# Patient Record
Sex: FEMALE | Race: WHITE | NOT HISPANIC OR LATINO | Employment: OTHER | URBAN - METROPOLITAN AREA
[De-identification: names, ages, dates, MRNs, and addresses within clinical notes are randomized per-mention and may not be internally consistent; named-entity substitution may affect disease eponyms.]

---

## 2017-06-20 ENCOUNTER — GENERIC CONVERSION - ENCOUNTER (OUTPATIENT)
Dept: OTHER | Facility: OTHER | Age: 77
End: 2017-06-20

## 2017-06-23 RX ORDER — OMEPRAZOLE 20 MG/1
20 CAPSULE, DELAYED RELEASE ORAL DAILY
COMMUNITY
End: 2020-01-13

## 2017-06-23 RX ORDER — ASPIRIN 81 MG/1
81 TABLET ORAL DAILY
COMMUNITY
End: 2020-01-11

## 2017-06-23 RX ORDER — LEVOTHYROXINE SODIUM 0.1 MG/1
137 TABLET ORAL DAILY
COMMUNITY
End: 2021-03-02 | Stop reason: HOSPADM

## 2017-06-23 RX ORDER — METOPROLOL TARTRATE 100 MG/1
100 TABLET ORAL DAILY
COMMUNITY
End: 2021-03-02 | Stop reason: HOSPADM

## 2017-06-25 ENCOUNTER — ANESTHESIA EVENT (OUTPATIENT)
Dept: PERIOP | Facility: HOSPITAL | Age: 77
End: 2017-06-25
Payer: MEDICARE

## 2017-06-26 ENCOUNTER — HOSPITAL ENCOUNTER (OUTPATIENT)
Facility: HOSPITAL | Age: 77
Setting detail: OUTPATIENT SURGERY
Discharge: HOME | End: 2017-06-26
Attending: INTERNAL MEDICINE | Admitting: INTERNAL MEDICINE
Payer: MEDICARE

## 2017-06-26 ENCOUNTER — ANESTHESIA (OUTPATIENT)
Dept: PERIOP | Facility: HOSPITAL | Age: 77
End: 2017-06-26
Payer: MEDICARE

## 2017-06-26 ENCOUNTER — GENERIC CONVERSION - ENCOUNTER (OUTPATIENT)
Dept: OTHER | Facility: OTHER | Age: 77
End: 2017-06-26

## 2017-06-26 VITALS
TEMPERATURE: 98 F | HEART RATE: 62 BPM | SYSTOLIC BLOOD PRESSURE: 149 MMHG | RESPIRATION RATE: 21 BRPM | DIASTOLIC BLOOD PRESSURE: 71 MMHG | HEIGHT: 66 IN | BODY MASS INDEX: 34.69 KG/M2 | WEIGHT: 215.83 LBS | OXYGEN SATURATION: 95 %

## 2017-06-26 DIAGNOSIS — Z12.11 ENCOUNTER FOR SCREENING FOR MALIGNANT NEOPLASM OF COLON: ICD-10-CM

## 2017-06-26 PROCEDURE — 88305 TISSUE EXAM BY PATHOLOGIST: CPT | Performed by: INTERNAL MEDICINE

## 2017-06-26 RX ORDER — SODIUM CHLORIDE, SODIUM LACTATE, POTASSIUM CHLORIDE, CALCIUM CHLORIDE 600; 310; 30; 20 MG/100ML; MG/100ML; MG/100ML; MG/100ML
125 INJECTION, SOLUTION INTRAVENOUS CONTINUOUS
Status: DISCONTINUED | OUTPATIENT
Start: 2017-06-26 | End: 2017-06-26 | Stop reason: HOSPADM

## 2017-06-26 RX ORDER — PROPOFOL 10 MG/ML
INJECTION, EMULSION INTRAVENOUS AS NEEDED
Status: DISCONTINUED | OUTPATIENT
Start: 2017-06-26 | End: 2017-06-26 | Stop reason: SURG

## 2017-06-26 RX ADMIN — PROPOFOL 120 MG: 10 INJECTION, EMULSION INTRAVENOUS at 10:33

## 2017-06-26 RX ADMIN — PROPOFOL 50 MG: 10 INJECTION, EMULSION INTRAVENOUS at 10:42

## 2017-06-26 RX ADMIN — SODIUM CHLORIDE, SODIUM LACTATE, POTASSIUM CHLORIDE, AND CALCIUM CHLORIDE 125 ML/HR: .6; .31; .03; .02 INJECTION, SOLUTION INTRAVENOUS at 10:05

## 2017-06-26 RX ADMIN — PROPOFOL 50 MG: 10 INJECTION, EMULSION INTRAVENOUS at 10:38

## 2017-06-26 RX ADMIN — PROPOFOL 30 MG: 10 INJECTION, EMULSION INTRAVENOUS at 10:35

## 2017-07-05 ENCOUNTER — GENERIC CONVERSION - ENCOUNTER (OUTPATIENT)
Dept: OTHER | Facility: OTHER | Age: 77
End: 2017-07-05

## 2018-01-14 NOTE — RESULT NOTES
Verified Results  (1) TISSUE EXAM [id number] 10:43AM Zita Mcclelland     Test Name Result Flag Reference   LAB AP CASE REPORT (Report)     Surgical Pathology Report             Case: [id number]                   Authorizing Provider: Christal Rodríguez MD  Collected:      06/26/2017 1043        Ordering Location:   [facility] Received:      06/26/2017 1153                    Operating Room                                 Pathologist:      Dimple Tesfaye MD                                 Specimen:  Polyp, Colorectal, Splenic flexure polypectomy   LAB AP FINAL DIAGNOSIS      A  Splenic flexure polyp, colon (cold snare polypectomy):    - Portions of polypoid colonic mucosa with minimal surface hyperplasia  - No high-grade dysplasia or malignancy identified  Electronically signed by Dimple Tesfaye MD on 6/28/2017 at 12:29 PM   LAB AP NOTE      Interpretation performed at [facility], [address] [place] [address], [address] [place] [address]  LAB AP SURGICAL ADDITIONAL INFORMATION (Report)     These tests were developed and their performance characteristics   determined by [facility] or One [facility]  They may not be cleared or approved by the [place]  Food and   Drug Administration  The FDA has determined that such clearance or   approval is not necessary  These tests are used for clinical purposes  They should not be regarded as investigational or for research  This   laboratory has been approved by [facility], designated as a high-complexity   laboratory and is qualified to perform these tests  LAB AP GROSS DESCRIPTION (Report)     A  The specimen is received in formalin, labeled with the patient's name   and hospital number, and is designated splenic flexure polypectomy  The   specimen consists of an unoriented tan soft tissue fragment measuring 1 1   x 0 2 x 0 2 cm  Entirely submitted  One cassette      Note: The estimated total formalin fixation time based upon information provided by the submitting clinician and the standard processing schedule   is 9 75 hours      MAC   LAB AP CLINICAL INFORMATION      Encounter for screening for malignant neoplasm of colon   Cold snare

## 2018-04-24 ENCOUNTER — TRANSCRIBE ORDERS (OUTPATIENT)
Dept: ADMINISTRATIVE | Facility: HOSPITAL | Age: 78
End: 2018-04-24

## 2020-01-11 ENCOUNTER — HOSPITAL ENCOUNTER (EMERGENCY)
Facility: HOSPITAL | Age: 80
Discharge: HOME | DRG: 194 | End: 2020-01-11
Attending: EMERGENCY MEDICINE | Admitting: EMERGENCY MEDICINE
Payer: MEDICARE

## 2020-01-11 ENCOUNTER — APPOINTMENT (EMERGENCY)
Dept: RADIOLOGY | Facility: HOSPITAL | Age: 80
DRG: 194 | End: 2020-01-11
Payer: MEDICARE

## 2020-01-11 VITALS
SYSTOLIC BLOOD PRESSURE: 136 MMHG | BODY MASS INDEX: 36.55 KG/M2 | OXYGEN SATURATION: 96 % | TEMPERATURE: 97.8 F | DIASTOLIC BLOOD PRESSURE: 89 MMHG | HEIGHT: 65 IN | WEIGHT: 219.36 LBS | RESPIRATION RATE: 19 BRPM | HEART RATE: 63 BPM

## 2020-01-11 DIAGNOSIS — S42.202A CLOSED FRACTURE OF LEFT PROXIMAL HUMERUS: Primary | ICD-10-CM
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
## 2020-01-11 LAB
ALBUMIN SERPL BCP-MCNC: 3.8 G/DL (ref 3.5–5)
ALP SERPL-CCNC: 108 U/L (ref 46–116)
ALT SERPL W P-5'-P-CCNC: 47 U/L (ref 12–78)
ANION GAP SERPL CALCULATED.3IONS-SCNC: 11 MMOL/L (ref 4–13)
APTT PPP: 26 SECONDS (ref 23–37)
AST SERPL W P-5'-P-CCNC: 33 U/L (ref 5–45)
ATRIAL RATE: 90 BPM
BASOPHILS # BLD AUTO: 0.03 THOUSANDS/ΜL (ref 0–0.1)
BASOPHILS NFR BLD AUTO: 0 % (ref 0–1)
BILIRUB SERPL-MCNC: 0.5 MG/DL (ref 0.2–1)
BUN SERPL-MCNC: 19 MG/DL (ref 5–25)
CALCIUM SERPL-MCNC: 8.9 MG/DL (ref 8.3–10.1)
CHLORIDE SERPL-SCNC: 103 MMOL/L (ref 100–108)
CO2 SERPL-SCNC: 23 MMOL/L (ref 21–32)
CREAT SERPL-MCNC: 0.91 MG/DL (ref 0.6–1.3)
EOSINOPHIL # BLD AUTO: 0.2 THOUSAND/ΜL (ref 0–0.61)
EOSINOPHIL NFR BLD AUTO: 3 % (ref 0–6)
ERYTHROCYTE [DISTWIDTH] IN BLOOD BY AUTOMATED COUNT: 14.1 % (ref 11.6–15.1)
GFR SERPL CREATININE-BSD FRML MDRD: 61 ML/MIN/1.73SQ M
GLUCOSE SERPL-MCNC: 122 MG/DL (ref 65–140)
HCT VFR BLD AUTO: 39.4 % (ref 34.8–46.1)
HGB BLD-MCNC: 12.8 G/DL (ref 11.5–15.4)
IMM GRANULOCYTES # BLD AUTO: 0.03 THOUSAND/UL (ref 0–0.2)
IMM GRANULOCYTES NFR BLD AUTO: 0 % (ref 0–2)
INR PPP: 1.02 (ref 0.84–1.19)
LYMPHOCYTES # BLD AUTO: 4.27 THOUSANDS/ΜL (ref 0.6–4.47)
LYMPHOCYTES NFR BLD AUTO: 58 % (ref 14–44)
MCH RBC QN AUTO: 30.5 PG (ref 26.8–34.3)
MCHC RBC AUTO-ENTMCNC: 32.5 G/DL (ref 31.4–37.4)
MCV RBC AUTO: 94 FL (ref 82–98)
MONOCYTES # BLD AUTO: 0.61 THOUSAND/ΜL (ref 0.17–1.22)
MONOCYTES NFR BLD AUTO: 8 % (ref 4–12)
NEUTROPHILS # BLD AUTO: 2.32 THOUSANDS/ΜL (ref 1.85–7.62)
NEUTS SEG NFR BLD AUTO: 31 % (ref 43–75)
NRBC BLD AUTO-RTO: 0 /100 WBCS
P AXIS: 47 DEGREES
PLATELET # BLD AUTO: 222 THOUSANDS/UL (ref 149–390)
PMV BLD AUTO: 10.8 FL (ref 8.9–12.7)
POTASSIUM SERPL-SCNC: 3.9 MMOL/L (ref 3.5–5.3)
PR INTERVAL: 278 MS
PROT SERPL-MCNC: 8.2 G/DL (ref 6.4–8.2)
PROTHROMBIN TIME: 13.4 SECONDS (ref 11.6–14.5)
QRS AXIS: -40 DEGREES
QRSD INTERVAL: 122 MS
QT INTERVAL: 392 MS
QTC INTERVAL: 479 MS
RBC # BLD AUTO: 4.2 MILLION/UL (ref 3.81–5.12)
SODIUM SERPL-SCNC: 137 MMOL/L (ref 136–145)
T WAVE AXIS: 29 DEGREES
VENTRICULAR RATE: 90 BPM
WBC # BLD AUTO: 7.46 THOUSAND/UL (ref 4.31–10.16)

## 2020-01-11 PROCEDURE — 85730 THROMBOPLASTIN TIME PARTIAL: CPT | Performed by: NURSE PRACTITIONER

## 2020-01-11 PROCEDURE — 85610 PROTHROMBIN TIME: CPT | Performed by: NURSE PRACTITIONER

## 2020-01-11 PROCEDURE — 93005 ELECTROCARDIOGRAM TRACING: CPT

## 2020-01-11 PROCEDURE — 85025 COMPLETE CBC W/AUTO DIFF WBC: CPT | Performed by: NURSE PRACTITIONER

## 2020-01-11 PROCEDURE — 73060 X-RAY EXAM OF HUMERUS: CPT

## 2020-01-11 PROCEDURE — 99285 EMERGENCY DEPT VISIT HI MDM: CPT | Performed by: NURSE PRACTITIONER

## 2020-01-11 PROCEDURE — 93010 ELECTROCARDIOGRAM REPORT: CPT | Performed by: INTERNAL MEDICINE

## 2020-01-11 PROCEDURE — 99284 EMERGENCY DEPT VISIT MOD MDM: CPT

## 2020-01-11 PROCEDURE — 96374 THER/PROPH/DIAG INJ IV PUSH: CPT

## 2020-01-11 PROCEDURE — 80053 COMPREHEN METABOLIC PANEL: CPT | Performed by: NURSE PRACTITIONER

## 2020-01-11 PROCEDURE — 36415 COLL VENOUS BLD VENIPUNCTURE: CPT | Performed by: NURSE PRACTITIONER

## 2020-01-11 PROCEDURE — 96375 TX/PRO/DX INJ NEW DRUG ADDON: CPT

## 2020-01-11 RX ORDER — ONDANSETRON 2 MG/ML
INJECTION INTRAMUSCULAR; INTRAVENOUS
Status: DISCONTINUED
Start: 2020-01-11 | End: 2020-01-11 | Stop reason: HOSPADM

## 2020-01-11 RX ORDER — OXYCODONE HYDROCHLORIDE AND ACETAMINOPHEN 5; 325 MG/1; MG/1
1 TABLET ORAL EVERY 6 HOURS PRN
Qty: 20 TABLET | Refills: 0 | Status: SHIPPED | OUTPATIENT
Start: 2020-01-11 | End: 2020-01-11 | Stop reason: SDUPTHER

## 2020-01-11 RX ORDER — SODIUM CHLORIDE, SODIUM GLUCONATE, SODIUM ACETATE, POTASSIUM CHLORIDE, MAGNESIUM CHLORIDE, SODIUM PHOSPHATE, DIBASIC, AND POTASSIUM PHOSPHATE .53; .5; .37; .037; .03; .012; .00082 G/100ML; G/100ML; G/100ML; G/100ML; G/100ML; G/100ML; G/100ML
1000 INJECTION, SOLUTION INTRAVENOUS ONCE
Status: COMPLETED | OUTPATIENT
Start: 2020-01-11 | End: 2020-01-11

## 2020-01-11 RX ORDER — OXYCODONE HYDROCHLORIDE AND ACETAMINOPHEN 5; 325 MG/1; MG/1
1 TABLET ORAL EVERY 6 HOURS PRN
Qty: 20 TABLET | Refills: 0 | Status: SHIPPED | OUTPATIENT
Start: 2020-01-11 | End: 2020-02-05

## 2020-01-11 RX ORDER — ONDANSETRON 2 MG/ML
4 INJECTION INTRAMUSCULAR; INTRAVENOUS ONCE
Status: COMPLETED | OUTPATIENT
Start: 2020-01-11 | End: 2020-01-11

## 2020-01-11 RX ORDER — METOPROLOL TARTRATE 5 MG/5ML
5 INJECTION INTRAVENOUS ONCE
Status: COMPLETED | OUTPATIENT
Start: 2020-01-11 | End: 2020-01-11

## 2020-01-11 RX ORDER — FENTANYL CITRATE 50 UG/ML
50 INJECTION, SOLUTION INTRAMUSCULAR; INTRAVENOUS ONCE
Status: COMPLETED | OUTPATIENT
Start: 2020-01-11 | End: 2020-01-11

## 2020-01-11 RX ORDER — METOPROLOL TARTRATE 50 MG/1
50 TABLET, FILM COATED ORAL ONCE
Status: COMPLETED | OUTPATIENT
Start: 2020-01-11 | End: 2020-01-11

## 2020-01-11 RX ADMIN — SODIUM CHLORIDE, SODIUM GLUCONATE, SODIUM ACETATE, POTASSIUM CHLORIDE, MAGNESIUM CHLORIDE, SODIUM PHOSPHATE, DIBASIC, AND POTASSIUM PHOSPHATE 1000 ML: .53; .5; .37; .037; .03; .012; .00082 INJECTION, SOLUTION INTRAVENOUS at 09:21

## 2020-01-11 RX ADMIN — METOPROLOL TARTRATE 5 MG: 5 INJECTION INTRAVENOUS at 07:58

## 2020-01-11 RX ADMIN — ONDANSETRON 4 MG: 2 INJECTION INTRAMUSCULAR; INTRAVENOUS at 08:04

## 2020-01-11 RX ADMIN — METOPROLOL TARTRATE 50 MG: 50 TABLET, FILM COATED ORAL at 07:57

## 2020-01-11 RX ADMIN — FENTANYL CITRATE 50 MCG: 50 INJECTION, SOLUTION INTRAMUSCULAR; INTRAVENOUS at 07:53

## 2020-01-11 RX ADMIN — VERAPAMIL HYDROCHLORIDE 120 MG: 120 TABLET, FILM COATED, EXTENDED RELEASE ORAL at 08:29

## 2020-01-11 NOTE — ED PROVIDER NOTES
H&P Exam - Trauma   Claudell Dux 70 y o  female MRN: [id number]  Unit/Bed#: ED 26/ED 26 Encounter: [id number]    Assessment/Plan   Trauma Alert: Trauma Acuity: Trauma Evaluation  Model of Arrival: Mode of Arrival: Direct from scene via    Trauma Team: Current Providers  Attending Provider: Leonid Seaman MD  Nurse Practitioner: AAMIR Cruz  ED Technician: Susan Lisa  Registered Nurse: Zeferino Charles RN  Consultants: None    Trauma Active Problems: Left upper arm pain    Trauma Plan: Xray left humerus     Chief Complaint:   Chief Complaint   Patient presents with    Fall     pt states she slipped and fell and injured her left shoulder  Denies head injuries  negative BT  History of Present Illness   HPI:  Claudell Dux is a 70 y o  female who presents with left upper arm injury after falling while trying to tend to her small dog  Mechanism:Details of Incident: pt slipped and fell and injured her left shouulder onto the grass outside  No LOC  No head injury  No blood thinners  Injury Date: 01/11/20 Injury Time: 0700 Injury Occurence Location - [address] [place] [address] Way: Patient fell from standing position and landed on her left shoulder outside on the grass  No LOC, No Blood thinner  No head injury  HPI  Review of Systems   Constitutional: Negative for activity change, fatigue and fever  HENT: Negative for congestion, ear pain, rhinorrhea and sore throat  Eyes: Negative  Respiratory: Negative for cough, shortness of breath and wheezing  Gastrointestinal: Negative for abdominal pain, diarrhea, nausea and vomiting  Endocrine: Negative  Genitourinary: Negative for difficulty urinating, dyspareunia, dysuria, flank pain, frequency, menstrual problem, pelvic pain, urgency, vaginal bleeding, vaginal discharge and vaginal pain  Musculoskeletal: Negative for arthralgias and myalgias  Skin: Negative for color change and pallor     Neurological: Negative for dizziness, speech difficulty, weakness and headaches  Hematological: Negative for adenopathy  Psychiatric/Behavioral: Negative for confusion  Historical Information     Immunizations: There is no immunization history on file for this patient  Past Medical History:   Diagnosis Date    Breast cancer ([address] )     Disease of thyroid gland     GERD (gastroesophageal reflux disease)     Hypertension     Rapid heart rate      History reviewed  No pertinent family history  Past Surgical History:   Procedure Laterality Date    BREAST SURGERY      CHOLECYSTECTOMY      MD COLONOSCOPY FLX DX W/COLLJ SPEC WHEN PFRMD N/A 6/26/2017    Procedure: COLONOSCOPY;  Surgeon: Heather Lai MD;  Location: [facility];   Service: Gastroenterology       Social History     Socioeconomic History    Marital status: /Civil Union     Spouse name: None    Number of children: None    Years of education: None    Highest education level: None   Occupational History    None   Social Needs    Financial resource strain: None    Food insecurity:     Worry: None     Inability: None    Transportation needs:     Medical: None     Non-medical: None   Tobacco Use    Smoking status: Never Smoker    Smokeless tobacco: Never Used   Substance and Sexual Activity    Alcohol use: No    Drug use: No    Sexual activity: None   Lifestyle    Physical activity:     Days per week: None     Minutes per session: None    Stress: None   Relationships    Social connections:     Talks on phone: None     Gets together: None     Attends Synagogue service: None     Active member of club or organization: None     Attends meetings of clubs or organizations: None     Relationship status: None    Intimate partner violence:     Fear of current or ex partner: None     Emotionally abused: None     Physically abused: None     Forced sexual activity: None   Other Topics Concern    None   Social History Narrative    None       Family History: non-contributory    Meds/Allergies   Prior to Admission Medications   Prescriptions Last Dose Informant Patient Reported? Taking?   levothyroxine 100 mcg tablet   Yes Yes   Sig: Take 88 mcg by mouth daily     metoprolol tartrate (LOPRESSOR) 100 mg tablet   Yes Yes   Sig: Take 50 mg by mouth 2 (two) times a day     omeprazole (PriLOSEC) 20 mg delayed release capsule   Yes Yes   Sig: Take 20 mg by mouth daily   verapamil (CALAN-SR) 180 mg CR tablet   Yes Yes   Sig: Take 180 mg by mouth daily at bedtime      Facility-Administered Medications: None       No Known Allergies    PHYSICAL EXAM    PE limited by: nothing     Objective   Vitals:   First set: Temperature: 97 8 °F (36 6 °C) (01/11/20 0735)  Pulse: (!) 162 (01/11/20 0735)  Respirations: 20 (01/11/20 0735)  Blood Pressure: 131/91 (01/11/20 0735)  SpO2: 97 % (01/11/20 0735)    Primary Survey:   (A) Airway:  Patent  (B) Breathing:  Unlabored equal respirations  (C) Circulation: Pulses:   normal  (D) Disabliity:  GCS Total:  15  (E) Expose:  Completed    Secondary Survey: (Click on Physical Exam tab above)  Physical Exam   Constitutional: She is oriented to person, place, and time  She appears well-developed and well-nourished  She is cooperative  Non-toxic appearance  She does not have a sickly appearance  She does not appear ill  No distress  HENT:   Head: Normocephalic and atraumatic  Right Ear: Tympanic membrane and external ear normal    Left Ear: Tympanic membrane and external ear normal    Nose: No rhinorrhea, sinus tenderness or nasal deformity  No epistaxis  Right sinus exhibits no maxillary sinus tenderness and no frontal sinus tenderness  Left sinus exhibits no maxillary sinus tenderness and no frontal sinus tenderness  Mouth/Throat: Oropharynx is clear and moist and mucous membranes are normal  Normal dentition  Eyes: Pupils are equal, round, and reactive to light  EOM are normal    Neck: Normal range of motion  Neck supple     Cardiovascular: Normal rate, regular rhythm and normal heart sounds  No murmur heard  Pulmonary/Chest: Effort normal and breath sounds normal  No accessory muscle usage  No respiratory distress  She has no wheezes  She has no rales  She exhibits no tenderness  Abdominal: Soft  She exhibits no distension  There is no guarding  Musculoskeletal: Normal range of motion  She exhibits no edema  Left shoulder: She exhibits tenderness and swelling  Lymphadenopathy:     She has no cervical adenopathy  Neurological: She is alert and oriented to person, place, and time  She exhibits normal muscle tone  Skin: Skin is warm and dry  No rash noted  No erythema  Psychiatric: She has a normal mood and affect  Nursing note and vitals reviewed        Invasive Devices     None                 Lab Results:   Results Reviewed     Procedure Component Value Units Date/Time    Comprehensive metabolic panel [[id number]] Collected:  01/11/20 0753    Lab Status:  Final result Specimen:  Blood from Arm, Right Updated:  01/11/20 0820     Sodium 137 mmol/L      Potassium 3 9 mmol/L      Chloride 103 mmol/L      CO2 23 mmol/L      ANION GAP 11 mmol/L      BUN 19 mg/dL      Creatinine 0 91 mg/dL      Glucose 122 mg/dL      Calcium 8 9 mg/dL      AST 33 U/L      ALT 47 U/L      Alkaline Phosphatase 108 U/L      Total Protein 8 2 g/dL      Albumin 3 8 g/dL      Total Bilirubin 0 50 mg/dL      eGFR 61 ml/min/1 73sq m     Narrative:       [place] guidelines for Chronic Kidney Disease (CKD):     Stage 1 with normal or high GFR (GFR > 90 mL/min/1 73 square meters)    Stage 2 Mild CKD (GFR = 60-89 mL/min/1 73 square meters)    Stage 3A Moderate CKD (GFR = 45-59 mL/min/1 73 square meters)    Stage 3B Moderate CKD (GFR = 30-44 mL/min/1 73 square meters)    Stage 4 Severe CKD (GFR = 15-29 mL/min/1 73 square meters)    Stage 5 End Stage CKD (GFR <15 mL/min/1 73 square meters)  Note: GFR calculation is accurate only with a steady state creatinine    Protime-INR [[id number]]  (Normal) Collected:  01/11/20 0753    Lab Status:  Final result Specimen:  Blood from Arm, Right Updated:  01/11/20 0815     Protime 13 4 seconds      INR 1 02    APTT [[id number]]  (Normal) Collected:  01/11/20 0753    Lab Status:  Final result Specimen:  Blood from Arm, Right Updated:  01/11/20 0815     PTT 26 seconds     CBC and differential [[id number]]  (Abnormal) Collected:  01/11/20 0753    Lab Status:  Final result Specimen:  Blood from Arm, Right Updated:  01/11/20 0801     WBC 7 46 Thousand/uL      RBC 4 20 Million/uL      Hemoglobin 12 8 g/dL      Hematocrit 39 4 %      MCV 94 fL      MCH 30 5 pg      MCHC 32 5 g/dL      RDW 14 1 %      MPV 10 8 fL      Platelets 826 Thousands/uL      nRBC 0 /100 WBCs      Neutrophils Relative 31 %      Immat GRANS % 0 %      Lymphocytes Relative 58 %      Monocytes Relative 8 %      Eosinophils Relative 3 %      Basophils Relative 0 %      Neutrophils Absolute 2 32 Thousands/µL      Immature Grans Absolute 0 03 Thousand/uL      Lymphocytes Absolute 4 27 Thousands/µL      Monocytes Absolute 0 61 Thousand/µL      Eosinophils Absolute 0 20 Thousand/µL      Basophils Absolute 0 03 Thousands/µL                  Imaging Studies:   Direct to CT: No  XR humerus LEFT   ED Interpretation by AAMIR Hernandez (01/11 0820)   Proximal humerus fracture      Final Result by Josep Garcia MD (01/11 1520)      Impacted nondisplaced humeral neck fracture  The preliminary findings noted by the ER physician concur with this final interpretation              Workstation performed: [id number]             Other Studies:  None    Code Status: No Order  Advance Directive and Living Will:      Power of :    POLST:      Procedures  ECG 12 Lead Documentation Only  Date/Time: 1/11/2020 9:27 AM  Performed by: AAMIR Hernandez  Authorized by: AAMIR Hernandez     Indications / Diagnosis:  Resolved tachycardia  ECG reviewed by me, the ED Provider: yes    Patient location:  ED  Previous ECG:     Previous ECG:  Compared to current    Similarity:  Changes noted  Interpretation:     Interpretation: normal    Rate:     ECG rate:  90    ECG rate assessment: normal    Rhythm:     Rhythm: sinus rhythm and A-V block    Ectopy:     Ectopy: none    QRS:     QRS axis:  Normal  Conduction:     Conduction: normal    ST segments:     ST segments:  Normal  T waves:     T waves: normal    ECG 12 Lead Documentation Only  Date/Time: 1/11/2020 7:40 AM  Performed by: AAMIR Cruz  Authorized by: AAMIR Cruz     Indications / Diagnosis:  Tachycardia left humerus fracture  ECG reviewed by me, the ED Provider: yes    Patient location:  ED  Previous ECG:     Previous ECG:  Compared to current    Similarity:  Changes noted  Interpretation:     Interpretation: abnormal    Rate:     ECG rate:  151    ECG rate assessment: tachycardic    Conduction:     Conduction: normal    ST segments:     ST segments:  Normal             ED Course         MDM  Number of Diagnoses or Management Options  Closed fracture of left proximal humerus: new and requires workup  Fall, initial encounter: new and requires workup  Diagnosis management comments: Patient least in the sling for supportive therapy of left proximal humerus fracture  Structure to follow up with Orthopedics  No other injury occurred from fall         Amount and/or Complexity of Data Reviewed  Tests in the radiology section of CPT®: reviewed and ordered    Patient Progress  Patient progress: stable        Disposition  [facility] Transfer: No  Final diagnoses:   Closed fracture of left proximal humerus   Fall, initial encounter     Time reflects when diagnosis was documented in both MDM as applicable and the Disposition within this note     Time User Action Codes Description Comment    1/11/2020  8:20 AM  Bold Add [S42 202A] Closed fracture of left proximal humerus     1/11/2020  9:34 AM Bran Fernandez [W19 XXXA] Fall, initial encounter       ED Disposition     ED Disposition Condition Date/Time Comment    Discharge Stable Sat Jan 11, 2020  9:35 AM Mignonzaheer Mendozat discharge to home/self care  Follow-up Information     Follow up With Specialties Details Why Contact Info Additional [address] [facility]  Orthopedic Surgery Schedule an appointment as soon as possible for a visit  For Continued Evaluation Muna [address] [place] [address] [place] [phone] [facility] [place] [address], [id number] [place] [address], [address] [facility]        Discharge Medication List as of 1/11/2020  9:39 AM      CONTINUE these medications which have CHANGED    Details   oxyCODONE-acetaminophen (PERCOCET) 5-325 mg per tablet Take 1 tablet by mouth every 6 (six) hours as needed for severe pain for up to 20 dosesMax Daily Amount: 4 tablets, Starting Sat 1/11/2020, Normal         CONTINUE these medications which have NOT CHANGED    Details   levothyroxine 100 mcg tablet Take 88 mcg by mouth daily  , Historical Med      metoprolol tartrate (LOPRESSOR) 100 mg tablet Take 50 mg by mouth 2 (two) times a day  , Historical Med      omeprazole (PriLOSEC) 20 mg delayed release capsule Take 20 mg by mouth daily, Historical Med      verapamil (CALAN-SR) 180 mg CR tablet Take 180 mg by mouth daily at bedtime, Historical Med           No discharge procedures on file        ED Provider  Electronically Signed by         AAMIR Tamayo  01/12/20 [address] [place] [address] Robert Taylor  01/12/20 8727

## 2020-01-11 NOTE — ED NOTES
Trauma eval called by Sharlene per provider at this time due to heart rate        Karol Pale  01/11/20 0742

## 2020-01-13 ENCOUNTER — APPOINTMENT (EMERGENCY)
Dept: CT IMAGING | Facility: HOSPITAL | Age: 80
DRG: 194 | End: 2020-01-13
Payer: MEDICARE

## 2020-01-13 ENCOUNTER — HOSPITAL ENCOUNTER (INPATIENT)
Facility: HOSPITAL | Age: 80
LOS: 2 days | Discharge: HOME | DRG: 194 | End: 2020-01-15
Attending: EMERGENCY MEDICINE | Admitting: INTERNAL MEDICINE
Payer: MEDICARE

## 2020-01-13 DIAGNOSIS — S42.213A HUMERAL SURGICAL NECK FRACTURE: ICD-10-CM

## 2020-01-13 DIAGNOSIS — Z78.9 IMPAIRED MOBILITY AND ADLS: ICD-10-CM

## 2020-01-13 DIAGNOSIS — Z74.09 IMPAIRED MOBILITY AND ADLS: ICD-10-CM

## 2020-01-13 DIAGNOSIS — R42 LIGHTHEADEDNESS: ICD-10-CM

## 2020-01-13 DIAGNOSIS — J18.9 PNEUMONIA: ICD-10-CM

## 2020-01-13 DIAGNOSIS — S42.215A CLOSED NONDISPLACED FRACTURE OF SURGICAL NECK OF LEFT HUMERUS, UNSPECIFIED FRACTURE MORPHOLOGY, INITIAL ENCOUNTER: ICD-10-CM

## 2020-01-13 DIAGNOSIS — R06.00 DYSPNEA: Primary | ICD-10-CM

## 2020-01-13 DIAGNOSIS — R05.8 PRODUCTIVE COUGH: ICD-10-CM

## 2020-01-13 PROBLEM — I10 HYPERTENSION: Status: ACTIVE | Noted: 2020-01-13

## 2020-01-13 PROBLEM — E03.9 HYPOTHYROIDISM: Status: ACTIVE | Noted: 2020-01-13

## 2020-01-13 PROBLEM — K21.9 GERD (GASTROESOPHAGEAL REFLUX DISEASE): Status: ACTIVE | Noted: 2020-01-13

## 2020-01-13 PROBLEM — E87.1 HYPONATREMIA: Status: ACTIVE | Noted: 2020-01-13

## 2020-01-13 PROBLEM — S42.309A HUMERAL FRACTURE: Status: ACTIVE | Noted: 2020-01-13

## 2020-01-13 LAB
ALBUMIN SERPL BCP-MCNC: 3.3 G/DL (ref 3.5–5)
ALP SERPL-CCNC: 102 U/L (ref 46–116)
ALT SERPL W P-5'-P-CCNC: 32 U/L (ref 12–78)
ANION GAP SERPL CALCULATED.3IONS-SCNC: 9 MMOL/L (ref 4–13)
APTT PPP: 25 SECONDS (ref 23–37)
AST SERPL W P-5'-P-CCNC: 34 U/L (ref 5–45)
ATRIAL RATE: 150 BPM
ATRIAL RATE: 66 BPM
BASOPHILS # BLD AUTO: 0.02 THOUSANDS/ΜL (ref 0–0.1)
BASOPHILS NFR BLD AUTO: 0 % (ref 0–1)
BILIRUB DIRECT SERPL-MCNC: 0.16 MG/DL (ref 0–0.2)
BILIRUB SERPL-MCNC: 0.8 MG/DL (ref 0.2–1)
BILIRUB UR QL STRIP: NEGATIVE
BUN SERPL-MCNC: 12 MG/DL (ref 5–25)
CALCIUM SERPL-MCNC: 8.8 MG/DL (ref 8.3–10.1)
CHLORIDE SERPL-SCNC: 100 MMOL/L (ref 100–108)
CLARITY UR: CLEAR
CO2 SERPL-SCNC: 23 MMOL/L (ref 21–32)
COLOR UR: YELLOW
CREAT SERPL-MCNC: 0.59 MG/DL (ref 0.6–1.3)
EOSINOPHIL # BLD AUTO: 0.07 THOUSAND/ΜL (ref 0–0.61)
EOSINOPHIL NFR BLD AUTO: 1 % (ref 0–6)
ERYTHROCYTE [DISTWIDTH] IN BLOOD BY AUTOMATED COUNT: 14 % (ref 11.6–15.1)
FLUAV RNA NPH QL NAA+PROBE: NORMAL
FLUBV RNA NPH QL NAA+PROBE: NORMAL
GFR SERPL CREATININE-BSD FRML MDRD: 89 ML/MIN/1.73SQ M
GLUCOSE SERPL-MCNC: 115 MG/DL (ref 65–140)
GLUCOSE UR STRIP-MCNC: NEGATIVE MG/DL
HCT VFR BLD AUTO: 38.9 % (ref 34.8–46.1)
HGB BLD-MCNC: 12.1 G/DL (ref 11.5–15.4)
HGB UR QL STRIP.AUTO: NEGATIVE
IMM GRANULOCYTES # BLD AUTO: 0.04 THOUSAND/UL (ref 0–0.2)
IMM GRANULOCYTES NFR BLD AUTO: 1 % (ref 0–2)
INR PPP: 1.1 (ref 0.84–1.19)
KETONES UR STRIP-MCNC: NEGATIVE MG/DL
L PNEUMO1 AG UR QL IA.RAPID: NEGATIVE
LACTATE SERPL-SCNC: 0.9 MMOL/L (ref 0.5–2)
LEUKOCYTE ESTERASE UR QL STRIP: NEGATIVE
LIPASE SERPL-CCNC: 125 U/L (ref 73–393)
LYMPHOCYTES # BLD AUTO: 1.3 THOUSANDS/ΜL (ref 0.6–4.47)
LYMPHOCYTES NFR BLD AUTO: 18 % (ref 14–44)
MAGNESIUM SERPL-MCNC: 2.2 MG/DL (ref 1.6–2.6)
MCH RBC QN AUTO: 30.5 PG (ref 26.8–34.3)
MCHC RBC AUTO-ENTMCNC: 31.1 G/DL (ref 31.4–37.4)
MCV RBC AUTO: 98 FL (ref 82–98)
MONOCYTES # BLD AUTO: 0.56 THOUSAND/ΜL (ref 0.17–1.22)
MONOCYTES NFR BLD AUTO: 8 % (ref 4–12)
NEUTROPHILS # BLD AUTO: 5.31 THOUSANDS/ΜL (ref 1.85–7.62)
NEUTS SEG NFR BLD AUTO: 72 % (ref 43–75)
NITRITE UR QL STRIP: NEGATIVE
NRBC BLD AUTO-RTO: 0 /100 WBCS
NT-PROBNP SERPL-MCNC: 743 PG/ML
P AXIS: 60 DEGREES
PH UR STRIP.AUTO: 7 [PH]
PLATELET # BLD AUTO: 169 THOUSANDS/UL (ref 149–390)
PLATELET # BLD AUTO: 178 THOUSANDS/UL (ref 149–390)
PMV BLD AUTO: 10.7 FL (ref 8.9–12.7)
PMV BLD AUTO: 10.8 FL (ref 8.9–12.7)
POTASSIUM SERPL-SCNC: 4.2 MMOL/L (ref 3.5–5.3)
PR INTERVAL: 198 MS
PROCALCITONIN SERPL-MCNC: <0.05 NG/ML
PROT SERPL-MCNC: 7.9 G/DL (ref 6.4–8.2)
PROT UR STRIP-MCNC: NEGATIVE MG/DL
PROTHROMBIN TIME: 14.2 SECONDS (ref 11.6–14.5)
QRS AXIS: -33 DEGREES
QRS AXIS: -46 DEGREES
QRSD INTERVAL: 120 MS
QRSD INTERVAL: 140 MS
QT INTERVAL: 310 MS
QT INTERVAL: 434 MS
QTC INTERVAL: 454 MS
QTC INTERVAL: 491 MS
RBC # BLD AUTO: 3.97 MILLION/UL (ref 3.81–5.12)
RSV RNA NPH QL NAA+PROBE: NORMAL
S PNEUM AG UR QL: NEGATIVE
SODIUM SERPL-SCNC: 132 MMOL/L (ref 136–145)
SP GR UR STRIP.AUTO: <=1.005 (ref 1–1.03)
T WAVE AXIS: 104 DEGREES
T WAVE AXIS: 16 DEGREES
TROPONIN I SERPL-MCNC: 0.02 NG/ML
TROPONIN I SERPL-MCNC: 0.02 NG/ML
TROPONIN I SERPL-MCNC: <0.02 NG/ML
UROBILINOGEN UR QL STRIP.AUTO: 0.2 E.U./DL
VENTRICULAR RATE: 151 BPM
VENTRICULAR RATE: 66 BPM
WBC # BLD AUTO: 7.3 THOUSAND/UL (ref 4.31–10.16)

## 2020-01-13 PROCEDURE — 99285 EMERGENCY DEPT VISIT HI MDM: CPT

## 2020-01-13 PROCEDURE — 99223 1ST HOSP IP/OBS HIGH 75: CPT | Performed by: INTERNAL MEDICINE

## 2020-01-13 PROCEDURE — 85049 AUTOMATED PLATELET COUNT: CPT | Performed by: INTERNAL MEDICINE

## 2020-01-13 PROCEDURE — 36415 COLL VENOUS BLD VENIPUNCTURE: CPT | Performed by: EMERGENCY MEDICINE

## 2020-01-13 PROCEDURE — 83690 ASSAY OF LIPASE: CPT | Performed by: EMERGENCY MEDICINE

## 2020-01-13 PROCEDURE — 84484 ASSAY OF TROPONIN QUANT: CPT | Performed by: EMERGENCY MEDICINE

## 2020-01-13 PROCEDURE — 83935 ASSAY OF URINE OSMOLALITY: CPT | Performed by: INTERNAL MEDICINE

## 2020-01-13 PROCEDURE — 99285 EMERGENCY DEPT VISIT HI MDM: CPT | Performed by: EMERGENCY MEDICINE

## 2020-01-13 PROCEDURE — 93010 ELECTROCARDIOGRAM REPORT: CPT | Performed by: INTERNAL MEDICINE

## 2020-01-13 PROCEDURE — 87040 BLOOD CULTURE FOR BACTERIA: CPT | Performed by: EMERGENCY MEDICINE

## 2020-01-13 PROCEDURE — 84145 PROCALCITONIN (PCT): CPT | Performed by: EMERGENCY MEDICINE

## 2020-01-13 PROCEDURE — 84484 ASSAY OF TROPONIN QUANT: CPT | Performed by: INTERNAL MEDICINE

## 2020-01-13 PROCEDURE — 84300 ASSAY OF URINE SODIUM: CPT | Performed by: INTERNAL MEDICINE

## 2020-01-13 PROCEDURE — 87449 NOS EACH ORGANISM AG IA: CPT | Performed by: INTERNAL MEDICINE

## 2020-01-13 PROCEDURE — 96374 THER/PROPH/DIAG INJ IV PUSH: CPT

## 2020-01-13 PROCEDURE — 85025 COMPLETE CBC W/AUTO DIFF WBC: CPT | Performed by: EMERGENCY MEDICINE

## 2020-01-13 PROCEDURE — 71275 CT ANGIOGRAPHY CHEST: CPT

## 2020-01-13 PROCEDURE — 93005 ELECTROCARDIOGRAM TRACING: CPT

## 2020-01-13 PROCEDURE — 80076 HEPATIC FUNCTION PANEL: CPT | Performed by: EMERGENCY MEDICINE

## 2020-01-13 PROCEDURE — 81003 URINALYSIS AUTO W/O SCOPE: CPT | Performed by: EMERGENCY MEDICINE

## 2020-01-13 PROCEDURE — 96361 HYDRATE IV INFUSION ADD-ON: CPT

## 2020-01-13 PROCEDURE — 87631 RESP VIRUS 3-5 TARGETS: CPT | Performed by: EMERGENCY MEDICINE

## 2020-01-13 PROCEDURE — 80048 BASIC METABOLIC PNL TOTAL CA: CPT | Performed by: EMERGENCY MEDICINE

## 2020-01-13 PROCEDURE — 83880 ASSAY OF NATRIURETIC PEPTIDE: CPT | Performed by: EMERGENCY MEDICINE

## 2020-01-13 PROCEDURE — 85610 PROTHROMBIN TIME: CPT | Performed by: EMERGENCY MEDICINE

## 2020-01-13 PROCEDURE — 83735 ASSAY OF MAGNESIUM: CPT | Performed by: EMERGENCY MEDICINE

## 2020-01-13 PROCEDURE — 87077 CULTURE AEROBIC IDENTIFY: CPT | Performed by: EMERGENCY MEDICINE

## 2020-01-13 PROCEDURE — 83605 ASSAY OF LACTIC ACID: CPT | Performed by: EMERGENCY MEDICINE

## 2020-01-13 PROCEDURE — 85730 THROMBOPLASTIN TIME PARTIAL: CPT | Performed by: EMERGENCY MEDICINE

## 2020-01-13 PROCEDURE — 96375 TX/PRO/DX INJ NEW DRUG ADDON: CPT

## 2020-01-13 PROCEDURE — 1124F ACP DISCUSS-NO DSCNMKR DOCD: CPT | Performed by: PHYSICIAN ASSISTANT

## 2020-01-13 RX ORDER — ANASTROZOLE 1 MG/1
1 TABLET ORAL DAILY
COMMUNITY

## 2020-01-13 RX ORDER — OXYCODONE HYDROCHLORIDE AND ACETAMINOPHEN 5; 325 MG/1; MG/1
1 TABLET ORAL EVERY 6 HOURS PRN
Status: DISCONTINUED | OUTPATIENT
Start: 2020-01-13 | End: 2020-01-15 | Stop reason: HOSPADM

## 2020-01-13 RX ORDER — ACETAMINOPHEN 325 MG/1
650 TABLET ORAL EVERY 6 HOURS PRN
Status: DISCONTINUED | OUTPATIENT
Start: 2020-01-13 | End: 2020-01-15 | Stop reason: HOSPADM

## 2020-01-13 RX ORDER — ONDANSETRON 2 MG/ML
4 INJECTION INTRAMUSCULAR; INTRAVENOUS EVERY 6 HOURS PRN
Status: DISCONTINUED | OUTPATIENT
Start: 2020-01-13 | End: 2020-01-15 | Stop reason: HOSPADM

## 2020-01-13 RX ORDER — ONDANSETRON 2 MG/ML
4 INJECTION INTRAMUSCULAR; INTRAVENOUS ONCE
Status: COMPLETED | OUTPATIENT
Start: 2020-01-13 | End: 2020-01-13

## 2020-01-13 RX ORDER — SODIUM CHLORIDE 9 MG/ML
75 INJECTION, SOLUTION INTRAVENOUS CONTINUOUS
Status: DISCONTINUED | OUTPATIENT
Start: 2020-01-13 | End: 2020-01-15

## 2020-01-13 RX ORDER — ANASTROZOLE 1 MG/1
1 TABLET ORAL DAILY
Status: DISCONTINUED | OUTPATIENT
Start: 2020-01-13 | End: 2020-01-15 | Stop reason: HOSPADM

## 2020-01-13 RX ORDER — PANTOPRAZOLE SODIUM 40 MG/1
40 TABLET, DELAYED RELEASE ORAL
Status: DISCONTINUED | OUTPATIENT
Start: 2020-01-14 | End: 2020-01-15 | Stop reason: HOSPADM

## 2020-01-13 RX ORDER — IBUPROFEN 800 MG/1
800 TABLET ORAL EVERY 8 HOURS
COMMUNITY
End: 2021-03-02 | Stop reason: HOSPADM

## 2020-01-13 RX ORDER — HEPARIN SODIUM 5000 [USP'U]/ML
5000 INJECTION, SOLUTION INTRAVENOUS; SUBCUTANEOUS EVERY 8 HOURS SCHEDULED
Status: DISCONTINUED | OUTPATIENT
Start: 2020-01-13 | End: 2020-01-15 | Stop reason: HOSPADM

## 2020-01-13 RX ORDER — AZITHROMYCIN 250 MG/1
500 TABLET, FILM COATED ORAL EVERY 24 HOURS
Status: DISCONTINUED | OUTPATIENT
Start: 2020-01-14 | End: 2020-01-14

## 2020-01-13 RX ORDER — IBUPROFEN 400 MG/1
800 TABLET ORAL EVERY 6 HOURS PRN
Status: DISCONTINUED | OUTPATIENT
Start: 2020-01-13 | End: 2020-01-15 | Stop reason: HOSPADM

## 2020-01-13 RX ORDER — METOPROLOL TARTRATE 50 MG/1
50 TABLET, FILM COATED ORAL 2 TIMES DAILY
Status: DISCONTINUED | OUTPATIENT
Start: 2020-01-13 | End: 2020-01-15 | Stop reason: HOSPADM

## 2020-01-13 RX ADMIN — HEPARIN SODIUM 5000 UNITS: 5000 INJECTION INTRAVENOUS; SUBCUTANEOUS at 23:51

## 2020-01-13 RX ADMIN — IOHEXOL 100 ML: 350 INJECTION, SOLUTION INTRAVENOUS at 10:24

## 2020-01-13 RX ADMIN — MORPHINE SULFATE 2 MG: 2 INJECTION, SOLUTION INTRAMUSCULAR; INTRAVENOUS at 09:42

## 2020-01-13 RX ADMIN — OXYCODONE HYDROCHLORIDE AND ACETAMINOPHEN 1 TABLET: 5; 325 TABLET ORAL at 20:08

## 2020-01-13 RX ADMIN — ONDANSETRON 4 MG: 2 INJECTION INTRAMUSCULAR; INTRAVENOUS at 09:42

## 2020-01-13 RX ADMIN — SODIUM CHLORIDE 1000 ML: 0.9 INJECTION, SOLUTION INTRAVENOUS at 09:44

## 2020-01-13 RX ADMIN — HEPARIN SODIUM 5000 UNITS: 5000 INJECTION INTRAVENOUS; SUBCUTANEOUS at 15:59

## 2020-01-13 RX ADMIN — SODIUM CHLORIDE 75 ML/HR: 0.9 INJECTION, SOLUTION INTRAVENOUS at 15:56

## 2020-01-13 RX ADMIN — Medication 1000 MG: at 11:52

## 2020-01-13 RX ADMIN — AZITHROMYCIN 500 MG: 500 INJECTION, POWDER, LYOPHILIZED, FOR SOLUTION INTRAVENOUS at 11:52

## 2020-01-13 RX ADMIN — METOPROLOL TARTRATE 50 MG: 50 TABLET, FILM COATED ORAL at 19:07

## 2020-01-13 NOTE — H&P
H&P- Mikaela Valles 1940, 70 y o  female MRN: [id number]    Unit/Bed#: ED 06 Encounter: [id number]    Primary Care Provider: Edi Arnold MD   Date and time admitted to hospital: 1/13/2020  8:23 AM        Hyponatremia  Assessment & Plan  Presented with sodium of 132  Gentle IV fluids  Urine sodium and osmolality  Recheck labs in a m  Humeral fracture  Assessment & Plan  Pain management  Orthopedic consult  Left arm in a sling  P T /OT    Hypothyroidism  Assessment & Plan  On Synthroid    GERD (gastroesophageal reflux disease)  Assessment & Plan  On Protonix    Hypertension  Assessment & Plan  Continue on Calan and Lopressor  Monitor vitals as per protocol      * Pneumonia  Assessment & Plan  Patient had CTA chest PE which showed- no pulmonary embolism, Nodule identified right upper lobe with adjacent minimal patchy airspace opacity  This may represent a pulmonary mass with adjacent atelectasis, although alternatively, both opacities may represent atelectasis or developing pneumonia  Initial   evaluation with follow-up CT of the chest recommended in 3 months  Continue on Rocephin and Zithromax  Follow-up culture results  Trend procalcitonin  Oxygen supplementation and bronchodilators p r n  Pulmonary consult      Anticipated length of stay greater than 2 midnights  Chief Complaint   Patient presents with    Shortness of Breath     Patient presents to ED with c/o SOB and increase pain in her left shoulder  States she was seen here Saturday for a fall  HPI:  Mikaela Valles is a 70 y o  female with past medical history of hypothyroidism, sinus tachycardia, breast cancer status post left lumpectomy and radiation therapy on hormone therapy, hypertension, GERD presented to the emergency department for evaluation of shortness of breath, lightheadedness, cough which is productive of yellowish sputum associated with chills and generalized weakness    Patient states that she was seen in this hospital on Saturday after she had a mechanical fall at home and was found to have impacted left humeral neck fracture  Patient was placed in a sling/immobilizer and was given pain medication and was discharged home  Patient states that she was taking her pain medication but stopped did as it was making her very nauseous  She is unable to eat because of nausea  She states that she started feeling lightheaded upon standing up  She also complained of having some chest pain which was worse with coughing for past couple of days and associated shortness of breath  Patient did not complain of any chest pain at this time of my exam   She admits to having some chills but denies having any fever  Denies any abdominal pain, diarrhea, urinary complaints, dysuria, rectal bleeding, weakness of extremities or paresthesias  In ER patient had CTA chest to rule out PE which revealed no pulmonary embolism but possible pneumonia versus mass  Patient was started on Rocephin and Zithromax  Historical Information   Past Medical History:   Diagnosis Date    Breast cancer ([address] )     Disease of thyroid gland     GERD (gastroesophageal reflux disease)     Hypertension     Rapid heart rate      Past Surgical History:   Procedure Laterality Date    BREAST SURGERY      CHOLECYSTECTOMY      WA COLONOSCOPY FLX DX W/COLLJ SPEC WHEN PFRMD N/A 6/26/2017    Procedure: COLONOSCOPY;  Surgeon: Jermaine Carpio MD;  Location: [facility]; Service: Gastroenterology     Social History   Social History     Substance and Sexual Activity   Alcohol Use No     Social History     Substance and Sexual Activity   Drug Use No     Social History     Tobacco Use   Smoking Status Never Smoker   Smokeless Tobacco Never Used     History reviewed  No pertinent family history  Meds/Allergies   No Known Allergies    Meds:  No current facility-administered medications for this encounter       Current Outpatient Medications:     anastrozole (ARIMIDEX) 1 mg tablet, Take 1 mg by mouth daily, Disp: , Rfl:     Calcium Carbonate-Vitamin D (CALTRATE 600+D PO), Take 1 tablet by mouth daily, Disp: , Rfl:     ibuprofen (MOTRIN) 800 mg tablet, Take 800 mg by mouth every 6 (six) hours as needed for mild pain, Disp: , Rfl:     levothyroxine 100 mcg tablet, Take 137 mcg by mouth daily , Disp: , Rfl:     metoprolol tartrate (LOPRESSOR) 100 mg tablet, Take 50 mg by mouth 2 (two) times a day  , Disp: , Rfl:     oxyCODONE-acetaminophen (PERCOCET) 5-325 mg per tablet, Take 1 tablet by mouth every 6 (six) hours as needed for severe pain for up to 20 dosesMax Daily Amount: 4 tablets, Disp: 20 tablet, Rfl: 0    verapamil (CALAN-SR) 180 mg CR tablet, Take 180 mg by mouth daily , Disp: , Rfl:       (Not in a hospital admission)      Review of Systems   Constitutional: Positive for activity change, appetite change, chills and fatigue  HENT: Negative  Eyes: Negative  Respiratory: Positive for cough and shortness of breath  Cardiovascular: Negative  Gastrointestinal: Positive for nausea and vomiting  Endocrine: Negative  Genitourinary: Negative  Musculoskeletal: Negative  Skin: Negative  Allergic/Immunologic: Negative  Neurological: Negative  Hematological: Negative  Psychiatric/Behavioral: Negative          Current Vitals:   Blood Pressure: 168/58 (01/13/20 1229)  Pulse: 65 (01/13/20 1229)  Temperature: 98 1 °F (36 7 °C) (01/13/20 0832)  Temp Source: Oral (01/13/20 9324)  Respirations: 17 (01/13/20 1229)  Height: 5' 5" (165 1 cm) (01/13/20 5891)  Weight - Scale: 99 5 kg (219 lb 5 7 oz) (01/13/20 0832)  SpO2: 98 % (01/13/20 1229)  SPO2 RA Rest      ED from 1/13/2020 in [facility]   SpO2  98 %   SpO2 Activity  At Rest   O2 Device  None (Room air)   O2 Flow Rate            Intake/Output Summary (Last 24 hours) at 1/13/2020 1436  Last data filed at 1/13/2020 1225  Gross per 24 hour   Intake 1000 ml   Output    Net 1000 ml     Body mass index is 36 5 kg/m²  Physical Exam   Constitutional: She is oriented to person, place, and time  She appears well-developed and well-nourished  No distress  HENT:   Head: Normocephalic and atraumatic  Nose: Nose normal    Mouth/Throat: Oropharynx is clear and moist    Eyes: Pupils are equal, round, and reactive to light  Conjunctivae and EOM are normal  No scleral icterus  Neck: Normal range of motion  Neck supple  No JVD present  No tracheal deviation present  Cardiovascular: Normal rate, regular rhythm, normal heart sounds and intact distal pulses  Pulmonary/Chest: Effort normal and breath sounds normal  No respiratory distress  She has no wheezes  She has no rales  She exhibits no tenderness  Some rhonchi present on right side   Abdominal: Soft  Bowel sounds are normal  She exhibits no mass  There is no tenderness  There is no rebound and no guarding  Musculoskeletal: Normal range of motion  She exhibits no edema, tenderness or deformity  Left upper extremity in a sling  Pain at shoulder joint on movement/palpation   Lymphadenopathy:     She has no cervical adenopathy  Neurological: She is alert and oriented to person, place, and time  No cranial nerve deficit  No focal deficits   Skin: Skin is warm  No rash noted  No erythema  No pallor  Psychiatric: She has a normal mood and affect  Judgment normal    Nursing note and vitals reviewed        Lab Results:   CBC:   Lab Results   Component Value Date    WBC 7 30 01/13/2020    HGB 12 1 01/13/2020    HCT 38 9 01/13/2020    MCV 98 01/13/2020     01/13/2020    MCH 30 5 01/13/2020    MCHC 31 1 (L) 01/13/2020    RDW 14 0 01/13/2020    MPV 10 8 01/13/2020    NRBC 0 01/13/2020     CMP:  Lab Results   Component Value Date     01/13/2020    CO2 23 01/13/2020    BUN 12 01/13/2020    CREATININE 0 59 (L) 01/13/2020    CALCIUM 8 8 01/13/2020    AST 34 01/13/2020    ALT 32 01/13/2020    ALKPHOS 102 01/13/2020    EGFR 89 01/13/2020     Lab Results   Component Value Date    TROPONINI <0 02 01/13/2020     Coagulation:   Lab Results   Component Value Date    INR 1 10 01/13/2020    Urinalysis:  Lab Results   Component Value Date    COLORU Yellow 01/13/2020    CLARITYU Clear 01/13/2020    SPECGRAV <=1 005 01/13/2020    PHUR 7 0 01/13/2020    LEUKOCYTESUR Negative 01/13/2020    NITRITE Negative 01/13/2020    GLUCOSEU Negative 01/13/2020    KETONESU Negative 01/13/2020    BILIRUBINUR Negative 01/13/2020    BLOODU Negative 01/13/2020      Amylase: No results found for: AMYLASE  Lipase:   Lab Results   Component Value Date    LIPASE 125 01/13/2020        Imaging: Xr Humerus Left    Result Date: 1/11/2020  Narrative: LEFT HUMERUS INDICATION:   fall   pain in left arm and shoulder after fall  COMPARISON:  None VIEWS:  XR HUMERUS LEFT FINDINGS: Nondisplaced impacted humeral neck fracture noted  No dislocation  No degenerative changes  No lytic or blastic lesions are seen  Soft tissues are unremarkable  Impression: Impacted nondisplaced humeral neck fracture  The preliminary findings noted by the ER physician concur with this final interpretation  Workstation performed: [id number]     Cta Ed Chest Pe Study    Result Date: 1/13/2020  Narrative: CTA - CHEST WITH IV CONTRAST - PULMONARY ANGIOGRAM INDICATION:   cough, SOB, fell 2 days ago, broke arm, also h/o breast CA  COMPARISON: None  TECHNIQUE: CTA examination of the chest was performed using angiographic technique according to a protocol specifically tailored to evaluate for pulmonary embolism  Axial, sagittal, and coronal 2D reformatted images were created from the source data and  submitted for interpretation  In addition, coronal 3D MIP postprocessing was performed on the acquisition scanner  Radiation dose length product (DLP) for this visit:  490 mGy-cm     This examination, like all CT scans performed in the [facility], was performed utilizing techniques to minimize radiation dose exposure, including the use of iterative reconstruction and automated exposure control  IV Contrast:  100 mL of iohexol (OMNIPAQUE)  FINDINGS: PULMONARY ARTERIAL TREE:  No pulmonary embolus is seen  LUNGS:  Nodular opacity right upper lobe series 3 image 31 measures 11 x 6 x 14 mm  PLEURA:  Unremarkable  HEART/GREAT VESSELS:  Unremarkable for patient's age  MEDIASTINUM AND PAVAN:  Unremarkable  CHEST WALL AND LOWER NECK:   Unremarkable  VISUALIZED STRUCTURES IN THE UPPER ABDOMEN:  Unremarkable  OSSEOUS STRUCTURES:  No acute fracture or destructive osseous lesion  Impression: No evidence of pulmonary embolism  Nodule identified right upper lobe with adjacent minimal patchy airspace opacity  This may represent a pulmonary mass with adjacent atelectasis, although alternatively, both opacities may represent atelectasis or developing pneumonia  Initial evaluation with follow-up CT of the chest recommended in 3 months  The study was marked in EPIC for significant notification  Workstation performed: [id number]     EKG, Pathology, and Other Studies: I have personally reviewed the results  VTE Pharmacologic Prophylaxis: Heparin  VTE Mechanical Prophylaxis: sequential compression device    Code Status: No Order    Counseling / Coordination of Care  Total floor / unit time spent today 75 minutes  Greater than 50% of total time was spent with the patient and / or family counseling and / or coordination of care       "This note has been constructed using a voice recognition system"      Luciana Marinelli MD  1/13/2020, 2:36 PM

## 2020-01-13 NOTE — ED NOTES
ORTHOSTATIC VITAL SIGNS BLOOD PRESSURE HEART RATE           LYING 194/86 72           SITTING 162/77 83           STANDING 160/77 601 [address]  01/13/20 0707

## 2020-01-13 NOTE — SOCIAL WORK
CM met w/pt to discuss d/c planning  Pt reports she lives with  in mobile home w/4 ROMERO  Pt reports she was retired, driving and independent in AD[employer]s [place]  Pt has SPC which she uses sometimes  No hx HHC or rehab  Pt denies hx inpatient mental health/substance abuse treatment  Preferred pharmacy is [facility]  Pt reports  will drive home @ discharge  Emergency contact is pt's  Jair Rios [phone]  CM reviewed d/c planning process including the following: identifying help at home, patient preference for d/c planning needs, Discharge Lounge, [facility] Meds to Bed program, availability of treatment team to discuss questions or concerns patient and/or family may have regarding understanding medications and recognizing signs and symptoms once discharged  CM also encouraged patient to follow up with all recommended appointments after discharge  Patient advised of importance for patient and family to participate in managing patients medical well being  CM following for d/c needs

## 2020-01-13 NOTE — ED PROVIDER NOTES
History  Chief Complaint   Patient presents with    Shortness of Breath     Patient presents to ED with c/o SOB and increase pain in her left shoulder  States she was seen here Saturday for a fall  Patient is a 71-year-old female with past medical history of hypothyroidism, sinus tachycardia for which he takes verapamil a metoprolol, GERD, breast cancer status post left lumpectomy and radiation therapy 3 years ago and currently on hormone therapy, hypertension, GERD, cholecystectomy, presents to the emergency department for evaluation of shortness of breath, lightheadedness, inability to care for herself at home in the setting of recent left arm fracture  Patient states she was seen here on Saturday after she tripped over her dog and fell  She injured her left arm and on x-ray was found to have an impacted humeral neck fracture on the left  She was placed in a sling/immobilizer and prescribed oxycodone to go home with  She has been taking it however she did not take it today because the oxycodone has been making her very nauseous  She has been unable to eat due to the nausea  She also reports feeling very lightheaded and dizzy especially upon standing  Patient also states for the past several days she has had central sharp chest pain and has also been feeling short of breath  She does report for 1 week she has had a productive cough  She denies any known fever, shaking chills, runny nose, congestion, sore throat, earache, neck or back pain, palpitations, abdominal pain, vomiting, diarrhea, constipation, blood per rectum or melena, dysuria, change in urinary frequency, hematuria, flank pain, skin rash or color change, extremity swelling or pain, lateralizing extremity weakness or paresthesia or other focal neurologic deficits  History provided by:  Patient   used: No        Prior to Admission Medications   Prescriptions Last Dose Informant Patient Reported? Taking? levothyroxine 100 mcg tablet   Yes No   Sig: Take 88 mcg by mouth daily     metoprolol tartrate (LOPRESSOR) 100 mg tablet   Yes No   Sig: Take 50 mg by mouth 2 (two) times a day     omeprazole (PriLOSEC) 20 mg delayed release capsule   Yes No   Sig: Take 20 mg by mouth daily   oxyCODONE-acetaminophen (PERCOCET) 5-325 mg per tablet   No No   Sig: Take 1 tablet by mouth every 6 (six) hours as needed for severe pain for up to 20 dosesMax Daily Amount: 4 tablets   verapamil (CALAN-SR) 180 mg CR tablet   Yes No   Sig: Take 180 mg by mouth daily at bedtime      Facility-Administered Medications: None       Past Medical History:   Diagnosis Date    Breast cancer ([facility] [address] )     Disease of thyroid gland     GERD (gastroesophageal reflux disease)     Hypertension     Rapid heart rate        Past Surgical History:   Procedure Laterality Date    BREAST SURGERY      CHOLECYSTECTOMY      MA COLONOSCOPY FLX DX W/COLLJ SPEC WHEN PFRMD N/A 6/26/2017    Procedure: COLONOSCOPY;  Surgeon: Jonathan Moss MD;  Location: [facility]; Service: Gastroenterology       History reviewed  No pertinent family history  I have reviewed and agree with the history as documented  Social History     Tobacco Use    Smoking status: Never Smoker    Smokeless tobacco: Never Used   Substance Use Topics    Alcohol use: No    Drug use: No        Review of Systems   Constitutional: Positive for appetite change  Negative for chills and fever  HENT: Negative for congestion, ear pain, rhinorrhea and sore throat  Eyes: Negative for photophobia, pain and visual disturbance  Respiratory: Positive for cough and shortness of breath  Negative for chest tightness and wheezing  Cardiovascular: Positive for chest pain  Negative for palpitations  Gastrointestinal: Positive for nausea  Negative for abdominal distention, abdominal pain, blood in stool, constipation, diarrhea and vomiting     Genitourinary: Negative for dysuria, flank pain, frequency and hematuria  Musculoskeletal: Positive for arthralgias  Negative for back pain, neck pain and neck stiffness  +Left proximal arm pain (from recent fracture)   Skin: Negative for color change, pallor and rash  Allergic/Immunologic: Negative for immunocompromised state  Neurological: Positive for dizziness, light-headedness and headaches  Negative for syncope, weakness and numbness  Hematological: Negative for adenopathy  Psychiatric/Behavioral: Negative for confusion and decreased concentration  All other systems reviewed and are negative  Physical Exam  Physical Exam   Constitutional: She is oriented to person, place, and time  She appears well-developed and well-nourished  No distress  HENT:   Head: Normocephalic and atraumatic  Right Ear: External ear normal    Left Ear: External ear normal    Mouth/Throat: Oropharynx is clear and moist  No oropharyngeal exudate  Eyes: Pupils are equal, round, and reactive to light  Conjunctivae and EOM are normal    Neck: Normal range of motion  Neck supple  No JVD present  Cardiovascular: Normal rate, regular rhythm, normal heart sounds and intact distal pulses  Exam reveals no gallop and no friction rub  No murmur heard  Pulmonary/Chest: Effort normal and breath sounds normal  No respiratory distress  She has no wheezes  She has no rales  She exhibits no tenderness  Coarse breath sounds throughout with expiratory rhonchi throughout  Abdominal: Soft  Bowel sounds are normal  She exhibits no distension  There is no tenderness  There is no rebound and no guarding  Musculoskeletal: Normal range of motion  She exhibits no edema or tenderness  Range of motion not tested on the left upper extremity due to known recent humerus fracture  All extremities are neurovascularly intact  Lymphadenopathy:     She has no cervical adenopathy  Neurological: She is alert and oriented to person, place, and time     No gross motor or sensory deficits  Skin: Skin is warm and dry  No rash noted  She is not diaphoretic  No erythema  No pallor  Psychiatric: She has a normal mood and affect  Her behavior is normal    Nursing note and vitals reviewed  Vital Signs  ED Triage Vitals [01/13/20 0832]   Temperature Pulse Respirations Blood Pressure SpO2   98 1 °F (36 7 °C) 71 18 (!) 180/73 97 %      Temp Source Heart Rate Source Patient Position - Orthostatic VS BP Location FiO2 (%)   Oral Monitor Lying Right arm --      Pain Score       Worst Possible Pain         Vitals:    01/13/20 0832 01/13/20 1034 01/13/20 1229   BP: (!) 180/73 (!) 178/64 168/58   BP Location: Right arm Left arm Left arm   Pulse: 71 68 65   Resp: 18 17 17   Temp: 98 1 °F (36 7 °C)     TempSrc: Oral     SpO2: 97% 98% 98%   Weight: 99 5 kg (219 lb 5 7 oz)     Height: 5' 5" (1 651 m)         Visual Acuity      ED Medications  Medications   sodium chloride 0 9 % bolus 1,000 mL (0 mL Intravenous Stopped 1/13/20 1225)   ondansetron (ZOFRAN) injection 4 mg (4 mg Intravenous Given 1/13/20 0942)   morphine injection 2 mg (2 mg Intravenous Given 1/13/20 0942)   iohexol (OMNIPAQUE) 350 MG/ML injection (MULTI-DOSE) 100 mL (100 mL Intravenous Given 1/13/20 1024)   ceftriaxone (ROCEPHIN) 1 g/50 mL in dextrose IVPB (0 mg Intravenous Stopped 1/13/20 1226)   azithromycin (ZITHROMAX) 500 mg in sodium chloride 0 9% 250mL IVPB 500 mg (500 mg Intravenous New Bag 1/13/20 1152)       Diagnostic Studies  Results Reviewed     Procedure Component Value Units Date/Time    Lactic acid, plasma [[id number]]  (Normal) Collected:  01/13/20 1152    Lab Status:  Final result Specimen:  Blood from Arm, Left Updated:  01/13/20 1246     LACTIC ACID 0 9 mmol/L     Narrative:       Result may be elevated if tourniquet was used during collection  Blood culture #1 [[id number]] Collected:  01/13/20 1152    Lab Status:   In process Specimen:  Blood from Arm, Left Updated:  01/13/20 1201    Procalcitonin [[id number]] Collected:  01/13/20 1152    Lab Status: In process Specimen:  Blood from Arm, Left Updated:  01/13/20 1201    Blood culture #2 [[id number]] Collected:  01/13/20 1153    Lab Status:   In process Specimen:  Blood from Arm, Right Updated:  01/13/20 1201    Influenza A/B and RSV PCR [[id number]]  (Normal) Collected:  01/13/20 0938    Lab Status:  Final result Specimen:  Nose Updated:  01/13/20 1029     INFLUENZA A PCR None Detected     INFLUENZA B PCR None Detected     RSV PCR None Detected    Basic metabolic panel [[id number]]  (Abnormal) Collected:  01/13/20 0938    Lab Status:  Final result Specimen:  Blood from Arm, Right Updated:  01/13/20 1023     Sodium 132 mmol/L      Potassium 4 2 mmol/L      Chloride 100 mmol/L      CO2 23 mmol/L      ANION GAP 9 mmol/L      BUN 12 mg/dL      Creatinine 0 59 mg/dL      Glucose 115 mg/dL      Calcium 8 8 mg/dL      eGFR 89 ml/min/1 73sq m     Narrative:       [place] guidelines for Chronic Kidney Disease (CKD):     Stage 1 with normal or high GFR (GFR > 90 mL/min/1 73 square meters)    Stage 2 Mild CKD (GFR = 60-89 mL/min/1 73 square meters)    Stage 3A Moderate CKD (GFR = 45-59 mL/min/1 73 square meters)    Stage 3B Moderate CKD (GFR = 30-44 mL/min/1 73 square meters)    Stage 4 Severe CKD (GFR = 15-29 mL/min/1 73 square meters)    Stage 5 End Stage CKD (GFR <15 mL/min/1 73 square meters)  Note: GFR calculation is accurate only with a steady state creatinine    Hepatic function panel [[id number]]  (Abnormal) Collected:  01/13/20 0938    Lab Status:  Final result Specimen:  Blood from Arm, Right Updated:  01/13/20 1023     Total Bilirubin 0 80 mg/dL      Bilirubin, Direct 0 16 mg/dL      Alkaline Phosphatase 102 U/L      AST 34 U/L      ALT 32 U/L      Total Protein 7 9 g/dL      Albumin 3 3 g/dL     NT-BNP PRO [[id number]]  (Abnormal) Collected:  01/13/20 0938    Lab Status:  Final result Specimen:  Blood from Arm, Right Updated:  01/13/20 1023 NT-proBNP 743 pg/mL     Lipase [[id number]]  (Normal) Collected:  01/13/20 0938    Lab Status:  Final result Specimen:  Blood from Arm, Right Updated:  01/13/20 1023     Lipase 125 u/L     Magnesium [[id number]]  (Normal) Collected:  01/13/20 0938    Lab Status:  Final result Specimen:  Blood from Arm, Right Updated:  01/13/20 1023     Magnesium 2 2 mg/dL     Troponin I [[id number]]  (Normal) Collected:  01/13/20 0938    Lab Status:  Final result Specimen:  Blood from Arm, Right Updated:  01/13/20 1016     Troponin I <0 02 ng/mL     UA (URINE) with reflex to Scope [[id number]] Collected:  01/13/20 0951    Lab Status:  Final result Specimen:  Urine, Clean Catch Updated:  01/13/20 1002     Color, UA Yellow     Clarity, UA Clear     Specific Gravity, UA <=1 005     pH, UA 7 0     Leukocytes, UA Negative     Nitrite, UA Negative     Protein, UA Negative mg/dl      Glucose, UA Negative mg/dl      Ketones, UA Negative mg/dl      Urobilinogen, UA 0 2 E U /dl      Bilirubin, UA Negative     Blood, UA Negative    Protime-INR [[id number]]  (Normal) Collected:  01/13/20 0938    Lab Status:  Final result Specimen:  Blood from Arm, Right Updated:  01/13/20 1000     Protime 14 2 seconds      INR 1 10    APTT [[id number]]  (Normal) Collected:  01/13/20 0938    Lab Status:  Final result Specimen:  Blood from Arm, Right Updated:  01/13/20 1000     PTT 25 seconds     CBC and differential [[id number]]  (Abnormal) Collected:  01/13/20 0938    Lab Status:  Final result Specimen:  Blood from Arm, Right Updated:  01/13/20 0944     WBC 7 30 Thousand/uL      RBC 3 97 Million/uL      Hemoglobin 12 1 g/dL      Hematocrit 38 9 %      MCV 98 fL      MCH 30 5 pg      MCHC 31 1 g/dL      RDW 14 0 %      MPV 10 8 fL      Platelets 971 Thousands/uL      nRBC 0 /100 WBCs      Neutrophils Relative 72 %      Immat GRANS % 1 %      Lymphocytes Relative 18 %      Monocytes Relative 8 %      Eosinophils Relative 1 %      Basophils Relative 0 %      Neutrophils Absolute 5 31 Thousands/µL      Immature Grans Absolute 0 04 Thousand/uL      Lymphocytes Absolute 1 30 Thousands/µL      Monocytes Absolute 0 56 Thousand/µL      Eosinophils Absolute 0 07 Thousand/µL      Basophils Absolute 0 02 Thousands/µL                  CTA ED chest PE study   Final Result by Jean-Claude Childress DO (01/13 8780)      No evidence of pulmonary embolism  Nodule identified right upper lobe with adjacent minimal patchy airspace opacity  This may represent a pulmonary mass with adjacent atelectasis, although alternatively, both opacities may represent atelectasis or developing pneumonia  Initial    evaluation with follow-up CT of the chest recommended in 3 months  The study was marked in EPIC for significant notification  Workstation performed: [id number]                    Procedures  ECG 12 Lead Documentation Only  Date/Time: 1/13/2020 9:22 AM  Performed by: Clementina Mckoy DO  Authorized by: Clementina Mckoy DO     ECG reviewed by me, the ED Provider: yes    Patient location:  ED  Previous ECG:     Previous ECG:  Compared to current    Comparison ECG info:  1-; PVCs no presents    Similarity:  No change  Rate:     ECG rate:  66    ECG rate assessment: normal    Ectopy:     Ectopy: PVCs      PVCs:  Infrequent  QRS:     QRS axis:  Left    QRS intervals: Wide  Conduction:     Conduction: abnormal      Abnormal conduction: non-specific intraventricular conduction delay    ST segments:     ST segments:  Normal  T waves:     T waves: non-specific               ED Course  ED Course as of Jan 13 1258   Mon Jan 13, 2020   0952 Hemoglobin: 12 1   0953 WBC: 7 30   1130 Updated patient about CT findings  Will start antibiotics as clinically she is presenting more like pneumonia  Recommended admission and patient is agreeable                              Amirah Yoder Criteria for PE      Most Recent Value   Wells' Criteria for PE   Clinical signs and symptoms of DVT  0 Filed at: 01/13/2020 1257   PE is primary diagnosis or equally likely  3 Filed at: 01/13/2020 1257   HR >100  0 Filed at: 01/13/2020 1257   Immobilization at least 3 days or Surgery in the previous 4 weeks  1 5 Filed at: 01/13/2020 1257   Previous, objectively diagnosed PE or DVT  0 Filed at: 01/13/2020 1257   Hemoptysis  0 Filed at: 01/13/2020 1257   Malignancy with treatment within 6 months or palliative  0 Filed at: 01/13/2020 1257   Wells' Criteria Total  4 5 Filed at: 01/13/2020 1257            [facility]  Number of Diagnoses or Management Options  Diagnosis management comments: 72-year-old female presents to the ED with multiple complaints including recent lightheadedness and dizziness, nausea with inability to tolerate her oxycodone, uncontrolled pain in her left arm due to recent fracture, chest pain and dyspnea  Patient also has productive cough  Differential is vast and includes acute bronchitis, pneumonia, pulmonary embolism, acute coronary syndrome or unstable angina, dehydration, metabolic abnormality, rib fracture or musculoskeletal pain  Will workup with cardiac labs, CTA of the chest to rule out PE, pneumonia or rib fractures  Will give IV fluids, Zofran and small dose of morphine for pain control  Will most likely recommend admission for observation and possibly rehab placement         Amount and/or Complexity of Data Reviewed  Clinical lab tests: ordered and reviewed  Tests in the radiology section of CPT®: ordered and reviewed  Tests in the medicine section of CPT®: ordered and reviewed  Review and summarize past medical records: yes  Independent visualization of images, tracings, or specimens: yes          Disposition  Final diagnoses:   Dyspnea   Productive cough   Pneumonia   Lightheadedness   Humeral surgical neck fracture - Left, subsequent encounter   Impaired mobility and ADLs     Time reflects when diagnosis was documented in both MDM as applicable and the Disposition within this note     Time User Action Codes Description Comment    1/13/2020 11:37 AM Violetta Nascimentoling E Add [R06 00] Dyspnea     1/13/2020 11:37 AM Violetta Dilalberto E Add [R05] Productive cough     1/13/2020 11:37 AM Violetta Dilling E Add [J18 9] Pneumonia     1/13/2020 11:37 AM Violetta Davida E Add [R42] Lightheadedness     1/13/2020 11:38 AM Violetta Higuera E Add [D51 093Z] Humeral surgical neck fracture     1/13/2020 11:39 AM Violetta Higuera E Modify [P35 740V] Humeral surgical neck fracture Left, subsequent encounter    1/13/2020 11:39 AM Violetta SWIFT Add [Z74 09] Impaired mobility and ADLs       ED Disposition     ED Disposition Condition Date/Time Comment    Admit Stable Mon Jan 13, 2020 11:37 AM Case was discussed with YRIS and the patient's admission status was agreed to be Admission Status: inpatient status to the service of Dr Julio César Gill   Follow-up Information    None         Patient's Medications   Discharge Prescriptions    No medications on file     No discharge procedures on file      ED Provider  Electronically Signed by           Hever Lawton DO  01/13/20 3546

## 2020-01-13 NOTE — ASSESSMENT & PLAN NOTE
Patient had CTA chest PE which showed- no pulmonary embolism, Nodule identified right upper lobe with adjacent minimal patchy airspace opacity  This may represent a pulmonary mass with adjacent atelectasis, although alternatively, both opacities may represent atelectasis or developing pneumonia  Initial   evaluation with follow-up CT of the chest recommended in 3 months  Continue on Rocephin and Zithromax  Follow-up culture results  Trend procalcitonin  Oxygen supplementation and bronchodilators p r n    Pulmonary consult

## 2020-01-14 PROBLEM — D64.9 ANEMIA: Status: ACTIVE | Noted: 2020-01-14

## 2020-01-14 LAB
ALBUMIN SERPL BCP-MCNC: 3 G/DL (ref 3.5–5)
ALP SERPL-CCNC: 89 U/L (ref 46–116)
ALT SERPL W P-5'-P-CCNC: 29 U/L (ref 12–78)
ANION GAP SERPL CALCULATED.3IONS-SCNC: 9 MMOL/L (ref 4–13)
AST SERPL W P-5'-P-CCNC: 26 U/L (ref 5–45)
BASOPHILS # BLD AUTO: 0.02 THOUSANDS/ΜL (ref 0–0.1)
BASOPHILS NFR BLD AUTO: 0 % (ref 0–1)
BILIRUB SERPL-MCNC: 0.6 MG/DL (ref 0.2–1)
BUN SERPL-MCNC: 8 MG/DL (ref 5–25)
CALCIUM SERPL-MCNC: 8.4 MG/DL (ref 8.3–10.1)
CHLORIDE SERPL-SCNC: 102 MMOL/L (ref 100–108)
CO2 SERPL-SCNC: 27 MMOL/L (ref 21–32)
CREAT SERPL-MCNC: 0.71 MG/DL (ref 0.6–1.3)
EOSINOPHIL # BLD AUTO: 0.06 THOUSAND/ΜL (ref 0–0.61)
EOSINOPHIL NFR BLD AUTO: 1 % (ref 0–6)
ERYTHROCYTE [DISTWIDTH] IN BLOOD BY AUTOMATED COUNT: 14.2 % (ref 11.6–15.1)
GFR SERPL CREATININE-BSD FRML MDRD: 82 ML/MIN/1.73SQ M
GLUCOSE SERPL-MCNC: 108 MG/DL (ref 65–140)
HCT VFR BLD AUTO: 33.1 % (ref 34.8–46.1)
HGB BLD-MCNC: 10.9 G/DL (ref 11.5–15.4)
IMM GRANULOCYTES # BLD AUTO: 0.06 THOUSAND/UL (ref 0–0.2)
IMM GRANULOCYTES NFR BLD AUTO: 1 % (ref 0–2)
LYMPHOCYTES # BLD AUTO: 1.45 THOUSANDS/ΜL (ref 0.6–4.47)
LYMPHOCYTES NFR BLD AUTO: 18 % (ref 14–44)
MAGNESIUM SERPL-MCNC: 1.8 MG/DL (ref 1.6–2.6)
MCH RBC QN AUTO: 30.9 PG (ref 26.8–34.3)
MCHC RBC AUTO-ENTMCNC: 32.9 G/DL (ref 31.4–37.4)
MCV RBC AUTO: 94 FL (ref 82–98)
MONOCYTES # BLD AUTO: 0.6 THOUSAND/ΜL (ref 0.17–1.22)
MONOCYTES NFR BLD AUTO: 7 % (ref 4–12)
NEUTROPHILS # BLD AUTO: 5.92 THOUSANDS/ΜL (ref 1.85–7.62)
NEUTS SEG NFR BLD AUTO: 73 % (ref 43–75)
NRBC BLD AUTO-RTO: 0 /100 WBCS
OSMOLALITY UR: 455 MMOL/KG
PHOSPHATE SERPL-MCNC: 2.7 MG/DL (ref 2.3–4.1)
PLATELET # BLD AUTO: 177 THOUSANDS/UL (ref 149–390)
PMV BLD AUTO: 10.5 FL (ref 8.9–12.7)
POTASSIUM SERPL-SCNC: 3.8 MMOL/L (ref 3.5–5.3)
PROCALCITONIN SERPL-MCNC: <0.05 NG/ML
PROT SERPL-MCNC: 7.1 G/DL (ref 6.4–8.2)
RBC # BLD AUTO: 3.53 MILLION/UL (ref 3.81–5.12)
SODIUM 24H UR-SCNC: 117 MOL/L
SODIUM SERPL-SCNC: 138 MMOL/L (ref 136–145)
TSH SERPL DL<=0.05 MIU/L-ACNC: 1.8 UIU/ML (ref 0.36–3.74)
WBC # BLD AUTO: 8.11 THOUSAND/UL (ref 4.31–10.16)

## 2020-01-14 PROCEDURE — 97110 THERAPEUTIC EXERCISES: CPT

## 2020-01-14 PROCEDURE — 84443 ASSAY THYROID STIM HORMONE: CPT | Performed by: INTERNAL MEDICINE

## 2020-01-14 PROCEDURE — 87205 SMEAR GRAM STAIN: CPT | Performed by: INTERNAL MEDICINE

## 2020-01-14 PROCEDURE — 83735 ASSAY OF MAGNESIUM: CPT | Performed by: INTERNAL MEDICINE

## 2020-01-14 PROCEDURE — 84100 ASSAY OF PHOSPHORUS: CPT | Performed by: INTERNAL MEDICINE

## 2020-01-14 PROCEDURE — 97163 PT EVAL HIGH COMPLEX 45 MIN: CPT

## 2020-01-14 PROCEDURE — 84145 PROCALCITONIN (PCT): CPT | Performed by: INTERNAL MEDICINE

## 2020-01-14 PROCEDURE — 87070 CULTURE OTHR SPECIMN AEROBIC: CPT | Performed by: INTERNAL MEDICINE

## 2020-01-14 PROCEDURE — 85025 COMPLETE CBC W/AUTO DIFF WBC: CPT | Performed by: INTERNAL MEDICINE

## 2020-01-14 PROCEDURE — 80053 COMPREHEN METABOLIC PANEL: CPT | Performed by: INTERNAL MEDICINE

## 2020-01-14 PROCEDURE — 99221 1ST HOSP IP/OBS SF/LOW 40: CPT | Performed by: PHYSICIAN ASSISTANT

## 2020-01-14 PROCEDURE — 23600 CLTX PROX HUMRL FX W/O MNPJ: CPT | Performed by: ORTHOPAEDIC SURGERY

## 2020-01-14 PROCEDURE — 99222 1ST HOSP IP/OBS MODERATE 55: CPT | Performed by: ORTHOPAEDIC SURGERY

## 2020-01-14 PROCEDURE — 99232 SBSQ HOSP IP/OBS MODERATE 35: CPT | Performed by: PHYSICIAN ASSISTANT

## 2020-01-14 PROCEDURE — 36415 COLL VENOUS BLD VENIPUNCTURE: CPT | Performed by: INTERNAL MEDICINE

## 2020-01-14 PROCEDURE — 97166 OT EVAL MOD COMPLEX 45 MIN: CPT

## 2020-01-14 RX ORDER — B-COMPLEX WITH VITAMIN C
1 TABLET ORAL
Status: DISCONTINUED | OUTPATIENT
Start: 2020-01-15 | End: 2020-01-15 | Stop reason: HOSPADM

## 2020-01-14 RX ADMIN — METOPROLOL TARTRATE 50 MG: 50 TABLET, FILM COATED ORAL at 08:54

## 2020-01-14 RX ADMIN — VERAPAMIL HYDROCHLORIDE 180 MG: 180 TABLET, FILM COATED, EXTENDED RELEASE ORAL at 08:58

## 2020-01-14 RX ADMIN — CEFTRIAXONE SODIUM 1000 MG: 10 INJECTION, POWDER, FOR SOLUTION INTRAVENOUS at 12:05

## 2020-01-14 RX ADMIN — METOPROLOL TARTRATE 50 MG: 50 TABLET, FILM COATED ORAL at 18:21

## 2020-01-14 RX ADMIN — HEPARIN SODIUM 5000 UNITS: 5000 INJECTION INTRAVENOUS; SUBCUTANEOUS at 21:58

## 2020-01-14 RX ADMIN — HEPARIN SODIUM 5000 UNITS: 5000 INJECTION INTRAVENOUS; SUBCUTANEOUS at 15:00

## 2020-01-14 RX ADMIN — ANASTROZOLE 1 MG: 1 TABLET, COATED ORAL at 08:58

## 2020-01-14 RX ADMIN — IBUPROFEN 800 MG: 400 TABLET ORAL at 21:59

## 2020-01-14 RX ADMIN — AZITHROMYCIN 500 MG: 250 TABLET, FILM COATED ORAL at 08:54

## 2020-01-14 RX ADMIN — LEVOTHYROXINE SODIUM 137 MCG: 112 TABLET ORAL at 08:58

## 2020-01-14 RX ADMIN — PANTOPRAZOLE SODIUM 40 MG: 40 TABLET, DELAYED RELEASE ORAL at 06:01

## 2020-01-14 RX ADMIN — HEPARIN SODIUM 5000 UNITS: 5000 INJECTION INTRAVENOUS; SUBCUTANEOUS at 06:01

## 2020-01-14 RX ADMIN — SODIUM CHLORIDE 75 ML/HR: 0.9 INJECTION, SOLUTION INTRAVENOUS at 08:48

## 2020-01-14 RX ADMIN — SODIUM CHLORIDE 75 ML/HR: 0.9 INJECTION, SOLUTION INTRAVENOUS at 12:04

## 2020-01-14 NOTE — OCCUPATIONAL THERAPY NOTE
OccupationalTherapy Evaluation and Treatment Note     Patient Name: Morena Alfaro  EKLFB'H Date: 1/14/2020  Problem List  Principal Problem:    Pneumonia  Active Problems:    Hypertension    GERD (gastroesophageal reflux disease)    Hypothyroidism    Humeral fracture    Hyponatremia    Anemia    Past Medical History  Past Medical History:   Diagnosis Date    Breast cancer ([address] )     Disease of thyroid gland     GERD (gastroesophageal reflux disease)     Hypertension     Rapid heart rate      Past Surgical History  Past Surgical History:   Procedure Laterality Date    BREAST SURGERY      CHOLECYSTECTOMY      [place] COLONOSCOPY FLX DX W/COLLJ SPEC WHEN PFRMD N/A 6/26/2017    Procedure: COLONOSCOPY;  Surgeon: Chris Razo MD;  Location: [facility]; Service: Gastroenterology        01/14/20 5268   Note Type   Note type Eval/Treat   Restrictions/Precautions   Weight Bearing Precautions Per Order Yes   LUE Weight Bearing Per Order NWB  (in sling)   Braces or Orthoses Sling  (L UE per ortho)   Other Precautions Chair Alarm; Bed Alarm;WBS;Fall Risk;Pain;Multiple lines   Pain Assessment   Pain Assessment No/denies pain   Pain Score No Pain   Home Living   Type of Home Mobile home   Home Layout Stairs to enter with rails; One level;Performs ADLs on one level; Able to live on main level with bedroom/bathroom  (4 ROMERO)   Bathroom Shower/Tub Tub/shower unit   Bathroom Toilet Standard   Bathroom Equipment Other (Comment)  (no DME at baseline)   [address]   Additional Comments Pt used SPC PRN at baseline due to R knee sorenes at times  Pt reports since recent fall has been using SPC to complete STS     Prior Function   Level of [place] Independent with ADLs and functional mobility   Lives With Spouse  (granddtr)   Receives Help From Family  (Maurice Avendano goes to college, spouse has memory problems per pt )   ADL Assistance Independent   IADLs Independent   Falls in the last 6 months 1 to 4  (1 admitting fall)   Vocational Retired   Comments Pt reports driving at baseline  Lifestyle   Autonomy Pt reports INDEP with ADLs, IADLs, and functional mobility using SPC PRN   Reciprocal Relationships Pt reports supportive son, granddtr, and spouse  Service to Others Pt is retired - used to be housewife   Intrinsic Gratification Pt enjoys time with family - 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  Psychosocial   Psychosocial (WDL) WDL   Subjective   Subjective Pt is pleasant and cooperative   ADL   Eating Assistance 5  Supervision/Setup   Eating Deficit Setup   Grooming Assistance 4  Minimal Assistance   UB Bathing Assistance 3  Moderate Assistance   LB Bathing Assistance 2  Maximal Assistance   UB Dressing Assistance 3  Moderate Assistance   LB Dressing Assistance 2  Maximal [address]  4  Minimal Assistance   Additional Comments Assist levels listed above are based on functional assessment of performance skills and deficits  Bed Mobility   Sit to Supine 4  Minimal assistance   Additional items Assist x 1;HOB elevated; Bedrails; Increased time required;LE management   Additional Comments Pt supine in bed at start of session and agreeable to OT  Pt seen with PT due to decreased activity tolerance  Transfers   Sit to Stand 5  Supervision   Additional items Assist x 1; Increased time required;Armrests   Stand to Sit 5  Supervision   Additional items Assist x 1; Increased time required   Toilet transfer 4  Minimal assistance   Additional items Assist x 1; Increased time required;Verbal cues;Standard toilet  (Use of grab bar with R UE)   Additional Comments No AD for UE support     Functional Mobility   Functional Mobility 4  Minimal assistance   Additional Comments CGA with use of IV pole for R UE support to complete functional mobility <> bathroom; no overt LOB noted   Balance   Static Sitting Good   Dynamic Sitting Fair +   Static Standing Fair -   Dynamic Standing Poor +   Ambulatory Poor +   Activity Tolerance   Activity Tolerance Patient limited by fatigue   Medical Staff Made Aware SPT Nidiatayo Patrick   Nurse Made Aware FRANK Land verbalized pt appropriate for OT eval, made aware of session outcomes   RUE Assessment   RUE Assessment WFL  (based on functional assessment)   LUE Assessment   LUE Assessment X  (pt is L handed; sling to L UE; NWB)   LUE Overall AROM   L Shoulder Flexion DEFERRED: per ortho note: "She is to maintain the sling  She may remove her sling and keep her shoulder close to her side for hygiene purposes as well as to work on range of motion at the fingers, wrist and elbow  "   L Wrist Flexion WFL   L Wrist Extension WFL   L Wrist ABduction WFL   L Wrist ADduction WFL   L Mass Grasp WFL   L Finger Flexion WFL   L Finger Extension WFL   L Finger ABduction WFL   L Finger ADduction WFL   LUE Strength   LUE Overall Strength Unable to assess;Due to  precautions   Hand Function   Gross Motor Coordination Functional  (R side functional; testing of L deferred at this time)   Fine Motor Coordination Functional   Sensation   Light Touch No apparent deficits   Vision-Basic Assessment   Current Vision   (driving and reading)   Visual History   (reports cataracts are starting to develop)   Patient Visual Report Other (Comment)  (denies acuet visual changes)   Vision - Complex Assessment   Acuity Able to read clock/calendar on wall without difficulty; Able to read employee name badge without difficulty   Cognition   Overall Cognitive Status [facility]   Arousal/Participation Alert; Responsive;Arousable; Cooperative   Attention Within functional limits   Orientation Level Oriented X4   Memory Within functional limits   Following Commands Follows all commands and directions without difficulty   Comments Pt able to identify self by full name and birthdate  Assessment   Limitation Decreased ADL status; Decreased UE ROM; Decreased UE strength;Decreased endurance;Decreased self-care trans;Decreased high-level ADLs; Non-func L UE   Assessment Pt is a 70 y o  female seen for OT evaluation (time 6114-6278) s/p admit to [facility] on 1/13/2020 w/ Pneumonia  Comorbidities affecting pt's functional performance at time of assessment include: HTN, GERD, hypothyroidism, humeral fracture, hyponatremia, anemia  Evaluation required a review of medical and/ or therapy records and extensive additional review of physical, cognitive, or psychosocial history related to current functional performance    Personal factors affecting pt at time of IE include:steps to enter environment, limited home support, behavioral pattern, difficulty performing ADLS, difficulty performing IADLS , health management  and environment  Prior to admission, Pt reports INDEP with ADLs, IADLs, and functional mobility using SPC PRN  Upon evaluation: Based on functional assessment, pt requires set-up for feeding, Min A for grooming, Mod A for UB ADLs, Max A for LB ADLs, Min A for toileting, Min A x 1 for toilet transfer, supervision for sit<>stand transfer with hand held assist, CGA x 1 using IV pole for R UE support to complete functional mobility <> bathroom  2* the following deficits impacting occupational performance: weakness, decreased ROM, decreased strength, decreased balance, decreased tolerance, orthopedic restrictions and decreased coping skills  Cognition appears to be [facility] and vision is [facility] - please refer to flowsheet above for details  Pt to benefit from continued skilled OT tx while in the hospital to address deficits as defined above and maximize level of functional independence w ADL's and functional mobility  Occupational Performance areas to address include: eating, grooming, bathing/shower, toilet hygiene, dressing, medication management, socialization, health maintenance, functional mobility and social participation  From OT standpoint, recommendation at time of d/c would be home OT     Goals   Patient Goals "Id like to get home as soon as I could"   Plan   Treatment Interventions ADL retraining;Functional transfer training;UE strengthening/ROM; Endurance training;Patient/family training;Equipment evaluation/education; Fine motor coordination activities; Compensatory technique education;Continued evaluation; Energy conservation; Activityengagement  (one-handed ADL techniques)   Goal Expiration Date 01/22/20   OT Treatment Day 1   OT Frequency 3-5x/wk   Additional Treatment Session   Start Time 9499   End Time 1412   Treatment Assessment Patient participated in Skilled OT session (time 9633-4638) this date with interventions consisting of maintaining weight bearing precautions and therapeutic exercise education and completion to increase functional use and strength of b/l UEs  Pt verbalized understanding of L UE NWB status  Reviewed recommendation from ortho  Educated pt on completion of AROM exercises for L elbow, wrist, and digits  Recommended pt complete 10 reps of each exercise at least 5 x /day to improve functional AROM of elbow (flexion/extension), wrist (flexion/extension, radial/ulnar deviation, and rotation), and digits (flexion/extension, opposition/reposition, abduction/adduction) as well as to decrease risk of swelling that may develop  Emphasized importance of maintaining L shoulder at side of body as described in ortho note  Provided pt with and reviewed AROM handout  Instructed pt to notify healthcare provider if experience sharp/shooting pain  Recommended pt complete exercises while seated in chair  Emphasized importance of not adding resistance to L UE AROM exercises  Pt verbalized understanding  Patient agreeable to OT treatment session, upon arrival patient was found supine in bed  Patient continues to be functioning below baseline level, occupational performance remains limited secondary to factors listed above and increased risk for falls and injury     From OT standpoint, recommendation at time of d/c would be home OT  Patient to benefit from continued Occupational Therapy treatment while in the hospital to address deficits as defined above and maximize level of functional independence with ADLs and functional mobility  Recommendation   OT Discharge Recommendation Home OT; shower chair   OT - OK to Discharge   (once medically cleared)   Barthel Index   Feeding 5   Bathing 0   Grooming Score 0   Dressing Score 5   Bladder Score 10   Bowels Score 10   Toilet Use Score 5   Transfers (Bed/Chair) Score 10   Mobility (Level Surface) Score 0   Stairs Score 0   Barthel Index Score 45   Modified [place] Scale   Modified [place] Scale 4     Goals to be met within 8 days:    Pt will complete grooming/oral hygiene tasks Mod I while standing at sink implementing most appropriate one-handed compensatory and adaptive techniques to maintain L UE % of the time  Pt will complete UB bathing/dressing Mod I while seated implementing most appropriate one-handed compensatory and adaptive techniques to maintain L UE % of the time       Pt will complete LB bathing/dressing Mod I while seated implementing most appropriate one-handed compensatory and adaptive techniques to maintain L UE % of the time       Pt will improve functional activity tolerance while standing at sink to 15+ minutes in order to increase safety and independence during functional transfers and ADL tasks  Pt will improve dynamic sitting/standing balance to good to increase safety and independence during functional transfers and ADL and decrease risk for falls  Pt will increase independence during STS transfers with least restrictive AD Mod I while maintaining L UE % of the time  Pt will complete transfer to standard toilet without use of grab bars with Mod I while maintaining L UE % of the time  Pt will complete toileting tasks (clothing management up/down, hygiene) Mod I while maintaining L UE % of the time      Pt will complete tub/shower transfer without LOB with Mod I using shower chair while maintaining L UE % of the time  Pt will identify and implement at least 3 healthy coping strategies to increase participation in meaningful activities and decrease risk for readmission  Pt will demo 100% carryover of learned AROM HEP to L UE to maximize functional use of b/l UEs      Colt Ivy, OTR/L

## 2020-01-14 NOTE — ASSESSMENT & PLAN NOTE
· BP appears stable on review  · Continue Lopressor 50 mg BID and Verapamil 180 mg daily   · Monitor

## 2020-01-14 NOTE — ASSESSMENT & PLAN NOTE
· Pt presented with shortness of breath and productive cough  · CTA with no pulmonary embolism, Nodule identified right upper lobe with adjacent minimal patchy airspace opacity  This may represent a pulmonary mass with adjacent atelectasis, although alternatively, both opacities may represent atelectasis or developing pneumonia     · Pt does not have leukocytosis and procalcitonin is negative  · Pulmonary consultation is pending  · Continue IV ceftriaxone for now, repeat procalcitonin, consider d/c abx if negative   · D/c azithromycin as legionella is negative  · Appreciate additional recommendations

## 2020-01-14 NOTE — ASSESSMENT & PLAN NOTE
· Sodium 132 on admission   · Likely secondary to hypovolemia as this is improved s/p IVF hydration   · Continue gentle IVF  · Monitor BMP in the AM

## 2020-01-14 NOTE — CONSULTS
Orthopedics   Rufina Blackburn 70 y o  female MRN: [id number]  Unit/Bed#: ED 6-01      Chief Complaint:   left shoulder pain    HPI:  70 y  o female complaining of left shoulder pain  Patient has a past medical history for hypothyroidism, sinus tachycardia, breast cancer status post left lumpectomy and radiation therapy, hypertension, GERD who presented to the emergency room for evaluation of shortness of breath, lightheadedness and cough  Patient recently had a fall on 01/11/2020 as she fell onto the outstretched hand  Patient is left-hand dominant  She was attempting to  her dog when she fell forward  She presented to the emergency room on 01/11/2020 for evaluation  It was showed that she had impacted proximal humerus fracture  She was placed into a sling and instructed to follow up with Orthopedics as an outpatient  Two days later she started to note some increasing chest pain, lightheadedness, nausea  She presented to the emergency room for evaluation of her symptoms  She states today that she is doing much better and does not have the symptoms anymore  She states that her sling is in a comfortable position  She only has mild discomfort at the shoulder  She is able to move her fingers, wrist and elbow with minimal discomfort  She denies any numbness or tingling into the extremity  Upon exam today, she denies any chest pain, shortness of breath, nausea, vomiting, diarrhea, lightheadedness, dizziness        Review Of Systems:   · Skin: Normal  · Neuro: See HPI  · Musculoskeletal: See HPI  · 14 point review of systems negative except as stated above     Past Medical History:   Past Medical History:   Diagnosis Date    [address] [address]     Disease of thyroid gland     GERD (gastroesophageal reflux disease)     Hypertension     Rapid heart rate        Past Surgical History:   Past Surgical History:   Procedure Laterality Date    BREAST SURGERY      CHOLECYSTECTOMY      [place] COLONOSCOPY FLX DX W/COLLJ [facility] WHEN PFRMD N/A 6/26/2017    Procedure: COLONOSCOPY;  Surgeon: Lindsay Camargo MD;  Location: [facility]; Service: Gastroenterology       Family History:  Family history reviewed and non-contributory  History reviewed  No pertinent family history      Social History:  Social History     Socioeconomic History    Marital status: /Civil Union     Spouse name: None    Number of children: None    Years of education: None    Highest education level: None   Occupational History    None   Social Needs    Financial resource strain: None    Food insecurity:     Worry: None     Inability: None    Transportation needs:     Medical: None     Non-medical: None   Tobacco Use    Smoking status: Never Smoker    Smokeless tobacco: Never Used   Substance and Sexual Activity    Alcohol use: No    Drug use: No    Sexual activity: None   Lifestyle    Physical activity:     Days per week: None     Minutes per session: None    Stress: None   Relationships    Social connections:     Talks on phone: None     Gets together: None     Attends [employer] service: None     Active member of club or organization: None     Attends meetings of clubs or organizations: None     Relationship status: None    Intimate partner violence:     Fear of current or ex partner: None     Emotionally abused: None     Physically abused: None     Forced sexual activity: None   Other Topics Concern    None   Social History Narrative    None       Allergies:   No Known Allergies        Labs:  0   Lab Value Date/Time    HCT 33 1 (L) 01/14/2020 0627    HCT 38 9 01/13/2020 0938    HCT 39 4 01/11/2020 0753    HGB 10 9 (L) 01/14/2020 0627    HGB 12 1 01/13/2020 0938    HGB 12 8 01/11/2020 0753    INR 1 10 01/13/2020 0938    WBC 8 11 01/14/2020 0627    WBC 7 30 01/13/2020 0938    WBC 7 46 01/11/2020 0753       Meds:    Current Facility-Administered Medications:     acetaminophen (TYLENOL) tablet 650 mg, 650 mg, Oral, Q6H PRN, Olman Marcelo MD    anastrozole (ARIMIDEX) tablet 1 mg, 1 mg, Oral, Daily, Olman Marcelo MD    azithromycin (ZITHROMAX) tablet 500 mg, 500 mg, Oral, Q24H, Shikha Srinivasan MD    ceftriaxone (ROCEPHIN) 1 g/50 mL in dextrose IVPB, 1,000 mg, Intravenous, Q24H, Olman Marcelo MD    heparin (porcine) subcutaneous injection 5,000 Units, 5,000 Units, Subcutaneous, Q8H [address], 5,000 Units at 01/14/20 0601 **AND** [COMPLETED] Platelet count, , , Once, Olman Marcelo MD    ibuprofen (MOTRIN) tablet 800 mg, 800 mg, Oral, Q6H PRN, Olman Marcelo MD    levothyroxine tablet 137 mcg, 137 mcg, Oral, Daily, Olman Marcelo MD    metoprolol tartrate (LOPRESSOR) tablet 50 mg, 50 mg, Oral, BID, Olman Marcelo MD, 50 mg at 01/13/20 1907    ondansetron (ZOFRAN) injection 4 mg, 4 mg, Intravenous, Q6H PRN, Olman Marcelo MD    oxyCODONE-acetaminophen (PERCOCET) 5-325 mg per tablet 1 tablet, 1 tablet, Oral, Q6H PRN, Olman Marcelo MD, 1 tablet at 01/13/20 2008    pantoprazole (PROTONIX) EC tablet 40 mg, 40 mg, Oral, Early Morning, Olman Marcelo MD, 40 mg at 01/14/20 0601    sodium chloride 0 9 % infusion, 75 mL/hr, Intravenous, Continuous, Olman Marcelo MD, Last Rate: 75 mL/hr at 01/13/20 1556, 75 mL/hr at 01/13/20 1556    verapamil (CALAN-SR) CR tablet 180 mg, 180 mg, Oral, Daily, Olman Marcelo MD    Current Outpatient Medications:     anastrozole (ARIMIDEX) 1 mg tablet, Take 1 mg by mouth daily, Disp: , Rfl:     Calcium Carbonate-Vitamin D (CALTRATE 600+D PO), Take 1 tablet by mouth daily, Disp: , Rfl:     ibuprofen (MOTRIN) 800 mg tablet, Take 800 mg by mouth every 6 (six) hours as needed for mild pain, Disp: , Rfl:     levothyroxine 100 mcg tablet, Take 137 mcg by mouth daily , Disp: , Rfl:     metoprolol tartrate (LOPRESSOR) 100 mg tablet, Take 50 mg by mouth 2 (two) times a day  , Disp: , Rfl:     oxyCODONE-acetaminophen (PERCOCET) 5-325 mg per tablet, Take 1 tablet by mouth every 6 (six) hours as needed for severe pain for up to 20 dosesMax Daily Amount: 4 tablets, Disp: 20 tablet, Rfl: 0    verapamil (CALAN-SR) 180 mg CR tablet, Take 180 mg by mouth daily , Disp: , Rfl:     Blood Culture:   Lab Results   Component Value Date    BLOODCX Received in Microbiology Lab  Culture in Progress  01/13/2020       Wound Culture:   No results found for: WOUNDCULT    Ins and Outs:  I/O last 24 hours: In: 1000 [IV Piggyback:1000]  Out: -           Physical Exam:   /74   Pulse 70   Temp 98 1 °F (36 7 °C) (Oral)   Resp 18   Ht 5' 5" (1 651 m)   Wt 99 5 kg (219 lb 5 7 oz)   SpO2 96%   BMI 36 50 kg/m²   Gen: Alert and oriented to person, place, time  HEENT: EOMI, eyes clear, moist mucus membranes, hearing intact  Respiratory: Bilateral chest rise  No audible wheezing found  Cardiovascular: Regular Rate and Rhythm  Abdomen: soft nontender/nondistended  Musculoskeletal: left upper extremity  · Skin pink dry and intact with no erythema edema or ecchymosis noted  · Sling was intact in good position  · Tenderness to palpation over the proximal humerus  · Range of motion strength were deferred secondary to fracture  · Sensation intact to axillary, musculocutaneous, radial, ulna, median nerves  · Patient is neurovascularly intact    Radiology:   I personally reviewed the films  X-rays of left shoulder shows impacted proximal humerus fracture    _*_*_*_*_*_*_*_*_*_*_*_*_*_*_*_*_*_*_*_*_*_*_*_*_*_*_*_*_*_*_*_*_*_*_*_*_*_*_*_*_*    Assessment:  70 y  o female with  left shoulder impacted proximal humerus fracture    Plan:   · Non weight Bearing left upper extremity  · Maintain sling at all times  May take off for hygiene purposes and to work on range of motion of the digits, wrist, elbow  · PT/OT  · Analgesics as per primary team  · DVT prophylaxis as per primary team  · Body mass index is 36 5 kg/m²  moderately obese  Recommend nutrition and physical activity    · Dispo: [id number] [address] Gonzales for discharge from ortho perspective   · Was discussed with the patient that she has a impacted proximal humerus fracture which can be treated non operatively at this time  There is good alignment on radiological findings  She is to maintain the sling  She may remove her sling and keep her shoulder close to her side for hygiene purposes as well as to work on range of motion at the fingers, wrist and elbow  She can follow up with Dr Lulu George in 1 week as an outpatient for new x-rays of the left shoulder and outpatient follow-up  No further orthopedic intervention is needed at this time  If there are any questions, please reach out  Francisco Ornelas PA-C

## 2020-01-14 NOTE — PROGRESS NOTES
Progress Note - Claudell Dux 1940, 70 y o  female MRN: [id number]    Unit/Bed#: ED 06 Encounter: [id number]    Primary Care Provider: Nickie Calloway MD   Date and time admitted to hospital: 1/13/2020  8:23 AM      DOS: 1/14/2020    * Pneumonia  Assessment & Plan  · Pt presented with shortness of breath and productive cough  · CTA with no pulmonary embolism, Nodule identified right upper lobe with adjacent minimal patchy airspace opacity  This may represent a pulmonary mass with adjacent atelectasis, although alternatively, both opacities may represent atelectasis or developing pneumonia     · Pt does not have leukocytosis and procalcitonin is negative  · Pulmonary consultation is pending  · Continue IV ceftriaxone for now, repeat procalcitonin, consider d/c abx if negative   · D/c azithromycin as legionella is negative  · Appreciate additional recommendations    Anemia  Assessment & Plan  · Normocytic   · Hgb 10 9 today   · Likely component of dilution secondary to IVF  · No signs of active bleeding currently   · Continue to trend CBC    Hyponatremia  Assessment & Plan  · Sodium 132 on admission   · Likely secondary to hypovolemia as this is improved s/p IVF hydration   · Continue gentle IVF  · Monitor BMP in the AM    Humeral fracture  Assessment & Plan  · Xray humerus 1/11 - impacted nondisplaced humeral neck fracture  · Ortho evaluated,  · NWB left upper extremity   · Continue to maintain sling   · PT/OT evals pending   · Pt cleared for discharge from ortho standpoint for follow up with Dr Lisy Rodriguez in one week for repeat xrays     Hypothyroidism  Assessment & Plan  · TSH WNL  · Continue levothyroxine 137 mcg daily     GERD (gastroesophageal reflux disease)  Assessment & Plan  · On Protonix    Hypertension  Assessment & Plan  · BP appears stable on review  · Continue Lopressor 50 mg BID and Verapamil 180 mg daily   · Monitor         VTE Pharmacologic Prophylaxis:   Pharmacologic: Heparin  Mechanical VTE Prophylaxis in Place: Yes    Patient Centered Rounds: I have evaluated patient without nursing staff present due to speaking to nurse outside patient's room, Neva Hartman in the ER    Discussions with Specialists or Other Care Team Provider: Discussed with pulmonary, RN, CM and reviewed previous notes     Education and Discussions with Family / Patient: Discussed with patient at bedside regarding plan of care, when asked to update friends or family members patient politely declined  Time Spent for Care: 30 minutes  More than 50% of total time spent on counseling and coordination of care as described above  Current Length of Stay: 1 day(s)    Current Patient Status: Inpatient   Certification Statement: The patient will continue to require additional inpatient hospital stay due to pulmonary evaluation, continued IV abx and procalcitonin     Discharge Plan: Not medically stable as above, anticipate next 24-48 hours pending pulmonary clearance  PT/OT consultations are pending  Code Status: Level 1 - Full Code      Subjective:   Pt reports that she feels better today  Continues to have minimal cough that is productive of yellow sputum  Denies any shortness of breath, chest pain, nausea, vomiting, abdominal pain currently  Reports that pain in her shoulder is also improved  Objective:     Vitals:   No data recorded  HR:  [] 163  Resp:  [17-20] 20  BP: (137-178)/(58-87) 143/87  SpO2:  [93 %-98 %] 93 %  Body mass index is 36 5 kg/m²  Input and Output Summary (last 24 hours): Intake/Output Summary (Last 24 hours) at 1/14/2020 0925  Last data filed at 1/13/2020 1225  Gross per 24 hour   Intake 1000 ml   Output    Net 1000 ml       Physical Exam:     Physical Exam   Constitutional: No distress  Pt is in no acute distress lying in her hospital bed resting comfortably  Left arm in sling, neurovascularly intact   HENT:   Head: Normocephalic and atraumatic     Eyes: Pupils are equal, round, and reactive to light  Conjunctivae are normal    Cardiovascular: Normal rate, regular rhythm and intact distal pulses  Pulmonary/Chest: Effort normal  No stridor  No respiratory distress  She has no wheezes  Abdominal: Soft  Bowel sounds are normal  She exhibits no distension  There is no tenderness  There is no guarding  Musculoskeletal: She exhibits no edema  Neurological: She is alert  Skin: Skin is warm and dry  She is not diaphoretic  No erythema  Psychiatric: She has a normal mood and affect  Vitals reviewed  Additional Data:     Labs:    Results from last 7 days   Lab Units 01/14/20  0627   WBC Thousand/uL 8 11   HEMOGLOBIN g/dL 10 9*   HEMATOCRIT % 33 1*   PLATELETS Thousands/uL 177   NEUTROS PCT % 73   LYMPHS PCT % 18   MONOS PCT % 7   EOS PCT % 1     Results from last 7 days   Lab Units 01/14/20  0627   POTASSIUM mmol/L 3 8   CHLORIDE mmol/L 102   CO2 mmol/L 27   BUN mg/dL 8   CREATININE mg/dL 0 71   CALCIUM mg/dL 8 4   ALK PHOS U/L 89   ALT U/L 29   AST U/L 26     Results from last 7 days   Lab Units 01/13/20  0938   INR  1 10       * I Have Reviewed All Lab Data Listed Above  * Additional Pertinent Lab Tests Reviewed: All Labs Within Last 24 Hours Reviewed    Imaging:    Imaging Reports Reviewed Today Include: CTA, Xray humerus   Imaging Personally Reviewed by Myself Includes:  None    Recent Cultures (last 7 days):     Results from last 7 days   Lab Units 01/13/20  1725 01/13/20  1153 01/13/20  1152   BLOOD CULTURE   --  Received in Microbiology Lab  Culture in Progress  Received in Microbiology Lab  Culture in Progress     LEGIONELLA URINARY ANTIGEN  Negative  --   --        Last 24 Hours Medication List:     Current Facility-Administered Medications:  acetaminophen 650 mg Oral Q6H PRN Queen Fabio MD    anastrozole 1 mg Oral Daily Queen Fabio MD    azithromycin 500 mg Oral Q24H Juliocesar De La Cruz MD    cefTRIAXone 1,000 mg Intravenous Q24H Queen Fabio MD    heparin (porcine) 5,000 Units Subcutaneous Q8H Lacie Garnica MD    ibuprofen 800 mg Oral Q6H PRN Phil Burch MD    levothyroxine 137 mcg Oral Daily Phil Burch MD    metoprolol tartrate 50 mg Oral BID Phil Burch MD    ondansetron 4 mg Intravenous Q6H PRN Phil Burch MD    oxyCODONE-acetaminophen 1 tablet Oral Q6H PRN Phil Burch MD    pantoprazole 40 mg Oral Early Morning Phil Burch MD    sodium chloride 75 mL/hr Intravenous Continuous Phil Burch MD Last Rate: 75 mL/hr (01/14/20 0848)   verapamil 180 mg Oral Daily Phil Burch MD         Today, Patient Was Seen By: Yokasta Dye PA-C    ** Please Note: Dictation voice to text software may have been used in the creation of this document   **

## 2020-01-14 NOTE — PHYSICAL THERAPY NOTE
Physical Therapy Evaluation     Patient's Name: Burton Mahajan    Admitting Diagnosis  Lightheadedness [R42]  Pneumonia [J18 9]  Dyspnea [R06 00]  SOB (shortness of breath) [R06 02]  Productive cough [R05]  Humeral surgical neck fracture [S42 213A]  Impaired mobility and ADLs [Z74 09]    Problem List  Patient Active Problem List   Diagnosis    Pneumonia    Hypertension    GERD (gastroesophageal reflux disease)    Hypothyroidism    Humeral fracture    Hyponatremia    Anemia       Past Medical History  Past Medical History:   Diagnosis Date    Breast cancer ([facility] [address] )     Disease of thyroid gland     GERD (gastroesophageal reflux disease)     Hypertension     Rapid heart rate        Past Surgical History  Past Surgical History:   Procedure Laterality Date    BREAST SURGERY      CHOLECYSTECTOMY      VA COLONOSCOPY FLX DX W/COLLJ SPEC WHEN PFRMD N/A 6/26/2017    Procedure: COLONOSCOPY;  Surgeon: Anne Marie Munoz MD;  Location: [facility]; Service: Gastroenterology          01/14/20 3111   Note Type   Note type Eval only   Pain Assessment   Pain Assessment No/denies pain   Pain Score No Pain  ("I have pain when I move"; no pain currently)   Home Living   Type of Home Mobile home   Home Layout One level;Performs ADLs on one level; Able to live on main level with bedroom/bathroom;Stairs to enter with rails  (4 ROMERO)   Bathroom Shower/Tub Tub/shower unit   Bathroom Toilet Standard   Bathroom Equipment Other (Comment)  (no DME at baseline per pt)   [facility]  (pt uses SPC PRN for R knee pain, using SPC for sit<>stand)   Prior Function   Level of [place] Independent with ADLs and functional mobility   Lives With Spouse; Family  (granddtr)   Receives Help From Family  ( has memory problems, granddtr in college per pt)   ADL Assistance Independent   IADLs Independent  ("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")   Falls in the last 6 months 1 to 4  (admits to 1 mechanical fall)   Vocational Retired   Comments (+) driving   Restrictions/Precautions   Wells Tom Bearing Precautions Per Order Yes   LUE Weight Bearing Per Order NWB  (per ortho)   Braces or Orthoses Sling  (sling to LUE per ortho)   Other Precautions Chair Alarm; Bed Alarm;WBS;Multiple lines; Fall Risk;Pain   General   Additional Pertinent History XR L humerus performed on 20: "IMPRESSION: Impacted nondisplaced humeral neck fracture " Pt is L-handed  Family/Caregiver Present No   Cognition   Overall Cognitive Status WFL   Arousal/Participation Alert   Attention Within functional limits   Orientation Level Oriented X4   Memory Within functional limits   Following Commands Follows all commands and directions without difficulty   Comments Pt agreeable to PT eval  Pt verbalized full name and   RUE Assessment   RUE Assessment WFL  (defer to OT eval for comment)   LUE Assessment   LUE Assessment X  (sling to LUE; NWB)   RLE Assessment   RLE Assessment WFL  (upon functional assessment; grossly 3+/5)   LLE Assessment   LLE Assessment WFL  (upon functional assessment; grossly 3+/5)   Coordination   Movements are Fluid and Coordinated 1   Sensation WFL   Bed Mobility   Sit to Supine 4  Minimal assistance   Additional items Assist x 1;HOB elevated; Bedrails; Increased time required;LE management   Additional Comments Pt received supine in bed upon arrival  Pt reports being ambulatory to bathroom with A x1  Pt returned to bed at end of session with all needs in reach, bed alarm engaged  Transfers   Sit to Stand 5  Supervision   Additional items Assist x 1; Increased time required;Armrests   Stand to Sit 5  Supervision   Additional items Assist x 1; Increased time required   Ambulation/Elevation   Gait pattern Decreased foot clearance;Decreased R stance; Short stride; Antalgic   Gait Assistance 4  Minimal assist   Additional items Assist x 1;Verbal cues   Assistive Device None  (pt pushing IV pole c RUE for support)   Distance 10' x2 from recliner to bathroom, bathroom to EOB   Stair Management Assistance Not tested   Balance   Static Sitting Good   Dynamic Sitting Fair +   Static Standing Fair -   Dynamic Standing Poor +   Ambulatory Poor +   Endurance Deficit   Endurance Deficit Yes   Activity Tolerance   Activity Tolerance Patient limited by fatigue;Patient limited by pain   Medical Staff Made Aware MICHAEL Saldaña   Nurse Made Aware FRANK Vincent verbalized pt appropriate for PT eval, made aware of session outcomes/recs   Assessment   Prognosis Good   Problem List Pain;Orthopedic restrictions;Decreased strength;Decreased endurance; Impaired balance;Decreased mobility   Assessment Pt is a 69 y/o female admitted to [facility] on 20 with pneumonia  Pt was seen in the ED on 20 for a closed fx of L proximal humerus s/p fall at home  Pt consulted to assess functional mobility and d/c needs  PT eval/treat as well as up w/A orders active  Pt's past medical history includes HTN, GERD, humeral fracture, and anemia  Prior to her admission, the pt was living with her  and granddtr in a one-story mobile home with 4 ROMERO, was ambulatory with a SPC used PRN, and was independent with all functional mobility  Personal factors: decreased caregiver support, living in a home with ROMERO, and a positive fall history  Pt agreeable to PT eval, verbalized as appropriate per RN [place]  Two pt identifiers assessed, pt verbalized full name and   Pt with sling donned to LUE upon arrival  Pt performed all bed mobility with min A x1  Pt performed all transfers with supervision  Pt ambulated 10' x2 from recliner to bathroom, bathroom to EOB with min A x1, no LOB observed  Upon assessment, pt's physical limitations include orthopedic restrictions, decreased strength, decreased endurance, impaired balance, and decreased mobility  Outcome measures: Barthel 45/100, Modified [place] 4   Pt's clinical presentation is currently unstable due to her multiple co-morbidities, ongoing medical assessment, and abnormal lab values  At this time, pt is recommended for d/c home with family support and HHPT to address the above impairments  Pt would benefit from continued PT throughout her hospital stay to facilitate return to PLOF  Barriers to Discharge Decreased caregiver support   Barriers to Discharge Comments (+) ROMERO   Goals   Patient Goals "I'd like to get home as soon as I could"   STG Expiration Date 01/24/20   Short Term Goal #1 In 7-10 days, pt will: 1) ambulate 150' with least restrictive device/no AD mod (I) while adhering to LUE % of the time, 2) navigate 4 stairs with unilateral handrail mod (I) while adhering to LUE % of the time, 3) perform all bed mobility mod (I) to decrease caregiver burden, 4) perform all transfers mod (I) to improve overall mobility, 5) increase B LE strength 1/2 grade to improve ambulation, 6) increase all balance 1 grade to improve safety and independence with functional mobility   PT Treatment Day 0   Plan   Treatment/Interventions Functional transfer training;LE strengthening/ROM; Elevations; Therapeutic exercise; Endurance training;Patient/family training;Equipment eval/education; Bed mobility;Gait training;Spoke to nursing;OT   PT Frequency 5x/wk   Recommendation   Recommendation Home with family support;Home PT  (anticipated pending progress)   PT - OK to Discharge No  (pt to clear stairs & demonstrate consistency c level surface)   Modified Jo Scale   Modified [place] Scale 4   Barthel Index   Feeding 5   Bathing 0   Grooming Score 0   Dressing Score 5   Bladder Score 10   Bowels Score 10   Toilet Use Score 5   Transfers (Bed/Chair) Score 10   Mobility (Level Surface) Score 0   Stairs Score 0   Barthel Index Score 45     [place], SPT

## 2020-01-14 NOTE — CONSULTS
Consultation - Pulmonary Medicine   Coco Ham 70 y o  female MRN: [id number]  Unit/Bed#: -01 Encounter: [id number]      Assessment:  Shortness of breath   Abnormal chest CT  Breast cancer status post left lumpectomy and radiation, currently on hormone therapy  Left humerus fracture    Plan:   No hypoxia or supplemental oxygen requirements  Shortness of breath improved  Chest CT negative for pulmonary embolism  A right upper lobe nodule with adjacent airspace opacity representing atelectasis versus pneumonia versus pulmonary mass is seen  There is no leukocytosis or procalcitonin elevation  Patient initially started on ceftriaxone and azithromycin, azithromycin has been discontinued after Legionella urinary antigen found to be negative  Strep pneumo urinary antigen negative as well  Influenza and RSV negative  Follow-up on sputum culture results  Follow-up on procalcitonin to determine further antibiotic needs  Encourage incentive spirometry  She follows with [facility]  States that her breast cancer has been in remission for the past 3 years, follows up regularly with mammograms  No history of metastatic disease  Patient has been seen by Orthopedics regarding the humerus fracture    She will need outpatient pulmonary follow-up including a follow-up chest CT in the next 6-8 weeks    History of Present Illness   Physician Requesting Consult: Dayday Apple MD  Reason for Consult / Principal Problem:  Pneumonia  Hx and PE limited by:  None  HPI: Coco Ham is a 70y o  year old female never smoker with past medical history including breast cancer, disease of thyroid gland, GERD, hypertension who presents with complaints of shortness of breath and cough x1 week  There were no fevers or chills  Cough is productive for green and yellow sputum  She is unaware of any sick contacts  She also presents following a mechanical fall with left humerus fracture    She tripped over her dog at home  From a pulmonary standpoint, patient denies any significant history  She states she has never had pneumonia  There is no history of asthma  As noted above, she has never smoked cigarettes  No history of occupational exposure to chemicals  Inpatient consult to Pulmonology  Consult performed by: Georgia Garcia PA-C  Consult ordered by: Maryjane Nuno MD          Review of Systems   Constitutional: Positive for fatigue  Negative for fever  Respiratory: Positive for cough and shortness of breath  Cardiovascular: Negative for chest pain and leg swelling  Musculoskeletal:        + L arm pain   All other systems reviewed and are negative  Historical Information   Past Medical History:   Diagnosis Date    Breast cancer ([address] )     Disease of thyroid gland     GERD (gastroesophageal reflux disease)     Hypertension     Rapid heart rate      Past Surgical History:   Procedure Laterality Date    BREAST SURGERY      CHOLECYSTECTOMY      VA COLONOSCOPY FLX DX W/COLLJ SPEC WHEN PFRMD N/A 6/26/2017    Procedure: COLONOSCOPY;  Surgeon: Yahir Lomeli MD;  Location: [facility]; Service: Gastroenterology     Social History   Social History     Substance and Sexual Activity   Alcohol Use Never    Frequency: Never     Social History     Substance and Sexual Activity   Drug Use Never     Social History     Tobacco Use   Smoking Status Never Smoker   Smokeless Tobacco Never Used     Occupational History:  Housewife    Family History:   Family History   Problem Relation Age of Onset    Prostate cancer Father     Breast cancer Sister        Meds/Allergies   all current active meds have been reviewed    No Known Allergies    Objective   Vitals: Blood pressure 143/87, pulse 68, temperature 98 1 °F (36 7 °C), temperature source Oral, resp  rate 16, height 5' 5" (1 651 m), weight 99 5 kg (219 lb 5 7 oz), SpO2 93 %  ,Body mass index is 36 5 kg/m²      Intake/Output Summary (Last 24 hours) at 1/14/2020 1321  Last data filed at 1/14/2020 1204  Gross per 24 hour   Intake 1000 ml   Output    Net 1000 ml     Invasive Devices     Peripheral Intravenous Line            Peripheral IV 01/13/20 Right Hand less than 1 day                Physical Exam   Constitutional: She is oriented to person, place, and time  She appears well-developed and well-nourished  No distress  obese   HENT:   Head: Normocephalic and atraumatic  Right Ear: External ear normal    Left Ear: External ear normal    Nose: Nose normal    Eyes: Conjunctivae and EOM are normal  Right eye exhibits no discharge  Left eye exhibits no discharge  No scleral icterus  Neck: Normal range of motion  Neck supple  No tracheal deviation present  Short and wide neck   Cardiovascular: Normal rate, regular rhythm and normal heart sounds  Exam reveals no gallop and no friction rub  No murmur heard  Pulmonary/Chest: Effort normal  No stridor  No respiratory distress  She has no wheezes  She has no rales  Faint expiratory rhonchi   Abdominal: She exhibits no distension  There is no guarding  Musculoskeletal: She exhibits no edema, tenderness or deformity  L arm in sling   Neurological: She is alert and oriented to person, place, and time  No cranial nerve deficit  Skin: Skin is warm and dry  No rash noted  She is not diaphoretic  No erythema  No pallor  Psychiatric: She has a normal mood and affect  Her behavior is normal  Judgment and thought content normal    Nursing note and vitals reviewed  Lab Results: I have personally reviewed pertinent lab results  Imaging Studies: I have personally reviewed pertinent reports  EKG, Pathology, and Other Studies: I have personally reviewed pertinent reports      VTE Prophylaxis: Heparin    Code Status: Level 1 - Full Code  Advance Directive and Living Will:      Power of :    POLST:

## 2020-01-14 NOTE — ASSESSMENT & PLAN NOTE
· Normocytic   · Hgb 10 9 today   · Likely component of dilution secondary to IVF  · No signs of active bleeding currently   · Continue to trend CBC

## 2020-01-14 NOTE — ASSESSMENT & PLAN NOTE
· Xray humerus 1/11 - impacted nondisplaced humeral neck fracture  · Ortho evaluated,  · NWB left upper extremity   · Continue to maintain sling   · PT/OT evals pending   · Pt cleared for discharge from ortho standpoint for follow up with Dr Blade Sethi in one week for repeat xrays

## 2020-01-14 NOTE — RESPIRATORY THERAPY NOTE
RT Protocol Note  Tung Benitez 70 y o  female MRN: [id number]  Unit/Bed#: ED 06 Encounter: [id number]    Assessment    Principal Problem:    Pneumonia  Active Problems:    Hypertension    GERD (gastroesophageal reflux disease)    Hypothyroidism    Humeral fracture    Hyponatremia    Anemia      Home Pulmonary Medications:  n/a       Past Medical History:   Diagnosis Date    Breast cancer ([facility] [address]     Disease of thyroid gland     GERD (gastroesophageal reflux disease)     Hypertension     Rapid heart rate      Social History     Socioeconomic History    Marital status: /Civil Union     Spouse name: None    Number of children: None    Years of education: None    Highest education level: None   Occupational History    None   Social Needs    Financial resource strain: None    Food insecurity:     Worry: None     Inability: None    Transportation needs:     Medical: None     Non-medical: None   Tobacco Use    Smoking status: Never Smoker    Smokeless tobacco: Never Used   Substance and Sexual Activity    Alcohol use: No    Drug use: No    Sexual activity: None   Lifestyle    Physical activity:     Days per week: None     Minutes per session: None    Stress: None   Relationships    Social connections:     Talks on phone: None     Gets together: None     Attends Confucianist service: None     Active member of club or organization: None     Attends meetings of clubs or organizations: None     Relationship status: None    Intimate partner violence:     Fear of current or ex partner: None     Emotionally abused: None     Physically abused: None     Forced sexual activity: None   Other Topics Concern    None   Social History Narrative    None       Subjective    Subjective Data: awake an dalert wihtout apparent respiratory distress    Objective    Physical Exam:   Assessment Type: Assess only  General Appearance: Alert, Awake  Respiratory Pattern: Normal, Spontaneous  Chest Assessment: Chest expansion symmetrical  Bilateral Breath Sounds: Diminished, Scattered, Coarse  Cough: None  O2 Device: room air    Vitals:  Blood pressure 143/87, pulse 68, temperature 98 1 °F (36 7 °C), temperature source Oral, resp  rate 16, height 5' 5" (1 651 m), weight 99 5 kg (219 lb 5 7 oz), SpO2 93 %  Imaging and other studies: I have personally reviewed pertinent reports        O2 Device: room air     Plan    Respiratory Plan: No distress/Pulmonary history        Resp Comments: respiratory protocol completed at [facility] time patwint awake and alert without apparent respiratory distress no significant pulomnanry PMH no indication for aerosol therapy needed at [facility] time

## 2020-01-14 NOTE — PLAN OF CARE
Problem: OCCUPATIONAL THERAPY ADULT  Goal: Performs self-care activities at highest level of function for planned discharge setting  See evaluation for individualized goals  Description  Treatment Interventions: ADL retraining, Functional transfer training, UE strengthening/ROM, Endurance training, Patient/family training, Equipment evaluation/education, Fine motor coordination activities, Compensatory technique education, Continued evaluation, Energy conservation, Activityengagement(one-handed ADL techniques)          See flowsheet documentation for full assessment, interventions and recommendations  Note:   Limitation: Decreased ADL status, Decreased UE ROM, Decreased UE strength, Decreased endurance, Decreased self-care trans, Decreased high-level ADLs, Non-func L UE     Assessment: Pt is a 70 y o  female seen for OT evaluation (time 4597-2910) s/p admit to [facility] on 1/13/2020 w/ Pneumonia  Comorbidities affecting pt's functional performance at time of assessment include: HTN, GERD, hypothyroidism, humeral fracture, hyponatremia, anemia  Evaluation required a review of medical and/ or therapy records and extensive additional review of physical, cognitive, or psychosocial history related to current functional performance    Personal factors affecting pt at time of IE include:steps to enter environment, limited home support, behavioral pattern, difficulty performing ADLS, difficulty performing IADLS , health management  and environment  Prior to admission, Pt reports INDEP with ADLs, IADLs, and functional mobility using SPC PRN   Upon evaluation: Based on functional assessment, pt requires set-up for feeding, Min A for grooming, Mod A for UB ADLs, Max A for LB ADLs, Min A for toileting, Min A x 1 for toilet transfer, supervision for sit<>stand transfer with hand held assist, CGA x 1 using IV pole for R UE support to complete functional mobility <> bathroom  2* the following deficits impacting occupational performance: weakness, decreased ROM, decreased strength, decreased balance, decreased tolerance, orthopedic restrictions and decreased coping skills  Cognition appears to be [facility] and vision is [facility] - please refer to flowsheet above for details  Pt to benefit from continued skilled OT tx while in the hospital to address deficits as defined above and maximize level of functional independence w ADL's and functional mobility  Occupational Performance areas to address include: eating, grooming, bathing/shower, toilet hygiene, dressing, medication management, socialization, health maintenance, functional mobility and social participation  From OT standpoint, recommendation at time of d/c would be home OT  Tx assessment: Patient participated in Skilled OT session (time 4792-2942) this date with interventions consisting of maintaining weight bearing precautions and therapeutic exercise education and completion to increase functional use and strength of b/l UEs  Pt verbalized understanding of L UE NWB status  Reviewed recommendation from ortho  Educated pt on completion of AROM exercises for L elbow, wrist, and digits  Recommended pt complete 10 reps of each exercise at least 5 x /day to improve functional AROM of elbow (flexion/extension), wrist (flexion/extension, radial/ulnar deviation, and rotation), and digits (flexion/extension, opposition/reposition, abduction/adduction) as well as to decrease risk of swelling that may develop  Emphasized importance of maintaining L shoulder at side of body as described in ortho note  Provided pt with and reviewed AROM handout  Instructed pt to notify healthcare provider if experience sharp/shooting pain  Recommended pt complete exercises while seated in chair  Emphasized importance of not adding resistance to L UE AROM exercises  Pt verbalized understanding  Patient agreeable to OT treatment session, upon arrival patient was found supine in bed   Patient continues to be functioning below baseline level, occupational performance remains limited secondary to factors listed above and increased risk for falls and injury  From OT standpoint, recommendation at time of d/c would be home OT  Patient to benefit from continued Occupational Therapy treatment while in the hospital to address deficits as defined above and maximize level of functional independence with ADLs and functional mobility        OT Discharge Recommendation: Home OT  OT - OK to Discharge: (once medically cleared)

## 2020-01-14 NOTE — PLAN OF CARE
Problem: Prexisting or High Potential for Compromised Skin Integrity  Goal: Skin integrity is maintained or improved  Description  INTERVENTIONS:  - Identify patients at risk for skin breakdown  - Assess and monitor skin integrity  - Assess and monitor nutrition and hydration status  - Monitor labs   - Assess for incontinence   - Turn and reposition patient  - Assist with mobility/ambulation  - Relieve pressure over bony prominences  - Avoid friction and shearing  - Provide appropriate hygiene as needed including keeping skin clean and dry  - Evaluate need for skin moisturizer/barrier cream  - Collaborate with interdisciplinary team   - Patient/family teaching  - Consider wound care consult   Outcome: Progressing     Problem: Potential for Falls  Goal: Patient will remain free of falls  Description  INTERVENTIONS:  - Assess patient frequently for physical needs  -  Identify cognitive and physical deficits and behaviors that affect risk of falls    -  [place] fall precautions as indicated by assessment   - Educate patient/family on patient safety including physical limitations  - Instruct patient to call for assistance with activity based on assessment  - Modify environment to reduce risk of injury  - Consider OT/PT consult to assist with strengthening/mobility  Outcome: Progressing     Problem: PAIN - ADULT  Goal: Verbalizes/displays adequate comfort level or baseline comfort level  Description  Interventions:  - Encourage patient to monitor pain and request assistance  - Assess pain using appropriate pain scale  - Administer analgesics based on type and severity of pain and evaluate response  - Implement non-pharmacological measures as appropriate and evaluate response  - Consider cultural and social influences on pain and pain management  - Notify physician/advanced practitioner if interventions unsuccessful or patient reports new pain  Outcome: Progressing     Problem: INFECTION - ADULT  Goal: Absence or prevention of progression during hospitalization  Description  INTERVENTIONS:  - Assess and monitor for signs and symptoms of infection  - Monitor lab/diagnostic results  - Monitor all insertion sites, i e  indwelling lines, tubes, and drains  - Monitor endotracheal if appropriate and nasal secretions for changes in amount and color  - [place] appropriate cooling/warming therapies per order  - Administer medications as ordered  - Instruct and encourage patient and family to use good hand hygiene technique  - Identify and instruct in appropriate isolation precautions for identified infection/condition  Outcome: Progressing  Goal: Absence of fever/infection during neutropenic period  Description  INTERVENTIONS:  - Monitor WBC    Outcome: Progressing     Problem: Knowledge Deficit  Goal: Patient/family/caregiver demonstrates understanding of disease process, treatment plan, medications, and discharge instructions  Description  Complete learning assessment and assess knowledge base    Interventions:  - Provide teaching at level of understanding  - Provide teaching via preferred learning methods  Outcome: Progressing

## 2020-01-14 NOTE — PLAN OF CARE
Problem: PHYSICAL THERAPY ADULT  Goal: Performs mobility at highest level of function for planned discharge setting  See evaluation for individualized goals  Description  Treatment/Interventions: Functional transfer training, LE strengthening/ROM, Elevations, Therapeutic exercise, Endurance training, Patient/family training, Equipment eval/education, Bed mobility, Gait training, Spoke to nursing, OT          See flowsheet documentation for full assessment, interventions and recommendations  Note:   Prognosis: Good  Problem List: Pain, Orthopedic restrictions, Decreased strength, Decreased endurance, Impaired balance, Decreased mobility  Assessment: Pt is a 71 y/o female admitted to [facility] on 20 with pneumonia  Pt was seen in the ED on 20 for a closed fx of L proximal humerus s/p fall at home  Pt consulted to assess functional mobility and d/c needs  PT eval/treat as well as up w/A orders active  Pt's past medical history includes HTN, GERD, humeral fracture, and anemia  Prior to her admission, the pt was living with her  and granddtr in a one-story mobile home with 4 ROMERO, was ambulatory with a SPC used PRN, and was independent with all functional mobility  Personal factors: decreased caregiver support, living in a home with ROMERO, and a positive fall history  Pt agreeable to PT eval, verbalized as appropriate per RN [place]  Two pt identifiers assessed, pt verbalized full name and   Pt with sling donned to LUE upon arrival  Pt performed all bed mobility with min A x1  Pt performed all transfers with supervision  Pt ambulated 10' x2 from recliner to bathroom, bathroom to EOB with min A x1, no LOB observed  Upon assessment, pt's physical limitations include orthopedic restrictions, decreased strength, decreased endurance, impaired balance, and decreased mobility  Outcome measures: [place] 45/100, Modified [place] 4   Pt's clinical presentation is currently unstable due to her multiple co-morbidities, ongoing medical assessment, and abnormal lab values  At this time, pt is recommended for d/c home with family support and HHPT to address the above impairments  Pt would benefit from continued PT throughout her hospital stay to facilitate return to PLOF  Barriers to Discharge: Decreased caregiver support  Barriers to Discharge Comments: (+) ROMERO  Recommendation: Home with family support, Home PT(anticipated pending progress)     PT - OK to Discharge: No(pt to clear stairs & demonstrate consistency c level surface)    See flowsheet documentation for full assessment

## 2020-01-15 VITALS
HEIGHT: 65 IN | SYSTOLIC BLOOD PRESSURE: 137 MMHG | WEIGHT: 230.38 LBS | TEMPERATURE: 97.9 F | BODY MASS INDEX: 38.38 KG/M2 | DIASTOLIC BLOOD PRESSURE: 85 MMHG | HEART RATE: 68 BPM | RESPIRATION RATE: 18 BRPM | OXYGEN SATURATION: 96 %

## 2020-01-15 DIAGNOSIS — R91.1 LUNG NODULE: Primary | ICD-10-CM

## 2020-01-15 DIAGNOSIS — R93.89 ABNORMAL CHEST CT: ICD-10-CM

## 2020-01-15 LAB
ERYTHROCYTE [DISTWIDTH] IN BLOOD BY AUTOMATED COUNT: 14.2 % (ref 11.6–15.1)
HCT VFR BLD AUTO: 30.8 % (ref 34.8–46.1)
HGB BLD-MCNC: 10 G/DL (ref 11.5–15.4)
MCH RBC QN AUTO: 30.6 PG (ref 26.8–34.3)
MCHC RBC AUTO-ENTMCNC: 32.5 G/DL (ref 31.4–37.4)
MCV RBC AUTO: 94 FL (ref 82–98)
PLATELET # BLD AUTO: 175 THOUSANDS/UL (ref 149–390)
PMV BLD AUTO: 11.1 FL (ref 8.9–12.7)
PROCALCITONIN SERPL-MCNC: 0.05 NG/ML
RBC # BLD AUTO: 3.27 MILLION/UL (ref 3.81–5.12)
WBC # BLD AUTO: 6.29 THOUSAND/UL (ref 4.31–10.16)

## 2020-01-15 PROCEDURE — 85027 COMPLETE CBC AUTOMATED: CPT | Performed by: PHYSICIAN ASSISTANT

## 2020-01-15 PROCEDURE — 97110 THERAPEUTIC EXERCISES: CPT

## 2020-01-15 PROCEDURE — 99239 HOSP IP/OBS DSCHRG MGMT >30: CPT | Performed by: NURSE PRACTITIONER

## 2020-01-15 PROCEDURE — 84145 PROCALCITONIN (PCT): CPT | Performed by: PHYSICIAN ASSISTANT

## 2020-01-15 PROCEDURE — 97116 GAIT TRAINING THERAPY: CPT

## 2020-01-15 PROCEDURE — 99231 SBSQ HOSP IP/OBS SF/LOW 25: CPT | Performed by: PHYSICIAN ASSISTANT

## 2020-01-15 RX ORDER — POLYETHYLENE GLYCOL 3350 17 G/17G
17 POWDER, FOR SOLUTION ORAL DAILY
Status: DISCONTINUED | OUTPATIENT
Start: 2020-01-15 | End: 2020-01-15 | Stop reason: HOSPADM

## 2020-01-15 RX ORDER — DOCUSATE SODIUM 100 MG/1
100 CAPSULE, LIQUID FILLED ORAL 2 TIMES DAILY
Status: DISCONTINUED | OUTPATIENT
Start: 2020-01-15 | End: 2020-01-15 | Stop reason: HOSPADM

## 2020-01-15 RX ORDER — CEFDINIR 300 MG/1
300 CAPSULE ORAL EVERY 12 HOURS SCHEDULED
Qty: 10 CAPSULE | Refills: 0 | Status: SHIPPED | OUTPATIENT
Start: 2020-01-16 | End: 2020-01-21

## 2020-01-15 RX ADMIN — DOCUSATE SODIUM 100 MG: 100 CAPSULE, LIQUID FILLED ORAL at 11:35

## 2020-01-15 RX ADMIN — HEPARIN SODIUM 5000 UNITS: 5000 INJECTION INTRAVENOUS; SUBCUTANEOUS at 05:56

## 2020-01-15 RX ADMIN — VERAPAMIL HYDROCHLORIDE 180 MG: 180 TABLET, FILM COATED, EXTENDED RELEASE ORAL at 08:41

## 2020-01-15 RX ADMIN — SODIUM CHLORIDE 75 ML/HR: 0.9 INJECTION, SOLUTION INTRAVENOUS at 01:20

## 2020-01-15 RX ADMIN — OYSTER SHELL CALCIUM WITH VITAMIN D 1 TABLET: 500; 200 TABLET, FILM COATED ORAL at 08:41

## 2020-01-15 RX ADMIN — CEFTRIAXONE SODIUM 1000 MG: 10 INJECTION, POWDER, FOR SOLUTION INTRAVENOUS at 13:00

## 2020-01-15 RX ADMIN — METOPROLOL TARTRATE 50 MG: 50 TABLET, FILM COATED ORAL at 17:12

## 2020-01-15 RX ADMIN — POLYETHYLENE GLYCOL 3350 17 G: 17 POWDER, FOR SOLUTION ORAL at 11:36

## 2020-01-15 RX ADMIN — DOCUSATE SODIUM 100 MG: 100 CAPSULE, LIQUID FILLED ORAL at 17:12

## 2020-01-15 RX ADMIN — LEVOTHYROXINE SODIUM 137 MCG: 112 TABLET ORAL at 05:57

## 2020-01-15 RX ADMIN — METOPROLOL TARTRATE 50 MG: 50 TABLET, FILM COATED ORAL at 08:41

## 2020-01-15 RX ADMIN — PANTOPRAZOLE SODIUM 40 MG: 40 TABLET, DELAYED RELEASE ORAL at 05:57

## 2020-01-15 RX ADMIN — ANASTROZOLE 1 MG: 1 TABLET, COATED ORAL at 08:42

## 2020-01-15 NOTE — PROGRESS NOTES
Progress Note - Pulmonary   Aishwarya Renee 70 y o  female MRN: [id number]  Unit/Bed#: -01 Encounter: [id number]    Assessment:  Shortness of breath, improved  Abnormal chest CT  Breast cancer status post left lumpectomy and radiation, currently on hormone therapy  Left humerus fracture    Plan:  No hypoxia or supplemental oxygen requirements  Shortness of breath improved  Chest CT negative for pulmonary embolism  A right upper lobe nodule with adjacent airspace opacity representing atelectasis versus pneumonia versus pulmonary mass is seen  There is no leukocytosis or procalcitonin elevation  Patient initially started on ceftriaxone and azithromycin, azithromycin has been discontinued after Legionella urinary antigen found to be negative  Strep pneumo urinary antigen negative as well  Despite procalcitonin being normal, given the clinical symptoms and radiology findings, would recommend completing a 7 day course of antibiotics  Patient can be transitioned to [facility] for discharge  Influenza and RSV negative  Follow-up on sputum culture results  Encourage incentive spirometry  She follows with [facility]  States that her breast cancer has been in remission for the past 3 years, follows up regularly with mammograms  No history of metastatic disease  Patient has been seen by Orthopedics regarding the humerus fracture     She will need outpatient pulmonary follow-up including a follow-up chest CT in the next 4 weeks approximately  This has been ordered  Patient is stable from pulmonary standpoint, discussed with Internal Medicine at bedside    Subjective:   Feeling well today  Denies any shortness of breath    Objective:     Vitals: Blood pressure 146/87, pulse 82, temperature 97 9 °F (36 6 °C), resp  rate 18, height 5' 5" (1 651 m), weight 104 kg (230 lb 6 1 oz), SpO2 94 %  ,Body mass index is 38 34 kg/m²        Intake/Output Summary (Last 24 hours) at 1/15/2020 0155  Last data filed at 1/15/2020 0900  Gross per 24 hour   Intake 1580 ml   Output 1400 ml   Net 180 ml       Invasive Devices     Peripheral Intravenous Line            Peripheral IV 01/15/20 Distal;Right;Ventral (anterior) Forearm less than 1 day                Physical Exam:   General appearance: alert and oriented, in no acute distress  Head: Normocephalic, without obvious abnormality, atraumatic  Eyes: EOMI  No discharge bilaterally  No scleral icterus  Neck: supple, symmetrical, trachea midline  Lungs:  Occasional, very faint expiratory rhonchi  Heart: regular rate and rhythm, S1, S2 normal, no murmur, click, rub or gallop  Abdomen:  Soft, nontender abdomen  Extremities: No edema or tenderness of the lower extremities appreciated  Musculoskeletal:  Left arm noted in sling  Skin: No lesions or pallor noted  No rash  Warm and dry  Neurologic: Grossly normal     Labs: I have personally reviewed pertinent lab results  Imaging and other studies: I have personally reviewed pertinent reports

## 2020-01-15 NOTE — ASSESSMENT & PLAN NOTE
· Pt presented with shortness of breath and productive cough  · CTA with no pulmonary embolism, Nodule identified right upper lobe with adjacent minimal patchy airspace opacity  This may represent a pulmonary mass with adjacent atelectasis, although alternatively, both opacities may represent atelectasis or developing pneumonia     · Pt does not have leukocytosis and procalcitonin is negative  · Pulmonary evaluated  · Continue IV ceftriaxone for now, repeat procalcitonin normal however pulmonology wants to treat will transition patient to oral antibiotics patient will need repeat imaging to make sure resolution of her pneumonia in 4 weeks outpatient follow-up with pulmonology if no resolution patient may need PET scan  · D/c azithromycin as legionella is negative  · Patient has not had a bowel movement in 3+ days will initiate MiraLax and Colace once patient is achieved bowel movement she can discharge home  · Appreciate additional recommendations  · Blood cultures x2 1 showing positive for gram-positive cocci in clusters suspect this is contamination patient is nontoxic stable for discharge

## 2020-01-15 NOTE — ASSESSMENT & PLAN NOTE
· Xray humerus 1/11 - impacted nondisplaced humeral neck fracture  · Ortho evaluated,  · NWB left upper extremity   · Continue to maintain sling   · PT/OT evals home PT OT  · Pt cleared for discharge from ortho standpoint for follow up with Dr Maria E Miller in one week for repeat xrays

## 2020-01-15 NOTE — PHYSICAL THERAPY NOTE
PHYSICAL THERAPY NOTE      Patient Name: Arsalan Walton  IMXTR'D Date: 1/15/2020        01/15/20 2760   Pain Assessment   Pain Assessment No/denies pain   Pain Score No Pain   Restrictions/Precautions   Weight Bearing Precautions Per Order Yes   LUE Weight Bearing Per Order NWB   Braces or Orthoses Sling   Other Precautions WBS; Multiple lines; Fall Risk   General   Chart Reviewed Yes   Response to Previous Treatment Patient with no complaints from previous session  Family/Caregiver Present Yes   Cognition   Overall Cognitive Status WFL   Arousal/Participation Alert; Responsive;Arousable; Cooperative   Attention Within functional limits   Orientation Level Oriented X4   Memory Within functional limits   Following Commands Follows all commands and directions without difficulty   Comments pt agreeable to PT session   Subjective   Subjective "I need to have a bowel movement before they will d/c me"   Bed Mobility   Sit to Supine 5  Supervision   Additional items Assist x 1;Bedrails; Increased time required;LE management   Additional Comments pt received OOB to recliner upon arrival, pt reports being ambulatory to the bathroom on several occasions earlier this date with A x1  Pt returned to bed at end of session with all needs in reach  Transfers   Sit to Stand 5  Supervision   Additional items Assist x 1; Armrests; Increased time required   Stand to Sit 5  Supervision   Additional items Assist x 1; Increased time required   Ambulation/Elevation   Gait pattern Decreased foot clearance;Decreased R stance; Short stride   Gait Assistance 5  Supervision   Additional items Assist x 1   Assistive Device None   Distance 80' + 15' + 15'   Stair Management Assistance 5  Supervision   Additional items Assist x 1;Verbal cues; Increased time required   Stair Management Technique One rail R;Step to pattern  (R rail ascending & descending)   Number of Stairs 3  (x2 trials)   Balance   Static Sitting Good   Dynamic Sitting Good   Static Standing Fair   Dynamic Standing Fair -   Ambulatory Fair -   Endurance Deficit   Endurance Deficit Yes   Activity Tolerance   Activity Tolerance Patient limited by fatigue   Medical Staff Made Aware PT [place]   Nurse Made Aware FRANK Raphael verbalized pt appropriate for PT treatment session, made aware of session outcomes/recs   Exercises   Quad Sets AROM; Bilateral;Sitting;15 reps   Heelslides AROM; Bilateral;Sitting;10 reps  (pt requiring rest break after 5 reps 2* muscular fatigue)   Glute Sets AROM; Bilateral;Sitting;15 reps   Hip Abduction AROM; Bilateral;Sitting;15 reps   Hip Adduction AROM; Bilateral;Sitting;15 reps  (to midline)   Knee AROM Long Arc Quad AROM; Bilateral;Sitting;15 reps   Ankle Pumps AROM; Bilateral;Sitting;15 reps   Marching AROM; Bilateral;Sitting;15 reps   Assessment   Prognosis Good   Problem List Pain;Orthopedic restrictions;Decreased strength;Decreased endurance; Impaired balance;Decreased mobility   Assessment Pt agreeable to PT treatment session, verbalized as appropriate per FRANK Raphael  Pt performed all ther ex in seated position without any pain, SOB, lightheadedness, or dizziness  Pt requiring rest breaks after 5 reps of seated heel slides 2* muscular fatigue  Pt able to verbalized LUE NWB status and maintain throughout session  Pt performed multiple sit<>stand and stand <>sit transfers with supervision  Pt ambulated [de-identified]' + 15' + 15' with supervision, no LOB observed  Pt navigated 3 steps x2 trials using R unilateral handrail ascending with supervision, no LOB observed  Pt returned to bed at end of session with all needs in reach  Pt has no concerns returning home  At this time, pt is recommended for d/c home with family support and HHPT to address the above impairments  Pt would benefit from continued PT throughout her hospital stay to facilitate return to PLOF     Barriers to Discharge Decreased caregiver support Barriers to Discharge Comments (+) ROMERO   Goals   Patient Goals to return home   STG Expiration Date 01/24/20   PT Treatment Day 1   Plan   Treatment/Interventions Functional transfer training;LE strengthening/ROM; Elevations; Therapeutic exercise; Endurance training;Patient/family training;Equipment eval/education; Bed mobility;Gait training;Spoke to nursing;OT   Progress Progressing toward goals   PT Frequency 5x/wk   Recommendation   Recommendation Home with family support;Home PT  (ancitipated pending progress)   PT - OK to Discharge Yes  (when medically cleared)     Martha Villatoro, SPT

## 2020-01-15 NOTE — ASSESSMENT & PLAN NOTE
· Sodium 132 on admission   · Likely secondary to hypovolemia as this is improved s/p IVF hydration   · Now resolved 138  · Discontinue IV fluids

## 2020-01-15 NOTE — PLAN OF CARE
Problem: PHYSICAL THERAPY ADULT  Goal: Performs mobility at highest level of function for planned discharge setting  See evaluation for individualized goals  Description  Treatment/Interventions: Functional transfer training, LE strengthening/ROM, Elevations, Therapeutic exercise, Endurance training, Patient/family training, Equipment eval/education, Bed mobility, Gait training, Spoke to nursing, OT          See flowsheet documentation for full assessment, interventions and recommendations  Outcome: Progressing  Note:   Prognosis: Good  Problem List: Pain, Orthopedic restrictions, Decreased strength, Decreased endurance, Impaired balance, Decreased mobility  Assessment: Pt agreeable to PT treatment session, verbalized as appropriate per FRANK Raphael  Pt performed all ther ex in seated position without any pain, SOB, lightheadedness, or dizziness  Pt requiring rest breaks after 5 reps of seated heel slides 2* muscular fatigue  Pt able to verbalized LUE NWB status and maintain throughout session  Pt performed multiple sit<>stand and stand <>sit transfers with supervision  Pt ambulated [de-identified]' + 15' + 15' with supervision, no LOB observed  Pt navigated 3 steps x2 trials using R unilateral handrail ascending with supervision, no LOB observed  Pt returned to bed at end of session with all needs in reach  Pt has no concerns returning home  At this time, pt is recommended for d/c home with family support and HHPT to address the above impairments  Pt would benefit from continued PT throughout her hospital stay to facilitate return to PLOF  Barriers to Discharge: Decreased caregiver support  Barriers to Discharge Comments: (+) ROMERO  Recommendation: Home with family support, Home PT(ancitipated pending progress)     PT - OK to Discharge: Yes(when medically cleared)    See flowsheet documentation for full assessment

## 2020-01-15 NOTE — DISCHARGE SUMMARY
Discharge- Savita Laura 1940, 70 y o  female MRN: [id number]    Unit/Bed#: -01 Encounter: [id number]    Primary Care Provider: Gregory Morgan MD   Date and time admitted to hospital: 1/13/2020  8:23 AM        * Pneumonia  Assessment & Plan  · Pt presented with shortness of breath and productive cough  · CTA with no pulmonary embolism, Nodule identified right upper lobe with adjacent minimal patchy airspace opacity  This may represent a pulmonary mass with adjacent atelectasis, although alternatively, both opacities may represent atelectasis or developing pneumonia     · Pt does not have leukocytosis and procalcitonin is negative  · Pulmonary evaluated  · Continue IV ceftriaxone for now, repeat procalcitonin normal however pulmonology wants to treat will transition patient to oral antibiotics patient will need repeat imaging to make sure resolution of her pneumonia in 4 weeks outpatient follow-up with pulmonology if no resolution patient may need PET scan  · D/c azithromycin as legionella is negative  · Patient has not had a bowel movement in 3+ days will initiate MiraLax and Colace once patient is achieved bowel movement she can discharge home  · Appreciate additional recommendations  · Blood cultures x2 1 showing positive for gram-positive cocci in clusters suspect this is contamination patient is nontoxic stable for discharge    Humeral fracture  Assessment & Plan  · Xray humerus 1/11 - impacted nondisplaced humeral neck fracture  · Ortho evaluated,  · NWB left upper extremity   · Continue to maintain sling   · PT/OT evals home PT OT  · Pt cleared for discharge from ortho standpoint for follow up with Dr Omaira Tavares in one week for repeat xrays     Hyponatremia  Assessment & Plan  · Sodium 132 on admission   · Likely secondary to hypovolemia as this is improved s/p IVF hydration   · Now resolved 138  · Discontinue IV fluids    Anemia  Assessment & Plan  · Normocytic   · Hgb 10 0 today   · No signs of active bleeding currently   · Continue to trend CBC    Hypothyroidism  Assessment & Plan  · TSH WNL  · Continue levothyroxine 137 mcg daily     GERD (gastroesophageal reflux disease)  Assessment & Plan  · On Protonix    Hypertension  Assessment & Plan  · BP appears stable on review  · Continue Lopressor 50 mg BID and Verapamil 180 mg daily   · Monitor           Discharging Physician / Practitioner: AAMIR Jaimes  PCP: Edi Arnold MD  Admission Date:   Admission Orders (From admission, onward)     Ordered        01/13/20 1139  Inpatient Admission  Once                   Discharge Date: 01/15/20    Resolved Problems  Date Reviewed: 1/15/2020    None          Consultations During Hospital Stay:  · Orthopedic surgery  · Pulmonology    Procedures Performed:   · CTA chest PE study:  Nodule identified right upper lobe with an adjacent minimal patchy airspace opacity  This may represent a pulmonary mass with an adjacent atelectasis, although alternatively, both opacities may represent atelectasis or developing pneumonia  Significant Findings / Test Results:   · Pneumonia  · Known humeral fracture    Incidental Findings:   · Positive blood culture x1 thought to be contamination patient nontoxic     Test Results Pending at Discharge (will require follow up): · Blood cultures x2 final growth     Outpatient Tests Requested:  · Repeat CT per pulmonology    Complications:  None    Reason for Admission:  Pneumonia    Hospital Course:     Mikaela Valles is a 70 y o  female patient who originally presented to the hospital on 1/13/2020 due to shortness of breath, lightheadedness, cough with a productive yellowish sputum associated with chills and generalized weakness  Patient was seen recently in the hospital after sustaining a mechanical fall at home where she is found to have an impacted left humeral neck fracture  Patient was placed in a sling immobilizer was given pain medication and discharged home    Given patient's fall and hospitalization orthopedic was consult evaluation recommendations were nonweightbearing to the left upper extremity maintain sling at all times except for hygiene purposes  PT OT  Outpatient follow-up with Orthopedic in 1 week for repeat imaging  Given patient's CTA findings of possibility of mass versus pneumonia pulmonology evaluated Given patient's sputum production although negative procalcitonin is do 1 patient treated with antibiotics and want her to follow up outpatient with them for repeat imaging to make sure her image study findings show resolution if not patient may require further workup  Please see above list of diagnoses and related plan for additional information  Condition at Discharge: good     Discharge Day Visit / Exam:     Subjective:  Patient reports significant improvement of shortness of breath  Patient currently denies pain  Patient's only complaint is that she has not moved her bowels and patient was started on a bowel regimen and has been walking and obtained a small bowel movement prior to discharge  Vitals: Blood Pressure: 137/85 (01/15/20 1522)  Pulse: 68 (01/15/20 1522)  Temperature: 97 9 °F (36 6 °C) (01/15/20 0712)  Temp Source: Oral (01/13/20 9533)  Respirations: 18 (01/15/20 1522)  Height: 5' 5" (165 1 cm) (01/13/20 5839)  Weight - Scale: 104 kg (230 lb 6 1 oz) (01/15/20 0600)  SpO2: 96 % (01/15/20 1522)  Exam:   Physical Exam   Constitutional: She is oriented to person, place, and time  She appears well-developed and well-nourished  HENT:   Head: Normocephalic and atraumatic  Eyes: Conjunctivae and EOM are normal    Neck: Normal range of motion  Cardiovascular: Normal rate, regular rhythm and normal heart sounds  Pulmonary/Chest: Effort normal and breath sounds normal    Abdominal: Soft  Bowel sounds are normal    Musculoskeletal:   Sling intact to left upper extremity   Neurological: She is alert and oriented to person, place, and time  Skin: Skin is warm and dry  Psychiatric: She has a normal mood and affect  Her behavior is normal        Discussion with Family:  Granddaughter bedside    Discharge instructions/Information to patient and family:   See after visit summary for information provided to patient and family  Provisions for Follow-Up Care:  See after visit summary for information related to follow-up care and any pertinent home health orders  Disposition:     Home    For Discharges to [facility]:   · Not Applicable to this Patient - Not Applicable to this Patient    Planned Readmission:  None     Discharge Statement:  I spent 35 minutes discharging the patient  This time was spent on the day of discharge  I had direct contact with the patient on the day of discharge  Greater than 50% of the total time was spent examining patient, answering all patient questions, arranging and discussing plan of care with patient as well as directly providing post-discharge instructions  Additional time then spent on discharge activities  Discharge Medications:  See after visit summary for reconciled discharge medications provided to patient and family        ** Please Note: This note has been constructed using a voice recognition system **

## 2020-01-16 ENCOUNTER — TELEPHONE (OUTPATIENT)
Dept: PULMONOLOGY | Facility: CLINIC | Age: 80
End: 2020-01-16

## 2020-01-16 ENCOUNTER — TELEPHONE (OUTPATIENT)
Dept: OBGYN CLINIC | Facility: HOSPITAL | Age: 80
End: 2020-01-16

## 2020-01-16 LAB
BACTERIA SPT RESP CULT: NORMAL
BACTERIA SPT RESP CULT: NORMAL
GRAM STN SPEC: NORMAL

## 2020-01-16 NOTE — TELEPHONE ENCOUNTER
Please advise if this patient can be placed on Dr Omaira Tavares schedule for 1/22 for post hospital discharge follow up for Humeral surgical neck fx?

## 2020-01-17 LAB
BACTERIA BLD CULT: ABNORMAL
GRAM STN SPEC: ABNORMAL

## 2020-01-17 NOTE — RESULT ENCOUNTER NOTE
Discussed blood culture results with patient via telephone  1/2 cultures grew out micrococcus which is likely a contaminant  Patient states she is feeling great and denies fevers  Advised patient to follow-up with her PCP to have the cultures redrawn  ED return precautions discussed  Call back complete

## 2020-01-18 LAB — BACTERIA BLD CULT: NORMAL

## 2020-01-22 ENCOUNTER — OFFICE VISIT (OUTPATIENT)
Dept: OBGYN CLINIC | Facility: CLINIC | Age: 80
End: 2020-01-22

## 2020-01-22 ENCOUNTER — APPOINTMENT (OUTPATIENT)
Dept: RADIOLOGY | Facility: CLINIC | Age: 80
End: 2020-01-22
Payer: MEDICARE

## 2020-01-22 VITALS
SYSTOLIC BLOOD PRESSURE: 198 MMHG | HEIGHT: 65 IN | HEART RATE: 69 BPM | BODY MASS INDEX: 36.49 KG/M2 | DIASTOLIC BLOOD PRESSURE: 80 MMHG | WEIGHT: 219 LBS

## 2020-01-22 DIAGNOSIS — S42.295A OTHER NONDISPLACED FRACTURE OF UPPER END OF LEFT HUMERUS, INITIAL ENCOUNTER FOR CLOSED FRACTURE: ICD-10-CM

## 2020-01-22 DIAGNOSIS — S42.295A OTHER NONDISPLACED FRACTURE OF UPPER END OF LEFT HUMERUS, INITIAL ENCOUNTER FOR CLOSED FRACTURE: Primary | ICD-10-CM

## 2020-01-22 PROCEDURE — 73060 X-RAY EXAM OF HUMERUS: CPT

## 2020-01-22 PROCEDURE — 99024 POSTOP FOLLOW-UP VISIT: CPT | Performed by: ORTHOPAEDIC SURGERY

## 2020-01-22 PROCEDURE — 73020 X-RAY EXAM OF SHOULDER: CPT

## 2020-01-22 NOTE — PROGRESS NOTES
HPI:  Patient is a 70y o  year old who presents today for her left proximal humerus fracture that was sustained on 1/11/2020, when she slipped an fell onto the left shoulder while tending to her small dog  She presents in a sling today  ROS:   General: No fever, no chills, no weight loss, no weight gain  HEENT:  No loss of hearing, no nose bleeds, no sore throat  Eyes:  No eye pain, no red eyes, no visual disturbance  Respiratory:  No cough, no shortness of breath, no wheezing  Cardiovascular:  No chest pain, no palpitations, no edema  GI: No abdominal pain, no nausea, no vomiting  Endocrine: No frequent urination, no excessive thirst  Urinary:  No dysuria, no hematuria, no incontinence  Musculoskeletal: see HPI and PE  Skin:  No rash, no wounds  Neurological:  No dizziness, no headache, no numbness  Psychiatric:  No difficulty concentrating, no depression, no suicide thoughts, no anxiety  Review of all other systems is negative    PMH:  Past Medical History:   Diagnosis Date    Breast cancer ([facility] [address] )     Disease of thyroid gland     GERD (gastroesophageal reflux disease)     Hypertension     Rapid heart rate        PSH:  Past Surgical History:   Procedure Laterality Date    BREAST SURGERY      CHOLECYSTECTOMY      CT COLONOSCOPY FLX DX W/COLLJ SPEC WHEN PFRMD N/A 6/26/2017    Procedure: COLONOSCOPY;  Surgeon: Kari Ribeiro MD;  Location: [facility];   Service: Gastroenterology       Medications:  Current Outpatient Medications   Medication Sig Dispense Refill    anastrozole (ARIMIDEX) 1 mg tablet Take 1 mg by mouth daily      Calcium Carbonate-Vitamin D (CALTRATE 600+D PO) Take 1 tablet by mouth daily      ibuprofen (MOTRIN) 800 mg tablet Take 800 mg by mouth every 6 (six) hours as needed for mild pain      levothyroxine 100 mcg tablet Take 137 mcg by mouth daily       metoprolol tartrate (LOPRESSOR) 100 mg tablet Take 50 mg by mouth 2 (two) times a day        oxyCODONE-acetaminophen (PERCOCET) 5-325 mg per tablet Take 1 tablet by mouth every 6 (six) hours as needed for severe pain for up to 20 dosesMax Daily Amount: 4 tablets 20 tablet 0    verapamil (CALAN-SR) 180 mg CR tablet Take 180 mg by mouth daily        No current facility-administered medications for this visit  Allergies:  No Known Allergies    Family History:  Family History   Problem Relation Age of Onset    Prostate cancer Father     Breast cancer Sister        Social History:  Social History     Occupational History    Not on file   Tobacco Use    Smoking status: Never Smoker    Smokeless tobacco: Never Used   Substance and Sexual Activity    Alcohol use: Never     Frequency: Never    Drug use: Never    Sexual activity: Never       Physical Exam:  General :  Alert, cooperative, no distress, appears stated age  Blood pressure (!) 198/80, pulse 69, height 5' 5" (1 651 m), weight 99 3 kg (219 lb)  Head:  Normocephalic, without obvious abnormality, atraumatic   Eyes:  Conjunctiva/corneas clear, EOM's intact,   Ears: Both ears normal appearance, no hearing deficits  Nose: Nares normal, septum midline, no drainage    Neck: Supple,  trachea midline, no adenopathy, no tenderness, no mass   Back:   Symmetric, no curvature, ROM normal, no tenderness   Lungs:   Respirations unlabored   Chest Wall:  No tenderness or deformity   Extremities: Extremities normal, atraumatic, no cyanosis or edema      Pulses: 2+ and symmetric   Skin: Skin color, texture, turgor normal, no rashes or lesions      Neurologic: Normal           Left Shoulder Exam     Tenderness   Left shoulder tenderness location: lateral deltoid  Other   Erythema: absent  Sensation: normal  Pulse: present     Comments:  Limited ROM of the shoulder due to nature of the injury  Full ROm of elbow wrist and hand  Full sensation intact to light touch  Good capillary refill            Imaging Studies:      The following imaging studies were reviewed in office today  My findings are noted  Xrays of the Left shoulder performed today and reviewed: Good alignment of the fracture, no further displacement  Assessment  Encounter Diagnosis   Name Primary?  Other nondisplaced fracture of upper end of left humerus, initial encounter for closed fracture Yes         Plan:  - Xrays show that the proximal humerus fracture is in good alignment     - Continue with the sling   - She can work on gentle pendulums that were demonstrated to her in the office, along with elbow, wrist and hand ROM  - Follow up in 2 weeks with xrays    Scribe Attestation    I,:   Phong George am acting as a scribe while in the presence of the attending physician :        I,:   Carlos Enrique Toscano MD personally performed the services described in this documentation    as scribed in my presence :

## 2020-01-29 ENCOUNTER — OFFICE VISIT (OUTPATIENT)
Dept: PULMONOLOGY | Facility: CLINIC | Age: 80
End: 2020-01-29
Payer: MEDICARE

## 2020-01-29 VITALS
BODY MASS INDEX: 35.99 KG/M2 | DIASTOLIC BLOOD PRESSURE: 80 MMHG | HEART RATE: 68 BPM | SYSTOLIC BLOOD PRESSURE: 130 MMHG | WEIGHT: 216 LBS | HEIGHT: 65 IN | OXYGEN SATURATION: 97 %

## 2020-01-29 DIAGNOSIS — C50.912 MALIGNANT NEOPLASM OF LEFT FEMALE BREAST, UNSPECIFIED ESTROGEN RECEPTOR STATUS, UNSPECIFIED SITE OF BREAST (HCC): ICD-10-CM

## 2020-01-29 DIAGNOSIS — R91.1 PULMONARY NODULE: ICD-10-CM

## 2020-01-29 DIAGNOSIS — R93.89 ABNORMAL CHEST CT: Primary | ICD-10-CM

## 2020-01-29 PROCEDURE — 99214 OFFICE O/P EST MOD 30 MIN: CPT | Performed by: PHYSICIAN ASSISTANT

## 2020-01-29 NOTE — PROGRESS NOTES
Assessment:    1  Abnormal chest CT     2  Pulmonary nodule     3  Malignant neoplasm of left female breast, unspecified estrogen receptor status, unspecified site of breast [facility])           Plan:   Patient presents today for hospital follow-up with her granddaughter present  There has been significant improvement in symptoms  There is no shortness of breath or coughing  Again discussed chest CT revealing 11 x 6 x 14 mm nodular opacity in the right upper lobe, likely infectious given the clinical presentation however given patient's history of breast cancer will be followed up on with a chest CT 4 weeks after initial imaging  If the opacity persists without decrease in size, would likely proceed with PET scan  She follows with Oncology, currently on hormone therapy   No smoking history and she does not require bronchodilators    All of the patient's questions were answered to their satisfaction and understanding, which was verbalized  Patient was advised to call the office prior to next appointment should they have any questions or concerns prior  Patient made aware of worrisome symptoms that should prompt a visit to the ER or call to 911 such as chest pain, severe shortness of breath, cyanosis, throat closing, syncope, etc     Subjective:     Patient ID: Shawn Lu is a 70 y o  female  Chief Complaint:  Renée Márquez is a very pleasant 29-year-old female never smoker with past medical history including left-sided breast cancer status post lumpectomy and radiation therapy currently on hormone therapy, thyroid disorder, GERD, hypertension  She presents today for hospital follow-up  Patient was admitted following a mechanical fall with left humerus fracture, at that time also complaining of shortness of breath and cough x1 week  Cough was productive for green/yellow sputum  Chest CT revealed nodular opacity  Given the clinical symptoms and presentation, she was treated for community-acquired pneumonia    There was improvement in symptoms after 7 days with Omnicef  Today, she states there are no issues with her breathing  She is no longer coughing  She does not have any significant pulmonary history prior  No prior history of asthma  No history of occupational exposure to chemicals  The following portions of the patient's history were reviewed in this encounter and updated as appropriate: Past medical, social, surgical, family, allergies    Review of Systems   Constitutional: Negative for chills, fatigue and fever  Respiratory: Negative for cough, chest tightness, shortness of breath and wheezing  Cardiovascular: Positive for leg swelling  Negative for chest pain  All other systems reviewed and are negative  Objective:    Physical Exam   Constitutional: She is oriented to person, place, and time  She appears well-developed and well-nourished  No distress  obese   HENT:   Head: Normocephalic and atraumatic  Right Ear: External ear normal    Left Ear: External ear normal    Nose: Nose normal    Mouth/Throat: Oropharynx is clear and moist  No oropharyngeal exudate  Crowded airway   Eyes: Conjunctivae and EOM are normal  Right eye exhibits no discharge  Left eye exhibits no discharge  No scleral icterus  Neck: Normal range of motion  Neck supple  No tracheal deviation present  Short and wide neck   Cardiovascular: Normal rate, regular rhythm and normal heart sounds  Exam reveals no gallop and no friction rub  No murmur heard  Pulmonary/Chest: Effort normal and breath sounds normal  No stridor  No respiratory distress  She has no wheezes  She has no rales  Abdominal: She exhibits no distension  There is no guarding  Musculoskeletal: Normal range of motion  She exhibits edema and tenderness  She exhibits no deformity  L shoulder sling noted   Neurological: She is alert and oriented to person, place, and time  No cranial nerve deficit  Skin: Skin is warm and dry  No rash noted   She is not diaphoretic  No erythema  No pallor  Psychiatric: She has a normal mood and affect  Her behavior is normal  Judgment and thought content normal    Nursing note and vitals reviewed        Lab Review:   Admission on 01/13/2020, Discharged on 01/15/2020   Component Date Value    WBC 01/13/2020 7 30     RBC 01/13/2020 3 97     Hemoglobin 01/13/2020 12 1     Hematocrit 01/13/2020 38 9     MCV 01/13/2020 98     MCH 01/13/2020 30 5     MCHC 01/13/2020 31 1*    RDW 01/13/2020 14 0     MPV 01/13/2020 10 8     Platelets 01/88/3847 169     nRBC 01/13/2020 0     Neutrophils Relative 01/13/2020 72     Immat GRANS % 01/13/2020 1     Lymphocytes Relative 01/13/2020 18     Monocytes Relative 01/13/2020 8     Eosinophils Relative 01/13/2020 1     Basophils Relative 01/13/2020 0     Neutrophils Absolute 01/13/2020 5 31     Immature Grans Absolute 01/13/2020 0 04     Lymphocytes Absolute 01/13/2020 1 30     Monocytes Absolute 01/13/2020 0 56     Eosinophils Absolute 01/13/2020 0 07     Basophils Absolute 01/13/2020 0 02     Protime 01/13/2020 14 2     INR 01/13/2020 1 10     PTT 01/13/2020 25     INFLUENZA A PCR 01/13/2020 None Detected     INFLUENZA B PCR 01/13/2020 None Detected     RSV PCR 01/13/2020 None Detected     Sodium 01/13/2020 132*    Potassium 01/13/2020 4 2     Chloride 01/13/2020 100     CO2 01/13/2020 23     ANION GAP 01/13/2020 9     BUN 01/13/2020 12     Creatinine 01/13/2020 0 59*    Glucose 01/13/2020 115     Calcium 01/13/2020 8 8     eGFR 01/13/2020 89     Total Bilirubin 01/13/2020 0 80     Bilirubin, Direct 01/13/2020 0 16     Alkaline Phosphatase 01/13/2020 102     AST 01/13/2020 34     ALT 01/13/2020 32     Total Protein 01/13/2020 7 9     Albumin 01/13/2020 3 3*    Magnesium 01/13/2020 2 2     Troponin I 01/13/2020 <0 02     Color, UA 01/13/2020 Yellow     Clarity, UA 01/13/2020 Clear     Specific Gravity, UA 01/13/2020 <=1 005     pH, UA 01/13/2020 7 0  Leukocytes, UA 01/13/2020 Negative     Nitrite, UA 01/13/2020 Negative     Protein, UA 01/13/2020 Negative     Glucose, UA 01/13/2020 Negative     Ketones, UA 01/13/2020 Negative     Urobilinogen, UA 01/13/2020 0 2     Bilirubin, UA 01/13/2020 Negative     Blood, UA 01/13/2020 Negative     NT-proBNP 01/13/2020 743*    Lipase 01/13/2020 125     LACTIC ACID 01/13/2020 0 9     Blood Culture 01/13/2020 No Growth After 5 Days   Blood Culture 01/13/2020 Micrococcus luteus*    Gram Stain Result 01/13/2020 Gram positive cocci in clusters*    Procalcitonin 01/13/2020 <0 05     Osmolality, Ur 01/13/2020 455     Sodium, Ur 01/13/2020 117     Troponin I 01/13/2020 0 02     Sputum Culture 01/14/2020 Mixed Respiratory jadon     Sputum Culture 01/14/2020 Commensal respiratory jadon only; No significant growth of Staph aureus/MRSA or Pseudomonas aeruginosa       Gram Stain Result 01/14/2020 Rare Epithelial cells per low power field     Gram Stain Result 01/14/2020 2+ Polys     Gram Stain Result 01/14/2020 No bacteria seen     Legionella Urinary Antig* 01/13/2020 Negative     Strep pneumoniae antigen* 01/13/2020 Negative     Platelets 96/15/4202 178     MPV 01/13/2020 10 7     Troponin I 01/13/2020 0 02     Ventricular Rate 01/13/2020 66     Atrial Rate 01/13/2020 66     NH Interval 01/13/2020 198     QRSD Interval 01/13/2020 140     QT Interval 01/13/2020 434     QTC Interval 01/13/2020 454     P Axis 01/13/2020 60     QRS Axis 01/13/2020 -33     T Wave Mineral 01/13/2020 16     Sodium 01/14/2020 138     Potassium 01/14/2020 3 8     Chloride 01/14/2020 102     CO2 01/14/2020 27     ANION GAP 01/14/2020 9     BUN 01/14/2020 8     Creatinine 01/14/2020 0 71     Glucose 01/14/2020 108     Calcium 01/14/2020 8 4     AST 01/14/2020 26     ALT 01/14/2020 29     Alkaline Phosphatase 01/14/2020 89     Total Protein 01/14/2020 7 1     Albumin 01/14/2020 3 0*    Total Bilirubin 01/14/2020 0 60     eGFR 01/14/2020 82     Magnesium 01/14/2020 1 8     Phosphorus 01/14/2020 2 7     WBC 01/14/2020 8 11     RBC 01/14/2020 3 53*    Hemoglobin 01/14/2020 10 9*    Hematocrit 01/14/2020 33 1*    MCV 01/14/2020 94     MCH 01/14/2020 30 9     MCHC 01/14/2020 32 9     RDW 01/14/2020 14 2     MPV 01/14/2020 10 5     Platelets 55/53/0874 177     nRBC 01/14/2020 0     Neutrophils Relative 01/14/2020 73     Immat GRANS % 01/14/2020 1     Lymphocytes Relative 01/14/2020 18     Monocytes Relative 01/14/2020 7     Eosinophils Relative 01/14/2020 1     Basophils Relative 01/14/2020 0     Neutrophils Absolute 01/14/2020 5 92     Immature Grans Absolute 01/14/2020 0 06     Lymphocytes Absolute 01/14/2020 1 45     Monocytes Absolute 01/14/2020 0 60     Eosinophils Absolute 01/14/2020 0 06     Basophils Absolute 01/14/2020 0 02     TSH 3RD GENERATON 01/14/2020 1 800     Procalcitonin 01/14/2020 <0 05     WBC 01/15/2020 6 29     RBC 01/15/2020 3 27*    Hemoglobin 01/15/2020 10 0*    Hematocrit 01/15/2020 30 8*    MCV 01/15/2020 94     MCH 01/15/2020 30 6     MCHC 01/15/2020 32 5     RDW 01/15/2020 14 2     Platelets 42/36/0932 175     MPV 01/15/2020 11 1     Procalcitonin 01/15/2020 0 05    Admission on 01/11/2020, Discharged on 01/11/2020   Component Date Value    WBC 01/11/2020 7 46     RBC 01/11/2020 4 20     Hemoglobin 01/11/2020 12 8     Hematocrit 01/11/2020 39 4     MCV 01/11/2020 94     MCH 01/11/2020 30 5     MCHC 01/11/2020 32 5     RDW 01/11/2020 14 1     MPV 01/11/2020 10 8     Platelets 08/40/6466 222     nRBC 01/11/2020 0     Neutrophils Relative 01/11/2020 31*    Immat GRANS % 01/11/2020 0     Lymphocytes Relative 01/11/2020 58*    Monocytes Relative 01/11/2020 8     Eosinophils Relative 01/11/2020 3     Basophils Relative 01/11/2020 0     Neutrophils Absolute 01/11/2020 2 32     Immature Grans Absolute 01/11/2020 0 03     Lymphocytes Absolute 01/11/2020 4 27     Monocytes Absolute 01/11/2020 0 61     Eosinophils Absolute 01/11/2020 0 20     Basophils Absolute 01/11/2020 0 03     Sodium 01/11/2020 137     Potassium 01/11/2020 3 9     Chloride 01/11/2020 103     CO2 01/11/2020 23     ANION GAP 01/11/2020 11     BUN 01/11/2020 19     Creatinine 01/11/2020 0 91     Glucose 01/11/2020 122     Calcium 01/11/2020 8 9     AST 01/11/2020 33     ALT 01/11/2020 47     Alkaline Phosphatase 01/11/2020 108     Total Protein 01/11/2020 8 2     Albumin 01/11/2020 3 8     Total Bilirubin 01/11/2020 0 50     eGFR 01/11/2020 61     Protime 01/11/2020 13 4     INR 01/11/2020 1 02     PTT 01/11/2020 26     Ventricular Rate 01/11/2020 90     Atrial Rate 01/11/2020 90     IN Interval 01/11/2020 278     QRSD Interval 01/11/2020 122     QT Interval 01/11/2020 392     QTC Interval 01/11/2020 479     P Axis 01/11/2020 47     QRS Axis 01/11/2020 -40     T Wave Axis 01/11/2020 29     Ventricular Rate 01/11/2020 151     Atrial Rate 01/11/2020 150     QRSD Interval 01/11/2020 120     QT Interval 01/11/2020 310     QTC Interval 01/11/2020 491     QRS Axis 01/11/2020 -55     T Wave Axis 01/11/2020 104

## 2020-01-29 NOTE — PATIENT INSTRUCTIONS
Follow-up for your next appointment in approximately 1 month  Please do not hesitate to call the office prior to your next appointment should you have any questions or concerns  Worrisome symptoms that should prompt a call to 911 or visit to the ER include chest pain, severe shortness of breath, cyanosis (blue discoloration of the skin), dizziness/light-headedness, passing out  Continue to follow up with your Primary Care Provider and any other specialists you see  To schedule your CT scan, please contact Central Scheduling at [phone]     This will need to be done mid-May

## 2020-02-04 ENCOUNTER — TRANSCRIBE ORDERS (OUTPATIENT)
Dept: ADMINISTRATIVE | Facility: HOSPITAL | Age: 80
End: 2020-02-04

## 2020-02-04 DIAGNOSIS — I10 ESSENTIAL HYPERTENSION, MALIGNANT: Primary | ICD-10-CM

## 2020-02-05 ENCOUNTER — OFFICE VISIT (OUTPATIENT)
Dept: OBGYN CLINIC | Facility: CLINIC | Age: 80
End: 2020-02-05

## 2020-02-05 ENCOUNTER — APPOINTMENT (OUTPATIENT)
Dept: RADIOLOGY | Facility: CLINIC | Age: 80
End: 2020-02-05
Payer: MEDICARE

## 2020-02-05 VITALS
DIASTOLIC BLOOD PRESSURE: 84 MMHG | HEIGHT: 65 IN | WEIGHT: 202.3 LBS | SYSTOLIC BLOOD PRESSURE: 118 MMHG | BODY MASS INDEX: 33.7 KG/M2 | RESPIRATION RATE: 20 BRPM | HEART RATE: 166 BPM

## 2020-02-05 DIAGNOSIS — S42.295D OTHER CLOSED NONDISPLACED FRACTURE OF PROXIMAL END OF LEFT HUMERUS WITH ROUTINE HEALING, SUBSEQUENT ENCOUNTER: ICD-10-CM

## 2020-02-05 DIAGNOSIS — S42.295D OTHER CLOSED NONDISPLACED FRACTURE OF PROXIMAL END OF LEFT HUMERUS WITH ROUTINE HEALING, SUBSEQUENT ENCOUNTER: Primary | ICD-10-CM

## 2020-02-05 PROCEDURE — 73030 X-RAY EXAM OF SHOULDER: CPT

## 2020-02-05 PROCEDURE — 99024 POSTOP FOLLOW-UP VISIT: CPT | Performed by: PHYSICIAN ASSISTANT

## 2020-02-05 RX ORDER — OMEPRAZOLE 40 MG/1
CAPSULE, DELAYED RELEASE ORAL
COMMUNITY
Start: 2019-12-12

## 2020-02-05 RX ORDER — ACETAMINOPHEN 500 MG
650 TABLET ORAL EVERY 6 HOURS PRN
COMMUNITY
End: 2021-03-02 | Stop reason: HOSPADM

## 2020-02-05 NOTE — PROGRESS NOTES
Chief Complaint   Patient presents with    Left Shoulder - Follow-up, Fracture         Subjective   Patient here for follow-up right proximal humerus fracture  Patient sustained a fall January 11, 2020  This has been treated nonsurgically in a sling with limited range of motion  Patient has been doing well  She has been doing home exercise program working on range of motion of the elbow, wrist and hand  She has been doing pendulums  Pain has been well controlled  Denies any numbness tingling left upper extremity        ROS:  Review of systems form reviewed February 5, 2020  General: no fever, no chills  Respiratory:  No coughing, shortness of breath or wheezing  Cardiovascular:  No chest pain, no palpitations  Musculoskeletal: see HPI and PE  SKIN:  No skin rash, no dry skin  Neurological:  No headaches, no confusion  Psychiatric:  No suicide thoughts, no anxiety, no depression  Review of all other systems is negative    Past Medical History:   Diagnosis Date    Breast cancer ([facility] [address] )     Disease of thyroid gland     GERD (gastroesophageal reflux disease)     Hypertension     Rapid heart rate        Current Outpatient Medications on File Prior to Visit   Medication Sig Dispense Refill    acetaminophen (TYLENOL) 500 mg tablet Take 500 mg by mouth every 6 (six) hours as needed for mild pain      anastrozole (ARIMIDEX) 1 mg tablet Take 1 mg by mouth daily      Calcium Carbonate-Vitamin D (CALTRATE 600+D PO) Take 1 tablet by mouth daily      ibuprofen (MOTRIN) 800 mg tablet Take 800 mg by mouth every 6 (six) hours as needed for mild pain      levothyroxine 100 mcg tablet Take 137 mcg by mouth daily       metoprolol tartrate (LOPRESSOR) 100 mg tablet Take 50 mg by mouth 2 (two) times a day        omeprazole (PriLOSEC) 40 MG capsule       verapamil (CALAN-SR) 180 mg CR tablet Take 180 mg by mouth daily       [DISCONTINUED] oxyCODONE-acetaminophen (PERCOCET) 5-325 mg per tablet Take 1 tablet by mouth every 6 (six) hours as needed for severe pain for up to 20 dosesMax Daily Amount: 4 tablets (Patient not taking: Reported on 1/29/2020) 20 tablet 0     No current facility-administered medications on file prior to visit  No Known Allergies      Physical Exam:    Vitals:    02/05/20 0804   BP: 118/84   Pulse: (!) 166   Resp: 20   Weight: 91 8 kg (202 lb 4 8 oz)   Height: 5' 5" (1 651 m)       General Appearance:  Alert, cooperative, no distress, appears stated age   Lungs:   respirations unlabored   Heart:  Normal heart rate noted   Abdomen:   Soft, non-tender,  no masses   Extremities: Extremities normal, atraumatic, no cyanosis or edema   Pulses: 2+ and symmetric   Skin: Skin color, texture, turgor normal, no rashes or lesions   Neurologic: Normal         Ortho Exam  Examination left shoulder shows skin intact no erythema noted  Range of motion and strength of the shoulder deferred  Full active range of motion of the left elbow, wrist and hand  Sensations intact to light touch left upper extremity  Tenderness with palpation proximal shoulder    Imaging  X-rays left shoulder February 5, 2020 nondisplaced healing fracture proximal humerus    ASSESSMENT:    Dariusz Vinson was seen today for follow-up and fracture  Diagnoses and all orders for this visit:    Other closed nondisplaced fracture of proximal end of left humerus with routine healing, subsequent encounter  -     Cancel: XR humerus left; Future  -     XR shoulder 2+ vw left; Future          PLAN:  S/P 4 weeks Nondisplaced fracture left proximal humerus  Patient will continue using the sling for immobilization  She was instructed to come out of the sling and work on range of motion of her elbow, wrist and hand as well as gentle pendulums  She will follow up in two weeks and have an x-ray of the left shoulder upon arrival   Can discuss starting physical therapy at that point

## 2020-02-18 ENCOUNTER — HOSPITAL ENCOUNTER (OUTPATIENT)
Dept: CT IMAGING | Facility: HOSPITAL | Age: 80
Discharge: HOME | End: 2020-02-18
Payer: MEDICARE

## 2020-02-18 ENCOUNTER — TELEPHONE (OUTPATIENT)
Dept: OBGYN CLINIC | Facility: HOSPITAL | Age: 80
End: 2020-02-18

## 2020-02-18 ENCOUNTER — APPOINTMENT (OUTPATIENT)
Dept: RADIOLOGY | Facility: CLINIC | Age: 80
End: 2020-02-18
Payer: MEDICARE

## 2020-02-18 ENCOUNTER — OFFICE VISIT (OUTPATIENT)
Dept: OBGYN CLINIC | Facility: CLINIC | Age: 80
End: 2020-02-18

## 2020-02-18 VITALS
HEART RATE: 84 BPM | HEIGHT: 65 IN | RESPIRATION RATE: 20 BRPM | BODY MASS INDEX: 35.99 KG/M2 | SYSTOLIC BLOOD PRESSURE: 157 MMHG | WEIGHT: 216 LBS | DIASTOLIC BLOOD PRESSURE: 80 MMHG

## 2020-02-18 DIAGNOSIS — R91.1 LUNG NODULE: ICD-10-CM

## 2020-02-18 DIAGNOSIS — S42.295D OTHER CLOSED NONDISPLACED FRACTURE OF PROXIMAL END OF LEFT HUMERUS WITH ROUTINE HEALING, SUBSEQUENT ENCOUNTER: Primary | ICD-10-CM

## 2020-02-18 DIAGNOSIS — S42.295D OTHER CLOSED NONDISPLACED FRACTURE OF PROXIMAL END OF LEFT HUMERUS WITH ROUTINE HEALING, SUBSEQUENT ENCOUNTER: ICD-10-CM

## 2020-02-18 DIAGNOSIS — R93.89 ABNORMAL CHEST CT: ICD-10-CM

## 2020-02-18 PROCEDURE — 73030 X-RAY EXAM OF SHOULDER: CPT

## 2020-02-18 PROCEDURE — 71250 CT THORAX DX C-: CPT

## 2020-02-18 PROCEDURE — 99024 POSTOP FOLLOW-UP VISIT: CPT | Performed by: ORTHOPAEDIC SURGERY

## 2020-02-18 NOTE — TELEPHONE ENCOUNTER
Spoke to patient  She stated she can start Monday in [facility] and the [facility] working with her stated they would come in the home to work with her daily until she can be seen in [facility] PT  Just to make you aware   thanks

## 2020-02-18 NOTE — PROGRESS NOTES
SUBJECTIVE  Patient is a 70year old female here for a follow up for her Left shoulder  She is treating conservatively for Left proximal humerus fracture, sustained a fall January 11, 2020  She has had in home therapy up to this point  They have d/c their services and feels that she can now do outpatient  She presents in a sling today  Her grand daughter is accompanying her  She denies numbness and tingling  ROS:   General: No fever, no chills, no weight loss, no weight gain  HEENT:  No loss of hearing, no nose bleeds, no sore throat  Eyes:  No eye pain, no red eyes, no visual disturbance  Respiratory:  No cough, no shortness of breath, no wheezing  Cardiovascular:  No chest pain, no palpitations, no edema  GI: No abdominal pain, no nausea, no vomiting  Endocrine: No frequent urination, no excessive thirst  Urinary:  No dysuria, no hematuria, no incontinence  Musculoskeletal: see HPI and PE  Skin:  No rash, no wounds  Neurological:  No dizziness, no headache, no numbness  Psychiatric:  No difficulty concentrating, no depression, no suicide thoughts, no anxiety  Review of all other systems is negative    PMH:  Past Medical History:   Diagnosis Date    [facility] [address] [facility]     Disease of thyroid gland     GERD (gastroesophageal reflux disease)     Hypertension     Rapid heart rate        PSH:  Past Surgical History:   Procedure Laterality Date    BREAST SURGERY      CHOLECYSTECTOMY      KS COLONOSCOPY FLX DX W/COLLJ SPEC WHEN PFRMD N/A 6/26/2017    Procedure: COLONOSCOPY;  Surgeon: Jermaine Carpio MD;  Location: [facility];   Service: Gastroenterology       Medications:  Current Outpatient Medications   Medication Sig Dispense Refill    acetaminophen (TYLENOL) 500 mg tablet Take 500 mg by mouth every 6 (six) hours as needed for mild pain      anastrozole (ARIMIDEX) 1 mg tablet Take 1 mg by mouth daily      Calcium Carbonate-Vitamin D (CALTRATE 600+D PO) Take 1 tablet by mouth daily      ibuprofen (MOTRIN) 800 mg tablet Take 800 mg by mouth every 6 (six) hours as needed for mild pain      levothyroxine 100 mcg tablet Take 137 mcg by mouth daily       metoprolol tartrate (LOPRESSOR) 100 mg tablet Take 50 mg by mouth 2 (two) times a day        omeprazole (PriLOSEC) 40 MG capsule       verapamil (CALAN-SR) 180 mg CR tablet Take 180 mg by mouth daily        No current facility-administered medications for this visit  Allergies:  No Known Allergies    Family History:  Family History   Problem Relation Age of Onset    Prostate cancer Father     Breast cancer Sister        Social History:  Social History     Occupational History    Not on file   Tobacco Use    Smoking status: Never Smoker    Smokeless tobacco: Never Used   Substance and Sexual Activity    Alcohol use: Never     Frequency: Never    Drug use: Never    Sexual activity: Never       Physical Exam:  General :  Alert, cooperative, no distress, appears stated age  Blood pressure 157/80, pulse 84, resp  rate 20, height 5' 5" (1 651 m), weight 98 kg (216 lb)  Head:  Normocephalic, without obvious abnormality, atraumatic   Eyes:  Conjunctiva/corneas clear, EOM's intact,   Ears: Both ears normal appearance, no hearing deficits  Nose: Nares normal, septum midline, no drainage    Neck: Supple,  trachea midline, no adenopathy, no tenderness, no mass   Back:   Symmetric, no curvature, ROM normal, no tenderness   Lungs:   Respirations unlabored   Chest Wall:  No tenderness or deformity   Extremities: Extremities normal, atraumatic, no cyanosis or edema      Pulses: 2+ and symmetric   Skin: Skin color, texture, turgor normal, no rashes or lesions      Neurologic: Normal           Left Shoulder Exam     Range of Motion   Active abduction: 90   External rotation: 40   Forward flexion: 90     Other   Erythema: absent  Sensation: normal  Pulse: present             Imaging Studies:      The following imaging studies were reviewed in office today  My findings are noted  Xray of the Left shoulder performed and reviewed:  fair consolidation and healing, well aligned proximal humerus fracture  Assessment  Encounter Diagnosis   Name Primary?  Other closed nondisplaced fracture of proximal end of left humerus with routine healing, subsequent encounter Yes         Plan:  - No use of the sling   Encouraged to move the shoulder    - HEP was instructed- walk slides  - At this time she is to start therapy with no restrictions  - Follow up in 3 weeks for ROM check       Scribe Attestation    I,:   Darryl Carrero am acting as a scribe while in the presence of the attending physician :        I,:   Lizz Obregon MD personally performed the services described in this documentation    as scribed in my presence :

## 2020-02-18 NOTE — TELEPHONE ENCOUNTER
Please call patient to let her know she can definitely go somewhere else    The quicker she starts the better    [place]

## 2020-02-18 NOTE — TELEPHONE ENCOUNTER
Patient sees Dr Shantell Hawthorne    Patient was prescribed PT, said the office Dr Shantell Hawthorne told her about cant see her until 3/3/20  Patient is asking if she should go somewhere else or can she possibly have home therapy until she can be seen on 3/3/30?     Kelly Dorantes #[phone]

## 2020-02-19 ENCOUNTER — EVALUATION (OUTPATIENT)
Dept: PHYSICAL THERAPY | Facility: CLINIC | Age: 80
End: 2020-02-19
Payer: MEDICARE

## 2020-02-19 DIAGNOSIS — S42.295D OTHER CLOSED NONDISPLACED FRACTURE OF PROXIMAL END OF LEFT HUMERUS WITH ROUTINE HEALING, SUBSEQUENT ENCOUNTER: ICD-10-CM

## 2020-02-19 PROCEDURE — 97162 PT EVAL MOD COMPLEX 30 MIN: CPT | Performed by: PHYSICAL THERAPIST

## 2020-02-19 PROCEDURE — 97110 THERAPEUTIC EXERCISES: CPT | Performed by: PHYSICAL THERAPIST
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
## 2020-02-24 ENCOUNTER — OFFICE VISIT (OUTPATIENT)
Dept: PHYSICAL THERAPY | Facility: CLINIC | Age: 80
End: 2020-02-24
Payer: MEDICARE

## 2020-02-24 DIAGNOSIS — S42.295D OTHER CLOSED NONDISPLACED FRACTURE OF PROXIMAL END OF LEFT HUMERUS WITH ROUTINE HEALING, SUBSEQUENT ENCOUNTER: Primary | ICD-10-CM

## 2020-02-24 PROCEDURE — 97110 THERAPEUTIC EXERCISES: CPT | Performed by: PHYSICAL THERAPIST

## 2020-02-24 PROCEDURE — 97140 MANUAL THERAPY 1/> REGIONS: CPT | Performed by: PHYSICAL THERAPIST

## 2020-02-24 NOTE — PROGRESS NOTES
Daily Note     Today's date: 2020  Patient name: Coco Ham  : 1940  MRN: [id number]  Referring provider: Jose Rand MD  Dx:   Encounter Diagnosis     ICD-10-CM    1  Other closed nondisplaced fracture of proximal end of left humerus with routine healing, subsequent encounter S42 736O                   Subjective: Patient states her arm is a little swollen since she's been out of the sling  She states she's noticing improved ROM and can reach her mouth to eat and brush her teeth now  Objective: See treatment diary below      Assessment: Tolerated treatment fair  Patient able to add very light AAROM with finger ladder and supine flexion with therapist assist   She tolerates light PROM to left shoulder  Addition of STR to L UT, which patient reports is successful in reducing symptoms of tightness  Patient would benefit from continued PT No complaints post session  Plan: Continue per plan of care  Precautions: rapid heartbeat (no exercise restrictions; controlled by medication); h/o left breast cancer (s/p lumpectomy, s/p radiation, and current hormone therapy)      Manual              STR L UT  10'                                                                     Exercise Diary             PSR x20 x20           Scap retraction x20            Light PROM L shoulder- flex, abd, ER at 45 degrees 10 min 10 min            PROM L elbow- flex/ext 5' 5'           Finger ladder NV x10           Pulleys NV 3'           Wall slides NV            Pendulums- side/side, fwd/bwd  x20 ea                                                                                                                                                                                      Patient encouraged to ice 2-3 times per day, 10-15 minutes for pain relief     Modalities              CP L Shld   decline

## 2020-02-28 ENCOUNTER — OFFICE VISIT (OUTPATIENT)
Dept: PHYSICAL THERAPY | Facility: CLINIC | Age: 80
End: 2020-02-28
Payer: MEDICARE

## 2020-02-28 DIAGNOSIS — S42.295D OTHER CLOSED NONDISPLACED FRACTURE OF PROXIMAL END OF LEFT HUMERUS WITH ROUTINE HEALING, SUBSEQUENT ENCOUNTER: Primary | ICD-10-CM

## 2020-02-28 PROCEDURE — 97140 MANUAL THERAPY 1/> REGIONS: CPT | Performed by: PHYSICAL THERAPIST

## 2020-02-28 PROCEDURE — 97110 THERAPEUTIC EXERCISES: CPT | Performed by: PHYSICAL THERAPIST

## 2020-02-28 NOTE — PROGRESS NOTES
Daily Note     Today's date: 2020  Patient name: Rufina Blackburn  : 1940  MRN: [id number]  Referring provider: Geovanny Gonzalez MD  Dx:   Encounter Diagnosis     ICD-10-CM    1  Other closed nondisplaced fracture of proximal end of left humerus with routine healing, subsequent encounter S42 513Y                   Subjective: Patient arrives this visit stating she had more soreness following last visit, but reports she is able to move her arm better  She states, "I'm having an episode of rapid heart beat  I feel fine, but wanted you to know "       Objective: See treatment diary below      Assessment: Initial vitals taken show blood pressure of 141/109, heart rate of 156 bpm, and spO2 of 98%  Patient states she sees a cardiologist and that this happens frequently  She reports taking verapamil every day as prescribed, but states she was instructed to take a second one when she gets rapid heart beat  Following a period of rest, her heart rate decreases to 77  Blood pressure decreases to 164/99  She denies any headache, dizziness, blurry vision  Treatment only includes heat pack to shoulder, followed by STR to L UT  This is successful in reducing tightness/pain within upper trap and shoulder  Blood pressure continues to decrease to 160/90, heart rate of 84 bpm, and spO2 of 97% post session  She leaves session without complaints  She is instructed to call cardiologist, if her symptoms persist   Held on exercise due to blood pressure concerns  Plan: Continue per plan of care  Patient to continue monitoring her BP at home; call cardiologist if symptoms persist  Resume, as able, at next visit if vitals return to WNL        Precautions: rapid heartbeat (no exercise restrictions; controlled by medication); h/o left breast cancer (s/p lumpectomy, s/p radiation, and current hormone therapy)      Manual             STR L UT  10' 10' Increased time spent on patient education and monitoring of vitals  Exercise Diary  2/19 2/24 2/28          PSR x20 x20 x20          Scap retraction x20            Light PROM L shoulder- flex, abd, ER at 45 degrees 10 min 10 min            PROM L elbow- flex/ext 5' 5'           Finger ladder NV x10           Pulleys NV 3'           Wall slides NV            Pendulums- side/side, fwd/bwd  x20 ea                                                                                                                                                                                      Patient encouraged to ice 2-3 times per day, 10-15 minutes for pain relief     Modalities   2/24 2/28          CP L Shld   decline           MHP L shld    10'

## 2020-03-02 ENCOUNTER — OFFICE VISIT (OUTPATIENT)
Dept: PULMONOLOGY | Facility: CLINIC | Age: 80
End: 2020-03-02
Payer: MEDICARE

## 2020-03-02 VITALS
OXYGEN SATURATION: 97 % | HEIGHT: 65 IN | WEIGHT: 219 LBS | SYSTOLIC BLOOD PRESSURE: 164 MMHG | DIASTOLIC BLOOD PRESSURE: 88 MMHG | BODY MASS INDEX: 36.49 KG/M2 | HEART RATE: 74 BPM

## 2020-03-02 DIAGNOSIS — Z85.3 HISTORY OF BREAST CANCER: ICD-10-CM

## 2020-03-02 DIAGNOSIS — I10 ESSENTIAL HYPERTENSION: ICD-10-CM

## 2020-03-02 DIAGNOSIS — R93.89 ABNORMAL CT OF THE CHEST: ICD-10-CM

## 2020-03-02 DIAGNOSIS — G47.39 OTHER SLEEP APNEA: ICD-10-CM

## 2020-03-02 DIAGNOSIS — R91.1 LUNG NODULE: Primary | ICD-10-CM

## 2020-03-02 PROCEDURE — 99214 OFFICE O/P EST MOD 30 MIN: CPT | Performed by: PHYSICIAN ASSISTANT

## 2020-03-02 NOTE — PROGRESS NOTES
Assessment:    1  Lung nodule  CT chest wo contrast   2  History of breast cancer  CT chest wo contrast   3  Abnormal CT of the chest  CT chest wo contrast   4  Other sleep apnea  Diagnostic Sleep Study   5  Essential hypertension           Plan:   Patient presents today for follow-up, overall doing pretty well  Reviewed CT of the chest with decrease in size of the previously seen pulmonary nodule (previously 1 1 x 0 6 cm , now 5 x 5 mm) with some new nodules and ground-glass opacity, suggestive of inflammatory process  Will proceed with short-term follow-up CT of the chest given patient's history of breast cancer  She does follow with Oncology  Patient has signs and symptoms consistent with sleep apnea including snoring, witnessed apneic periods, excessive daytime sleepiness  There is high suspicion for SAKSHI  She is at increased risk given her obesity and crowded airway  Medical comorbidities include hypertension and cardiac arrhythmia  She will undergo an all night polysomnogram    Discussed with her consequences of untreated sleep apnea including increased risk for cardiac disease, AFib, CVA, etc   Also briefly discussed treatment of sleep apnea  All of the patient's and her granddaughter's questions were answered to their satisfaction and understanding, which was verbalized  Patient was advised to call the office prior to next appointment should they have any questions or concerns prior  Patient made aware of worrisome symptoms that should prompt a visit to the ER or call to 911 such as chest pain, severe shortness of breath, cyanosis, throat closing, syncope, etc     Subjective:     Patient ID: Rufina Blackburn is a 70 y o  female      Chief Complaint:  Mari Zamora is a very pleasant 61-year-old female never smoker with past medical history including left-sided breast cancer status post lumpectomy and radiation therapy currently on hormone therapy, pulmonary nodules, thyroid disorder, GERD, hypertension, obesity, cardiac arrhythmia  She presents today for follow-up  Patient states that she is doing quite well  She denies any issues with shortness of breath  She denies any recent changes in her health  She is here to review follow-up CT of the chest done for her previously seen pulmonary nodules which all seem inflammatory in nature  She also admits to some signs and symptoms consistent with sleep apnea including snoring and excessive daytime sleepiness  She notes that 2 of her sons have been diagnosed with sleep apnea  The following portions of the patient's history were reviewed in this encounter and updated as appropriate: Past medical, social, surgical, family, allergies    Review of Systems   Constitutional: Positive for fatigue  Respiratory: Negative for cough, shortness of breath and wheezing  Cardiovascular: Negative for chest pain  Psychiatric/Behavioral: Positive for sleep disturbance  All other systems reviewed and are negative  Objective:    Physical Exam   Constitutional: She is oriented to person, place, and time  She appears well-developed and well-nourished  No distress  obese   HENT:   Head: Normocephalic and atraumatic  Right Ear: External ear normal    Left Ear: External ear normal    Nose: Nose normal    Mouth/Throat: Oropharynx is clear and moist  No oropharyngeal exudate  Crowded airway   Eyes: Conjunctivae and EOM are normal  Right eye exhibits no discharge  Left eye exhibits no discharge  No scleral icterus  Neck: Normal range of motion  Neck supple  No tracheal deviation present  Short and wide neck   Cardiovascular: Normal rate, regular rhythm and normal heart sounds  Exam reveals no gallop and no friction rub  No murmur heard  Pulmonary/Chest: Effort normal and breath sounds normal  No stridor  No respiratory distress  She has no wheezes  She has no rales  Abdominal: She exhibits no distension  There is no guarding     Musculoskeletal: Normal range of motion  She exhibits no edema, tenderness or deformity  Neurological: She is alert and oriented to person, place, and time  No cranial nerve deficit  Skin: Skin is warm and dry  No rash noted  She is not diaphoretic  No erythema  No pallor  Psychiatric: She has a normal mood and affect  Her behavior is normal  Judgment and thought content normal    Nursing note and vitals reviewed        Lab Review:   Admission on 01/13/2020, Discharged on 01/15/2020   Component Date Value    WBC 01/13/2020 7 30     RBC 01/13/2020 3 97     Hemoglobin 01/13/2020 12 1     Hematocrit 01/13/2020 38 9     MCV 01/13/2020 98     MCH 01/13/2020 30 5     MCHC 01/13/2020 31 1*    RDW 01/13/2020 14 0     MPV 01/13/2020 10 8     Platelets 17/35/8687 169     nRBC 01/13/2020 0     Neutrophils Relative 01/13/2020 72     Immat GRANS % 01/13/2020 1     Lymphocytes Relative 01/13/2020 18     Monocytes Relative 01/13/2020 8     Eosinophils Relative 01/13/2020 1     Basophils Relative 01/13/2020 0     Neutrophils Absolute 01/13/2020 5 31     Immature Grans Absolute 01/13/2020 0 04     Lymphocytes Absolute 01/13/2020 1 30     Monocytes Absolute 01/13/2020 0 56     Eosinophils Absolute 01/13/2020 0 07     Basophils Absolute 01/13/2020 0 02     Protime 01/13/2020 14 2     INR 01/13/2020 1 10     PTT 01/13/2020 25     INFLUENZA A PCR 01/13/2020 None Detected     INFLUENZA B PCR 01/13/2020 None Detected     RSV PCR 01/13/2020 None Detected     Sodium 01/13/2020 132*    Potassium 01/13/2020 4 2     Chloride 01/13/2020 100     CO2 01/13/2020 23     ANION GAP 01/13/2020 9     BUN 01/13/2020 12     Creatinine 01/13/2020 0 59*    Glucose 01/13/2020 115     Calcium 01/13/2020 8 8     eGFR 01/13/2020 89     Total Bilirubin 01/13/2020 0 80     Bilirubin, Direct 01/13/2020 0 16     Alkaline Phosphatase 01/13/2020 102     AST 01/13/2020 34     ALT 01/13/2020 32     Total Protein 01/13/2020 7 9     Albumin 01/13/2020 3 3*    Magnesium 01/13/2020 2 2     Troponin I 01/13/2020 <0 02     Color, UA 01/13/2020 Yellow     Clarity, UA 01/13/2020 Clear     Specific Gravity, UA 01/13/2020 <=1 005     pH, UA 01/13/2020 7 0     Leukocytes, UA 01/13/2020 Negative     Nitrite, UA 01/13/2020 Negative     Protein, UA 01/13/2020 Negative     Glucose, UA 01/13/2020 Negative     Ketones, UA 01/13/2020 Negative     Urobilinogen, UA 01/13/2020 0 2     Bilirubin, UA 01/13/2020 Negative     Blood, UA 01/13/2020 Negative     NT-proBNP 01/13/2020 743*    Lipase 01/13/2020 125     LACTIC ACID 01/13/2020 0 9     Blood Culture 01/13/2020 No Growth After 5 Days   Blood Culture 01/13/2020 Micrococcus luteus*    Gram Stain Result 01/13/2020 Gram positive cocci in clusters*    Procalcitonin 01/13/2020 <0 05     Osmolality, Ur 01/13/2020 455     Sodium, Ur 01/13/2020 117     Troponin I 01/13/2020 0 02     Sputum Culture 01/14/2020 Mixed Respiratory jadon     Sputum Culture 01/14/2020 Commensal respiratory jadon only; No significant growth of Staph aureus/MRSA or Pseudomonas aeruginosa       Gram Stain Result 01/14/2020 Rare Epithelial cells per low power field     Gram Stain Result 01/14/2020 2+ Polys     Gram Stain Result 01/14/2020 No bacteria seen     Legionella Urinary Antig* 01/13/2020 Negative     Strep pneumoniae antigen* 01/13/2020 Negative     Platelets 14/00/4851 178     MPV 01/13/2020 10 7     Troponin I 01/13/2020 0 02     Ventricular Rate 01/13/2020 66     Atrial Rate 01/13/2020 66     PA Interval 01/13/2020 198     QRSD Interval 01/13/2020 140     QT Interval 01/13/2020 434     QTC Interval 01/13/2020 454     P Axis 01/13/2020 60     QRS Axis 01/13/2020 -33     T Wave [place] 01/13/2020 16     Sodium 01/14/2020 138     Potassium 01/14/2020 3 8     Chloride 01/14/2020 102     CO2 01/14/2020 27     ANION GAP 01/14/2020 9     BUN 01/14/2020 8     Creatinine 01/14/2020 0 71     Glucose 01/14/2020 108     Calcium 01/14/2020 8 4     AST 01/14/2020 26     ALT 01/14/2020 29     Alkaline Phosphatase 01/14/2020 89     Total Protein 01/14/2020 7 1     Albumin 01/14/2020 3 0*    Total Bilirubin 01/14/2020 0 60     eGFR 01/14/2020 82     Magnesium 01/14/2020 1 8     Phosphorus 01/14/2020 2 7     WBC 01/14/2020 8 11     RBC 01/14/2020 3 53*    Hemoglobin 01/14/2020 10 9*    Hematocrit 01/14/2020 33 1*    MCV 01/14/2020 94     MCH 01/14/2020 30 9     MCHC 01/14/2020 32 9     RDW 01/14/2020 14 2     MPV 01/14/2020 10 5     Platelets 48/26/5779 177     nRBC 01/14/2020 0     Neutrophils Relative 01/14/2020 73     Immat GRANS % 01/14/2020 1     Lymphocytes Relative 01/14/2020 18     Monocytes Relative 01/14/2020 7     Eosinophils Relative 01/14/2020 1     Basophils Relative 01/14/2020 0     Neutrophils Absolute 01/14/2020 5 92     Immature Grans Absolute 01/14/2020 0 06     Lymphocytes Absolute 01/14/2020 1 45     Monocytes Absolute 01/14/2020 0 60     Eosinophils Absolute 01/14/2020 0 06     Basophils Absolute 01/14/2020 0 02     TSH 3RD GENERATON 01/14/2020 1 800     Procalcitonin 01/14/2020 <0 05     WBC 01/15/2020 6 29     RBC 01/15/2020 3 27*    Hemoglobin 01/15/2020 10 0*    Hematocrit 01/15/2020 30 8*    MCV 01/15/2020 94     MCH 01/15/2020 30 6     MCHC 01/15/2020 32 5     RDW 01/15/2020 14 2     Platelets 63/36/9599 175     MPV 01/15/2020 11 1     Procalcitonin 01/15/2020 0 05    Admission on 01/11/2020, Discharged on 01/11/2020   Component Date Value    WBC 01/11/2020 7 46     RBC 01/11/2020 4 20     Hemoglobin 01/11/2020 12 8     Hematocrit 01/11/2020 39 4     MCV 01/11/2020 94     MCH 01/11/2020 30 5     MCHC 01/11/2020 32 5     RDW 01/11/2020 14 1     MPV 01/11/2020 10 8     Platelets 54/18/4438 222     nRBC 01/11/2020 0     Neutrophils Relative 01/11/2020 31*    Immat GRANS % 01/11/2020 0     Lymphocytes Relative 01/11/2020 58*    Monocytes Relative 01/11/2020 8     Eosinophils Relative 01/11/2020 3     Basophils Relative 01/11/2020 0     Neutrophils Absolute 01/11/2020 2 32     Immature Grans Absolute 01/11/2020 0 03     Lymphocytes Absolute 01/11/2020 4 27     Monocytes Absolute 01/11/2020 0 61     Eosinophils Absolute 01/11/2020 0 20     Basophils Absolute 01/11/2020 0 03     Sodium 01/11/2020 137     Potassium 01/11/2020 3 9     Chloride 01/11/2020 103     CO2 01/11/2020 23     ANION GAP 01/11/2020 11     BUN 01/11/2020 19     Creatinine 01/11/2020 0 91     Glucose 01/11/2020 122     Calcium 01/11/2020 8 9     AST 01/11/2020 33     ALT 01/11/2020 47     Alkaline Phosphatase 01/11/2020 108     Total Protein 01/11/2020 8 2     Albumin 01/11/2020 3 8     Total Bilirubin 01/11/2020 0 50     eGFR 01/11/2020 61     Protime 01/11/2020 13 4     INR 01/11/2020 1 02     PTT 01/11/2020 26     Ventricular Rate 01/11/2020 90     Atrial Rate 01/11/2020 90     MI Interval 01/11/2020 278     QRSD Interval 01/11/2020 122     QT Interval 01/11/2020 392     QTC Interval 01/11/2020 479     P Axis 01/11/2020 47     QRS Axis 01/11/2020 -40     T Wave Axis 01/11/2020 29     Ventricular Rate 01/11/2020 151     Atrial Rate 01/11/2020 150     QRSD Interval 01/11/2020 120     QT Interval 01/11/2020 310     QTC Interval 01/11/2020 491     QRS Axis 01/11/2020 -55     T Wave Axis 01/11/2020 104

## 2020-03-03 ENCOUNTER — OFFICE VISIT (OUTPATIENT)
Dept: PHYSICAL THERAPY | Facility: CLINIC | Age: 80
End: 2020-03-03
Payer: MEDICARE

## 2020-03-03 DIAGNOSIS — S42.295D OTHER CLOSED NONDISPLACED FRACTURE OF PROXIMAL END OF LEFT HUMERUS WITH ROUTINE HEALING, SUBSEQUENT ENCOUNTER: Primary | ICD-10-CM

## 2020-03-03 PROCEDURE — 97110 THERAPEUTIC EXERCISES: CPT | Performed by: PHYSICAL THERAPIST

## 2020-03-03 PROCEDURE — 97140 MANUAL THERAPY 1/> REGIONS: CPT | Performed by: PHYSICAL THERAPIST

## 2020-03-03 PROCEDURE — 97112 NEUROMUSCULAR REEDUCATION: CPT | Performed by: PHYSICAL THERAPIST

## 2020-03-03 NOTE — PROGRESS NOTES
Daily Note     Today's date: 3/3/2020  Patient name: Burton Mahajan  : 1940  MRN: [id number]  Referring provider: Chet Oviedo MD  Dx:   Encounter Diagnosis     ICD-10-CM    1  Other closed nondisplaced fracture of proximal end of left humerus with routine healing, subsequent encounter S42 674D                   Subjective: Patient states she is doing well and noticing that she can raise her arm better  Objective: See treatment diary below    Heart rate: pre: 68 bpm post: 78bpm  SpO2: pre: 97% post: 98%     Assessment: Patient exhibiting improved passive and active ROM in all planes  She tolerates treatment well and declines ice post session  She is advised to utilize ice at home  Updated HEP with today's new exercises  Her vitals remain WNL throughout session  No complaints post session  Plan: Continue per plan of care  Precautions: rapid heartbeat (no exercise restrictions; controlled by medication); h/o left breast cancer (s/p lumpectomy, s/p radiation, and current hormone therapy)      Manual   2/24 2/28 3/3         STR L UT  10' 10' 10'                                                                 Increased time spent on patient education and monitoring of vitals  Exercise Diary  2/19 2/24 2/28 3/3         PSR x20 x20 x20 x20         Scap retraction x20            Light PROM L shoulder- flex, abd, ER at 45 degrees 10 min 10 min   10 min         PROM L elbow- flex/ext 5' 5'           Finger ladder NV x10 x10 x10         Pulleys NV 3' 3' 3'         Wall slides NV   x20         Pendulums- side/side, fwd/bwd  x20 ea x20 ea x20 ea         Bicep curl    x20         Wall slides- AAROM    x20         Supine cane flexion    2x10         Supine cane ER stretch    10"x10                                                                                                                                Patient encouraged to ice 2-3 times per day, 10-15 minutes for pain relief     Modalities 2/24 2/28 3/3         CP L Shld   decline  decline         MHP L shld    10' 10'

## 2020-03-06 ENCOUNTER — OFFICE VISIT (OUTPATIENT)
Dept: PHYSICAL THERAPY | Facility: CLINIC | Age: 80
End: 2020-03-06
Payer: MEDICARE

## 2020-03-06 DIAGNOSIS — S42.295D OTHER CLOSED NONDISPLACED FRACTURE OF PROXIMAL END OF LEFT HUMERUS WITH ROUTINE HEALING, SUBSEQUENT ENCOUNTER: Primary | ICD-10-CM

## 2020-03-06 PROCEDURE — 97110 THERAPEUTIC EXERCISES: CPT | Performed by: PHYSICAL THERAPIST

## 2020-03-06 PROCEDURE — 97140 MANUAL THERAPY 1/> REGIONS: CPT | Performed by: PHYSICAL THERAPIST

## 2020-03-06 NOTE — PROGRESS NOTES
Daily Note     Today's date: 3/6/2020  Patient name: Luna Aaron  : 1940  MRN: [id number]  Referring provider: Raúl Houston MD  Dx:   Encounter Diagnosis     ICD-10-CM    1  Other closed nondisplaced fracture of proximal end of left humerus with routine healing, subsequent encounter S42 409D                   Subjective: Patient is pleased with her progress in such a short time  She states she can wash her hair and use her arm better  Objective: See treatment diary below    BP: pre: 125/96 post: 158/83  Heart rate: pre: 159 bpm post: 89 bpm  SpO2: pre: 98% post: 98%     Assessment: Patient has another episode of rapid heart beat  This diminishes following seated rest and STR of left upper trap  Patient denies having any headache, dizziness, or adverse feeling  Performed only hot pack and STR of left upper trap; once vitals stabilized, performed PROM, but held on exercises this visit  Patient instructed to call cardiologist today due to increased frequency of tachycardia  She exhibits 80 degrees of active flexion of LUE and passive abduction to 90 degrees and passive ER to 40 degrees  Plan: Continue per plan of care  as long as she is cleared by cardiologist       Precautions: rapid heartbeat (no exercise restrictions; controlled by medication); h/o left breast cancer (s/p lumpectomy, s/p radiation, and current hormone therapy)      Manual   2/24 2/28 3/3 3/6        STR L UT  10' 10' 10' 10'                                                                Increased time spent on patient education and monitoring of vitals     Exercise Diary  2/19 2/24 2/28 3/3 3/6        PSR x20 x20 x20 x20         Scap retraction x20            Light PROM L shoulder- flex, abd, ER at 45 degrees 10 min 10 min   10 min 15'        PROM L elbow- flex/ext 5' 5'           Finger ladder NV x10 x10 x10         Pulleys NV 3' 3' 3'         Wall slides NV   x20         Pendulums- side/side, fwd/bwd  x20 ea x20 ea x20 ea         Bicep curl    x20         Wall slides- AAROM    x20         Supine cane flexion    2x10         Supine cane ER stretch    10"x10                                                                                                                                Patient encouraged to ice 2-3 times per day, 10-15 minutes for pain relief     Modalities   2/24 2/28 3/3 3/6        CP L Shld   decline  decline         MHP L shld    10' 10' 10'

## 2020-03-09 ENCOUNTER — OFFICE VISIT (OUTPATIENT)
Dept: PHYSICAL THERAPY | Facility: CLINIC | Age: 80
End: 2020-03-09
Payer: MEDICARE

## 2020-03-09 DIAGNOSIS — S42.295D OTHER CLOSED NONDISPLACED FRACTURE OF PROXIMAL END OF LEFT HUMERUS WITH ROUTINE HEALING, SUBSEQUENT ENCOUNTER: Primary | ICD-10-CM

## 2020-03-09 PROCEDURE — 97112 NEUROMUSCULAR REEDUCATION: CPT | Performed by: PHYSICAL THERAPIST

## 2020-03-09 PROCEDURE — 97110 THERAPEUTIC EXERCISES: CPT | Performed by: PHYSICAL THERAPIST

## 2020-03-09 PROCEDURE — 97140 MANUAL THERAPY 1/> REGIONS: CPT | Performed by: PHYSICAL THERAPIST

## 2020-03-09 NOTE — PROGRESS NOTES
Daily Note     Today's date: 3/9/2020  Patient name: Darline Clark  : 1940  MRN: [id number]  Referring provider: Aidee Carrillo MD  Dx:   Encounter Diagnosis     ICD-10-CM    1  Other closed nondisplaced fracture of proximal end of left humerus with routine healing, subsequent encounter S42 952D                   Subjective: Patient continues to report improved AAROM and AROM  "I'm surprised how much better I am in a short time "  Patient states she spoke with her cardiologist on Friday due to increased frequency of tachcardia; she was told she can continue therapy  She is instructed to take 2 Verapamil as needed  Objective: See treatment diary below    BP: pre: 176/94 after heat: 166/78  Heart rate: pre: 71 bpm post: 69 bpm  SpO2: pre: 97% post: 97%     Assessment: Patient does well throughout session with improved PROM overall  She has less tenderness to palpation of left upper trap  She exhibits improved active assisted flexion ROM with pulleys and finger ladder  Plan: Continue per plan of care  Per Dr Adilene Currie (cardiologist), patient is cleared to continue PT from a cardiovascular standpoint  Precautions: rapid heartbeat (no exercise restrictions; controlled by medication); h/o left breast cancer (s/p lumpectomy, s/p radiation, and current hormone therapy)      Manual   2/24 2/28 3/3 3/6 3/9       STR L UT  10' 10' 10' 10' 8'                                                               Increased time spent on patient education and monitoring of vitals     Exercise Diary  2/19 2/24 2/28 3/3 3/6 3/9       PSR x20 x20 x20 x20  x20       Scap retraction x20     x20       Light PROM L shoulder- flex, abd, ER at 45 degrees 10 min 10 min   10 min 15' 15'       PROM L elbow- flex/ext 5' 5'    3'       Finger ladder NV x10 x10 x10  x10       Pulleys NV 3' 3' 3'  3'       Wall slides- AAROM NV   x20         Pendulums- side/side, fwd/bwd  x20 ea x20 ea x20 ea  x20 ea       Bicep curl x20         Supine cane flexion    2x10 2x10  2x10       Supine cane ER stretch    10"x10 10"x10 10"x10       Chest Press AAROM       2x10                                                                                                                 Patient encouraged to ice 2-3 times per day, 10-15 minutes for pain relief     Modalities   2/24 2/28 3/3 3/6 3/9       CP L Shld   decline  decline         MHP L shld    10' 10' 10' 10'

## 2020-03-10 ENCOUNTER — OFFICE VISIT (OUTPATIENT)
Dept: OBGYN CLINIC | Facility: CLINIC | Age: 80
End: 2020-03-10

## 2020-03-10 ENCOUNTER — APPOINTMENT (OUTPATIENT)
Dept: RADIOLOGY | Facility: CLINIC | Age: 80
End: 2020-03-10
Payer: MEDICARE

## 2020-03-10 VITALS
SYSTOLIC BLOOD PRESSURE: 151 MMHG | RESPIRATION RATE: 20 BRPM | HEART RATE: 67 BPM | DIASTOLIC BLOOD PRESSURE: 80 MMHG | BODY MASS INDEX: 35.82 KG/M2 | HEIGHT: 65 IN | WEIGHT: 215 LBS

## 2020-03-10 DIAGNOSIS — S42.295D OTHER CLOSED NONDISPLACED FRACTURE OF PROXIMAL END OF LEFT HUMERUS WITH ROUTINE HEALING, SUBSEQUENT ENCOUNTER: Primary | ICD-10-CM

## 2020-03-10 DIAGNOSIS — S42.295D OTHER CLOSED NONDISPLACED FRACTURE OF PROXIMAL END OF LEFT HUMERUS WITH ROUTINE HEALING, SUBSEQUENT ENCOUNTER: ICD-10-CM

## 2020-03-10 PROCEDURE — 73030 X-RAY EXAM OF SHOULDER: CPT

## 2020-03-10 PROCEDURE — 99024 POSTOP FOLLOW-UP VISIT: CPT | Performed by: ORTHOPAEDIC SURGERY

## 2020-03-10 RX ORDER — METOPROLOL SUCCINATE 50 MG/1
TABLET, EXTENDED RELEASE ORAL
COMMUNITY
Start: 2020-02-18 | End: 2020-03-10

## 2020-03-10 NOTE — PROGRESS NOTES
SUBJECTIVE  Patient is a 70year old female here for a 3 week follow up for her left shoulder  She is 8 weeks out from a Left proximal humerus fracture, sustained a fall January 11, 2020 that is being treated conservatively  She started out- patient PT noting that she has made tremendous progress since being in outpatient  She is very happy with her progress  ROS:   General: No fever, no chills, no weight loss, no weight gain  HEENT:  No loss of hearing, no nose bleeds, no sore throat  Eyes:  No eye pain, no red eyes, no visual disturbance  Respiratory:  No cough, no shortness of breath, no wheezing  Cardiovascular:  No chest pain, no palpitations, no edema  GI: No abdominal pain, no nausea, no vomiting  Endocrine: No frequent urination, no excessive thirst  Urinary:  No dysuria, no hematuria, no incontinence  Musculoskeletal: see HPI and PE  Skin:  No rash, no wounds  Neurological:  No dizziness, no headache, no numbness  Psychiatric:  No difficulty concentrating, no depression, no suicide thoughts, no anxiety  Review of all other systems is negative    PMH:  Past Medical History:   Diagnosis Date    Breast cancer ([facility] [address] )     Disease of thyroid gland     hypothyroidism    GERD (gastroesophageal reflux disease)     Hypertension     Rapid heart rate        PSH:  Past Surgical History:   Procedure Laterality Date    BREAST SURGERY Left     lumpectomy     CHOLECYSTECTOMY      MN COLONOSCOPY FLX DX W/COLLJ SPEC WHEN PFRMD N/A 6/26/2017    Procedure: COLONOSCOPY;  Surgeon: Jonathan Moss MD;  Location: [facility];   Service: Gastroenterology       Medications:  Current Outpatient Medications   Medication Sig Dispense Refill    acetaminophen (TYLENOL) 500 mg tablet Take 650 mg by mouth every 6 (six) hours as needed for mild pain       anastrozole (ARIMIDEX) 1 mg tablet Take 1 mg by mouth daily      Calcium Carbonate-Vitamin D (CALTRATE 600+D PO) Take 1 tablet by mouth daily      ibuprofen (MOTRIN) 800 mg tablet Take 800 mg by mouth every 6 (six) hours as needed for mild pain      levothyroxine 100 mcg tablet Take 137 mcg by mouth daily       metoprolol tartrate (LOPRESSOR) 100 mg tablet Take 50 mg by mouth 2 (two) times a day        omeprazole (PriLOSEC) 40 MG capsule       verapamil (CALAN-SR) 180 mg CR tablet Take 180 mg by mouth daily        No current facility-administered medications for this visit  Allergies:  No Known Allergies    Family History:  Family History   Problem Relation Age of Onset    Prostate cancer Father     Breast cancer Sister     Sleep apnea Son        Social History:  Social History     Occupational History    Not on file   Tobacco Use    Smoking status: Never Smoker    Smokeless tobacco: Never Used   Substance and Sexual Activity    Alcohol use: Never     Frequency: Never    Drug use: Never    Sexual activity: Never       Physical Exam:  General :  Alert, cooperative, no distress, appears stated age  Blood pressure 151/80, pulse 67, resp  rate 20, height 5' 5" (1 651 m), weight 97 5 kg (215 lb)  Head:  Normocephalic, without obvious abnormality, atraumatic   Eyes:  Conjunctiva/corneas clear, EOM's intact,   Ears: Both ears normal appearance, no hearing deficits  Nose: Nares normal, septum midline, no drainage    Neck: Supple,  trachea midline, no adenopathy, no tenderness, no mass   Back:   Symmetric, no curvature, ROM normal, no tenderness   Lungs:   Respirations unlabored   Chest Wall:  No tenderness or deformity   Extremities: Extremities normal, atraumatic, no cyanosis or edema      Pulses: 2+ and symmetric   Skin: Skin color, texture, turgor normal, no rashes or lesions      Neurologic: Normal           Left Shoulder Exam     Tenderness   The patient is experiencing no tenderness  Range of Motion   Active abduction: 90 (PROM: 110)   Left shoulder external rotation: 80 degrees at 90 degrees abduction     Forward flexion: 90 (PROM: 120)   Internal rotation 90 degrees: 90     Other   Erythema: absent  Sensation: normal  Pulse: present             Imaging Studies: The following imaging studies were reviewed in office today  My findings are noted  Xrays of the left shoulder performed today and reviewed: healed proximal humerus fracture     Assessment  Encounter Diagnosis   Name Primary?     Other closed nondisplaced fracture of proximal end of left humerus with routine healing, subsequent encounter Yes         Plan:  - Xrays were reviewed with the patient  - At this time she can start to drive  - She continue formal out patient PT along with her HEP  - Follow up in 6 weeks, no xrays needed at that time      Scribe Attestation    I,:   Darryl Carrero am acting as a scribe while in the presence of the attending physician :        I,:   Lizz Obregon MD personally performed the services described in this documentation    as scribed in my presence :

## 2020-03-12 ENCOUNTER — OFFICE VISIT (OUTPATIENT)
Dept: PHYSICAL THERAPY | Facility: CLINIC | Age: 80
End: 2020-03-12
Payer: MEDICARE

## 2020-03-12 DIAGNOSIS — S42.295D OTHER CLOSED NONDISPLACED FRACTURE OF PROXIMAL END OF LEFT HUMERUS WITH ROUTINE HEALING, SUBSEQUENT ENCOUNTER: Primary | ICD-10-CM

## 2020-03-12 PROCEDURE — 97112 NEUROMUSCULAR REEDUCATION: CPT | Performed by: PHYSICAL THERAPIST

## 2020-03-12 PROCEDURE — 97110 THERAPEUTIC EXERCISES: CPT | Performed by: PHYSICAL THERAPIST

## 2020-03-12 NOTE — PROGRESS NOTES
Daily Note     Today's date: 3/12/2020  Patient name: Dyllan Kellogg  : 1940  MRN: [id number]  Referring provider: Imer Hickey MD  Dx:   Encounter Diagnosis     ICD-10-CM    1  Other closed nondisplaced fracture of proximal end of left humerus with routine healing, subsequent encounter S42 043D                   Subjective: Patient states she saw ortho who was very pleased with her progress  She states, "He said I was at about 60% "     Objective: See treatment diary below    Heart rate: pre: 73 bpm post: 69 bpm  SpO2: pre: 98% post: 94%     Assessment: Patient exhibits improved passive and active ROM overall  No issues during or post session  Held on STR due to patient having minimal pain within upper trap  Will re-assess at next visit  Patient encouraged to continue with HEP  Plan: Continue per plan of care  Per Dr Daniela Jara (cardiologist), patient is cleared to continue PT from a cardiovascular standpoint  Per referring physician, continue with HEP and therapy        Precautions: rapid heartbeat (no exercise restrictions; controlled by medication); h/o left breast cancer (s/p lumpectomy, s/p radiation, and current hormone therapy)      Manual   2/24 2/28 3/3 3/6 3/9 3/12      STR L UT  10' 10' 10' 10' 8' NP                                                                Exercise Diary  2/19 2/24 2/28 3/3 3/6 3/9 3/12      PSR x20 x20 x20 x20  x20 x20      Scap retraction x20     x20 x20      Light PROM L shoulder- flex, abd, ER at 45 degrees 10 min 10 min   10 min 15' 15' 15'      PROM L elbow- flex/ext 5' 5'    3' 3'      Finger ladder NV x10 x10 x10  x10 x10 ea abd/flex      Pulleys NV 3' 3' 3'  3' 3'      Wall slides- AAROM NV   x20   x20      Pendulums- side/side, fwd/bwd  x20 ea x20 ea x20 ea  x20 ea       Bicep curl    x20   2# x20      Supine cane flexion    2x10 2x10  2x10 2x10      Supine cane ER stretch    10"x10 10"x10 10"x10 10"x10      Chest Press AAROM       2x10 2x10 Patient encouraged to ice 2-3 times per day, 10-15 minutes for pain relief     Modalities   2/24 2/28 3/3 3/6 3/9 3/12      CP L Shld   decline  decline   decline      MHP L shld    10' 10' 10' 10' 10'

## 2020-03-16 ENCOUNTER — OFFICE VISIT (OUTPATIENT)
Dept: PHYSICAL THERAPY | Facility: CLINIC | Age: 80
End: 2020-03-16
Payer: MEDICARE

## 2020-03-16 DIAGNOSIS — S42.295D OTHER CLOSED NONDISPLACED FRACTURE OF PROXIMAL END OF LEFT HUMERUS WITH ROUTINE HEALING, SUBSEQUENT ENCOUNTER: Primary | ICD-10-CM

## 2020-03-16 NOTE — PROGRESS NOTES
NO CHARGE:  Patient arrives to clinic stating she didn't want to cancel without 24 hours notice, but she is concerned with the CDC recommendations for "social distancing"  At this time, she cancels the next two weeks of therapy  Treatment not initiated today  Verbal review of patient's home exercise program  She states she will continue these exercises at home  All questions answered

## 2020-03-18 ENCOUNTER — APPOINTMENT (OUTPATIENT)
Dept: PHYSICAL THERAPY | Facility: CLINIC | Age: 80
End: 2020-03-18
Payer: MEDICARE

## 2020-03-19 ENCOUNTER — APPOINTMENT (OUTPATIENT)
Dept: PHYSICAL THERAPY | Facility: CLINIC | Age: 80
End: 2020-03-19
Payer: MEDICARE

## 2020-03-20 ENCOUNTER — APPOINTMENT (OUTPATIENT)
Dept: PHYSICAL THERAPY | Facility: CLINIC | Age: 80
End: 2020-03-20
Payer: MEDICARE

## 2020-03-23 ENCOUNTER — APPOINTMENT (OUTPATIENT)
Dept: PHYSICAL THERAPY | Facility: CLINIC | Age: 80
End: 2020-03-23
Payer: MEDICARE

## 2020-03-24 ENCOUNTER — TELEPHONE (OUTPATIENT)
Dept: OBGYN CLINIC | Facility: HOSPITAL | Age: 80
End: 2020-03-24

## 2020-03-24 NOTE — TELEPHONE ENCOUNTER
Patient is calling to inform Dr Lizeth Red that she hasnt been going to therapy due to the COVID 19 virus    She is still doing her excerises per her PT Julienne Wilson except for the strecthing of the arm   She just wanted to know the doctor to know of her progress

## 2020-03-26 ENCOUNTER — APPOINTMENT (OUTPATIENT)
Dept: PHYSICAL THERAPY | Facility: CLINIC | Age: 80
End: 2020-03-26
Payer: MEDICARE

## 2020-03-27 ENCOUNTER — APPOINTMENT (OUTPATIENT)
Dept: PHYSICAL THERAPY | Facility: CLINIC | Age: 80
End: 2020-03-27
Payer: MEDICARE

## 2020-03-30 ENCOUNTER — TELEPHONE (OUTPATIENT)
Dept: PHYSICAL THERAPY | Facility: CLINIC | Age: 80
End: 2020-03-30

## 2020-03-30 ENCOUNTER — APPOINTMENT (OUTPATIENT)
Dept: PHYSICAL THERAPY | Facility: CLINIC | Age: 80
End: 2020-03-30
Payer: MEDICARE

## 2020-03-30 NOTE — TELEPHONE ENCOUNTER
Spoke with patient on 3/30/20- she had originally been interested in e-visits, but cancelled her scheduled e-visit today and states she wishes to continue with HEP  All questions of patient answered  Will update HEP and email to patient, per her request   She will call back when she is ready to return to therapy, following COVID precautions/social distancing

## 2020-05-13 ENCOUNTER — HOSPITAL ENCOUNTER (OUTPATIENT)
Dept: CT IMAGING | Facility: HOSPITAL | Age: 80
Discharge: HOME | End: 2020-05-13
Payer: MEDICARE

## 2020-05-13 DIAGNOSIS — R93.89 ABNORMAL CT OF THE CHEST: ICD-10-CM

## 2020-05-13 DIAGNOSIS — Z85.3 HISTORY OF BREAST CANCER: ICD-10-CM

## 2020-05-13 DIAGNOSIS — R91.1 LUNG NODULE: ICD-10-CM

## 2020-05-13 PROCEDURE — 71250 CT THORAX DX C-: CPT

## 2020-05-18 ENCOUNTER — EVALUATION (OUTPATIENT)
Dept: PHYSICAL THERAPY | Facility: CLINIC | Age: 80
End: 2020-05-18
Payer: MEDICARE

## 2020-05-18 DIAGNOSIS — S42.202D CLOSED FRACTURE OF PROXIMAL END OF LEFT HUMERUS WITH ROUTINE HEALING, UNSPECIFIED FRACTURE MORPHOLOGY, SUBSEQUENT ENCOUNTER: Primary | ICD-10-CM

## 2020-05-18 PROCEDURE — 97161 PT EVAL LOW COMPLEX 20 MIN: CPT | Performed by: PHYSICAL THERAPIST

## 2020-05-18 NOTE — PROGRESS NOTES
PT DISCHARGE:     Patient has not rescheduled from 3/30/20  Due to Medicare guidelines, a new evaluation will be completed upon her return  Previous treatment cycle discharged

## 2020-05-21 ENCOUNTER — OFFICE VISIT (OUTPATIENT)
Dept: PHYSICAL THERAPY | Facility: CLINIC | Age: 80
End: 2020-05-21
Payer: MEDICARE

## 2020-05-21 DIAGNOSIS — S42.202D CLOSED FRACTURE OF PROXIMAL END OF LEFT HUMERUS WITH ROUTINE HEALING, UNSPECIFIED FRACTURE MORPHOLOGY, SUBSEQUENT ENCOUNTER: Primary | ICD-10-CM

## 2020-05-21 PROCEDURE — 97112 NEUROMUSCULAR REEDUCATION: CPT | Performed by: PHYSICAL THERAPIST

## 2020-05-21 PROCEDURE — 97110 THERAPEUTIC EXERCISES: CPT | Performed by: PHYSICAL THERAPIST

## 2020-05-26 ENCOUNTER — OFFICE VISIT (OUTPATIENT)
Dept: PHYSICAL THERAPY | Facility: CLINIC | Age: 80
End: 2020-05-26
Payer: MEDICARE

## 2020-05-26 DIAGNOSIS — S42.202D CLOSED FRACTURE OF PROXIMAL END OF LEFT HUMERUS WITH ROUTINE HEALING, UNSPECIFIED FRACTURE MORPHOLOGY, SUBSEQUENT ENCOUNTER: Primary | ICD-10-CM

## 2020-05-26 PROCEDURE — 97112 NEUROMUSCULAR REEDUCATION: CPT | Performed by: PHYSICAL THERAPIST

## 2020-05-26 PROCEDURE — 97140 MANUAL THERAPY 1/> REGIONS: CPT | Performed by: PHYSICAL THERAPIST

## 2020-05-26 PROCEDURE — 97110 THERAPEUTIC EXERCISES: CPT | Performed by: PHYSICAL THERAPIST

## 2020-05-28 ENCOUNTER — APPOINTMENT (OUTPATIENT)
Dept: PHYSICAL THERAPY | Facility: CLINIC | Age: 80
End: 2020-05-28
Payer: MEDICARE

## 2020-06-01 ENCOUNTER — OFFICE VISIT (OUTPATIENT)
Dept: PHYSICAL THERAPY | Facility: CLINIC | Age: 80
End: 2020-06-01
Payer: MEDICARE

## 2020-06-01 DIAGNOSIS — S42.202D CLOSED FRACTURE OF PROXIMAL END OF LEFT HUMERUS WITH ROUTINE HEALING, UNSPECIFIED FRACTURE MORPHOLOGY, SUBSEQUENT ENCOUNTER: Primary | ICD-10-CM

## 2020-06-01 PROCEDURE — 97110 THERAPEUTIC EXERCISES: CPT | Performed by: PHYSICAL THERAPIST

## 2020-06-01 PROCEDURE — 97140 MANUAL THERAPY 1/> REGIONS: CPT | Performed by: PHYSICAL THERAPIST

## 2020-06-03 ENCOUNTER — OFFICE VISIT (OUTPATIENT)
Dept: PHYSICAL THERAPY | Facility: CLINIC | Age: 80
End: 2020-06-03
Payer: MEDICARE

## 2020-06-03 DIAGNOSIS — S42.202D CLOSED FRACTURE OF PROXIMAL END OF LEFT HUMERUS WITH ROUTINE HEALING, UNSPECIFIED FRACTURE MORPHOLOGY, SUBSEQUENT ENCOUNTER: Primary | ICD-10-CM

## 2020-06-03 PROCEDURE — 97110 THERAPEUTIC EXERCISES: CPT | Performed by: PHYSICAL THERAPIST

## 2020-06-08 ENCOUNTER — TELEPHONE (OUTPATIENT)
Dept: PULMONOLOGY | Facility: CLINIC | Age: 80
End: 2020-06-08

## 2020-06-08 ENCOUNTER — OFFICE VISIT (OUTPATIENT)
Dept: PHYSICAL THERAPY | Facility: CLINIC | Age: 80
End: 2020-06-08
Payer: MEDICARE

## 2020-06-08 DIAGNOSIS — S42.202D CLOSED FRACTURE OF PROXIMAL END OF LEFT HUMERUS WITH ROUTINE HEALING, UNSPECIFIED FRACTURE MORPHOLOGY, SUBSEQUENT ENCOUNTER: Primary | ICD-10-CM

## 2020-06-08 PROCEDURE — 97112 NEUROMUSCULAR REEDUCATION: CPT | Performed by: PHYSICAL THERAPIST

## 2020-06-08 PROCEDURE — 97110 THERAPEUTIC EXERCISES: CPT | Performed by: PHYSICAL THERAPIST

## 2020-06-09 ENCOUNTER — OFFICE VISIT (OUTPATIENT)
Dept: PULMONOLOGY | Facility: CLINIC | Age: 80
End: 2020-06-09
Payer: MEDICARE

## 2020-06-09 VITALS
DIASTOLIC BLOOD PRESSURE: 84 MMHG | OXYGEN SATURATION: 95 % | HEIGHT: 65 IN | BODY MASS INDEX: 36.99 KG/M2 | HEART RATE: 83 BPM | TEMPERATURE: 98.5 F | WEIGHT: 222 LBS | SYSTOLIC BLOOD PRESSURE: 140 MMHG

## 2020-06-09 DIAGNOSIS — E66.9 OBESITY (BMI 30-39.9): ICD-10-CM

## 2020-06-09 DIAGNOSIS — R91.8 PULMONARY NODULES: Primary | ICD-10-CM

## 2020-06-09 DIAGNOSIS — I10 ESSENTIAL HYPERTENSION: ICD-10-CM

## 2020-06-09 DIAGNOSIS — Z85.3 HISTORY OF BREAST CANCER: ICD-10-CM

## 2020-06-09 PROCEDURE — 99214 OFFICE O/P EST MOD 30 MIN: CPT | Performed by: PHYSICIAN ASSISTANT

## 2020-06-11 ENCOUNTER — OFFICE VISIT (OUTPATIENT)
Dept: PHYSICAL THERAPY | Facility: CLINIC | Age: 80
End: 2020-06-11
Payer: MEDICARE

## 2020-06-11 DIAGNOSIS — S42.202D CLOSED FRACTURE OF PROXIMAL END OF LEFT HUMERUS WITH ROUTINE HEALING, UNSPECIFIED FRACTURE MORPHOLOGY, SUBSEQUENT ENCOUNTER: Primary | ICD-10-CM

## 2020-06-11 PROCEDURE — 97110 THERAPEUTIC EXERCISES: CPT | Performed by: PHYSICAL THERAPIST

## 2020-06-12 ENCOUNTER — APPOINTMENT (OUTPATIENT)
Dept: RADIOLOGY | Facility: CLINIC | Age: 80
End: 2020-06-12
Payer: MEDICARE

## 2020-06-12 ENCOUNTER — OFFICE VISIT (OUTPATIENT)
Dept: OBGYN CLINIC | Facility: CLINIC | Age: 80
End: 2020-06-12
Payer: MEDICARE

## 2020-06-12 VITALS
HEART RATE: 76 BPM | BODY MASS INDEX: 36.75 KG/M2 | WEIGHT: 220.6 LBS | RESPIRATION RATE: 20 BRPM | HEIGHT: 65 IN | SYSTOLIC BLOOD PRESSURE: 127 MMHG | DIASTOLIC BLOOD PRESSURE: 72 MMHG

## 2020-06-12 DIAGNOSIS — S42.295D OTHER CLOSED NONDISPLACED FRACTURE OF PROXIMAL END OF LEFT HUMERUS WITH ROUTINE HEALING, SUBSEQUENT ENCOUNTER: Primary | ICD-10-CM

## 2020-06-12 DIAGNOSIS — S42.295D OTHER CLOSED NONDISPLACED FRACTURE OF PROXIMAL END OF LEFT HUMERUS WITH ROUTINE HEALING, SUBSEQUENT ENCOUNTER: ICD-10-CM

## 2020-06-12 PROCEDURE — 99213 OFFICE O/P EST LOW 20 MIN: CPT | Performed by: ORTHOPAEDIC SURGERY

## 2020-06-12 PROCEDURE — 73060 X-RAY EXAM OF HUMERUS: CPT

## 2020-06-15 ENCOUNTER — TRANSCRIBE ORDERS (OUTPATIENT)
Dept: PHYSICAL THERAPY | Facility: CLINIC | Age: 80
End: 2020-06-15

## 2020-06-15 ENCOUNTER — OFFICE VISIT (OUTPATIENT)
Dept: PHYSICAL THERAPY | Facility: CLINIC | Age: 80
End: 2020-06-15
Payer: MEDICARE

## 2020-06-15 DIAGNOSIS — M25.561 CHRONIC PAIN OF RIGHT KNEE: Primary | ICD-10-CM

## 2020-06-15 DIAGNOSIS — G89.29 CHRONIC PAIN OF RIGHT KNEE: Primary | ICD-10-CM

## 2020-06-15 DIAGNOSIS — S42.202D CLOSED FRACTURE OF PROXIMAL END OF LEFT HUMERUS WITH ROUTINE HEALING, UNSPECIFIED FRACTURE MORPHOLOGY, SUBSEQUENT ENCOUNTER: ICD-10-CM

## 2020-06-15 PROCEDURE — 97110 THERAPEUTIC EXERCISES: CPT | Performed by: PHYSICAL THERAPIST

## 2020-06-15 PROCEDURE — 97161 PT EVAL LOW COMPLEX 20 MIN: CPT | Performed by: PHYSICAL THERAPIST

## 2020-06-18 ENCOUNTER — OFFICE VISIT (OUTPATIENT)
Dept: PHYSICAL THERAPY | Facility: CLINIC | Age: 80
End: 2020-06-18
Payer: MEDICARE

## 2020-06-18 DIAGNOSIS — S42.202D CLOSED FRACTURE OF PROXIMAL END OF LEFT HUMERUS WITH ROUTINE HEALING, UNSPECIFIED FRACTURE MORPHOLOGY, SUBSEQUENT ENCOUNTER: Primary | ICD-10-CM

## 2020-06-18 PROCEDURE — 97112 NEUROMUSCULAR REEDUCATION: CPT | Performed by: PHYSICAL THERAPIST

## 2020-06-18 PROCEDURE — 97110 THERAPEUTIC EXERCISES: CPT | Performed by: PHYSICAL THERAPIST

## 2020-06-22 ENCOUNTER — OFFICE VISIT (OUTPATIENT)
Dept: PHYSICAL THERAPY | Facility: CLINIC | Age: 80
End: 2020-06-22
Payer: MEDICARE

## 2020-06-22 DIAGNOSIS — M25.561 CHRONIC PAIN OF RIGHT KNEE: ICD-10-CM

## 2020-06-22 DIAGNOSIS — G89.29 CHRONIC PAIN OF RIGHT KNEE: ICD-10-CM

## 2020-06-22 DIAGNOSIS — S42.202D CLOSED FRACTURE OF PROXIMAL END OF LEFT HUMERUS WITH ROUTINE HEALING, UNSPECIFIED FRACTURE MORPHOLOGY, SUBSEQUENT ENCOUNTER: Primary | ICD-10-CM

## 2020-06-22 PROCEDURE — 97110 THERAPEUTIC EXERCISES: CPT | Performed by: PHYSICAL THERAPIST

## 2020-06-25 ENCOUNTER — OFFICE VISIT (OUTPATIENT)
Dept: PHYSICAL THERAPY | Facility: CLINIC | Age: 80
End: 2020-06-25
Payer: MEDICARE

## 2020-06-25 DIAGNOSIS — M25.561 CHRONIC PAIN OF RIGHT KNEE: ICD-10-CM

## 2020-06-25 DIAGNOSIS — G89.29 CHRONIC PAIN OF RIGHT KNEE: ICD-10-CM

## 2020-06-25 DIAGNOSIS — S42.202D CLOSED FRACTURE OF PROXIMAL END OF LEFT HUMERUS WITH ROUTINE HEALING, UNSPECIFIED FRACTURE MORPHOLOGY, SUBSEQUENT ENCOUNTER: Primary | ICD-10-CM

## 2020-06-25 PROCEDURE — 97110 THERAPEUTIC EXERCISES: CPT | Performed by: PHYSICAL THERAPIST

## 2020-06-25 PROCEDURE — 97140 MANUAL THERAPY 1/> REGIONS: CPT | Performed by: PHYSICAL THERAPIST

## 2020-06-29 ENCOUNTER — OFFICE VISIT (OUTPATIENT)
Dept: PHYSICAL THERAPY | Facility: CLINIC | Age: 80
End: 2020-06-29
Payer: MEDICARE

## 2020-06-29 DIAGNOSIS — M25.561 CHRONIC PAIN OF RIGHT KNEE: ICD-10-CM

## 2020-06-29 DIAGNOSIS — G89.29 CHRONIC PAIN OF RIGHT KNEE: ICD-10-CM

## 2020-06-29 DIAGNOSIS — S42.202D CLOSED FRACTURE OF PROXIMAL END OF LEFT HUMERUS WITH ROUTINE HEALING, UNSPECIFIED FRACTURE MORPHOLOGY, SUBSEQUENT ENCOUNTER: Primary | ICD-10-CM

## 2020-06-29 PROCEDURE — 97110 THERAPEUTIC EXERCISES: CPT | Performed by: PHYSICAL THERAPIST

## 2020-06-29 PROCEDURE — 97140 MANUAL THERAPY 1/> REGIONS: CPT | Performed by: PHYSICAL THERAPIST

## 2020-07-01 ENCOUNTER — OFFICE VISIT (OUTPATIENT)
Dept: PHYSICAL THERAPY | Facility: CLINIC | Age: 80
End: 2020-07-01
Payer: MEDICARE

## 2020-07-01 DIAGNOSIS — M25.561 CHRONIC PAIN OF RIGHT KNEE: ICD-10-CM

## 2020-07-01 DIAGNOSIS — G89.29 CHRONIC PAIN OF RIGHT KNEE: ICD-10-CM

## 2020-07-01 DIAGNOSIS — S42.202D CLOSED FRACTURE OF PROXIMAL END OF LEFT HUMERUS WITH ROUTINE HEALING, UNSPECIFIED FRACTURE MORPHOLOGY, SUBSEQUENT ENCOUNTER: Primary | ICD-10-CM

## 2020-07-01 PROCEDURE — 97110 THERAPEUTIC EXERCISES: CPT | Performed by: PHYSICAL THERAPIST

## 2020-07-01 PROCEDURE — 97140 MANUAL THERAPY 1/> REGIONS: CPT | Performed by: PHYSICAL THERAPIST

## 2020-07-01 PROCEDURE — 97112 NEUROMUSCULAR REEDUCATION: CPT | Performed by: PHYSICAL THERAPIST

## 2020-07-01 NOTE — PROGRESS NOTES
Daily Note     Today's date: 2020  Patient name: Francisco Ferguson  : 1940  MRN: [id number]  Referring provider: Royce Scott MD (left shoulder); Dr Tyler Chavarria (right knee)   Dx:   Encounter Diagnosis     ICD-10-CM    1  Closed fracture of proximal end of left humerus with routine healing, unspecified fracture morphology, subsequent encounter S4D    2  Chronic pain of right knee M25 561     G89 29                   Subjective: Patient states she lifted 4 cases of soda today and reports increased left shoulder pain  She reports muscular soreness in knees following last visit  Objective: See treatment diary below      Assessment: Tolerated treatment well  Patient has reduced shoulder pain from start to finish of session  She remains limited with shoulder mobility in all planes, but overall improving  She will benefit from increased scapular strengthening; progressed theraband row and extension to red TB and updated HEP today  Patient would benefit from continued PT      Plan: Continue per plan of care  Signed POC received back from Dr Tracy Holm for right knee  Patient requires continued skilled PT- KX modifier added as she will continue to benefit from therapy and she continues to make improvements        Precautions: rapid heartbeat (no exercise restrictions; controlled by medication); h/o left breast cancer (s/p lumpectomy, s/p radiation, and current hormone therapy)        Manuals           Patellar mobs 5' 3' 3'          Scapular mobs             IASTM- R med knee 8' 7' 7'          IE for Right knee             RE for left shoulder             Neuro Re-Ed             Supine chest pull             Scapular wall slides             Supine scapular retraction with finger walks             Rhythmic stabilization- IR/ER             Tb Row/Ext   Red 3 x 10 ea          AAROM- Flex/abd w manual scap depression             Rhythmic stabilization- supine arm at 90*- all directions Supine protraction/retraction             Scap depression w TB                          Ther Ex             Manual PROM- L shoulder 15' 15' 20'          Elbow flares- top of head/back of head             Doorway stretch- low/mid             Patient education             Standing hip abduction 2x10 & Ext yellow 2x10 ea & ext red 2 x 10 ea          LAQ SAQ 2 x10 LAQ w adductor set 1# x 20  LAQ w  2# x 20           Adductor set 3"x20 3"x20 3"x20          SLR x10 ea 2x10 ea           SAQ   1# 3 x 10                        Ther Activity                                       Gait Training                                       Modalities

## 2020-07-02 ENCOUNTER — APPOINTMENT (OUTPATIENT)
Dept: PHYSICAL THERAPY | Facility: CLINIC | Age: 80
End: 2020-07-02
Payer: MEDICARE

## 2020-07-10 ENCOUNTER — OFFICE VISIT (OUTPATIENT)
Dept: PHYSICAL THERAPY | Facility: CLINIC | Age: 80
End: 2020-07-10
Payer: MEDICARE

## 2020-07-10 DIAGNOSIS — S42.202D CLOSED FRACTURE OF PROXIMAL END OF LEFT HUMERUS WITH ROUTINE HEALING, UNSPECIFIED FRACTURE MORPHOLOGY, SUBSEQUENT ENCOUNTER: Primary | ICD-10-CM

## 2020-07-10 DIAGNOSIS — G89.29 CHRONIC PAIN OF RIGHT KNEE: ICD-10-CM

## 2020-07-10 DIAGNOSIS — M25.561 CHRONIC PAIN OF RIGHT KNEE: ICD-10-CM

## 2020-07-10 PROCEDURE — 97140 MANUAL THERAPY 1/> REGIONS: CPT | Performed by: PHYSICAL THERAPIST

## 2020-07-10 PROCEDURE — 97110 THERAPEUTIC EXERCISES: CPT | Performed by: PHYSICAL THERAPIST

## 2020-07-10 PROCEDURE — 97112 NEUROMUSCULAR REEDUCATION: CPT | Performed by: PHYSICAL THERAPIST

## 2020-07-10 NOTE — PROGRESS NOTES
Daily Note     Today's date: 7/10/2020  Patient name: Miguel Townsend  : 1940  MRN: [id number]  Referring provider: Pierre Marie MD (left shoulder); Dr Charly Petersen (right knee)   Dx:   Encounter Diagnosis     ICD-10-CM    1  Closed fracture of proximal end of left humerus with routine healing, unspecified fracture morphology, subsequent encounter S4D    2  Chronic pain of right knee M25 561     G89 29                   Subjective: Patient states she is at least 50% improved with her knee  She states, "I'm walking better most of the time and not using the cane "  She also states that when she is putting on her pants, she can stand on her right leg, which she was previously unable to do  Objective: See treatment diary below    Right knee flexion 115 degrees     Assessment: Tolerated treatment well  Progression of LUE strengthening exercises to include lat pull down and scapular depression with TB  Shoulder ROM is improved and within functional limits, but goal to improve to maximal potential   Knee flexion improves to 115 degrees on right  Patient would benefit from continued PT      Plan: Continue per plan of care         Precautions: rapid heartbeat (no exercise restrictions; controlled by medication); h/o left breast cancer (s/p lumpectomy, s/p radiation, and current hormone therapy)        Manuals 6/25 6/29 7/1 7/10         Patellar mobs 5' 3' 3' 3'         Scapular mobs             IASTM- R med knee 8' 7' 7' 8'         IE for Right knee             RE for left shoulder             Neuro Re-Ed             Supine chest pull             Scapular wall slides             Supine scapular retraction with finger walks             Rhythmic stabilization- IR/ER             Tb Row/Ext   Red 3 x 10 ea Red 3 x 10 ea         AAROM- Flex/abd w manual scap depression             Rhythmic stabilization- supine arm at 90*- all directions             Supine protraction/retraction             Scap depression w TB    Red 2x10         TB Lat pull down    Red 2x10         Ther Ex             Manual PROM- L shoulder 15' 15' 20' 15'         Elbow flares- top of head/back of head             Doorway stretch- low/mid             Patient education             Standing hip abduction 2x10 & Ext yellow 2x10 ea & ext red 2 x 10 ea & ext red 2 x 10 ea         LAQ SAQ 2 x10 LAQ w adductor set 1# x 20  LAQ w  2# x 20  LAQ 2# x20 ea         Adductor set 3"x20 3"x20 3"x20          SLR x10 ea 2x10 ea  2x10         SAQ   1# 3 x 10           Table flexion stretch    10"x10                      Ther Activity                                       Gait Training                                       Modalities

## 2020-07-15 ENCOUNTER — OFFICE VISIT (OUTPATIENT)
Dept: PHYSICAL THERAPY | Facility: CLINIC | Age: 80
End: 2020-07-15
Payer: MEDICARE

## 2020-07-15 DIAGNOSIS — S42.202D CLOSED FRACTURE OF PROXIMAL END OF LEFT HUMERUS WITH ROUTINE HEALING, UNSPECIFIED FRACTURE MORPHOLOGY, SUBSEQUENT ENCOUNTER: Primary | ICD-10-CM

## 2020-07-15 DIAGNOSIS — M25.561 CHRONIC PAIN OF RIGHT KNEE: ICD-10-CM

## 2020-07-15 DIAGNOSIS — G89.29 CHRONIC PAIN OF RIGHT KNEE: ICD-10-CM

## 2020-07-15 PROCEDURE — 97110 THERAPEUTIC EXERCISES: CPT | Performed by: PHYSICAL THERAPIST

## 2020-07-15 PROCEDURE — 97140 MANUAL THERAPY 1/> REGIONS: CPT | Performed by: PHYSICAL THERAPIST
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
## 2020-07-17 ENCOUNTER — OFFICE VISIT (OUTPATIENT)
Dept: PHYSICAL THERAPY | Facility: CLINIC | Age: 80
End: 2020-07-17
Payer: MEDICARE

## 2020-07-17 DIAGNOSIS — G89.29 CHRONIC PAIN OF RIGHT KNEE: ICD-10-CM

## 2020-07-17 DIAGNOSIS — S42.202D CLOSED FRACTURE OF PROXIMAL END OF LEFT HUMERUS WITH ROUTINE HEALING, UNSPECIFIED FRACTURE MORPHOLOGY, SUBSEQUENT ENCOUNTER: Primary | ICD-10-CM

## 2020-07-17 DIAGNOSIS — M25.561 CHRONIC PAIN OF RIGHT KNEE: ICD-10-CM

## 2020-07-17 PROCEDURE — 97140 MANUAL THERAPY 1/> REGIONS: CPT | Performed by: PHYSICAL THERAPIST

## 2020-07-17 PROCEDURE — 97110 THERAPEUTIC EXERCISES: CPT | Performed by: PHYSICAL THERAPIST

## 2020-07-17 NOTE — PROGRESS NOTES
Daily Note     Today's date: 2020  Patient name: David Hansen  : 1940  MRN: [id number]  Referring provider: Jya Gao MD  Dx:   Encounter Diagnosis     ICD-10-CM    1  Closed fracture of proximal end of left humerus with routine healing, unspecified fracture morphology, subsequent encounter S4    2  Chronic pain of right knee M25 561     G89 29                   Subjective: Patient states her arm was a little sore today because of the way she slept  Objective: See treatment diary below      Assessment: Tolerated treatment well  Patient does well with current exercise program   She has less shoulder soreness from start to finish of session  She improves to 122 degrees of knee flexion ROM this visit, which is improved from last visit  Patient would benefit from continued PT      Plan: Continue per plan of care        Precautions: rapid heartbeat (no exercise restrictions; controlled by medication); h/o left breast cancer (s/p lumpectomy, s/p radiation, and current hormone therapy)        Manuals 6/25 6/29 7/1 7/10 7/15 7/17       Patellar mobs 5' 3' 3' 3' 3' 3'       Scapular mobs             IASTM- R med knee 8' 7' 7' 8' 8' 8'       IE for Right knee             RE for left shoulder             Neuro Re-Ed             Supine chest pull             Scapular wall slides             Supine scapular retraction with finger walks             Rhythmic stabilization- IR/ER             Tb Row/Ext   Red 3 x 10 ea Red 3 x 10 ea         AAROM- Flex/abd w manual scap depression             Rhythmic stabilization- supine arm at 90*- all directions             Supine protraction/retraction             Scap depression w TB    Red 2x10         TB Lat pull down    Red 2x10         TB Chest pull/ER     Yellow 2x10 ea Yellow 2x10 ea       Ther Ex             Manual PROM- L shoulder 15' 15' 20' 15' 15' 15'       Elbow flares- top of head/back of head             Doorway stretch- low/mid Pulleys- flex      3'       Standing hip abduction 2x10 & Ext yellow 2x10 ea & ext red 2 x 10 ea & ext red 2 x 10 ea & ext red 2 x 10 ea & ext red 2 x 10 ea       LAQ SAQ 2 x10 LAQ w adductor set 1# x 20  LAQ w  2# x 20  LAQ 2# x20 ea LAQ 2# x20 ea 3# 2 x 10 ea       Adductor set 3"x20 3"x20 3"x20          SLR x10 ea 2x10 ea  2x10 2# 2 x 10 ea 2# 2 x 10 ea       SAQ   1# 3 x 10           Table flexion stretch    10"x10         Heel slides       x20       Ther Activity                                       Gait Training                                       Modalities

## 2020-07-22 ENCOUNTER — OFFICE VISIT (OUTPATIENT)
Dept: PHYSICAL THERAPY | Facility: CLINIC | Age: 80
End: 2020-07-22
Payer: MEDICARE

## 2020-07-22 DIAGNOSIS — G89.29 CHRONIC PAIN OF RIGHT KNEE: ICD-10-CM

## 2020-07-22 DIAGNOSIS — M25.561 CHRONIC PAIN OF RIGHT KNEE: ICD-10-CM

## 2020-07-22 DIAGNOSIS — S42.202D CLOSED FRACTURE OF PROXIMAL END OF LEFT HUMERUS WITH ROUTINE HEALING, UNSPECIFIED FRACTURE MORPHOLOGY, SUBSEQUENT ENCOUNTER: Primary | ICD-10-CM

## 2020-07-22 PROCEDURE — 97110 THERAPEUTIC EXERCISES: CPT | Performed by: PHYSICAL THERAPIST

## 2020-07-22 PROCEDURE — 97140 MANUAL THERAPY 1/> REGIONS: CPT | Performed by: PHYSICAL THERAPIST

## 2020-07-22 NOTE — PROGRESS NOTES
Daily Note     Today's date: 2020  Patient name: Michelle Cabral  : 1940  MRN: [id number]  Referring provider: Herbert Fernandez MD  Dx:   Encounter Diagnosis     ICD-10-CM    1  Closed fracture of proximal end of left humerus with routine healing, unspecified fracture morphology, subsequent encounter S4    2  Chronic pain of right knee M25 561     G89 29                   Subjective: Patient continues to report occasional left shoulder soreness when sidelying on that side  However, she states, "I'm really pleased with my knee" and reports that she is seeing her PCP tomorrow  Objective: See treatment diary below      Assessment: Tolerated treatment well  Patient provided red theraband loop to perform HEP  No issues during treatment  She remains limited with shoulder ROM, but has achieved functional ROM  Patient would benefit from continued PT      Plan: Continue per plan of care  until 8/10/20         Precautions: rapid heartbeat (no exercise restrictions; controlled by medication); h/o left breast cancer (s/p lumpectomy, s/p radiation, and current hormone therapy)        Manuals 6/25 6/29 7/1 7/10 7/15 7/17 7/22      Patellar mobs 5' 3' 3' 3' 3' 3'       Scapular mobs             IASTM- R med knee 8' 7' 7' 8' 8' 8' 8'      R GHJ Inf mobs       5'                   Neuro Re-Ed             Supine chest pull             Scapular wall slides             Supine scapular retraction with finger walks             Rhythmic stabilization- IR/ER             Tb Row/Ext   Red 3 x 10 ea Red 3 x 10 ea         AAROM- Flex/abd w manual scap depression             Rhythmic stabilization- supine arm at 90*- all directions             Supine protraction/retraction             Scap depression w TB    Red 2x10         TB Lat pull down    Red 2x10         TB Chest pull/ER     Yellow 2x10 ea Yellow 2x10 ea HEP      Ther Ex             Manual PROM- L shoulder 15' 15' 20' 15' 15' 15' 15'      Elbow flares- top of head/back of head             Doorway stretch- low/mid             Pulleys- flex      3' 3'      Standing hip abduction 2x10 & Ext yellow 2x10 ea & ext red 2 x 10 ea & ext red 2 x 10 ea & ext red 2 x 10 ea & ext red 2 x 10 ea & ext red 2 x 10 ea      LAQ SAQ 2 x10 LAQ w adductor set 1# x 20  LAQ w  2# x 20  LAQ 2# x20 ea LAQ 2# x20 ea 3# 2 x 10 ea 3# 2 x 10 ea      Adductor set 3"x20 3"x20 3"x20          SLR x10 ea 2x10 ea  2x10 2# 2 x 10 ea 2# 2 x 10 ea       SAQ   1# 3 x 10           Table flexion stretch    10"x10         Heel slides       x20       Ther Activity                                       Gait Training                                       Modalities

## 2020-07-24 ENCOUNTER — OFFICE VISIT (OUTPATIENT)
Dept: PHYSICAL THERAPY | Facility: CLINIC | Age: 80
End: 2020-07-24
Payer: MEDICARE

## 2020-07-24 DIAGNOSIS — G89.29 CHRONIC PAIN OF RIGHT KNEE: ICD-10-CM

## 2020-07-24 DIAGNOSIS — S42.202D CLOSED FRACTURE OF PROXIMAL END OF LEFT HUMERUS WITH ROUTINE HEALING, UNSPECIFIED FRACTURE MORPHOLOGY, SUBSEQUENT ENCOUNTER: Primary | ICD-10-CM

## 2020-07-24 DIAGNOSIS — M25.561 CHRONIC PAIN OF RIGHT KNEE: ICD-10-CM

## 2020-07-24 PROCEDURE — 97140 MANUAL THERAPY 1/> REGIONS: CPT | Performed by: PHYSICAL THERAPIST

## 2020-07-24 PROCEDURE — 97110 THERAPEUTIC EXERCISES: CPT | Performed by: PHYSICAL THERAPIST

## 2020-07-24 NOTE — PROGRESS NOTES
Daily Note     Today's date: 2020  Patient name: Wagner Leyva  : 1940  MRN: [id number]  Referring provider: Susana Charles MD  Dx:   Encounter Diagnosis     ICD-10-CM    1  Closed fracture of proximal end of left humerus with routine healing, unspecified fracture morphology, subsequent encounter S4    2  Chronic pain of right knee M25 561     G89 29                   Subjective: Patient states she went to her PCP yesterday who was pleased with her progress with her knee  She states she's doing well overall  Some soreness in the morning within her shoulder  Objective: See treatment diary below      Assessment: Tolerated treatment well  Patient able to add balance exercises this visit with appropriate challenge  She remains with scapular compensatory pattern with left shoulder elevation  Patient would benefit from continued PT Any exercises not performed today are performed as part of HEP; sessions focus on manual intervention and exercises she cannot perform at home  Plan: Continue per plan of care  until 8/10/20         Precautions: rapid heartbeat (no exercise restrictions; controlled by medication); h/o left breast cancer (s/p lumpectomy, s/p radiation, and current hormone therapy)        Manuals 6/25 6/29 7/1 7/10 7/15 7/17 7/22 7/24     Patellar mobs 5' 3' 3' 3' 3' 3'       Scapular mobs             IASTM- R med knee 8' 7' 7' 8' 8' 8' 8' & inf 8'     R GHJ Inf mobs       5' 5'                  Neuro Re-Ed             SLB- floor        2x20" ea B      SLB- foam        2x20" ea B      Scapular wall slides             Supine scapular retraction with finger walks             Rhythmic stabilization- IR/ER             Tb Row/Ext   Red 3 x 10 ea Red 3 x 10 ea         AAROM- Flex/abd w manual scap depression             Rhythmic stabilization- supine arm at 90*- all directions             Supine protraction/retraction             Scap depression w TB    Red 2x10         TB Lat pull down    Red 2x10         TB Chest pull/ER     Yellow 2x10 ea Yellow 2x10 ea HEP      Ther Ex             Manual PROM- L shoulder 15' 15' 20' 15' 15' 15' 15' 10'     Elbow flares- top of head/back of head             Doorway stretch- low/mid             Pulleys- flex      3' 3' 3'     Standing hip abduction 2x10 & Ext yellow 2x10 ea & ext red 2 x 10 ea & ext red 2 x 10 ea & ext red 2 x 10 ea & ext red 2 x 10 ea & ext red 2 x 10 ea & ext red 3 x 10 ea     Side step with band at rail        Red x6     LAQ SAQ 2 x10 LAQ w adductor set 1# x 20  LAQ w  2# x 20  LAQ 2# x20 ea LAQ 2# x20 ea 3# 2 x 10 ea 3# 2 x 10 ea 3# 2 x 10 ea     Adductor set 3"x20 3"x20 3"x20          SLR x10 ea 2x10 ea  2x10 2# 2 x 10 ea 2# 2 x 10 ea  3# 2 x 10 ea     SAQ   1# 3 x 10           Table flexion stretch    10"x10         Heel slides       x20       Ther Activity                                       Gait Training                                       Modalities

## 2020-07-29 ENCOUNTER — OFFICE VISIT (OUTPATIENT)
Dept: PHYSICAL THERAPY | Facility: CLINIC | Age: 80
End: 2020-07-29
Payer: MEDICARE

## 2020-07-29 DIAGNOSIS — M25.561 CHRONIC PAIN OF RIGHT KNEE: ICD-10-CM

## 2020-07-29 DIAGNOSIS — G89.29 CHRONIC PAIN OF RIGHT KNEE: ICD-10-CM

## 2020-07-29 DIAGNOSIS — S42.202D CLOSED FRACTURE OF PROXIMAL END OF LEFT HUMERUS WITH ROUTINE HEALING, UNSPECIFIED FRACTURE MORPHOLOGY, SUBSEQUENT ENCOUNTER: Primary | ICD-10-CM

## 2020-07-29 PROCEDURE — 97110 THERAPEUTIC EXERCISES: CPT | Performed by: PHYSICAL THERAPIST

## 2020-07-29 PROCEDURE — 97140 MANUAL THERAPY 1/> REGIONS: CPT | Performed by: PHYSICAL THERAPIST

## 2020-07-29 NOTE — PROGRESS NOTES
Daily Note     Today's date: 2020  Patient name: Harriet Fraser  : 1940  MRN: [id number]  Referring provider: Asya Keller MD  Dx:   Encounter Diagnosis     ICD-10-CM    1  Closed fracture of proximal end of left humerus with routine healing, unspecified fracture morphology, subsequent encounter S4D    2  Chronic pain of right knee M25 561     G89 29                   Subjective: Patient notes that she feels pretty good today, as of right now  Objective: See treatment diary below      Assessment: Pt had hard feel with all directions in with shoulder mobility  She did have soreness in her shoulder and deferred mobilizations today  She additionally was challenged with balance/proprioception activities and heavily relied on her arms to maintain upright balance  Will try to decrease amount of UE assistance NV  Plan: Continue per plan of care  until 8/10/20         Precautions: rapid heartbeat (no exercise restrictions; controlled by medication); h/o left breast cancer (s/p lumpectomy, s/p radiation, and current hormone therapy)        Manuals 6/25 6/29 7/1 7/10 7/15 7/17 7/22 7/24 7/29    Patellar mobs 5' 3' 3' 3' 3' 3'       Scapular mobs             IASTM- R med knee 8' 7' 7' 8' 8' 8' 8' & inf 8' & inf 8'    R GHJ Inf mobs       5' 5' Deferred                  Neuro Re-Ed             SLB- floor        2x20" ea B  2x20" ea B     SLB- foam        2x20" ea B  2x20" ea B     Scapular wall slides             Supine scapular retraction with finger walks             Rhythmic stabilization- IR/ER             Tb Row/Ext   Red 3 x 10 ea Red 3 x 10 ea         AAROM- Flex/abd w manual scap depression             Rhythmic stabilization- supine arm at 90*- all directions             Supine protraction/retraction             Scap depression w TB    Red 2x10         TB Lat pull down    Red 2x10         TB Chest pull/ER     Yellow 2x10 ea Yellow 2x10 ea HEP      Ther Ex             Manual PROM- L shoulder 15' 15' 20' 15' 15' 15' 15' 10' 10'    Elbow flares- top of head/back of head             Doorway stretch- low/mid             Pulleys- flex      3' 3' 3' 3'    Standing hip abduction 2x10 & Ext yellow 2x10 ea & ext red 2 x 10 ea & ext red 2 x 10 ea & ext red 2 x 10 ea & ext red 2 x 10 ea & ext red 2 x 10 ea & ext red 3 x 10 ea & ext red 3 x 10 ea    Side step with band at rail        Red x6 Red x6    LAQ SAQ 2 x10 LAQ w adductor set 1# x 20  LAQ w  2# x 20  LAQ 2# x20 ea LAQ 2# x20 ea 3# 2 x 10 ea 3# 2 x 10 ea 3# 2 x 10 ea 3# 2 x 10 ea    Adductor set 3"x20 3"x20 3"x20          SLR x10 ea 2x10 ea  2x10 2# 2 x 10 ea 2# 2 x 10 ea  3# 2 x 10 ea 3# 2 x 10 ea    SAQ   1# 3 x 10           Table flexion stretch    10"x10         Heel slides       x20       Ther Activity                                       Gait Training                                       Modalities

## 2020-07-31 ENCOUNTER — OFFICE VISIT (OUTPATIENT)
Dept: PHYSICAL THERAPY | Facility: CLINIC | Age: 80
End: 2020-07-31
Payer: MEDICARE

## 2020-07-31 DIAGNOSIS — G89.29 CHRONIC PAIN OF RIGHT KNEE: ICD-10-CM

## 2020-07-31 DIAGNOSIS — M25.561 CHRONIC PAIN OF RIGHT KNEE: ICD-10-CM

## 2020-07-31 DIAGNOSIS — S42.202D CLOSED FRACTURE OF PROXIMAL END OF LEFT HUMERUS WITH ROUTINE HEALING, UNSPECIFIED FRACTURE MORPHOLOGY, SUBSEQUENT ENCOUNTER: Primary | ICD-10-CM

## 2020-07-31 PROCEDURE — 97140 MANUAL THERAPY 1/> REGIONS: CPT | Performed by: PHYSICAL THERAPIST

## 2020-07-31 PROCEDURE — 97110 THERAPEUTIC EXERCISES: CPT | Performed by: PHYSICAL THERAPIST

## 2020-07-31 NOTE — PROGRESS NOTES
Daily Note     Today's date: 2020  Patient name: Rosa Walters  : 1940  MRN: [id number]  Referring provider: Daisha Jimenez MD  Dx:   Encounter Diagnosis     ICD-10-CM    1  Closed fracture of proximal end of left humerus with routine healing, unspecified fracture morphology, subsequent encounter S4    2  Chronic pain of right knee M25 561     G89 29                   Subjective: Patient notes that she feels pretty good today, as of right now  Objective: See treatment diary below       Assessment: Pt continued to be challenged with balance and proprioceptive activities however, she was able today decrease her UE assistance and have brief intervals of no UE assistance during SLB  Progressed to stepping onto a foam to further proprioceptive training  Plan: Continue per plan of care  until 8/10/20         Precautions: rapid heartbeat (no exercise restrictions; controlled by medication); h/o left breast cancer (s/p lumpectomy, s/p radiation, and current hormone therapy)        Manuals 6/25 6/29 7/1 7/10 7/15 7/17 7/22 7/24 7/29 7/31   Patellar mobs 5' 3' 3' 3' 3' 3'       Scapular mobs             IASTM- R med knee 8' 7' 7' 8' 8' 8' 8' & inf 8' & inf 8' & inf 8'   R GHJ Inf mobs       5' 5' Deferred                  Neuro Re-Ed             SLB- floor        2x20" ea B  2x20" ea B  2x20" ea B    SLB- foam        2x20" ea B  2x20" ea B  2x20" ea B    Stepping on to foam fw/lat             Scapular wall slides             Supine scapular retraction with finger walks             Rhythmic stabilization- IR/ER             Tb Row/Ext   Red 3 x 10 ea Red 3 x 10 ea         AAROM- Flex/abd w manual scap depression             Rhythmic stabilization- supine arm at 90*- all directions             Supine protraction/retraction             Scap depression w TB    Red 2x10         TB Lat pull down    Red 2x10         TB Chest pull/ER     Yellow 2x10 ea Yellow 2x10 ea HEP      Ther Ex Manual PROM- L shoulder 15' 15' 20' 15' 15' 15' 15' 10' 10' 10'   Elbow flares- top of head/back of head             Doorway stretch- low/mid             Pulleys- flex      3' 3' 3' 3' 3'   Standing hip abduction 2x10 & Ext yellow 2x10 ea & ext red 2 x 10 ea & ext red 2 x 10 ea & ext red 2 x 10 ea & ext red 2 x 10 ea & ext red 2 x 10 ea & ext red 3 x 10 ea & ext red 3 x 10 ea & ext red 3 x 10 ea   Side step with band at rail        Red x6 Red x6    LAQ SAQ 2 x10 LAQ w adductor set 1# x 20  LAQ w  2# x 20  LAQ 2# x20 ea LAQ 2# x20 ea 3# 2 x 10 ea 3# 2 x 10 ea 3# 2 x 10 ea 3# 2 x 10 ea 3# 3 x 10 ea   Adductor set 3"x20 3"x20 3"x20          SLR x10 ea 2x10 ea  2x10 2# 2 x 10 ea 2# 2 x 10 ea  3# 2 x 10 ea 3# 2 x 10 ea 3# 2 x 10 ea   SAQ   1# 3 x 10           Table flexion stretch    10"x10         Heel slides       x20       Ther Activity                                       Gait Training                                       Modalities

## 2020-08-05 ENCOUNTER — HOSPITAL ENCOUNTER (OUTPATIENT)
Dept: SLEEP CENTER | Facility: CLINIC | Age: 80
Discharge: HOME | End: 2020-08-05
Payer: MEDICARE

## 2020-08-05 DIAGNOSIS — G47.39 OTHER SLEEP APNEA: ICD-10-CM

## 2020-08-05 PROCEDURE — 95810 POLYSOM 6/> YRS 4/> PARAM: CPT

## 2020-08-05 PROCEDURE — 95810 POLYSOM 6/> YRS 4/> PARAM: CPT | Performed by: INTERNAL MEDICINE

## 2020-08-06 NOTE — PROGRESS NOTES
Sleep Study Documentation    Pre-Sleep Study       Sleep testing procedure explained to patient:YES    Patient napped prior to study:NO    Caffeine:Dayshift worker after 12PM   Caffeine use:NO    Alcohol:Dayshift workers after 5PM: Alcohol use:NO    Typical day for patient:YES       Study Documentation    Sleep Study Indications: snoring, nocturnal choking, excessive daytime sleepiness, BMI>30, unrefreshing sleep, impaired concentration/memory    Sleep Study: Diagnostic   Snore:None  Supplemental O2: no    O2 flow rate (L/min) range n/a  O2 flow rate (L/min) final n/a  Minimum SaO2 89%  Baseline SaO2 98%        Mode of Therapy:n/a    EKG abnormalities: yes:  EPOCH example and comments: tachycardia epoch [id number]    EEG abnormalities: no    Sleep Study Recorded < 2 hours: N/A    Sleep Study Recorded > 2 hours but incomplete study: yes Unable to sleep and Patient choose to leave    Sleep Study Recorded 6 hours but no sleep obtained: NO    Patient classification: retired       Post-Sleep Study    Medication used at bedtime or during sleep study:YES other prescription medications    Patient reports time it took to fall asleep:greater than 60 minutes    Patient reports waking up during study:n/a    Patient reports sleeping less than 2 hours without dreaming  Patient reports sleep during study:n/a    Patient rated sleepiness: not sleepy or tired    PAP treatment:no

## 2020-08-07 NOTE — PROGRESS NOTES
PT DISCHARGE:     Patient cancelled her remaining appointments stating she "has the tools to continue on her own"  Spoke with patient on 8/7/20; she denies having any questions and states she is independent in HEP  She states she has continued with exercises and is pleased with her progress in therapy  She is instructed to call clinic with questions or concerns  Unable to perform formal d/c evaluation due to self discharge

## 2020-08-14 ENCOUNTER — HOSPITAL ENCOUNTER (OUTPATIENT)
Dept: CT IMAGING | Facility: HOSPITAL | Age: 80
Discharge: HOME | End: 2020-08-14
Payer: MEDICARE

## 2020-08-14 DIAGNOSIS — R91.8 PULMONARY NODULES: ICD-10-CM

## 2020-08-14 DIAGNOSIS — Z85.3 HISTORY OF BREAST CANCER: ICD-10-CM

## 2020-08-14 PROCEDURE — 71250 CT THORAX DX C-: CPT

## 2020-08-18 DIAGNOSIS — R93.89 ABNORMAL CT OF THE CHEST: Primary | ICD-10-CM

## 2020-10-15 ENCOUNTER — TRANSCRIBE ORDERS (OUTPATIENT)
Dept: ADMINISTRATIVE | Facility: HOSPITAL | Age: 80
End: 2020-10-15

## 2020-10-15 DIAGNOSIS — H54.7 VISION LOSS: Primary | ICD-10-CM

## 2020-10-20 ENCOUNTER — HOSPITAL ENCOUNTER (OUTPATIENT)
Dept: VASCULAR ULTRASOUND | Facility: HOSPITAL | Age: 80
Discharge: HOME | End: 2020-10-20
Payer: MEDICARE

## 2020-10-20 DIAGNOSIS — H54.7 VISION LOSS: ICD-10-CM

## 2020-10-20 PROCEDURE — 93880 EXTRACRANIAL BILAT STUDY: CPT | Performed by: SURGERY

## 2020-10-20 PROCEDURE — 93880 EXTRACRANIAL BILAT STUDY: CPT

## 2020-10-30 ENCOUNTER — HOSPITAL ENCOUNTER (OUTPATIENT)
Dept: MRI IMAGING | Facility: HOSPITAL | Age: 80
Discharge: HOME | End: 2020-10-30
Payer: MEDICARE

## 2020-10-30 DIAGNOSIS — H54.7 VISION LOSS: ICD-10-CM

## 2020-10-30 PROCEDURE — G1004 CDSM NDSC: HCPCS

## 2020-10-30 PROCEDURE — 70551 MRI BRAIN STEM W/O DYE: CPT

## 2020-12-04 ENCOUNTER — HOSPITAL ENCOUNTER (OUTPATIENT)
Dept: CT IMAGING | Facility: HOSPITAL | Age: 80
Discharge: HOME | End: 2020-12-04
Payer: MEDICARE

## 2020-12-04 DIAGNOSIS — R93.89 ABNORMAL CT OF THE CHEST: ICD-10-CM

## 2020-12-04 PROCEDURE — 71250 CT THORAX DX C-: CPT

## 2020-12-04 PROCEDURE — G1004 CDSM NDSC: HCPCS

## 2020-12-10 ENCOUNTER — OFFICE VISIT (OUTPATIENT)
Dept: PULMONOLOGY | Facility: CLINIC | Age: 80
End: 2020-12-10
Payer: MEDICARE

## 2020-12-10 VITALS
DIASTOLIC BLOOD PRESSURE: 80 MMHG | WEIGHT: 219 LBS | HEART RATE: 70 BPM | BODY MASS INDEX: 36.49 KG/M2 | OXYGEN SATURATION: 96 % | SYSTOLIC BLOOD PRESSURE: 124 MMHG | TEMPERATURE: 97.7 F | HEIGHT: 65 IN

## 2020-12-10 DIAGNOSIS — G47.33 OSA (OBSTRUCTIVE SLEEP APNEA): ICD-10-CM

## 2020-12-10 DIAGNOSIS — Z85.3 HISTORY OF BREAST CANCER: ICD-10-CM

## 2020-12-10 DIAGNOSIS — R91.8 MULTIPLE LUNG NODULES ON CT: Primary | ICD-10-CM

## 2020-12-10 PROCEDURE — 99214 OFFICE O/P EST MOD 30 MIN: CPT | Performed by: PHYSICIAN ASSISTANT

## 2020-12-10 RX ORDER — ZOLPIDEM TARTRATE 5 MG/1
5 TABLET ORAL ONCE
Qty: 1 TABLET | Refills: 0 | Status: SHIPPED | OUTPATIENT
Start: 2020-12-10 | End: 2021-03-02 | Stop reason: HOSPADM

## 2020-12-30 ENCOUNTER — TELEPHONE (OUTPATIENT)
Dept: PULMONOLOGY | Facility: CLINIC | Age: 80
End: 2020-12-30

## 2020-12-30 ENCOUNTER — LAB (OUTPATIENT)
Dept: LAB | Facility: HOSPITAL | Age: 80
End: 2020-12-30
Payer: MEDICARE

## 2020-12-30 DIAGNOSIS — R91.8 MULTIPLE LUNG NODULES ON CT: ICD-10-CM

## 2020-12-30 PROCEDURE — 86602 ANTINOMYCES ANTIBODY: CPT

## 2020-12-30 PROCEDURE — 86606 ASPERGILLUS ANTIBODY: CPT

## 2020-12-30 PROCEDURE — 86671 FUNGUS NES ANTIBODY: CPT

## 2020-12-30 PROCEDURE — 86331 IMMUNODIFFUSION OUCHTERLONY: CPT

## 2020-12-30 PROCEDURE — 36415 COLL VENOUS BLD VENIPUNCTURE: CPT

## 2020-12-30 PROCEDURE — 86255 FLUORESCENT ANTIBODY SCREEN: CPT

## 2021-01-04 LAB
C-ANCA TITR SER IF: NORMAL TITER
MYELOPEROXIDASE AB SER IA-ACNC: <9 U/ML (ref 0–9)
P-ANCA ATYPICAL TITR SER IF: NORMAL TITER
P-ANCA TITR SER IF: NORMAL TITER
PROTEINASE3 AB SER IA-ACNC: <3.5 U/ML (ref 0–3.5)

## 2021-01-07 LAB
A FUMIGATUS1 AB SER QL ID: NEGATIVE
A PULLULANS AB SER QL: NEGATIVE
LACEYELLA SACCHARI AB SER QL: NEGATIVE
PIGEON SERUM AB QL ID: NEGATIVE
S RECTIVIRGULA AB SER QL ID: NEGATIVE
T VULGARIS AB SER QL ID: NEGATIVE

## 2021-02-12 DIAGNOSIS — Z23 ENCOUNTER FOR IMMUNIZATION: ICD-10-CM

## 2021-02-23 ENCOUNTER — HOSPITAL ENCOUNTER (OUTPATIENT)
Dept: RADIOLOGY | Facility: HOSPITAL | Age: 81
Discharge: HOME | End: 2021-02-23
Payer: MEDICARE

## 2021-02-23 ENCOUNTER — TRANSCRIBE ORDERS (OUTPATIENT)
Dept: ADMINISTRATIVE | Facility: HOSPITAL | Age: 81
End: 2021-02-23

## 2021-02-23 DIAGNOSIS — M54.40 LOW BACK PAIN WITH SCIATICA, SCIATICA LATERALITY UNSPECIFIED, UNSPECIFIED BACK PAIN LATERALITY, UNSPECIFIED CHRONICITY: ICD-10-CM

## 2021-02-23 DIAGNOSIS — M25.552 LEFT HIP PAIN: ICD-10-CM

## 2021-02-23 DIAGNOSIS — M54.40 LOW BACK PAIN WITH SCIATICA, SCIATICA LATERALITY UNSPECIFIED, UNSPECIFIED BACK PAIN LATERALITY, UNSPECIFIED CHRONICITY: Primary | ICD-10-CM

## 2021-02-23 PROCEDURE — 72110 X-RAY EXAM L-2 SPINE 4/>VWS: CPT

## 2021-02-23 PROCEDURE — 73502 X-RAY EXAM HIP UNI 2-3 VIEWS: CPT

## 2021-02-28 ENCOUNTER — HOSPITAL ENCOUNTER (INPATIENT)
Facility: HOSPITAL | Age: 81
LOS: 2 days | Discharge: HOME HEALTH | DRG: 482 | End: 2021-03-02
Attending: EMERGENCY MEDICINE | Admitting: SURGERY
Payer: MEDICARE

## 2021-02-28 ENCOUNTER — APPOINTMENT (EMERGENCY)
Dept: RADIOLOGY | Facility: HOSPITAL | Age: 81
DRG: 482 | End: 2021-02-28
Payer: MEDICARE

## 2021-02-28 ENCOUNTER — APPOINTMENT (EMERGENCY)
Dept: CT IMAGING | Facility: HOSPITAL | Age: 81
DRG: 482 | End: 2021-02-28
Payer: MEDICARE

## 2021-02-28 DIAGNOSIS — I10 ESSENTIAL HYPERTENSION: ICD-10-CM

## 2021-02-28 DIAGNOSIS — E03.9 HYPOTHYROIDISM IN ADULT: ICD-10-CM

## 2021-02-28 DIAGNOSIS — R00.0 RAPID HEART BEAT: ICD-10-CM

## 2021-02-28 DIAGNOSIS — S72.002A CLOSED FRACTURE OF NECK OF LEFT FEMUR, INITIAL ENCOUNTER (HCC): Primary | ICD-10-CM

## 2021-02-28 PROBLEM — S72.009A FEMORAL NECK FRACTURE (HCC): Status: ACTIVE | Noted: 2021-02-28

## 2021-02-28 PROBLEM — Z01.818 PREOPERATIVE CLEARANCE: Status: ACTIVE | Noted: 2021-02-28

## 2021-02-28 PROBLEM — Z85.3 HISTORY OF BREAST CANCER: Status: ACTIVE | Noted: 2021-02-28

## 2021-02-28 PROBLEM — M25.562 LEFT KNEE PAIN: Status: ACTIVE | Noted: 2021-02-28

## 2021-02-28 LAB
ABO GROUP BLD: NORMAL
ABO GROUP BLD: NORMAL
ANION GAP SERPL CALCULATED.3IONS-SCNC: 9 MMOL/L (ref 4–13)
ATRIAL RATE: 62 BPM
BASOPHILS # BLD AUTO: 0.03 THOUSANDS/ΜL (ref 0–0.1)
BASOPHILS NFR BLD AUTO: 1 % (ref 0–1)
BLD GP AB SCN SERPL QL: NEGATIVE
BUN SERPL-MCNC: 16 MG/DL (ref 5–25)
CALCIUM SERPL-MCNC: 8.9 MG/DL (ref 8.3–10.1)
CHLORIDE SERPL-SCNC: 105 MMOL/L (ref 100–108)
CO2 SERPL-SCNC: 25 MMOL/L (ref 21–32)
CREAT SERPL-MCNC: 0.88 MG/DL (ref 0.6–1.3)
EOSINOPHIL # BLD AUTO: 0.12 THOUSAND/ΜL (ref 0–0.61)
EOSINOPHIL NFR BLD AUTO: 2 % (ref 0–6)
ERYTHROCYTE [DISTWIDTH] IN BLOOD BY AUTOMATED COUNT: 15.1 % (ref 11.6–15.1)
GFR SERPL CREATININE-BSD FRML MDRD: 63 ML/MIN/1.73SQ M
GLUCOSE SERPL-MCNC: 147 MG/DL (ref 65–140)
HCT VFR BLD AUTO: 35.6 % (ref 34.8–46.1)
HGB BLD-MCNC: 11.1 G/DL (ref 11.5–15.4)
IMM GRANULOCYTES # BLD AUTO: 0.02 THOUSAND/UL (ref 0–0.2)
IMM GRANULOCYTES NFR BLD AUTO: 0 % (ref 0–2)
LYMPHOCYTES # BLD AUTO: 1.54 THOUSANDS/ΜL (ref 0.6–4.47)
LYMPHOCYTES NFR BLD AUTO: 24 % (ref 14–44)
MCH RBC QN AUTO: 29.8 PG (ref 26.8–34.3)
MCHC RBC AUTO-ENTMCNC: 31.2 G/DL (ref 31.4–37.4)
MCV RBC AUTO: 95 FL (ref 82–98)
MONOCYTES # BLD AUTO: 0.52 THOUSAND/ΜL (ref 0.17–1.22)
MONOCYTES NFR BLD AUTO: 8 % (ref 4–12)
NEUTROPHILS # BLD AUTO: 4.27 THOUSANDS/ΜL (ref 1.85–7.62)
NEUTS SEG NFR BLD AUTO: 65 % (ref 43–75)
NRBC BLD AUTO-RTO: 0 /100 WBCS
P AXIS: 93 DEGREES
PLATELET # BLD AUTO: 198 THOUSANDS/UL (ref 149–390)
PMV BLD AUTO: 10.7 FL (ref 8.9–12.7)
POTASSIUM SERPL-SCNC: 4.5 MMOL/L (ref 3.5–5.3)
PR INTERVAL: 232 MS
QRS AXIS: -42 DEGREES
QRSD INTERVAL: 124 MS
QT INTERVAL: 416 MS
QTC INTERVAL: 413 MS
RBC # BLD AUTO: 3.73 MILLION/UL (ref 3.81–5.12)
RH BLD: NEGATIVE
RH BLD: NEGATIVE
SODIUM SERPL-SCNC: 139 MMOL/L (ref 136–145)
SPECIMEN EXPIRATION DATE: NORMAL
T WAVE AXIS: 46 DEGREES
VENTRICULAR RATE: 59 BPM
WBC # BLD AUTO: 6.5 THOUSAND/UL (ref 4.31–10.16)

## 2021-02-28 PROCEDURE — 86850 RBC ANTIBODY SCREEN: CPT | Performed by: EMERGENCY MEDICINE

## 2021-02-28 PROCEDURE — 99222 1ST HOSP IP/OBS MODERATE 55: CPT | Performed by: SURGERY

## 2021-02-28 PROCEDURE — 80048 BASIC METABOLIC PNL TOTAL CA: CPT | Performed by: PHYSICIAN ASSISTANT

## 2021-02-28 PROCEDURE — 71045 X-RAY EXAM CHEST 1 VIEW: CPT

## 2021-02-28 PROCEDURE — 99285 EMERGENCY DEPT VISIT HI MDM: CPT

## 2021-02-28 PROCEDURE — 93010 ELECTROCARDIOGRAM REPORT: CPT | Performed by: INTERNAL MEDICINE

## 2021-02-28 PROCEDURE — 36415 COLL VENOUS BLD VENIPUNCTURE: CPT | Performed by: PHYSICIAN ASSISTANT

## 2021-02-28 PROCEDURE — 85025 COMPLETE CBC W/AUTO DIFF WBC: CPT | Performed by: PHYSICIAN ASSISTANT

## 2021-02-28 PROCEDURE — 99222 1ST HOSP IP/OBS MODERATE 55: CPT | Performed by: INTERNAL MEDICINE

## 2021-02-28 PROCEDURE — 86900 BLOOD TYPING SEROLOGIC ABO: CPT | Performed by: EMERGENCY MEDICINE

## 2021-02-28 PROCEDURE — 93005 ELECTROCARDIOGRAM TRACING: CPT

## 2021-02-28 PROCEDURE — 99285 EMERGENCY DEPT VISIT HI MDM: CPT | Performed by: EMERGENCY MEDICINE

## 2021-02-28 PROCEDURE — 73700 CT LOWER EXTREMITY W/O DYE: CPT

## 2021-02-28 PROCEDURE — 86901 BLOOD TYPING SEROLOGIC RH(D): CPT | Performed by: EMERGENCY MEDICINE

## 2021-02-28 PROCEDURE — 73564 X-RAY EXAM KNEE 4 OR MORE: CPT

## 2021-02-28 RX ORDER — ONDANSETRON 2 MG/ML
4 INJECTION INTRAMUSCULAR; INTRAVENOUS EVERY 4 HOURS PRN
Status: DISCONTINUED | OUTPATIENT
Start: 2021-02-28 | End: 2021-03-02 | Stop reason: HOSPADM

## 2021-02-28 RX ORDER — HYDROMORPHONE HCL/PF 1 MG/ML
0.2 SYRINGE (ML) INJECTION EVERY 4 HOURS PRN
Status: DISCONTINUED | OUTPATIENT
Start: 2021-02-28 | End: 2021-03-02 | Stop reason: HOSPADM

## 2021-02-28 RX ORDER — PANTOPRAZOLE SODIUM 40 MG/1
40 INJECTION, POWDER, FOR SOLUTION INTRAVENOUS
Status: DISCONTINUED | OUTPATIENT
Start: 2021-03-01 | End: 2021-03-02

## 2021-02-28 RX ORDER — ACETAMINOPHEN 325 MG/1
975 TABLET ORAL EVERY 8 HOURS SCHEDULED
Status: DISCONTINUED | OUTPATIENT
Start: 2021-02-28 | End: 2021-03-02 | Stop reason: HOSPADM

## 2021-02-28 RX ORDER — LEVOTHYROXINE SODIUM 112 UG/1
112 TABLET ORAL
Status: DISCONTINUED | OUTPATIENT
Start: 2021-03-01 | End: 2021-03-02 | Stop reason: HOSPADM

## 2021-02-28 RX ORDER — OXYCODONE HYDROCHLORIDE 5 MG/1
2.5 TABLET ORAL EVERY 4 HOURS PRN
Status: DISCONTINUED | OUTPATIENT
Start: 2021-02-28 | End: 2021-03-02 | Stop reason: HOSPADM

## 2021-02-28 RX ORDER — HEPARIN SODIUM 5000 [USP'U]/ML
5000 INJECTION, SOLUTION INTRAVENOUS; SUBCUTANEOUS EVERY 8 HOURS SCHEDULED
Status: COMPLETED | OUTPATIENT
Start: 2021-02-28 | End: 2021-02-28

## 2021-02-28 RX ORDER — HYDRALAZINE HYDROCHLORIDE 20 MG/ML
10 INJECTION INTRAMUSCULAR; INTRAVENOUS ONCE
Status: COMPLETED | OUTPATIENT
Start: 2021-02-28 | End: 2021-02-28

## 2021-02-28 RX ORDER — DOCUSATE SODIUM 100 MG/1
100 CAPSULE, LIQUID FILLED ORAL 2 TIMES DAILY
Status: DISCONTINUED | OUTPATIENT
Start: 2021-02-28 | End: 2021-03-02 | Stop reason: HOSPADM

## 2021-02-28 RX ORDER — ANASTROZOLE 1 MG/1
1 TABLET ORAL DAILY
Status: DISCONTINUED | OUTPATIENT
Start: 2021-03-01 | End: 2021-03-02 | Stop reason: HOSPADM

## 2021-02-28 RX ORDER — HYDRALAZINE HYDROCHLORIDE 20 MG/ML
5 INJECTION INTRAMUSCULAR; INTRAVENOUS EVERY 6 HOURS PRN
Status: DISCONTINUED | OUTPATIENT
Start: 2021-02-28 | End: 2021-03-02 | Stop reason: HOSPADM

## 2021-02-28 RX ORDER — LEVOTHYROXINE SODIUM 0.03 MG/1
25 TABLET ORAL
Status: DISCONTINUED | OUTPATIENT
Start: 2021-03-01 | End: 2021-03-02 | Stop reason: HOSPADM

## 2021-02-28 RX ORDER — B-COMPLEX WITH VITAMIN C
1 TABLET ORAL
Status: DISCONTINUED | OUTPATIENT
Start: 2021-03-01 | End: 2021-03-02 | Stop reason: HOSPADM

## 2021-02-28 RX ORDER — OXYCODONE HYDROCHLORIDE 5 MG/1
5 TABLET ORAL EVERY 4 HOURS PRN
Status: DISCONTINUED | OUTPATIENT
Start: 2021-02-28 | End: 2021-03-02 | Stop reason: HOSPADM

## 2021-02-28 RX ADMIN — HEPARIN SODIUM 5000 UNITS: 5000 INJECTION INTRAVENOUS; SUBCUTANEOUS at 21:39

## 2021-02-28 RX ADMIN — HYDRALAZINE HYDROCHLORIDE 10 MG: 20 INJECTION INTRAMUSCULAR; INTRAVENOUS at 20:03

## 2021-02-28 RX ADMIN — ACETAMINOPHEN 975 MG: 325 TABLET, FILM COATED ORAL at 17:49

## 2021-02-28 RX ADMIN — ACETAMINOPHEN 975 MG: 325 TABLET, FILM COATED ORAL at 21:39

## 2021-02-28 RX ADMIN — DOCUSATE SODIUM 100 MG: 100 CAPSULE, LIQUID FILLED ORAL at 17:49

## 2021-02-28 NOTE — ED PROVIDER NOTES
History  Chief Complaint   Patient presents with    Hip Injury     Left hip pain, reports falling 3 weeks ago, having xray Tuesday, told today by FMD to go to the hospital because she has a broken hip  Patient ambulated into triage with walker     SB is a 68year old female with a PMH of Breast cancer, HTN, GERD and Hypothyroidism who presents to the emergency department after a fall approximately 3 weeks prior  The patient states that while shoveling snow 3 weeks prior she slipped and fell on the ice landing on asphalt  She states that she did not hit her head, did not lose consciousness, and she denies any neck or back pain  The patient states that she had left hip pain after the incident but the pain was well controlled with ibuprofen  Due to the pain remaining her PCP obtained an x-ray of the pelvis on 2/23 revealing a fracture of the left femoral neck and was told to come to the ED today  The patient now complains of only mild pain of the left hip and states that she has been ambulating on the hip since the incident  The patient denies any other injuries and states she has no other pain then in the left hip  She denies any dizziness, headache, fevers, chills, chest pain, SOB, palpitations, abdominal pain, nausea, vomiting, diarrhea or right lower extremity pain  She admits to daily anastrozole, levothyroxine, lopressor, metoprolol, omperazole, and verapamil  NKDA         History provided by:  Patient and relative   used: No    Hip Pain  Location:  Left hip  Quality:  Aching  Severity:  Moderate  Onset quality:  Gradual  Duration:  3 weeks  Timing:  Constant  Progression:  Improving  Chronicity:  New  Context:  After fall  Relieved by:  Ibuprofen   Worsened by:  Movment  Associated symptoms: no abdominal pain, no chest pain, no congestion, no cough, no diarrhea, no ear pain, no fatigue, no fever, no headaches, no loss of consciousness, no myalgias, no nausea, no rash, no rhinorrhea, no shortness of breath, no sore throat, no vomiting and no wheezing        Prior to Admission Medications   Prescriptions Last Dose Informant Patient Reported? Taking? Calcium Carbonate-Vitamin D (CALTRATE 600+D PO)  Self Yes Yes   Sig: Take 1 tablet by mouth daily   MULTIPLE VITAMIN PO   Yes Yes   Sig: Take 1 tablet by mouth daily   Red Yeast Rice Extract (RED YEAST RICE PO)   Yes Yes   Sig: Take 1 tablet by mouth daily   acetaminophen (TYLENOL) 500 mg tablet Not Taking at Unknown time Self Yes No   Sig: Take 650 mg by mouth every 6 (six) hours as needed for mild pain    anastrozole (ARIMIDEX) 1 mg tablet  Self Yes Yes   Sig: Take 1 mg by mouth daily   ibuprofen (MOTRIN) 800 mg tablet  Self Yes Yes   Sig: Take 800 mg by mouth every 6 (six) hours as needed for mild pain   levothyroxine 100 mcg tablet  Self Yes Yes   Sig: Take 137 mcg by mouth daily    metoprolol tartrate (LOPRESSOR) 100 mg tablet  Self Yes Yes   Sig: Take 50 mg by mouth 2 (two) times a day    omeprazole (PriLOSEC) 40 MG capsule  Self Yes Yes   verapamil (CALAN-SR) 180 mg CR tablet  Self Yes Yes   Sig: Take 180 mg by mouth daily    zolpidem (AMBIEN) 5 mg tablet   No No   Sig: Take 1 tablet (5 mg total) by mouth once for 1 dose Prior to sleep study      Facility-Administered Medications: None       Past Medical History:   Diagnosis Date    Breast cancer ([address] )     Disease of thyroid gland     hypothyroidism    GERD (gastroesophageal reflux disease)     Hypertension     Rapid heart rate        Past Surgical History:   Procedure Laterality Date    BREAST SURGERY Left     lumpectomy     CHOLECYSTECTOMY      [place] COLONOSCOPY FLX DX W/COLLJ SPEC WHEN PFRMD N/A 6/26/2017    Procedure: COLONOSCOPY;  Surgeon: Dolly Tesfaye MD;  Location: [facility];   Service: Gastroenterology       Family History   Problem Relation Age of Onset    Prostate cancer Father     Breast cancer Sister     Sleep apnea Son      I have reviewed and agree with the history as documented  E-Cigarette/Vaping    E-Cigarette Use Never User      E-Cigarette/Vaping Substances     Social History     Tobacco Use    Smoking status: Never Smoker    Smokeless tobacco: Never Used   Substance Use Topics    Alcohol use: Never     Frequency: Never    Drug use: Never       Review of Systems   Constitutional: Negative for activity change, appetite change, fatigue and fever  HENT: Negative for congestion, ear pain, rhinorrhea and sore throat  Respiratory: Negative for apnea, cough, chest tightness, shortness of breath and wheezing  Cardiovascular: Positive for leg swelling  Negative for chest pain and palpitations  Gastrointestinal: Negative for abdominal pain, diarrhea, nausea and vomiting  Genitourinary: Negative for dysuria, frequency and urgency  Musculoskeletal: Positive for arthralgias and gait problem  Negative for back pain, joint swelling, myalgias, neck pain and neck stiffness  Skin: Negative for color change, pallor, rash and wound  Neurological: Negative for tremors, loss of consciousness, syncope, weakness, light-headedness and headaches  Hematological: Negative for adenopathy  Does not bruise/bleed easily  Psychiatric/Behavioral: Negative for agitation and behavioral problems  All other systems reviewed and are negative  Physical Exam  Physical Exam  Vitals signs and nursing note reviewed  Constitutional:       General: She is not in acute distress  Appearance: Normal appearance  She is normal weight  She is not ill-appearing or toxic-appearing  HENT:      Head: Normocephalic and atraumatic  Mouth/Throat:      Mouth: Mucous membranes are moist       Pharynx: Oropharynx is clear  Eyes:      Extraocular Movements: Extraocular movements intact  Pupils: Pupils are equal, round, and reactive to light  Neck:      Musculoskeletal: Normal range of motion and neck supple  No neck rigidity or muscular tenderness     Cardiovascular:      Rate and Rhythm: Normal rate and regular rhythm  Pulses: Normal pulses  Heart sounds: Normal heart sounds  No murmur  No friction rub  No gallop  Pulmonary:      Effort: Pulmonary effort is normal  No respiratory distress  Breath sounds: Normal breath sounds  No stridor  No wheezing, rhonchi or rales  Abdominal:      General: Abdomen is flat  Bowel sounds are normal  There is no distension  Palpations: Abdomen is soft  There is no mass  Tenderness: There is no abdominal tenderness  Hernia: No hernia is present  Musculoskeletal:         General: Tenderness and signs of injury present  No swelling or deformity  Right hip: Normal       Left hip: She exhibits decreased range of motion, tenderness and bony tenderness  She exhibits normal strength, no swelling, no crepitus, no deformity and no laceration  Left knee: Normal       Left ankle: Normal         Legs:    Skin:     General: Skin is warm and dry  Capillary Refill: Capillary refill takes less than 2 seconds  Coloration: Skin is not jaundiced or pale  Findings: No bruising or erythema  Neurological:      General: No focal deficit present  Mental Status: She is alert and oriented to person, place, and time  Mental status is at baseline  Cranial Nerves: No cranial nerve deficit  Sensory: No sensory deficit  Motor: No weakness        Coordination: Coordination normal    Psychiatric:         Mood and Affect: Mood normal          Behavior: Behavior normal          Vital Signs  ED Triage Vitals   Temperature Pulse Respirations Blood Pressure SpO2   02/28/21 1501 02/28/21 1501 02/28/21 1501 02/28/21 1503 02/28/21 1501   98 3 °F (36 8 °C) 67 18 146/88 95 %      Temp Source Heart Rate Source Patient Position - Orthostatic VS BP Location FiO2 (%)   02/28/21 1501 02/28/21 1501 -- 02/28/21 1501 --   Oral Monitor  Right arm       Pain Score       02/28/21 1501       8           Vitals:    02/28/21 1501 02/28/21 1503 02/28/21 1700   BP:  146/88 (!) 193/81   Pulse: 67  60         Visual Acuity  Visual Acuity      Most Recent Value   L Pupil Size (mm)  2   R Pupil Size (mm)  2          ED Medications  Medications   docusate sodium (COLACE) capsule 100 mg (100 mg Oral Given 2/28/21 1749)   acetaminophen (TYLENOL) tablet 975 mg (975 mg Oral Given 2/28/21 1749)   oxyCODONE (ROXICODONE) IR tablet 2 5 mg (has no administration in time range)   oxyCODONE (ROXICODONE) IR tablet 5 mg (has no administration in time range)   HYDROmorphone (DILAUDID) injection 0 2 mg (has no administration in time range)   naloxone (NARCAN) 0 04 mg/mL syringe 0 04 mg (has no administration in time range)   ondansetron (ZOFRAN) injection 4 mg (has no administration in time range)   heparin (porcine) subcutaneous injection 5,000 Units (has no administration in time range)   anastrozole (ARIMIDEX) tablet 1 mg (has no administration in time range)   calcium carbonate-vitamin D (OSCAL-D) 500 mg-200 units per tablet 1 tablet (has no administration in time range)   levothyroxine tablet 112 mcg (has no administration in time range)   levothyroxine tablet 25 mcg (has no administration in time range)   pantoprazole (PROTONIX) injection 40 mg (has no administration in time range)   verapamil (CALAN-SR) CR tablet 180 mg (has no administration in time range)   hydrALAZINE (APRESOLINE) injection 5 mg (has no administration in time range)   hydrALAZINE (APRESOLINE) injection 10 mg (has no administration in time range)       Diagnostic Studies  Results Reviewed     Procedure Component Value Units Date/Time    Basic metabolic panel [[id number]]  (Abnormal) Collected: 02/28/21 1557    Lab Status: Final result Specimen: Blood from Arm, Right Updated: 02/28/21 1612     Sodium 139 mmol/L      Potassium 4 5 mmol/L      Chloride 105 mmol/L      CO2 25 mmol/L      ANION GAP 9 mmol/L      BUN 16 mg/dL      Creatinine 0 88 mg/dL      Glucose 147 mg/dL      Calcium 8 9 mg/dL      eGFR 63 ml/min/1 73sq m     Narrative:      [place] guidelines for Chronic Kidney Disease (CKD):     Stage 1 with normal or high GFR (GFR > 90 mL/min/1 73 square meters)    Stage 2 Mild CKD (GFR = 60-89 mL/min/1 73 square meters)    Stage 3A Moderate CKD (GFR = 45-59 mL/min/1 73 square meters)    Stage 3B Moderate CKD (GFR = 30-44 mL/min/1 73 square meters)    Stage 4 Severe CKD (GFR = 15-29 mL/min/1 73 square meters)    Stage 5 End Stage CKD (GFR <15 mL/min/1 73 square meters)  Note: GFR calculation is accurate only with a steady state creatinine    CBC and differential [[id number]]  (Abnormal) Collected: 02/28/21 1558    Lab Status: Final result Specimen: Blood from Arm, Right Updated: 02/28/21 1600     WBC 6 50 Thousand/uL      RBC 3 73 Million/uL      Hemoglobin 11 1 g/dL      Hematocrit 35 6 %      MCV 95 fL      MCH 29 8 pg      MCHC 31 2 g/dL      RDW 15 1 %      MPV 10 7 fL      Platelets 094 Thousands/uL      nRBC 0 /100 WBCs      Neutrophils Relative 65 %      Immat GRANS % 0 %      Lymphocytes Relative 24 %      Monocytes Relative 8 %      Eosinophils Relative 2 %      Basophils Relative 1 %      Neutrophils Absolute 4 27 Thousands/µL      Immature Grans Absolute 0 02 Thousand/uL      Lymphocytes Absolute 1 54 Thousands/µL      Monocytes Absolute 0 52 Thousand/µL      Eosinophils Absolute 0 12 Thousand/µL      Basophils Absolute 0 03 Thousands/µL                  CT lower extremity wo contrast left   Final Result by Svetlana Kasper MD (02/28 2351)      Acute slightly impacted subcapital left femoral neck fracture  This examination was marked "immediate notification" in Epic in order to begin the standard process by which the radiology reading room liaison alerts the referring practitioner              Workstation performed: [id number]         XR chest portable   ED Interpretation by Eli Block MD (02/28 3769)   NAD       by Isai Salgado (02/28 1601)      XR knee 4+ vw left injury   ED Interpretation by Lizabeth Norwood MD (02/28 2295)   NO FX                  Procedures  ECG 12 Lead Documentation Only    Date/Time: 2/28/2021 5:17 PM  Performed by: Mary Albert PA-C  Authorized by: Mary Albert PA-C     Indications / Diagnosis:  Pre Op  ECG reviewed by me, the ED Provider: yes    Patient location:  ED  Previous ECG:     Previous ECG:  Unavailable  Interpretation:     Interpretation: abnormal    Rate:     ECG rate:  59    ECG rate assessment: normal    Rhythm:     Rhythm: sinus rhythm and sinus bradycardia    Ectopy:     Ectopy: none    QRS:     QRS axis:  Left    QRS intervals: Wide  Conduction:     Conduction: abnormal      Abnormal conduction: 1st degree    T waves:     T waves: flattening      Flattening:  V1  Q waves:     Q waves:  V1 and V2             ED Course  ED Course as of Feb 28 1916   Sun Feb 28, 2021   1537 Patient seen and Evaluated  X-rays from 2/23 reviewed, appears to have left femoral neck fracture  Will contact trauma, pre-op clearance labs/ekg placed  [address] [facility] contacted, will come see patient  [phone] Patient updated on findings of x-ray  1611 Trauma bedside        1659 CT of the lower extremity ordered by Trauma: Acute slightly impacted subcapital left femoral neck fracture  MDM  Number of Diagnoses or Management Options  Closed fracture of neck of left femur, initial encounter [facility]): new and requires workup  Diagnosis management comments: Patient was seen and examined by me and Dr Rich Kc in the emergency department  The patient presented with left hip pain after a fall approximately three weeks prior  Patient did not strike head, no neck or back pain, no LOC  Patient had x- ray of the hip performed on 2/23 ordered by PCP revealing left hip fracture  Patient was told to come to ED today  Evaluation:Patient in NAD, mild pain of the left hip  FROM   Will start pre-op clearance with CBC, BMP, type and cross, EKG, CXR  Trauma contacted for admission  Workup: Contacted trauma who ordered CT of the lower extremity revealing acute slightly impacted subcapital left femoral neck fracture  [place] 2891 admission  Amount and/or Complexity of Data Reviewed  Clinical lab tests: ordered and reviewed  Tests in the radiology section of CPT®: ordered and reviewed  Tests in the medicine section of CPT®: ordered and reviewed  Obtain history from someone other than the patient: yes  Review and summarize past medical records: yes  Discuss the patient with other providers: yes  Independent visualization of images, tracings, or specimens: yes    Risk of Complications, Morbidity, and/or Mortality  Presenting problems: moderate  Diagnostic procedures: moderate  Management options: moderate    Patient Progress  Patient progress: stable      Disposition  Final diagnoses:   Closed fracture of neck of left femur, initial encounter ([facility] )     Time reflects when diagnosis was documented in both MDM as applicable and the Disposition within this note     Time User Action Codes Description Comment    2/28/2021  5:05 PM Mauro Field Add [[id number]] Closed fracture of neck of left femur, initial encounter [facility])       ED Disposition     ED Disposition Condition Date/Time Comment    Admit Stable Sun Feb 28, 2021  7:01 PM Case was discussed with trauma and the patient's admission status was agreed to be Admission Status: inpatient status to the service of Dr Juan Antonio Florentino   Follow-up Information    None         Patient's Medications   Discharge Prescriptions    No medications on file     No discharge procedures on file      PDMP Review       Value Time User    PDMP Reviewed  Yes 12/10/2020  4:19 PM Brooks Jose          ED Provider  Electronically Signed by           Naga Tripp PA-C  02/28/21 1916

## 2021-02-28 NOTE — ASSESSMENT & PLAN NOTE
CXR negative for acute cardiopulmonary disease - await EKG and if unremarkable and/or no changes compared to prior, patient poses an average/acceptable risk of chace-/postoperative complications and may proceed with orthopedic intervention per discretion of surgeon

## 2021-02-28 NOTE — ASSESSMENT & PLAN NOTE
CT imaging noting an "Acute slightly impacted subcapital left femoral neck fracture"   XR of left knee pending  PRN pain control  Plan for orthopedic intervention tentatively scheduled for tomorrow - NPO after midnight  Recommend postoperative DVT prophylaxis  Further management per primary service (trauma)

## 2021-02-28 NOTE — H&P
H&P Exam - Trauma   Raji Garibay 68 y o  female MRN: [id number]  Unit/Bed#: [place] [address] Encounter: [id number]    Assessment/Plan   Trauma Alert: Evaluation  Model of Arrival: Self  Trauma Team: Attending Laureen Wright and RENEE Brown  Consultants: Orthopaedics: Dr Miguel Ángel Padilla  Time Called 1740, Returned call: Yes [phone] and Other: SLIM- Dr James Adkins  Time Called 4041    Trauma Active Problems:   · 69 y/o female s/p fall on ice   · Impacted subcapital left femoral neck fracture  · Left knee pain    Trauma Plan:   · Orthopedics consulted  Per Dr Roark Brunner, patient will be NPO at midnight for possible OR tomorrow  · Hold aspirin  Hold prophylaxis heparin at midnight, for possible OR tomorrow  · Multimodal pain control  · Hip precautions  · Left knee x-ray pending  · SLIM consulted for medical management  Chief Complaint: left hip pain    History of Present Illness   HPI:  Raji Garibay is a 68 y o  female who presents s/p fall on ice three weeks ago in her driveway  She states that the pain just was not improving which prompted her to come to the ED today  She saw her family doctor appx one week ago who ordered a pelvic xray which revealed a left "humeral" fracture (suspect this was intended to read femoral) and 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  She denies repeat falls or trauma since slipping on the ice three weeks prior  She denies hitting her head and denies LOC  She has no pain elsewhere other than in her left knee which is mild and improving slightly  She has been ambulating and bearing full weight on the left lower extremity since the time of the fall  She takes as baby aspirin daily and no anticoagulants  Mechanism:Fall    Review of Systems   Constitutional: Negative  HENT: Negative  Eyes: Negative  Respiratory: Negative  Cardiovascular: Negative  Gastrointestinal: Negative  Genitourinary: Negative  Musculoskeletal: Positive for arthralgias          + left hip pain  + left knee pain   Skin: Negative  Neurological: Negative  Hematological: Negative  Psychiatric/Behavioral: Negative  12-point, complete review of systems was reviewed and negative except as stated above  Historical Information     Past Medical History:   Diagnosis Date    Breast cancer ([address] )     Disease of thyroid gland     hypothyroidism    GERD (gastroesophageal reflux disease)     Hypertension     Rapid heart rate      Past Surgical History:   Procedure Laterality Date    BREAST SURGERY Left     lumpectomy     CHOLECYSTECTOMY      [place] COLONOSCOPY FLX DX W/COLLJ SPEC WHEN PFRMD N/A 6/26/2017    Procedure: COLONOSCOPY;  Surgeon: Nicholas Whiting MD;  Location: [facility]; Service: Gastroenterology     Social History   Social History     Substance and Sexual Activity   Alcohol Use Never    Frequency: Never     Social History     Substance and Sexual Activity   Drug Use Never     Social History     Tobacco Use   Smoking Status Never Smoker   Smokeless Tobacco Never Used     E-Cigarette/Vaping    E-Cigarette Use Never User      E-Cigarette/Vaping Substances       There is no immunization history on file for this patient  Last Tetanus: unknown  Family History: Non-contributory      Meds/Allergies   all current active meds have been reviewed and PTA meds:   Prior to Admission Medications   Prescriptions Last Dose Informant Patient Reported? Taking?    Calcium Carbonate-Vitamin D (CALTRATE 600+D PO)  Self Yes Yes   Sig: Take 1 tablet by mouth daily   MULTIPLE VITAMIN PO   Yes Yes   Sig: Take 1 tablet by mouth daily   Red Yeast Rice Extract (RED YEAST RICE PO)   Yes Yes   Sig: Take 1 tablet by mouth daily   acetaminophen (TYLENOL) 500 mg tablet Not Taking at Unknown time Self Yes No   Sig: Take 650 mg by mouth every 6 (six) hours as needed for mild pain    anastrozole (ARIMIDEX) 1 mg tablet  Self Yes Yes   Sig: Take 1 mg by mouth daily   ibuprofen (MOTRIN) 800 mg tablet  Self Yes Yes   Sig: Take 800 mg by mouth every 6 (six) hours as needed for mild pain   levothyroxine 100 mcg tablet  Self Yes Yes   Sig: Take 137 mcg by mouth daily    metoprolol tartrate (LOPRESSOR) 100 mg tablet  Self Yes Yes   Sig: Take 50 mg by mouth 2 (two) times a day    omeprazole (PriLOSEC) 40 MG capsule  Self Yes Yes   verapamil (CALAN-SR) 180 mg CR tablet  Self Yes Yes   Sig: Take 180 mg by mouth daily    zolpidem (AMBIEN) 5 mg tablet   No No   Sig: Take 1 tablet (5 mg total) by mouth once for 1 dose Prior to sleep study      Facility-Administered Medications: None       No Known Allergies      PHYSICAL EXAM    Objective   Vitals:   First set: Temperature: 98 3 °F (36 8 °C) (02/28/21 1501)  Pulse: 67 (02/28/21 1501)  Respirations: 18 (02/28/21 1501)  Blood Pressure: 146/88 (02/28/21 1503)    Primary Survey:   (A) Airway: intact  (B) Breathing: equal bilaterally  (C) Circulation: Pulses:   normal  (D) Disabliity:  GCS Total:  15  (E) Expose:  Completed    Secondary Survey: (Click on Physical Exam tab above)  Physical Exam  Constitutional:       Appearance: Normal appearance  HENT:      Head: Normocephalic  Nose: Nose normal       Mouth/Throat:      Mouth: Mucous membranes are moist    Eyes:      Pupils: Pupils are equal, round, and reactive to light  Neck:      Musculoskeletal: Normal range of motion and neck supple  No muscular tenderness  Cardiovascular:      Rate and Rhythm: Normal rate and regular rhythm  Pulses: Normal pulses  Pulmonary:      Effort: Pulmonary effort is normal  No respiratory distress  Breath sounds: Normal breath sounds  Abdominal:      General: Abdomen is flat  Palpations: Abdomen is soft  Tenderness: There is no abdominal tenderness  Musculoskeletal:         General: No swelling  Right lower leg: No edema  Left lower leg: No edema        Comments: + L hip tenderness to palpation and decreased ROM in the L hip due to pain  + L knee tenderness to palpation medially and laterally  No contusion or edema noted  + all other extremities range without difficulty or pain  Skin:     General: Skin is warm and dry  Capillary Refill: Capillary refill takes less than 2 seconds  Neurological:      General: No focal deficit present  Mental Status: She is alert and oriented to person, place, and time  Sensory: No sensory deficit  Motor: No weakness  Psychiatric:         Mood and Affect: Mood normal          Invasive Devices     Peripheral Intravenous Line            Peripheral IV 02/28/21 Right Hand less than 1 day                Lab Results:   Results: I have personally reviewed pertinent reports   , BMP/CMP:   Lab Results   Component Value Date    SODIUM 139 02/28/2021    K 4 5 02/28/2021     02/28/2021    CO2 25 02/28/2021    BUN 16 02/28/2021    CREATININE 0 88 02/28/2021    CALCIUM 8 9 02/28/2021    EGFR 63 02/28/2021   , CBC:   Lab Results   Component Value Date    WBC 6 50 02/28/2021    HGB 11 1 (L) 02/28/2021    HCT 35 6 02/28/2021    MCV 95 02/28/2021     02/28/2021    MCH 29 8 02/28/2021    MCHC 31 2 (L) 02/28/2021    RDW 15 1 02/28/2021    MPV 10 7 02/28/2021    NRBC 0 02/28/2021    and Coagulation: No results found for: PT, INR, APTT  Imaging/EKG Studies: Results: I have personally reviewed pertinent reports       CT left lower extremity: Acute slightly impacted subcapital left femoral neck fracture  Other Studies: no new    Code Status: Level 1 - Full Code  Advance Directive and Living Will:      Power of :    POLST:

## 2021-02-28 NOTE — ED PROCEDURE NOTE
PROCEDURE  ECG 12 Lead Documentation Only    Date/Time: 2/28/2021 5:51 PM  Performed by: Agata Washington MD  Authorized by: Agata Washington MD     Indications / Diagnosis:  PRE OP   ECG reviewed by me, the ED Provider: yes    Patient location:  ED and bedside  Previous ECG:     Previous ECG:  Compared to current    Comparison ECG info:  1/13/20- NO SIGN CHANGES- PVC HAS RESOLVED    Similarity:  No change    Comparison to cardiac monitor: Yes    Interpretation:     Interpretation: non-specific    Rate:     ECG rate:  59    ECG rate assessment: normal    Rhythm:     Rhythm: sinus bradycardia    Ectopy:     Ectopy: none    QRS:     QRS axis:  Left    QRS intervals:   Wide  Conduction:     Conduction: abnormal      Abnormal conduction: 1st degree and non-specific intraventricular conduction delay    ST segments:     ST segments:  Normal  T waves:     T waves: flattening      Flattening:  V1  Q waves:     Q waves:  V1 and V2  Other findings:     Other findings: LVH and U wave    Comments:      NO E CG SIGNS OF ISCHEMIA/ INJURY / R HEART STRAIN / Salud Tse MD  02/28/21 9384

## 2021-02-28 NOTE — ED NOTES
With assistance from FREDDIE Tellez, pt placed on bedpan and EKG performed at request of FRANK Cherry  02/28/21 6975

## 2021-02-28 NOTE — ASSESSMENT & PLAN NOTE
TSH normal in December 2020 @ [facility]  Continue current Synthroid regimen (137 mcg QAM)

## 2021-02-28 NOTE — CONSULTS
Consult - [facility]  Patient: Dhara Elizondo 68 y o  female MRN: [id number]  Unit/Bed#: [facility] Encounter: [id number]  Primary Care Provider: Nuria Alejandro MD  Date Of Visit: 02/28/21        Assessment & Plan:    * Left femoral neck fracture  Assessment & Plan  CT imaging noting an "Acute slightly impacted subcapital left femoral neck fracture"   XR of left knee pending  PRN pain control  Plan for orthopedic intervention tentatively scheduled for tomorrow - NPO after midnight  Recommend postoperative DVT prophylaxis  Further management per primary service (trauma)    Preoperative clearance  Assessment & Plan  CXR negative for acute cardiopulmonary disease - await EKG and if unremarkable and/or no changes compared to prior, patient poses an average/acceptable risk of chace-/postoperative complications and may proceed with orthopedic intervention per discretion of surgeon    Essential hypertension  Assessment & Plan  Low-sodium diet  Continue Verapamil preoperatively - PRN IV Hydralazine on board for BP spikes    Hypothyroidism  Assessment & Plan  TSH normal in December 2020 @ [facility]  Continue current Synthroid regimen (137 mcg QAM)    GERD (gastroesophageal reflux disease)  Assessment & Plan  Continue PPI regimen    History of breast cancer  Assessment & Plan  S/p lumpectomy  Continue Arimidex w/ calcium/vitamin-D supplementation  Supportive care      DVT Prophylaxis:  Heparin SC    Total Time for Visit, including Counseling / Coordination of Care: 72 minutes  Greater than 50% of this total time spent on direct patient counseling and coordination of care  Reason for Consultation:  Preoperative clearance for in acute left femoral neck fracture      History of Present Illness:    Dhara Elizondo is a 68 y o  female who is originally admitted to the trauma surgery service on 2/28/2021 due to a left femoral neck fracture sustained from a fall earlier this month    The fall occurred approximately three weeks ago while the patient was shoveling snow  Since then, she has had intermittent left hip pain initially controlled on NSAIDs  She was evaluated by her PCP earlier in the week with XR imaging initially revealing a left femoral neck fracture, which upon report resulting, patient was contacted to report to the ED  In the ER she underwent a subsequent CT of the LLE confirming the XR findings  She has been admitted to the trauma service with a consult out to orthopedic surgery for tentative operative intervention tomorrow  The hospitalist service has been consulted for assistance in medical management and preoperative clearance  At the time my encounter in the ED, she states that her pain is fairly well controlled at this time  Overall, she remains in pleasant and hopeful spirits  Review of Systems:    Review of Systems - A thorough 12 point review systems was conducted  Pertinent positives and negatives are mentioned in the history of present illness  Past Medical and Surgical History:     Past Medical History:   Diagnosis Date    Breast cancer ([address] )     Disease of thyroid gland     hypothyroidism    GERD (gastroesophageal reflux disease)     Hypertension     Rapid heart rate        Past Surgical History:   Procedure Laterality Date    BREAST SURGERY Left     lumpectomy     CHOLECYSTECTOMY      AR COLONOSCOPY FLX DX W/COLLJ SPEC WHEN PFRMD N/A 6/26/2017    Procedure: COLONOSCOPY;  Surgeon: Karuna Sims MD;  Location: [facility]; Service: Gastroenterology         Medications & Allergies:    Prior to Admission medications    Medication Sig Start Date End Date Taking?  Authorizing Provider   anastrozole (ARIMIDEX) 1 mg tablet Take 1 mg by mouth daily   Yes Historical Provider, MD   Calcium Carbonate-Vitamin D (CALTRATE 600+D PO) Take 1 tablet by mouth daily   Yes Historical Provider, MD   ibuprofen (MOTRIN) 800 mg tablet Take 800 mg by mouth every 6 (six) hours as needed for mild pain   Yes Historical Provider, MD   levothyroxine 100 mcg tablet Take 137 mcg by mouth daily    Yes Historical Provider, MD   metoprolol tartrate (LOPRESSOR) 100 mg tablet Take 50 mg by mouth 2 (two) times a day    Yes Historical Provider, MD   MULTIPLE VITAMIN PO Take 1 tablet by mouth daily   Yes Historical Provider, MD   omeprazole (PriLOSEC) 40 MG capsule  12/12/19  Yes Historical Provider, MD   Red Yeast Rice Extract (RED YEAST RICE PO) Take 1 tablet by mouth daily   Yes Historical Provider, MD   verapamil (CALAN-SR) 180 mg CR tablet Take 180 mg by mouth daily    Yes Historical Provider, MD   acetaminophen (TYLENOL) 500 mg tablet Take 650 mg by mouth every 6 (six) hours as needed for mild pain     Historical Provider, MD   zolpidem (AMBIEN) 5 mg tablet Take 1 tablet (5 mg total) by mouth once for 1 dose Prior to sleep study 12/10/20 12/10/20  Ellie Perez PA-C         Allergies: No Known Allergies      Social History:    Substance Use History:   Social History     Substance and Sexual Activity   Alcohol Use Never    Frequency: Never     Social History     Tobacco Use   Smoking Status Never Smoker   Smokeless Tobacco Never Used     Social History     Substance and Sexual Activity   Drug Use Never         Family History:    Per medical chart, significant for prostate cancer in father, breast cancer in sister, and sleep apnea in son        Physical Exam:     Vitals:   Blood Pressure: (!) 193/81 (02/28/21 1700)  Pulse: 60 (02/28/21 1700)  Temperature: 98 3 °F (36 8 °C) (02/28/21 1501)  Temp Source: Oral (02/28/21 1501)  Respirations: 18 (02/28/21 1700)  SpO2: 95 % (02/28/21 1700)      GENERAL:  Well-developed/nourished   HEAD:  Normocephalic - atraumatic  EYES: PERRL - EOMI   MOUTH:  Mucosa moist  NECK:  Supple - full range of motion  CARDIAC:  Rate controlled - S1/S2 positive  PULMONARY:  Fairly clear to auscultation - nonlabored respirations at rest  ABDOMEN:  Soft - nontender/nondistended - active bowel sounds  MUSCULOSKELETAL:  Motor strength/range of motion limited of the LLE w/ hip/knee tenderness  NEUROLOGIC:  Alert/oriented at baseline  SKIN:  Chronic wrinkles/blemishes   PSYCHIATRIC:  Mood/affect stable      Additional Data:     Labs & Recent Cultures:    Results from last 7 days   Lab Units 02/28/21  1558   WBC Thousand/uL 6 50   HEMOGLOBIN g/dL 11 1*   HEMATOCRIT % 35 6   PLATELETS Thousands/uL 198   NEUTROS PCT % 65   LYMPHS PCT % 24   MONOS PCT % 8   EOS PCT % 2     Results from last 7 days   Lab Units 02/28/21  1557   SODIUM mmol/L 139   POTASSIUM mmol/L 4 5   CHLORIDE mmol/L 105   CO2 mmol/L 25   BUN mg/dL 16   CREATININE mg/dL 0 88   ANION GAP mmol/L 9   CALCIUM mg/dL 8 9   GLUCOSE RANDOM mg/dL 147*           Imaging:     Xr Spine Lumbar Minimum 4 Views Non Injury    Result Date: 2/28/2021  Narrative: LUMBAR SPINE INDICATION:   M54 40: Lumbago with sciatica, unspecified side M25 552: Pain in left hip  COMPARISON:  None VIEWS:  XR SPINE LUMBAR MINIMUM 4 VIEWS NON INJURY Images: 5 FINDINGS: There are 5 non rib bearing lumbar vertebral bodies  There is no evidence of acute fracture or destructive osseous lesion  Mild scoliosis with convexity to the right side Degenerative disc disease seen with disc space narrowing at L5-S1, L4-5, L3-4 The pedicles appear intact  Soft tissues are unremarkable  Impression: No acute compression collapse of the vertebra Multilevel degenerative disc disease with disc space narrowing at L5-S1, L4-5 and L3-4 Mild degenerative lateral listhesis of L3 over L4 Workstation performed: [id number]     Xr Hip/pelv 2-3 Vws Left If Performed    Result Date: 2/28/2021  Narrative: LEFT HIP INDICATION:   M54 40: Lumbago with sciatica, unspecified side M25 552: Pain in left hip  COMPARISON:  None VIEWS:  XR HIP/PELV 2-3 VWS LEFT  W PELVIS IF PERFORMED Images: 4 FINDINGS: There is no acute fracture or dislocation   Mild degenerative changes seen in the right hip joint and moderate degenerative changes left hip joint There is an impacted fracture of the surgical neck of the humerus No lytic or blastic osseous lesion  Soft tissues are unremarkable  The visualized lumbar spine is unremarkable  Impression: Impacted fracture of the surgical neck of the humerus  I personally discussed this study with Gisell Tran on 2/28/2021 at 11:43 AM  Workstation performed: [id number]     Ct Lower Extremity Wo Contrast Left    Result Date: 2/28/2021  Narrative: CT LEFT HIP WITHOUT IV CONTRAST INDICATION: fall on ice with left hip pain  COMPARISON: None  TECHNIQUE: CT examination of the left hip was performed  This examination, like all CT scans performed in the [facility], was performed utilizing techniques to minimize radiation dose exposure, including the use of iterative reconstruction and automated exposure control software  Sagittal and coronal two dimensional reconstructed images were also submitted for interpretation  Rad dose  280 mGy-cm FINDINGS: OSSEOUS STRUCTURES:  There is an acute slightly impacted subcapital left femoral neck fracture  No other fractures are identified  VISUALIZED MUSCULATURE:  Unremarkable  SOFT TISSUES:  Subcutaneous contusion over the hip  OTHER FINDINGS:  Marked diverticulosis noted  Impression: Acute slightly impacted subcapital left femoral neck fracture  This examination was marked "immediate notification" in Epic in order to begin the standard process by which the radiology reading room liaison alerts the referring practitioner  Workstation performed: [id number]                 Thank you for consulting the hospitalist service for assistance in medical management of your patient  We will continue to follow through the course of the hospital stay  Seen By:  Alex Staley MD      ** Please Note: This note is constructed using a voice recognition dictation system    An occasional wrong word/phrase or sound-a-like substitution may have been picked up by dictation device due to the inherent limitations of voice recognition software  Read the chart carefully and recognize, using reasonable context, where substitutions may have occurred  **

## 2021-03-01 ENCOUNTER — ANESTHESIA EVENT (INPATIENT)
Dept: PERIOP | Facility: HOSPITAL | Age: 81
DRG: 482 | End: 2021-03-01
Payer: MEDICARE

## 2021-03-01 ENCOUNTER — HOSPITAL ENCOUNTER (INPATIENT)
Dept: VASCULAR ULTRASOUND | Facility: HOSPITAL | Age: 81
Discharge: HOME | DRG: 482 | End: 2021-03-01
Payer: MEDICARE

## 2021-03-01 ENCOUNTER — APPOINTMENT (INPATIENT)
Dept: RADIOLOGY | Facility: HOSPITAL | Age: 81
DRG: 482 | End: 2021-03-01
Payer: MEDICARE

## 2021-03-01 ENCOUNTER — ANESTHESIA (INPATIENT)
Dept: PERIOP | Facility: HOSPITAL | Age: 81
DRG: 482 | End: 2021-03-01
Payer: MEDICARE

## 2021-03-01 PROBLEM — E66.9 CLASS 2 OBESITY IN ADULT: Status: ACTIVE | Noted: 2021-03-01

## 2021-03-01 PROBLEM — E66.812 CLASS 2 OBESITY IN ADULT: Status: ACTIVE | Noted: 2021-03-01

## 2021-03-01 LAB
ABO GROUP BLD: NORMAL
ANION GAP SERPL CALCULATED.3IONS-SCNC: 8 MMOL/L (ref 4–13)
APTT PPP: 26 SECONDS (ref 23–37)
BUN SERPL-MCNC: 11 MG/DL (ref 5–25)
CALCIUM SERPL-MCNC: 9.1 MG/DL (ref 8.3–10.1)
CHLORIDE SERPL-SCNC: 105 MMOL/L (ref 100–108)
CO2 SERPL-SCNC: 28 MMOL/L (ref 21–32)
CREAT SERPL-MCNC: 0.73 MG/DL (ref 0.6–1.3)
ERYTHROCYTE [DISTWIDTH] IN BLOOD BY AUTOMATED COUNT: 15.2 % (ref 11.6–15.1)
GFR SERPL CREATININE-BSD FRML MDRD: 79 ML/MIN/1.73SQ M
GLUCOSE SERPL-MCNC: 108 MG/DL (ref 65–140)
HCT VFR BLD AUTO: 34.2 % (ref 34.8–46.1)
HGB BLD-MCNC: 10.9 G/DL (ref 11.5–15.4)
INR PPP: 1.08 (ref 0.84–1.19)
MCH RBC QN AUTO: 29.5 PG (ref 26.8–34.3)
MCHC RBC AUTO-ENTMCNC: 31.9 G/DL (ref 31.4–37.4)
MCV RBC AUTO: 93 FL (ref 82–98)
PLATELET # BLD AUTO: 202 THOUSANDS/UL (ref 149–390)
PMV BLD AUTO: 10.3 FL (ref 8.9–12.7)
POTASSIUM SERPL-SCNC: 3.5 MMOL/L (ref 3.5–5.3)
PROTHROMBIN TIME: 14.1 SECONDS (ref 11.6–14.5)
RBC # BLD AUTO: 3.69 MILLION/UL (ref 3.81–5.12)
RH BLD: NEGATIVE
SODIUM SERPL-SCNC: 141 MMOL/L (ref 136–145)
WBC # BLD AUTO: 6.19 THOUSAND/UL (ref 4.31–10.16)

## 2021-03-01 PROCEDURE — 85610 PROTHROMBIN TIME: CPT | Performed by: NURSE PRACTITIONER

## 2021-03-01 PROCEDURE — C1769 GUIDE WIRE: HCPCS | Performed by: ORTHOPAEDIC SURGERY

## 2021-03-01 PROCEDURE — 93971 EXTREMITY STUDY: CPT | Performed by: SURGERY

## 2021-03-01 PROCEDURE — 0QS704Z REPOSITION LEFT UPPER FEMUR WITH INTERNAL FIXATION DEVICE, OPEN APPROACH: ICD-10-PCS | Performed by: ORTHOPAEDIC SURGERY

## 2021-03-01 PROCEDURE — C1713 ANCHOR/SCREW BN/BN,TIS/BN: HCPCS | Performed by: ORTHOPAEDIC SURGERY

## 2021-03-01 PROCEDURE — G9197 ORDER FOR CEPH: HCPCS | Performed by: ORTHOPAEDIC SURGERY

## 2021-03-01 PROCEDURE — 80048 BASIC METABOLIC PNL TOTAL CA: CPT | Performed by: NURSE PRACTITIONER

## 2021-03-01 PROCEDURE — 93971 EXTREMITY STUDY: CPT

## 2021-03-01 PROCEDURE — 99232 SBSQ HOSP IP/OBS MODERATE 35: CPT | Performed by: PHYSICIAN ASSISTANT

## 2021-03-01 PROCEDURE — 85027 COMPLETE CBC AUTOMATED: CPT | Performed by: NURSE PRACTITIONER

## 2021-03-01 PROCEDURE — C9113 INJ PANTOPRAZOLE SODIUM, VIA: HCPCS | Performed by: INTERNAL MEDICINE

## 2021-03-01 PROCEDURE — 73502 X-RAY EXAM HIP UNI 2-3 VIEWS: CPT

## 2021-03-01 PROCEDURE — 36415 COLL VENOUS BLD VENIPUNCTURE: CPT | Performed by: EMERGENCY MEDICINE

## 2021-03-01 PROCEDURE — 99233 SBSQ HOSP IP/OBS HIGH 50: CPT | Performed by: SURGERY

## 2021-03-01 PROCEDURE — 27235 TREAT THIGH FRACTURE: CPT | Performed by: ORTHOPAEDIC SURGERY

## 2021-03-01 PROCEDURE — 85730 THROMBOPLASTIN TIME PARTIAL: CPT | Performed by: NURSE PRACTITIONER

## 2021-03-01 PROCEDURE — 99223 1ST HOSP IP/OBS HIGH 75: CPT | Performed by: ORTHOPAEDIC SURGERY

## 2021-03-01 DEVICE — 7.3MM CANNULATED SCREW 16MM THREAD/80MM: Type: IMPLANTABLE DEVICE | Site: HIP | Status: FUNCTIONAL

## 2021-03-01 DEVICE — 7.3MM CANNULATED SCREW 16MM THREAD/90MM: Type: IMPLANTABLE DEVICE | Site: HIP | Status: FUNCTIONAL

## 2021-03-01 RX ORDER — FENTANYL CITRATE/PF 50 MCG/ML
25 SYRINGE (ML) INJECTION
Status: DISCONTINUED | OUTPATIENT
Start: 2021-03-01 | End: 2021-03-01 | Stop reason: HOSPADM

## 2021-03-01 RX ORDER — ONDANSETRON 2 MG/ML
4 INJECTION INTRAMUSCULAR; INTRAVENOUS ONCE AS NEEDED
Status: DISCONTINUED | OUTPATIENT
Start: 2021-03-01 | End: 2021-03-01 | Stop reason: HOSPADM

## 2021-03-01 RX ORDER — CEFAZOLIN SODIUM 1 G/50ML
1000 SOLUTION INTRAVENOUS EVERY 8 HOURS
Status: COMPLETED | OUTPATIENT
Start: 2021-03-01 | End: 2021-03-02

## 2021-03-01 RX ORDER — SODIUM CHLORIDE, SODIUM GLUCONATE, SODIUM ACETATE, POTASSIUM CHLORIDE, MAGNESIUM CHLORIDE, SODIUM PHOSPHATE, DIBASIC, AND POTASSIUM PHOSPHATE .53; .5; .37; .037; .03; .012; .00082 G/100ML; G/100ML; G/100ML; G/100ML; G/100ML; G/100ML; G/100ML
75 INJECTION, SOLUTION INTRAVENOUS CONTINUOUS
Status: DISCONTINUED | OUTPATIENT
Start: 2021-03-01 | End: 2021-03-02

## 2021-03-01 RX ORDER — MAGNESIUM HYDROXIDE 1200 MG/15ML
LIQUID ORAL AS NEEDED
Status: DISCONTINUED | OUTPATIENT
Start: 2021-03-01 | End: 2021-03-01 | Stop reason: HOSPADM

## 2021-03-01 RX ORDER — PROPOFOL 10 MG/ML
INJECTION, EMULSION INTRAVENOUS CONTINUOUS PRN
Status: DISCONTINUED | OUTPATIENT
Start: 2021-03-01 | End: 2021-03-01

## 2021-03-01 RX ORDER — FENTANYL CITRATE 50 UG/ML
INJECTION, SOLUTION INTRAMUSCULAR; INTRAVENOUS AS NEEDED
Status: DISCONTINUED | OUTPATIENT
Start: 2021-03-01 | End: 2021-03-01

## 2021-03-01 RX ORDER — HYDROMORPHONE HCL/PF 1 MG/ML
0.2 SYRINGE (ML) INJECTION
Status: DISCONTINUED | OUTPATIENT
Start: 2021-03-01 | End: 2021-03-01 | Stop reason: HOSPADM

## 2021-03-01 RX ORDER — BUPIVACAINE HYDROCHLORIDE 7.5 MG/ML
INJECTION, SOLUTION INTRASPINAL AS NEEDED
Status: DISCONTINUED | OUTPATIENT
Start: 2021-03-01 | End: 2021-03-01

## 2021-03-01 RX ORDER — SODIUM CHLORIDE, SODIUM LACTATE, POTASSIUM CHLORIDE, CALCIUM CHLORIDE 600; 310; 30; 20 MG/100ML; MG/100ML; MG/100ML; MG/100ML
INJECTION, SOLUTION INTRAVENOUS CONTINUOUS PRN
Status: DISCONTINUED | OUTPATIENT
Start: 2021-03-01 | End: 2021-03-01

## 2021-03-01 RX ORDER — GLYCOPYRROLATE 0.2 MG/ML
INJECTION INTRAMUSCULAR; INTRAVENOUS AS NEEDED
Status: DISCONTINUED | OUTPATIENT
Start: 2021-03-01 | End: 2021-03-01

## 2021-03-01 RX ORDER — CEFAZOLIN SODIUM 2 G/50ML
SOLUTION INTRAVENOUS AS NEEDED
Status: DISCONTINUED | OUTPATIENT
Start: 2021-03-01 | End: 2021-03-01

## 2021-03-01 RX ADMIN — PANTOPRAZOLE SODIUM 40 MG: 40 INJECTION, POWDER, FOR SOLUTION INTRAVENOUS at 09:36

## 2021-03-01 RX ADMIN — FENTANYL CITRATE 25 MCG: 50 INJECTION, SOLUTION INTRAMUSCULAR; INTRAVENOUS at 11:12

## 2021-03-01 RX ADMIN — PROPOFOL 50 MCG/KG/MIN: 10 INJECTION, EMULSION INTRAVENOUS at 11:22

## 2021-03-01 RX ADMIN — DOCUSATE SODIUM 100 MG: 100 CAPSULE, LIQUID FILLED ORAL at 17:12

## 2021-03-01 RX ADMIN — GLYCOPYRROLATE 0.1 MG: 0.2 INJECTION, SOLUTION INTRAMUSCULAR; INTRAVENOUS at 11:30

## 2021-03-01 RX ADMIN — METOPROLOL TARTRATE 25 MG: 25 TABLET, FILM COATED ORAL at 21:17

## 2021-03-01 RX ADMIN — CEFAZOLIN SODIUM 1000 MG: 1 SOLUTION INTRAVENOUS at 21:18

## 2021-03-01 RX ADMIN — OXYCODONE HYDROCHLORIDE 5 MG: 5 TABLET ORAL at 04:26

## 2021-03-01 RX ADMIN — HYDRALAZINE HYDROCHLORIDE 5 MG: 20 INJECTION, SOLUTION INTRAMUSCULAR; INTRAVENOUS at 04:49

## 2021-03-01 RX ADMIN — VERAPAMIL HYDROCHLORIDE 180 MG: 180 TABLET, FILM COATED, EXTENDED RELEASE ORAL at 09:32

## 2021-03-01 RX ADMIN — SODIUM CHLORIDE, SODIUM GLUCONATE, SODIUM ACETATE, POTASSIUM CHLORIDE, MAGNESIUM CHLORIDE, SODIUM PHOSPHATE, DIBASIC, AND POTASSIUM PHOSPHATE 75 ML/HR: .53; .5; .37; .037; .03; .012; .00082 INJECTION, SOLUTION INTRAVENOUS at 14:09

## 2021-03-01 RX ADMIN — OXYCODONE HYDROCHLORIDE 5 MG: 5 TABLET ORAL at 16:24

## 2021-03-01 RX ADMIN — SODIUM CHLORIDE, SODIUM GLUCONATE, SODIUM ACETATE, POTASSIUM CHLORIDE, MAGNESIUM CHLORIDE, SODIUM PHOSPHATE, DIBASIC, AND POTASSIUM PHOSPHATE 75 ML/HR: .53; .5; .37; .037; .03; .012; .00082 INJECTION, SOLUTION INTRAVENOUS at 09:33

## 2021-03-01 RX ADMIN — PHENYLEPHRINE HYDROCHLORIDE 50 MCG/MIN: 10 INJECTION INTRAVENOUS at 11:26

## 2021-03-01 RX ADMIN — ACETAMINOPHEN 975 MG: 325 TABLET, FILM COATED ORAL at 21:18

## 2021-03-01 RX ADMIN — BUPIVACAINE HYDROCHLORIDE IN DEXTROSE 1.6 ML: 7.5 INJECTION, SOLUTION SUBARACHNOID at 11:16

## 2021-03-01 RX ADMIN — CEFAZOLIN SODIUM 2000 MG: 2 SOLUTION INTRAVENOUS at 11:05

## 2021-03-01 RX ADMIN — METOPROLOL TARTRATE 25 MG: 25 TABLET, FILM COATED ORAL at 09:32

## 2021-03-01 RX ADMIN — SODIUM CHLORIDE, SODIUM LACTATE, POTASSIUM CHLORIDE, AND CALCIUM CHLORIDE: .6; .31; .03; .02 INJECTION, SOLUTION INTRAVENOUS at 11:08

## 2021-03-01 RX ADMIN — SODIUM CHLORIDE, SODIUM LACTATE, POTASSIUM CHLORIDE, AND CALCIUM CHLORIDE: .6; .31; .03; .02 INJECTION, SOLUTION INTRAVENOUS at 10:55

## 2021-03-01 RX ADMIN — GLYCOPYRROLATE 0.1 MG: 0.2 INJECTION, SOLUTION INTRAMUSCULAR; INTRAVENOUS at 11:41

## 2021-03-01 NOTE — ASSESSMENT & PLAN NOTE
Orthopedics consulted  NPO and heparin held at midnight for possible OR tomorrow, per Orthopedics  Hip precautions  Multimodal pain control  Hold ASA until postoperative plate cleared by Orthopedics  PT/OT, postoperatively

## 2021-03-01 NOTE — ASSESSMENT & PLAN NOTE
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

## 2021-03-01 NOTE — OCCUPATIONAL THERAPY NOTE
Occupational Therapy Cancellation Note        Patient Name: Luna RÍOS Date: 3/1/2021       03/01/21 1030   OT Last Visit   OT Visit Date 03/01/21   Note Type   Note type Evaluation   Cancel Reasons Patient to operating room   Assessment   Assessment OT orders received, chart review completed  Pt is currently in OR pool, for L femoral neck fx   Will follow s/p surgery as appropriate/able         Kisha Diop OTR/L

## 2021-03-01 NOTE — PHYSICAL THERAPY NOTE
Physical Therapy Cancellation Note        Referral received for PT eval and tx  Chart check performed  Pt is pending Orthopedics consult regarding left femoral neck fx  If surgery is required, postoperative reorders will be needed including weight bearing status and activity orders       Jose Garcias, PT

## 2021-03-01 NOTE — DISCHARGE INSTRUCTIONS
Care after your surgery:  · Ice and elevate the surgical site 3-4 times a day for 15 minutes   · Keep the bandages dry at all times  Remove 3 days after surgery and resume showering if the incision is dry  · Apply a clean bandage daily after showering until incisions are dry  · Ambulate with an assistive device until cleared by the physical therapist   · Weight bearing as tolerated on the operative leg  · Continue Lovenox for 4 weeks from the surgery date to prevent blood clots  · Do not drive until cleared by the surgeon  Call the office at [phone] if you have any of the following:  · Excessive redness or drainage at the surgical site  · Fever above 101 5° F   · Pain unrelieved by medication  · Any numbness, tingling, or excessive swelling of the operative extremity  Follow-Up Appointment:  · 10-14 days after surgery with Dr Deonte Moseley

## 2021-03-01 NOTE — ASSESSMENT & PLAN NOTE
· Low-sodium diet when no longer nothing by mouth  · Continue Verapamil at current dose  · Patient reports she is usually on metoprolol 100 mg q a m  States she has been on this for a long time and during a period of time where she was taken off of it by her family doctor she had spells of rapid heart rate" though she denies any known diagnosis of AFib  Noted that she has been running mildly bradycardic during this stay, but with uncontrolled hypertension in the 084-416 systolic range  Would suggest immediately resuming a decreased dose of metoprolol for now and splitting it to 25 b i d  with parameters  (She reports some sort of "allergic reaction" to a previously attempted alternative antihypertensive agent though she does not know which one and is not describing angioedema or cough)  Can adjust back to her usual home BB dosing as tolerated    Want to avoid perioperative hypotension  · PRN IV Hydralazine on board for BP spikes

## 2021-03-01 NOTE — ED ATTENDING ATTESTATION
2/28/2021  INeto MD, saw and evaluated the patient  I have discussed the patient with the resident/non-physician practitioner and agree with the resident's/non-physician practitioner's findings, Plan of Care, and MDM as documented in the resident's/non-physician practitioner's note, except where noted  All available labs and Radiology studies were reviewed  I was present for key portions of any procedure(s) performed by the resident/non-physician practitioner and I was immediately available to provide assistance  At this point I agree with the current assessment done in the Emergency Department    I have conducted an independent evaluation of this patient a history and physical is as follows:    ED Course  ED Course as of Feb 28 2022   Sun Feb 28, 2021   1756 LEFT KNEE X RAY - NO FX       1756 CXR PORTABLE- NO OLD CXR FOR COMPARISON -  NORMAL MEDIASTINUM/CARDIAC  SILHOUETTE- NO FREE/SQ AIR- NO INFILTRATE/ PTX/ PULM EDEMA/ PLEURAL EFFUSIONS            Critical Care Time  Procedures

## 2021-03-01 NOTE — ANESTHESIA POSTPROCEDURE EVALUATION
Post-Op Assessment Note    CV Status:  Stable  Pain Score: 0    Pain management: adequate     Mental Status:  Alert and awake   Hydration Status:  Euvolemic   PONV Controlled:  Controlled   Airway Patency:  Patent and adequate      Post Op Vitals Reviewed: Yes      Staff: CRNA         No complications documented      BP  116/57   Temp   97 7   Pulse 64   Resp 20   SpO2 100

## 2021-03-01 NOTE — PROGRESS NOTES
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  · Patient takes verapamil 180 mg and metoprolol 100 mg daily  · Reports following with cardiologist   Chart review does not reveal any cardiology consult  · SLIM consulted  Left knee pain  Assessment & Plan  · Left knee XR shows no osseous abnormality  · Feeling better today  · Multimodal pain control  · PT OT postoperatively    History of breast cancer  Assessment & Plan  · S/p lumpectomy  · Home anastrozole ordered    Preoperative clearance  Assessment & Plan  · SLIM consult, note appreciated  Hypothyroidism  Assessment & Plan  · Home levothyroxine ordered    GERD (gastroesophageal reflux disease)  Assessment & Plan  · Protonix ordered    Essential hypertension  Assessment & Plan  · SLIM consult  · Patient takes 180 mg of verapamil and 100 mg of metoprolol, daily  · Holding home metoprolol, per SLIM  · Home verapamil ordered  PRN Hydralazine as needed  · Required 1 dose of PRN hydralazine this morning for a SBP of 202    * Left femoral neck fracture  Assessment & Plan  Orthopedics consulted  NPO and heparin held at midnight for possible OR tomorrow, per Orthopedics  Hip precautions  Multimodal pain control  Hold ASA until postoperative plate cleared by Orthopedics  PT/OT, postoperatively  TERTIARY TRAUMA SURVEY NOTE    Prophylaxis: Sequential compression device (Venodyne)  and Reason for no pharmacologic prophylaxis Held for OR today, per ortho  Disposition:  Continue current level care  OR today  Code status:  Level 1 - Full Code    Consultants:  SLIM, Orthopedics, PT/OT, Case Management  Is the patient 72 years or older?: YES:    1   Before the illness or injury that brought you to the Emergency, did you need someone to help you on a regular basis? 0=No   2  Since the illness or injury that brought you to the Emergency, have you needed more help than usual to take care of yourself? 0=No   3  Have you been hospitalized for one or more nights during the past 6 months (excluding a stay in the Emergency Department)? 0=No   4  In general, do you see well? 0=Yes   5  In general, do you have serious problems with your memory? 0=No   6  Do you take more than three different medications everyday? 1=Yes   TOTAL   1     Did you order a geriatric consult if the score was 2 or greater?: no          SUBJECTIVE:     Transfer from: n/a  Outside Films Received: not applicable  Tertiary Exam Due on: 3/1/2021    Mechanism of Injury:Fall    Details related to Injury:  Mechanical fall, 3 weeks ago  Chief Complaint:  Left hip pain    HPI/Last 24 hour events: This is a 72-year-old female with history of hypertension and breast cancer s/p lumpectomy, presenting for evaluation left hip pain  Patient describes that she suffered a mechanical fall secondary to slipping while shoveling snow several weeks ago  Since that time, she has been having left hip pain, but thought it was just bruised  She was evaluated by her PCP who performed and XR on 02/23, which revealed a fracture of the left femoral neck, and was subsequently referred to the ER  Patient presented yesterday for evaluation of femoral neck fracture  Her only complaint was mild left hip pain which is worsened by movement  CT of left lower extremity revealed impacted subcapital left femoral neck fracture  Orthopedics was consulted  Patient is NPO for possible OR today  On my evaluation, patient states that she was having some pain earlier but since receiving the PRN oxycodone her pain has been minimal   She does admit to mild nausea    Nursing staff reports that patient was having a SBP as high as 202, which resolved with hydralyzine  Patient denies any current dizziness, lightheadedness, blurred vision, headache, decreased sensation, new weakness  Otherwise, a 10 point review of systems was completed and is negative      Active medications:           Current Facility-Administered Medications:     acetaminophen (TYLENOL) tablet 975 mg, 975 mg, Oral, Q8H CHRIS, 975 mg at 02/28/21 2139    anastrozole (ARIMIDEX) tablet 1 mg, 1 mg, Oral, Daily    calcium carbonate-vitamin D (OSCAL-D) 500 mg-200 units per tablet 1 tablet, 1 tablet, Oral, Daily With Breakfast    docusate sodium (COLACE) capsule 100 mg, 100 mg, Oral, BID, 100 mg at 02/28/21 1749    hydrALAZINE (APRESOLINE) injection 5 mg, 5 mg, Intravenous, Q6H PRN, 5 mg at 03/01/21 0449    HYDROmorphone (DILAUDID) injection 0 2 mg, 0 2 mg, Intravenous, Q4H PRN    levothyroxine tablet 112 mcg, 112 mcg, Oral, Early Morning    levothyroxine tablet 25 mcg, 25 mcg, Oral, Early Morning    naloxone (NARCAN) 0 04 mg/mL syringe 0 04 mg, 0 04 mg, Intravenous, Q1MIN PRN    ondansetron (ZOFRAN) injection 4 mg, 4 mg, Intravenous, Q4H PRN    oxyCODONE (ROXICODONE) IR tablet 2 5 mg, 2 5 mg, Oral, Q4H PRN    oxyCODONE (ROXICODONE) IR tablet 5 mg, 5 mg, Oral, Q4H PRN, 5 mg at 03/01/21 0426    pantoprazole (PROTONIX) injection 40 mg, 40 mg, Intravenous, Q24H CHRIS    verapamil (CALAN-SR) CR tablet 180 mg, 180 mg, Oral, Daily    Current Outpatient Medications:     anastrozole (ARIMIDEX) 1 mg tablet    Calcium Carbonate-Vitamin D (CALTRATE 600+D PO)    ibuprofen (MOTRIN) 800 mg tablet    levothyroxine 100 mcg tablet    metoprolol tartrate (LOPRESSOR) 100 mg tablet    MULTIPLE VITAMIN PO    omeprazole (PriLOSEC) 40 MG capsule    Red Yeast Rice Extract (RED YEAST RICE PO)    verapamil (CALAN-SR) 180 mg CR tablet    acetaminophen (TYLENOL) 500 mg tablet    zolpidem (AMBIEN) 5 mg tablet      OBJECTIVE:     Vitals:   Vitals:    03/01/21 0500   BP: 162/55   Pulse:    Resp:    Temp:    SpO2: Physical Exam:   GENERAL APPEARANCE:  No acute distress  NEURO:  GCS 15  Light touch sensation intact in all extremities  HEENT:  Normocephalic, atraumatic  PERRL  Neck is supple  CV:  Regular rate and rhythm  No murmur, no rubs, no gallops  +2 radial and dorsalis pedis pulses, bilaterally  LUNGS:  Clear to auscultation, bilaterally  No adventitious breath sounds  GI:  Abdomen is soft and nontender  Normal bowel sounds  :  Voiding  MSK:  Patient able to move all extremities  Minimal swelling surrounding left hip--no hematoma  SKIN:  Skin is warm, dry, well perfused  I/O:   I/O       02/27 0701 - 02/28 0700 02/28 0701 - 03/01 0700    Urine  500    Total Output  500    Net  -500                Invasive Devices: Invasive Devices     Peripheral Intravenous Line            Peripheral IV 02/28/21 Right Hand less than 1 day                  Imaging:   Xr Chest Portable    Result Date: 3/1/2021  Impression: Reticular nodular opacities within the lung bases, corresponding to findings suggestive of waxing and waning small airways infection seen on prior CTs  No focal consolidations, pleural effusions, or pneumothorax  Workstation performed: [id number]     Xr Knee 4+ Vw Left Injury    Result Date: 3/1/2021  Impression: No acute osseous abnormality  Workstation performed: [id number]     Ct Lower Extremity Wo Contrast Left    Result Date: 2/28/2021  Impression: Acute slightly impacted subcapital left femoral neck fracture  This examination was marked "immediate notification" in Epic in order to begin the standard process by which the radiology reading room liaison alerts the referring practitioner     Workstation performed: [id number]       Labs:   CBC:   Lab Results   Component Value Date    WBC 6 19 03/01/2021    HGB 10 9 (L) 03/01/2021    HCT 34 2 (L) 03/01/2021    MCV 93 03/01/2021     03/01/2021    MCH 29 5 03/01/2021    MCHC 31 9 03/01/2021    RDW 15 2 (H) 03/01/2021    MPV 10 3 03/01/2021    NRBC 0 02/28/2021     CMP:   Lab Results   Component Value Date     03/01/2021    CO2 28 03/01/2021    BUN 11 03/01/2021    CREATININE 0 73 03/01/2021    CALCIUM 9 1 03/01/2021    EGFR 79 03/01/2021

## 2021-03-01 NOTE — PROGRESS NOTES
Progress Note - Kiet Marsh 1940, 68 y o  female MRN: [id number]    Unit/Bed#: ED 12 Encounter: [id number]    Primary Care Provider: Ministerio Vela MD   Date and time admitted to hospital: 2/28/2021  3:08 PM      * Left femoral neck fracture  Assessment & Plan  Patient admitted by Trauma Service after slip and fall on ice with associated left-sided hip pain   CT imaging noting an "Acute slightly impacted subcapital left femoral neck fracture"   XR of left knee no injury  PRN pain control  Plan for orthopedic intervention tentatively scheduled for today  Recommend postoperative DVT prophylaxis  Further management per primary service (trauma)    Essential hypertension  Assessment & Plan  · Low-sodium diet when no longer nothing by mouth  · Continue Verapamil at current dose  · Patient reports she is usually on metoprolol 100 mg q a m  States she has been on this for a long time and during a period of time where she was taken off of it by her family doctor she had spells of rapid heart rate" though she denies any known diagnosis of AFib  Noted that she has been running mildly bradycardic during this stay, but with uncontrolled hypertension in the 480-399 systolic range  Would suggest immediately resuming a decreased dose of metoprolol for now and splitting it to 25 b i d  with parameters  (She reports some sort of "allergic reaction" to a previously attempted alternative antihypertensive agent though she does not know which one and is not describing angioedema or cough)  Can adjust back to her usual home BB dosing as tolerated    Want to avoid perioperative hypotension  · PRN IV Hydralazine on board for BP spikes    History of breast cancer  Assessment & Plan  S/p lumpectomy  Continue Arimidex w/ calcium/vitamin-D supplementation      GERD (gastroesophageal reflux disease)  Assessment & Plan  Continue PPI regimen; noting mild dyspepsia/nausea     Class 2 obesity in adult  Assessment & Plan  · BMI 36 4, comorbidity      VTE Pharmacologic Prophylaxis:   Pharmacologic: On hold preop  Mechanical VTE Prophylaxis in Place: Yes    Patient Centered Rounds: Case was discussed with patient's nurse in the ER    Discussions with Specialists or Other Care Team Provider:     Education and Discussions with Family / Patient:     Time Spent for Care: 25 min  More than 50% of total time spent on counseling and coordination of care as described above  Current Length of Stay: 1 day(s)    Current Patient Status: Inpatient   Certification Statement: The patient will continue to require additional inpatient hospital stay due to OR today    Discharge Plan: per trauma; PT and OT consulted post op    Code Status: Level 1 - Full Code      Subjective:   No events or concerns reported at this time, except mild nausea  Spoke with patient in detail regarding her antihypertensive regimen as noted above  Objective:     Vitals:   Temp (24hrs), Av 3 °F (36 8 °C), Min:98 3 °F (36 8 °C), Max:98 3 °F (36 8 °C)    Temp:  [98 3 °F (36 8 °C)] 98 3 °F (36 8 °C)  HR:  [50-67] 66  Resp:  [18-20] 18  BP: (146-193)/(55-88) 162/55  SpO2:  [92 %-95 %] 94 %  There is no height or weight on file to calculate BMI  Input and Output Summary (last 24 hours): Intake/Output Summary (Last 24 hours) at 3/1/2021 0825  Last data filed at 3/1/2021 0438  Gross per 24 hour   Intake --   Output 500 ml   Net -500 ml       Physical Exam:     Physical Exam  Vitals signs reviewed  Constitutional:       General: She is not in acute distress  Appearance: She is obese  She is not ill-appearing, toxic-appearing or diaphoretic  HENT:      Mouth/Throat:      Pharynx: No posterior oropharyngeal erythema  Eyes:      General:         Right eye: No discharge  Left eye: No discharge  Conjunctiva/sclera: Conjunctivae normal    Cardiovascular:      Rate and Rhythm: Normal rate and regular rhythm  Heart sounds: No murmur        Comments: Current heart rate 65-72 in the room  Pulmonary:      Effort: Pulmonary effort is normal  No respiratory distress  Breath sounds: No wheezing  Abdominal:      General: Bowel sounds are normal  There is no distension  Palpations: Abdomen is soft  Tenderness: There is no abdominal tenderness  There is no guarding  Musculoskeletal:      Right lower leg: No edema  Left lower leg: No edema  Skin:     General: Skin is warm and dry  Coloration: Skin is not jaundiced or pale  Findings: No bruising, erythema, lesion or rash  Neurological:      Mental Status: She is alert  Comments: Awake alert interactive she is a good historian with no confusion  Pleasant and cooperative  Follows commands appropriately  Psychiatric:         Mood and Affect: Mood normal          Thought Content: Thought content normal            Additional Data:     Labs:    Results from last 7 days   Lab Units 03/01/21  0442 02/28/21  1558   WBC Thousand/uL 6 19 6 50   HEMOGLOBIN g/dL 10 9* 11 1*   HEMATOCRIT % 34 2* 35 6   PLATELETS Thousands/uL 202 198   NEUTROS PCT %  --  65   LYMPHS PCT %  --  24   MONOS PCT %  --  8   EOS PCT %  --  2     Results from last 7 days   Lab Units 03/01/21  0442   SODIUM mmol/L 141   POTASSIUM mmol/L 3 5   CHLORIDE mmol/L 105   CO2 mmol/L 28   BUN mg/dL 11   CREATININE mg/dL 0 73   ANION GAP mmol/L 8   CALCIUM mg/dL 9 1   GLUCOSE RANDOM mg/dL 108     Results from last 7 days   Lab Units 03/01/21  0442   INR  1 08                   * I Have Reviewed All Lab Data Listed Above  * Additional Pertinent Lab Tests Reviewed:  [address] 68 Admission Reviewed    Imaging:    Imaging Reports Reviewed Today Include:   Imaging Personally Reviewed by Myself Includes:      Recent Cultures (last 7 days):           Last 24 Hours Medication List:   Current Facility-Administered Medications   Medication Dose Route Frequency Provider Last Rate    acetaminophen  975 mg Oral [facility] Kylee Mello Willam Koedith, AAMIR      anastrozole  1 mg Oral Daily Jose Baker MD      calcium carbonate-vitamin D  1 tablet Oral Daily With Breakfast Jose Baker MD      docusate sodium  100 mg Oral BID Nichole Braver, AAMIR      hydrALAZINE  5 mg Intravenous Q6H PRN Jose Baker MD      HYDROmorphone  0 2 mg Intravenous Q4H PRN Nichole Braver, CRSANDY      levothyroxine  112 mcg Oral Early Morning Jose Baker MD      levothyroxine  25 mcg Oral Early Morning Jose Baker MD      metoprolol tartrate  25 mg Oral Q12H [facility] Meredith Wilkins PA-C      naloxone  0 04 mg Intravenous Q1MIN PRN Nichole Braver, CRNP      ondansetron  4 mg Intravenous Q4H PRN Nichole Braver, CRNP      oxyCODONE  2 5 mg Oral Q4H PRN Nichole Braver, CRNP      oxyCODONE  5 mg Oral Q4H PRN Nichole Braver, CRNP      pantoprazole  40 mg Intravenous Q24H [facility] Jose Baker MD      verapamil  180 mg Oral Daily Jose Baker MD          Today, Patient Was Seen By: Kenia Hubbard PA-C    ** Please Note: Dictation voice to text software may have been used in the creation of this document   **

## 2021-03-01 NOTE — PLAN OF CARE
Problem: Potential for Falls  Goal: Patient will remain free of falls  Description: INTERVENTIONS:  - Assess patient frequently for physical needs  -  Identify cognitive and physical deficits and behaviors that affect risk of falls    -  Shickley fall precautions as indicated by assessment   - Educate patient/family on patient safety including physical limitations  - Instruct patient to call for assistance with activity based on assessment  - Modify environment to reduce risk of injury  - Consider OT/PT consult to assist with strengthening/mobility  Outcome: Progressing     Problem: Prexisting or High Potential for Compromised Skin Integrity  Goal: Skin integrity is maintained or improved  Description: INTERVENTIONS:  - Identify patients at risk for skin breakdown  - Assess and monitor skin integrity  - Assess and monitor nutrition and hydration status  - Monitor labs   - Assess for incontinence   - Turn and reposition patient  - Assist with mobility/ambulation  - Relieve pressure over bony prominences  - Avoid friction and shearing  - Provide appropriate hygiene as needed including keeping skin clean and dry  - Evaluate need for skin moisturizer/barrier cream  - Collaborate with interdisciplinary team   - Patient/family teaching  - Consider wound care consult   Outcome: Progressing

## 2021-03-01 NOTE — ANESTHESIA PROCEDURE NOTES
Spinal Block    Patient location during procedure: OR  Start time: 3/1/2021 11:16 AM  Reason for block: procedure for pain and at surgeon's request  Staffing  Anesthesiologist: Maria Del Rosario Gallagher MD  Resident/CRNA: Zuleima Guardado CRNA  Performed: anesthesiologist and resident/CRNA   Preanesthetic Checklist  Completed: patient identified, site marked, surgical consent, pre-op evaluation, timeout performed, IV checked, risks and benefits discussed and monitors and equipment checked  Spinal Block  Patient position: sitting  Prep: ChloraPrep  Patient monitoring: cardiac monitor and frequent blood pressure checks  Approach: midline  Location: L3-4  Injection technique: single-shot  Needle  Needle type: pencil-tip   Needle gauge: 25 G  Needle length: 10 cm  Assessment  Sensory level: T4  Injection Assessment:  negative aspiration for heme, no paresthesia on injection and positive aspiration for clear CSF    Post-procedure:  adhesive bandage applied, pressure dressing applied, secured with tape, site cleaned and sterile dressing applied

## 2021-03-01 NOTE — ANESTHESIA PREPROCEDURE EVALUATION
Procedure:  OPEN REDUCTION W/ INTERNAL FIXATION (ORIF) HIP WITH CANNUALATED SCREWS (Left Hip)    Relevant Problems   CARDIO   (+) Essential hypertension      ENDO   (+) Hypothyroidism      GI/HEPATIC   (+) GERD (gastroesophageal reflux disease)      HEMATOLOGY   (+) Anemia      NEURO/PSYCH   (+) History of breast cancer      PULMONARY   (+) SAKSHI (obstructive sleep apnea)   (+) Pneumonia     Npo verified; GERD well controlled  She denies any anticoagulation use     Reports history of arrhythmia for which she takes verapamil/metoprolol  Unclear if this is atrial fibrillation  ECG as below  Takes metoprolol 100 mg QD, dose decreased inpatient due to AV block  2/28/21 ECG  Sinus bradycardia with sinus arrhythmia with 1st degree A-V block  Left axis deviation  Left ventricular hypertrophy with QRS widening  Abnormal ECG    Results for Bri Pereira (MRN [id number]) as of 3/1/2021 10:48   Ref  Range 3/1/2021 04:42   Protime Latest Ref Range: 11 6 - 14 5 seconds 14 1   INR Latest Ref Range: 0 84 - 1 19  1 08   Results for Bri Pereira (MRN [id number]) as of 3/1/2021 08:37   Ref  Range 3/1/2021 04:42   WBC Latest Ref Range: 4 31 - 10 16 Thousand/uL 6 19   Red Blood Cell Count Latest Ref Range: 3 81 - 5 12 Million/uL 3 69 (L)   Hemoglobin Latest Ref Range: 11 5 - 15 4 g/dL 10 9 (L)   HCT Latest Ref Range: 34 8 - 46 1 % 34 2 (L)   MCV Latest Ref Range: 82 - 98 fL 93   MCH Latest Ref Range: 26 8 - 34 3 pg 29 5   MCHC Latest Ref Range: 31 4 - 37 4 g/dL 31 9   RDW Latest Ref Range: 11 6 - 15 1 % 15 2 (H)   Platelet Count Latest Ref Range: 149 - 390 Thousands/uL 202   MPV Latest Ref Range: 8 9 - 12 7 fL 10 3        Physical Exam    Airway    Mallampati score: III  TM Distance: >3 FB  Neck ROM: full     Dental   Comment: Reports multiple missing teeth; denies any loose  ,     Cardiovascular      Pulmonary      Other Findings        Anesthesia Plan  ASA Score- 3     Anesthesia Type- spinal with ASA Monitors  Additional Monitors:   Airway Plan:           Plan Factors-Exercise tolerance (METS): >4 METS  Chart reviewed  Induction- intravenous  Postoperative Plan- Plan for postoperative opioid use  Planned trial extubation    Informed Consent- Anesthetic plan and risks discussed with patient  I personally reviewed this patient with the CRNA  Discussed and agreed on the Anesthesia Plan with the CRNA  Yash Fernandez

## 2021-03-01 NOTE — ASSESSMENT & PLAN NOTE
· SLIM consult  · Patient takes 180 mg of verapamil and 100 mg of metoprolol, daily  · Holding home metoprolol, per SLIM  · Home verapamil ordered  PRN Hydralazine as needed    · Required 1 dose of PRN hydralazine this morning for a SBP of 202

## 2021-03-01 NOTE — ASSESSMENT & PLAN NOTE
Patient admitted by Trauma Service after slip and fall on ice with associated left-sided hip pain   CT imaging noting an "Acute slightly impacted subcapital left femoral neck fracture"   XR of left knee no injury  PRN pain control  Plan for orthopedic intervention tentatively scheduled for today  Recommend postoperative DVT prophylaxis  Further management per primary service (trauma)

## 2021-03-01 NOTE — OP NOTE
OPERATIVE REPORT  PATIENT NAME: Raji Garibay    :  1940  MRN: [id number]  Pt Location: AN OR ROOM 01    SURGERY DATE: 3/1/2021    Surgeon(s) and Role:     * Charan Connell MD - Primary    Preop Diagnosis:  Closed fracture of neck of left femur ([facility] [address] ) [S72 002A]    Post-Op Diagnosis Codes:     * Closed fracture of neck of left femur ([facility] [address] ) [S72 002A]    Procedure(s) (LRB):  PERCUTANEOUS FIXATION OF FEMORAL NECK FRACTURE (Left)    Specimen(s):  * No specimens in log *    Estimated Blood Loss:   Minimal    Drains:  * No LDAs found *    Anesthesia Type:   Spinal    Complications:   None    Procedure and Technique:  The patient was identified in the pre-operative holding area, and the surgical site was marked by the surgeon  The patient was transported to the operating room  General anesthesia was administered on the hospital bed, and the patient was intubated  The patient was transferred to the fracture table in the supine position  A well-padded perineal post was placed  The feet were padded and secured to the fracture table  Traction was applied to the operative extremity to reduce the fracture  The left hip was prepped and draped in the usual sterile fashion  Prophylactic antibiotics were given within 1 hour of incision  Following a surgical timeout, three guide pins from the [employer] 7 3 mm cannulated screw set were advanced percutaneously across the fracture site into the femoral head  The guide pins were placed in an inverted triangle configuration  Fluoroscopic images were obtained to confirm satisfactory fracture alignment and hardware position  Small incisions were made adjacent to the guide pins, and length measurements were obtained  The outer cortex was drilled over each guide pin  Three 7 3 mm cannulated screws were advanced over the guide pins, and the guide pins were removed  Final fluoroscopic images were obtained to confirm satisfactory fracture reduction and hardware position      The wounds were irrigated with sterile saline solution  The skin incisions were closed with simple buried 2-0 Vicryl sutures and skin adhesive  The wounds were dressed with a sterile Mepilex bandage  Anesthesia was discontinued  The patient was transferred to the stretcher and transported to the recovery area in stable condition      SIGNATURE: Siddhartha Villa MD  DATE: March 1, 2021  TIME: 12:05 PM

## 2021-03-01 NOTE — ASSESSMENT & PLAN NOTE
· Left knee XR shows no osseous abnormality  · Feeling better today    · Multimodal pain control  · PT OT postoperatively

## 2021-03-01 NOTE — CONSULTS
Consult Note - Orthopedics   Spenser Hoskins 68 y o  female MRN: [id number]  Unit/Bed#: ED 12 Encounter: [id number]      Assessment & Plan     Left hip nondisplaced impacted femoral neck fracture after fall three weeks ago  1  Recommend surgery consisting of percutaneous cannulated screw fixation of left femoral neck  Explained procedure in detail as well as the risks and benefits and expected postoperative recovery period  She has elected to proceed  2  Pain control  3  NWB LLE/Bedrest  4  NPO   5  Plan for surgery today pending OR availability  Chief Complaint     Left hip pain    History of the Present Illness     Spenser Hoskins is a 68 y o  female seen in orthopedic consultation at the request of Charles Young MD for evaluation of patient's left hip  Patient sustained a fall approximately three weeks ago after slipping on ice  She states she fell directly onto her left hip  Since then she notes worsening left hip pain  Prior to this injury she ambulated independently without the use of an assistive device  After the injury occurred she required a cane or walker to ambulate  Pain was worse with prolonged standing and walking as well as with prolonged sitting  She notes weakness secondary to pain  She denies instability  No numbness or tingling  She did report to the emergency department where x-rays and a CT scan were performed revealing a femoral neck fracture  Physical Exam     /55 (BP Location: Right arm)   Pulse 66   Temp 98 3 °F (36 8 °C) (Oral)   Resp 18   SpO2 94%     Left hip:  Skin intact  No gross deformity  Diffuse tenderness to palpation  Range of motion is intact and limited by pain  Positive logroll test     Eyes:  Anicteric sclerae  ENT:  Trachea midline  Lungs:  Clear to auscultation bilaterally  Cardiovascular:  Regular rate and rhythm with audible S1 and S2  Abdomen:  Soft and nontender  Lymphatic:  No palpable lymphadenopathy    Skin:  Intact without erythema  Neurologic:  Sensation grossly intact to light touch  Psychiatric:  Mood and affect are appropriate  Data Review     I have personally reviewed pertinent films in PACS, and my interpretation follows:    CT scan left hip 2/28/21:  Acute slightly impacted subcapital femoral neck fracture  I have personally reviewed pertinent lab results  Lab Results   Component Value Date    K 3 5 03/01/2021     03/01/2021    CO2 28 03/01/2021    BUN 11 03/01/2021    CREATININE 0 73 03/01/2021     Lab Results   Component Value Date    WBC 6 19 03/01/2021    HGB 10 9 (L) 03/01/2021     03/01/2021     Lab Results   Component Value Date    INR 1 08 03/01/2021    PTT 26 03/01/2021       Past Medical History:   Diagnosis Date    Breast cancer ([address] )     Disease of thyroid gland     hypothyroidism    GERD (gastroesophageal reflux disease)     Hypertension     Rapid heart rate        Past Surgical History:   Procedure Laterality Date    BREAST SURGERY Left     lumpectomy     CHOLECYSTECTOMY      [place] COLONOSCOPY FLX DX W/COLLJ SPEC WHEN PFRMD N/A 6/26/2017    Procedure: COLONOSCOPY;  Surgeon: Bryce Gill MD;  Location: [facility]; Service: Gastroenterology       No Known Allergies    No current facility-administered medications on file prior to encounter        Current Outpatient Medications on File Prior to Encounter   Medication Sig Dispense Refill    anastrozole (ARIMIDEX) 1 mg tablet Take 1 mg by mouth daily      Calcium Carbonate-Vitamin D (CALTRATE 600+D PO) Take 1 tablet by mouth daily      ibuprofen (MOTRIN) 800 mg tablet Take 800 mg by mouth every 8 (eight) hours       levothyroxine 100 mcg tablet Take 137 mcg by mouth daily       metoprolol tartrate (LOPRESSOR) 100 mg tablet Take 100 mg by mouth daily       MULTIPLE VITAMIN PO Take 1 tablet by mouth daily      omeprazole (PriLOSEC) 40 MG capsule       Red Yeast Rice Extract (RED YEAST RICE PO) Take 1 tablet by mouth daily      verapamil (CALAN-SR) 180 mg CR tablet Take 180 mg by mouth daily       acetaminophen (TYLENOL) 500 mg tablet Take 650 mg by mouth every 6 (six) hours as needed for mild pain       zolpidem (AMBIEN) 5 mg tablet Take 1 tablet (5 mg total) by mouth once for 1 dose Prior to sleep study 1 tablet 0       Social History     Tobacco Use    Smoking status: Never Smoker    Smokeless tobacco: Never Used   Substance Use Topics    Alcohol use: Never     Frequency: Never    Drug use: Never       Family History   Problem Relation Age of Onset    Prostate cancer Father     Breast cancer Sister     Sleep apnea Son        Review of Systems     As stated in the HPI  All other systems were reviewed and are negative

## 2021-03-02 VITALS
BODY MASS INDEX: 36.44 KG/M2 | DIASTOLIC BLOOD PRESSURE: 63 MMHG | RESPIRATION RATE: 18 BRPM | HEIGHT: 65 IN | OXYGEN SATURATION: 93 % | SYSTOLIC BLOOD PRESSURE: 142 MMHG | TEMPERATURE: 98.2 F | HEART RATE: 61 BPM

## 2021-03-02 PROBLEM — Z01.818 PREOPERATIVE CLEARANCE: Status: RESOLVED | Noted: 2021-02-28 | Resolved: 2021-03-02

## 2021-03-02 LAB
ANION GAP SERPL CALCULATED.3IONS-SCNC: 9 MMOL/L (ref 4–13)
BASOPHILS # BLD AUTO: 0.02 THOUSANDS/ΜL (ref 0–0.1)
BASOPHILS NFR BLD AUTO: 0 % (ref 0–1)
BUN SERPL-MCNC: 14 MG/DL (ref 5–25)
CALCIUM SERPL-MCNC: 8.7 MG/DL (ref 8.3–10.1)
CHLORIDE SERPL-SCNC: 103 MMOL/L (ref 100–108)
CO2 SERPL-SCNC: 24 MMOL/L (ref 21–32)
CREAT SERPL-MCNC: 0.78 MG/DL (ref 0.6–1.3)
EOSINOPHIL # BLD AUTO: 0 THOUSAND/ΜL (ref 0–0.61)
EOSINOPHIL NFR BLD AUTO: 0 % (ref 0–6)
ERYTHROCYTE [DISTWIDTH] IN BLOOD BY AUTOMATED COUNT: 15 % (ref 11.6–15.1)
GFR SERPL CREATININE-BSD FRML MDRD: 73 ML/MIN/1.73SQ M
GLUCOSE SERPL-MCNC: 161 MG/DL (ref 65–140)
HCT VFR BLD AUTO: 34 % (ref 34.8–46.1)
HGB BLD-MCNC: 10.3 G/DL (ref 11.5–15.4)
IMM GRANULOCYTES # BLD AUTO: 0.07 THOUSAND/UL (ref 0–0.2)
IMM GRANULOCYTES NFR BLD AUTO: 1 % (ref 0–2)
LYMPHOCYTES # BLD AUTO: 1.03 THOUSANDS/ΜL (ref 0.6–4.47)
LYMPHOCYTES NFR BLD AUTO: 11 % (ref 14–44)
MCH RBC QN AUTO: 29.8 PG (ref 26.8–34.3)
MCHC RBC AUTO-ENTMCNC: 30.3 G/DL (ref 31.4–37.4)
MCV RBC AUTO: 98 FL (ref 82–98)
MONOCYTES # BLD AUTO: 0.44 THOUSAND/ΜL (ref 0.17–1.22)
MONOCYTES NFR BLD AUTO: 5 % (ref 4–12)
NEUTROPHILS # BLD AUTO: 7.69 THOUSANDS/ΜL (ref 1.85–7.62)
NEUTS SEG NFR BLD AUTO: 83 % (ref 43–75)
NRBC BLD AUTO-RTO: 0 /100 WBCS
PLATELET # BLD AUTO: 160 THOUSANDS/UL (ref 149–390)
PMV BLD AUTO: 10.8 FL (ref 8.9–12.7)
POTASSIUM SERPL-SCNC: 3.9 MMOL/L (ref 3.5–5.3)
RBC # BLD AUTO: 3.46 MILLION/UL (ref 3.81–5.12)
SODIUM SERPL-SCNC: 136 MMOL/L (ref 136–145)
WBC # BLD AUTO: 9.25 THOUSAND/UL (ref 4.31–10.16)

## 2021-03-02 PROCEDURE — 99238 HOSP IP/OBS DSCHRG MGMT 30/<: CPT | Performed by: SURGERY

## 2021-03-02 PROCEDURE — 80048 BASIC METABOLIC PNL TOTAL CA: CPT | Performed by: SURGERY

## 2021-03-02 PROCEDURE — 99232 SBSQ HOSP IP/OBS MODERATE 35: CPT | Performed by: PHYSICIAN ASSISTANT

## 2021-03-02 PROCEDURE — 85025 COMPLETE CBC W/AUTO DIFF WBC: CPT | Performed by: SURGERY

## 2021-03-02 PROCEDURE — 97163 PT EVAL HIGH COMPLEX 45 MIN: CPT

## 2021-03-02 PROCEDURE — NC001 PR NO CHARGE: Performed by: SURGERY

## 2021-03-02 PROCEDURE — 97110 THERAPEUTIC EXERCISES: CPT

## 2021-03-02 PROCEDURE — C9113 INJ PANTOPRAZOLE SODIUM, VIA: HCPCS | Performed by: ORTHOPAEDIC SURGERY

## 2021-03-02 PROCEDURE — 99024 POSTOP FOLLOW-UP VISIT: CPT | Performed by: PHYSICIAN ASSISTANT

## 2021-03-02 PROCEDURE — 97167 OT EVAL HIGH COMPLEX 60 MIN: CPT

## 2021-03-02 RX ORDER — LEVOTHYROXINE SODIUM 112 UG/1
112 TABLET ORAL
Refills: 0
Start: 2021-03-03

## 2021-03-02 RX ORDER — PANTOPRAZOLE SODIUM 40 MG/1
40 TABLET, DELAYED RELEASE ORAL DAILY
Status: DISCONTINUED | OUTPATIENT
Start: 2021-03-03 | End: 2021-03-02 | Stop reason: HOSPADM

## 2021-03-02 RX ORDER — SENNOSIDES 8.6 MG
17.2 TABLET ORAL
Refills: 0
Start: 2021-03-02

## 2021-03-02 RX ORDER — OXYCODONE HYDROCHLORIDE 5 MG/1
2.5 TABLET ORAL EVERY 4 HOURS PRN
Qty: 20 TABLET | Refills: 0 | Status: SHIPPED | OUTPATIENT
Start: 2021-03-02 | End: 2021-03-12

## 2021-03-02 RX ORDER — ASPIRIN 81 MG/1
81 TABLET ORAL DAILY
Status: DISCONTINUED | OUTPATIENT
Start: 2021-03-02 | End: 2021-03-02 | Stop reason: HOSPADM

## 2021-03-02 RX ORDER — METOPROLOL TARTRATE 50 MG/1
50 TABLET, FILM COATED ORAL EVERY 12 HOURS SCHEDULED
Refills: 0
Start: 2021-03-02 | End: 2022-08-05

## 2021-03-02 RX ORDER — SENNOSIDES 8.6 MG
2 TABLET ORAL
Status: DISCONTINUED | OUTPATIENT
Start: 2021-03-02 | End: 2021-03-02 | Stop reason: HOSPADM

## 2021-03-02 RX ORDER — ASPIRIN 81 MG/1
81 TABLET ORAL DAILY
Refills: 0
Start: 2021-03-03

## 2021-03-02 RX ORDER — LEVOTHYROXINE SODIUM 0.03 MG/1
25 TABLET ORAL
Refills: 0
Start: 2021-03-03

## 2021-03-02 RX ORDER — ACETAMINOPHEN 325 MG/1
975 TABLET ORAL EVERY 8 HOURS SCHEDULED
Refills: 0
Start: 2021-03-02 | End: 2022-08-05

## 2021-03-02 RX ORDER — METOPROLOL TARTRATE 50 MG/1
50 TABLET, FILM COATED ORAL EVERY 12 HOURS SCHEDULED
Status: DISCONTINUED | OUTPATIENT
Start: 2021-03-02 | End: 2021-03-02 | Stop reason: HOSPADM

## 2021-03-02 RX ORDER — DOCUSATE SODIUM 100 MG/1
100 CAPSULE, LIQUID FILLED ORAL 2 TIMES DAILY
Qty: 10 CAPSULE | Refills: 0
Start: 2021-03-02

## 2021-03-02 RX ADMIN — DOCUSATE SODIUM 100 MG: 100 CAPSULE, LIQUID FILLED ORAL at 09:21

## 2021-03-02 RX ADMIN — VERAPAMIL HYDROCHLORIDE 180 MG: 180 TABLET, FILM COATED, EXTENDED RELEASE ORAL at 09:22

## 2021-03-02 RX ADMIN — OXYCODONE HYDROCHLORIDE 2.5 MG: 5 TABLET ORAL at 11:09

## 2021-03-02 RX ADMIN — LEVOTHYROXINE SODIUM 112 MCG: 112 TABLET ORAL at 06:05

## 2021-03-02 RX ADMIN — ENOXAPARIN SODIUM 30 MG: 30 INJECTION SUBCUTANEOUS at 09:21

## 2021-03-02 RX ADMIN — ANASTROZOLE 1 MG: 1 TABLET ORAL at 09:22

## 2021-03-02 RX ADMIN — LEVOTHYROXINE SODIUM 25 MCG: 25 TABLET ORAL at 06:05

## 2021-03-02 RX ADMIN — ASPIRIN 81 MG: 81 TABLET, COATED ORAL at 09:20

## 2021-03-02 RX ADMIN — ACETAMINOPHEN 975 MG: 325 TABLET, FILM COATED ORAL at 06:06

## 2021-03-02 RX ADMIN — SODIUM CHLORIDE, SODIUM GLUCONATE, SODIUM ACETATE, POTASSIUM CHLORIDE, MAGNESIUM CHLORIDE, SODIUM PHOSPHATE, DIBASIC, AND POTASSIUM PHOSPHATE 75 ML/HR: .53; .5; .37; .037; .03; .012; .00082 INJECTION, SOLUTION INTRAVENOUS at 02:32

## 2021-03-02 RX ADMIN — Medication 1 TABLET: at 09:20

## 2021-03-02 RX ADMIN — METOPROLOL TARTRATE 25 MG: 25 TABLET, FILM COATED ORAL at 09:21

## 2021-03-02 RX ADMIN — PANTOPRAZOLE SODIUM 40 MG: 40 INJECTION, POWDER, FOR SOLUTION INTRAVENOUS at 09:21

## 2021-03-02 RX ADMIN — OXYCODONE HYDROCHLORIDE 5 MG: 5 TABLET ORAL at 02:59

## 2021-03-02 RX ADMIN — ACETAMINOPHEN 975 MG: 325 TABLET, FILM COATED ORAL at 14:33

## 2021-03-02 RX ADMIN — CEFAZOLIN SODIUM 1000 MG: 1 SOLUTION INTRAVENOUS at 06:06

## 2021-03-02 NOTE — PHYSICAL THERAPY NOTE
Physical Therapy Evaluation     Patient Name: Starr Saldana    RFRXF'H Date: 3/2/2021     Problem List  Principal Problem:    Left femoral neck fracture  Active Problems:    Essential hypertension    GERD (gastroesophageal reflux disease)    Hypothyroidism    SAKSHI (obstructive sleep apnea)    History of breast cancer    Left knee pain    Rapid heart beat    Class 2 obesity in adult       Past Medical History  Past Medical History:   Diagnosis Date    Breast cancer ([address] )     Disease of thyroid gland     hypothyroidism    GERD (gastroesophageal reflux disease)     Hypertension     Rapid heart rate         Past Surgical History  Past Surgical History:   Procedure Laterality Date    BREAST SURGERY Left     lumpectomy     CHOLECYSTECTOMY      ORIF HIP FRACTURE Left 3/1/2021    Procedure: OPEN REDUCTION W/ INTERNAL FIXATION (ORIF) HIP WITH CANNUALATED SCREWS;  Surgeon: Keith Reyes MD;  [facility];  Service: Orthopedics    [place] COLONOSCOPY FLX DX W/COLLJ SPEC WHEN PFRMD N/A 6/26/2017    Procedure: COLONOSCOPY;  Surgeon: Norberto Leal MD;  [facility]; Service: Gastroenterology         03/02/21 1218   PT Last Visit   PT Visit Date 03/02/21   Note Type   Note type Evaluation  (EVAL [id number] [place] [phone])   Pain Assessment   Pain Assessment Tool 0-10   Pain Score 2   Pain Location/Orientation Orientation: Left; Location: Hip;Location: Leg   Pain Onset/Description Descriptor: Sore   Patient's Stated Pain Goal No pain   Hospital Pain Intervention(s) Ambulation/increased activity; Elevated; Emotional support   Home Living   Type of Home Mobile home   Home Layout One level  (3 yuni)   Home Equipment Walker;Cane  (LIFT RECLINER AND "ELECTRIC BED" )   Additional Comments RW IN AM - GRADUATING TO [facility] [place]OUGHOUT DAY   Prior Function   Level of [place] Independent with ADLs and functional mobility   Lives With Spouse   Receives Help From Family   ADL Assistance Independent   IADLs Independent  ( )   Falls in the last 6 months 1 to 4   Vocational Retired   Restrictions/Precautions   Wells Romeo Bearing Precautions Per Order Yes   LLE Wells Tom Bearing Per Order WBAT   General   Family/Caregiver Present No   Cognition   Overall Cognitive Status WFL   Attention Within functional limits   Orientation Level Oriented X4   Memory Within functional limits   Following Commands Follows all commands and directions without difficulty   RLE Assessment   RLE Assessment WFL   LLE Assessment   LLE Assessment X  (HIP PAIN LIMITED - WFL 5/5 KNEE AND ANKLE )   Coordination   Sensation WFL   Light Touch   RLE Light Touch Grossly intact   LLE Light Touch Grossly intact   Bed Mobility   Supine to Sit 5  Supervision  (CGA CUES )   Transfers   Sit to Stand 5  Supervision  (CGA INITIALLY- PROGRESSING TO S )   Stand to Sit 5  Supervision  (CGA INITIALLY PROGRESSING TO S )   Ambulation/Elevation   Gait pattern Excessively slow;Decreased foot clearance;Decreased L stance   Gait Assistance 5  Supervision   Additional items Verbal cues  (FOR SEQUENCING W/ RW AND UPRIGHT POSTURE/ )   Assistive Device Rolling walker   Distance 100'X2 W/ STAIRS IN BETWEEN - CUES FOR STEP THROUGH GAIT PATTERN AND INTIIATION OF HEEL STRIKE   Balance   Static Sitting Normal   Dynamic Sitting Good   Static Standing Good   Dynamic Standing Fair +   Ambulatory Fair +  (RW)   Activity Tolerance   Activity Tolerance Patient tolerated treatment well;Patient limited by pain   Medical Staff Made Aware OT JOHNATHAN    Assessment   Prognosis Excellent   Problem List Decreased strength;Decreased range of motion;Decreased endurance; Impaired balance;Decreased mobility;Obesity; Decreased skin integrity;Orthopedic restrictions;Pain   Assessment Pt is 68 y o  female  has a past medical history of Breast cancer ([facility] ), Disease of thyroid gland, GERD (gastroesophageal reflux disease), Hypertension, and Rapid heart rate   seen for PT evaluation s/p admit to [address] [place] [address] on 2/28/2021  Pt presenting s/p fall ~3 week prior w/ persistent L hip pain- imaging revealing L femoral neck fx and pt underwent  ORIF 3/1/21 w/ orders for WBAT post op  Personal factors affecting pt at time of IE include: steps to enter environment,  advanced age, past experience, inability to perform IADLs, mild impulsivity (baseline) anxiety Due to acute medical issues, ongoing medical management post op; pain, fall risk, increased reliance on more restrictive AD compared to baseline;  decreased activity tolerance compared to baseline, increased assistance needed from caregiver at current time, continuous monitoring, multiple lines, decline in overall functional mobility status; health management issues; note unstable clinical picture (high complexity)   Prior to admission, pt was MI w/ RW and living w/ spouse in a mobility home w/ 3 ROMERO rails and was I w/ ADl's   Currently pt  is requiring close S A for bed skills w/ cues for technique S  for functional transfers and close S for ambulation w/ RW and close S for stair training w/ L rail   Pt presentscurrently w/ overall mobility deficits 2* to:  Post op pain dn edema; decreased LE strength/AROM; limited flexibility; obesity;impaired balance; gait deviations;  (Please find additional objective findings from PT assessment regarding body systems outlined above ) Pt will continue to benefit from skilled PT interventions to address stated impairments; to maximize functional potential; for ongoing pt/ family training; and DME needs  Pt recommending home w/ family support and [facility] PT RW when medically cleared- (PT TO [address] MEDICALLY STABLE W/ ABOVE RECS) Pt/ family agreeable to plan and goals as stated on evaluation       Barriers to Discharge None   Goals   Patient Goals GO HOME    Short Term Goal #1 In 10 days pt will complete: 1) Bed mobility skills with INDEP to facilitate safe return to previous living environment 2) Functional transfers with INDEP  to facilitate safe return to previous living environment  3) Ambulation with least restrictive AD INDEP' > 250 without LOB and stable vitals for safe ambulation home/ community distances  4) Stair training up/ down 5 step/s with 1 rail/s  and INDEP for safe access to previous living environment  5) Improve balance grades to Good 6) Improve LE strength grades by 1 to increase independence w/ transfers and gait  7) LE HEP independently  8) PT for ongoing pt and family education; DME needs and D/C planning to promote highest level of function in least restrictive environment  Plan   Treatment/Interventions Functional transfer training;LE strengthening/ROM; Elevations; Therapeutic exercise; Endurance training;Patient/family training;Equipment eval/education; Bed mobility;Gait training;Spoke to nursing;Spoke to case management;Spoke to advanced practitioner;OT   PT Frequency   (3-6X/WK )   Recommendation   PT Discharge Recommendation Home with skilled therapy; Return to previous environment with social support   Equipment Recommended Walker  ( [facility] )   [facility] 8 in Bed Without Bedrails 3   Lying on Back to Sitting on Edge of Flat Bed 3   Moving Bed to Chair 3   Standing Up From Chair 3   Walk in Room 3   Climb 3-5 Stairs 3   Basic Mobility Inpatient Raw Score 18   Basic Mobility Standardized Score 41 05           PHYSICAL THERAPY TREATMENT [id number]  Pt seen for skilled PT session following evaluation consisting of instruction in post op seated/ semi supine therex/ HEP instruction; for increased LE strengthening to assist w/ increasing independence w/ transfers and gait  LLE edema control and for AAROM  AP, LAQ GS abd/ add in semireclined position 10 reps- STS from chair w/ and w/o arm rest and simulated car xfer instruction w/ S - all tolerated well   Pt tolerates therex w/o complaints or increase in pain  Will continue to  benefit from repeated instruction for correct technique and compliance  Pt upright in recliner for lunch post session         Wojciech Rooney, PT

## 2021-03-02 NOTE — ASSESSMENT & PLAN NOTE
TSH normal in December 2020 @ [facility]  Continue current Synthroid regimen (137 mcg QAM)

## 2021-03-02 NOTE — DISCHARGE SUMMARY
Discharge Summary - Trauma Service   Scott Navarrete 68 y o  female MRN: [id number]  Unit/Bed#: S -01 Encounter: [id number]    Admission Date: 2/28/2021     Discharge Date: 3/2/2021    Admitting Diagnosis: Hip injury [S79 919A]  Hip pain [M25 559]  Closed fracture of neck of left femur, initial encounter ([facility] ) [S72 002A]    Discharge Diagnosis:   Left knee pain  Assessment & Plan  · Left knee XR shows no osseous abnormality  · Feeling better today  · Multimodal pain control  · PT OT       History of breast cancer  Assessment & Plan  · S/p lumpectomy  · Home anastrozole ordered     Hypothyroidism  Assessment & Plan  · Home levothyroxine ordered     GERD (gastroesophageal reflux disease)  Assessment & Plan  · Protonix ordered     Essential hypertension  Assessment & Plan  · SLIM consult  · Patient takes 180 mg of verapamil and 100 mg of metoprolol, daily  · Metoprolol restarted pre op yesterday  · Home verapamil ordered  PRN Hydralazine as needed  · Is requiring hydralazine for high BPs  Will monitor     * Left femoral neck fracture  Assessment & Plan  Orthopedics consulted  POD #1 s/p ORIF  Hip precautions  Multimodal pain control  Restart ASA    PT/OT post op eval pending    Attending and Service: Dr Cecil Denise, Acute Care Surgical Services  Consulting Physician(s):   [facility]  SL    Imaging and Procedures Performed:   Orders Placed This Encounter   Procedures    ED ECG Documentation Only    ED ECG Documentation Only   SURGERY DATE: 3/1/2021   Surgeon(s) and Role: Jasmina Beltran MD - Primary   Preop Diagnosis:Closed fracture of neck of left femur ([facility] ) Brenna Alejandro   Post-Op Diagnosis Codes: Closed fracture of neck of left femur ([facility] ) [S72 002A]   Procedure(s) (LRB): PERCUTANEOUS FIXATION OF FEMORAL NECK FRACTURE (Left)    Xr Hip/pelv 2-3 Vws Left If Performed    Result Date: 3/1/2021  Impression: Fluoroscopy provided for procedure guidance    Workstation performed: [employer] Course: Arsalan Walton is a 68-year-old F with history of hypothyroidism, hypertension, fast heart rate, GERD and breast cancer on Arimidex presented to the emergency department for evaluation of left hip pain 3 weeks after slipping and falling on ice  Her pain was well controlled with p o  Pain meds however she did present to her PCP for evaluation who ordered an x-ray of her pelvis on 02/23 revealing a fracture of the left femoral neck  Patient was told to go to the ED for evaluation hence the trauma service has been consulted and patient was admitted  [facility] evaluated the patient and on March 1st she was taken to the operating room by Dr Herbert Ferraro for a percutaneous fixation of her left femoral neck fracture  She has tolerated the procedure well and today with cleared by Physical therapy for discharge home on postop day 1  Her pain is controlled with p o  Pain medication and Lovenox teaching will be performed by nursing prior to discharge home  [facility] service provided medical support during her hospitalization  They have recommended she take her metoprolol b i d  Dosing 50 mg instead of 100 mg every morning  This has been does discussed with the patient and she is agreeable  Home PT and OT have been ordered for the patient and she will follow up with Dr Herbert Ferraro in 2 weeks outpatient  On discharge, the patient is instructed to follow-up with the patient's primary care provider to review the events of the patient's recent hospitalization  The patient should follow the provided discharge instructions  Condition at Discharge: good     Discharge instructions/Information to patient and family:   See after visit summary for information provided to patient and family  Provisions for Follow-Up Care:  See after visit summary for information related to follow-up care and any pertinent home health orders        Disposition: Home    Planned Readmission: No    Discharge Statement   I spent 20 minutes discharging the patient  This time was spent on the day of discharge  I had direct contact with the patient on the day of discharge  Additional documentation is required if more than 30 minutes were spent on discharge  Discharge Medications:  See after visit summary for reconciled discharge medications provided to patient and family        Niurka Henriquez PA-C  3/2/2021  2:03 PM

## 2021-03-02 NOTE — CASE MANAGEMENT
CM informed by trauma that Patient will be prescribed Lovenox on dc  Patient's medications sent to [facility]  CM spoke to Phill Maradiaga at [facility] to review Patient's costs  Lovenox will cost $157 40 and pain medication is $1 40  CM reviewed these costs with the Patient  Patient is able to afford these medications and will plan to have her granddaughter pick these up when she comes to the hospital      Patient has been accepted by [facility]  Faxed AVS to [phone]

## 2021-03-02 NOTE — ASSESSMENT & PLAN NOTE
· Stop IV fluids  · Continue Verapamil at current dose  · Patient reports she is usually on metoprolol 100 mg q a m  States she has been on this for a long time and during a period of time where she was taken off of it by her family doctor she had spells of rapid heart rate" though she denies any known diagnosis of AFib  Noted that she has been running mildly bradycardic during this stay, but with initially uncontrolled hypertension in the 441-298 systolic range  Now remains in the 160 range  Yesterday I resumed a decreased dose of metoprolol and split it to 25 b i d  with parameters  Today I feel we can go back up to her usual home dose of 100 mg total but would still suggest splitting it into 50 mg twice a day dosing  · She reports some sort of "allergic reaction" to a previously attempted alternative antihypertensive agent though she does not know which one and is not describing angioedema or cough    Therefore I would suggest deferring to her family doctor regarding any other changes in the future  · PRN IV Hydralazine on board for BP spikes

## 2021-03-02 NOTE — PROGRESS NOTES
Progress Note - Karen Bridges 1940, 68 y o  female MRN: [id number]    Unit/Bed#: S -01 Encounter: [id number]    Primary Care Provider: Scott Greco MD   Date and time admitted to hospital: 2/28/2021  3:08 PM        Left knee pain  Assessment & Plan  · Left knee XR shows no osseous abnormality  · Feeling better today  · Multimodal pain control  · PT OT      History of breast cancer  Assessment & Plan  · S/p lumpectomy  · Home anastrozole ordered    Hypothyroidism  Assessment & Plan  · Home levothyroxine ordered    GERD (gastroesophageal reflux disease)  Assessment & Plan  · Protonix ordered    Essential hypertension  Assessment & Plan  · SLIM consult  · Patient takes 180 mg of verapamil and 100 mg of metoprolol, daily  · Metoprolol restarted pre op yesterday  · Home verapamil ordered  PRN Hydralazine as needed  · Is requiring hydralazine for high BPs   Will monitor    * Left femoral neck fracture  Assessment & Plan  Orthopedics consulted  POD #1 s/p ORIF  Hip precautions  Multimodal pain control  Restart ASA    PT/OT post op eval pending          Disposition: pending PT/OT evaluation      SUBJECTIVE:  Chief Complaint: no complaints    Subjective: slight pain L hip  No CP or SOB      OBJECTIVE:     Meds/Allergies     Current Facility-Administered Medications:     acetaminophen (TYLENOL) tablet 975 mg, 975 mg, Oral, Q8H [address], Edi Melendrez MD, 975 mg at 03/02/21 0606    anastrozole (ARIMIDEX) tablet 1 mg, 1 mg, Oral, Daily, Edi Melendrez MD, Stopped at 03/01/21 0917    calcium carbonate-vitamin D (OSCAL-D) 500 mg-200 units per tablet 1 tablet, 1 tablet, Oral, Daily With Breakfast, Edi Melendrez MD    docusate sodium (COLACE) capsule 100 mg, 100 mg, Oral, BID, Edi Melendrez MD, 100 mg at 03/01/21 1712    hydrALAZINE (APRESOLINE) injection 5 mg, 5 mg, Intravenous, Q6H PRN, Edi Melendrez MD, 5 mg at 03/01/21 0449    HYDROmorphone (DILAUDID) injection 0 2 mg, 0 2 mg, Intravenous, Q4H PRN, Orlean Bar Jaylon Lincoln MD    levothyroxine tablet 112 mcg, 112 mcg, Oral, Early Morning, Ruthie Bumpers, MD, 112 mcg at 03/02/21 0605    levothyroxine tablet 25 mcg, 25 mcg, Oral, Early Morning, Ruthie Bumpers, MD, 25 mcg at 03/02/21 0605    metoprolol tartrate (LOPRESSOR) tablet 25 mg, 25 mg, Oral, Q12H [facility], Ruthie Bumpers, MD, 25 mg at 03/01/21 2117    multi-electrolyte (PLASMALYTE-A/ISOLYTE-S PH 7 4) IV solution, 75 mL/hr, Intravenous, Continuous, Ruthie Bumpers, MD, Last Rate: 75 mL/hr at 03/02/21 0232, 75 mL/hr at 03/02/21 0232    naloxone (NARCAN) 0 04 mg/mL syringe 0 04 mg, 0 04 mg, Intravenous, Q1MIN PRN, Ruthie Bumpers, MD    ondansetron [facility]) injection 4 mg, 4 mg, Intravenous, Q4H PRN, Ruthie Bumpers, MD    oxyCODONE (ROXICODONE) IR tablet 2 5 mg, 2 5 mg, Oral, Q4H PRN, Ruthie Bumpers, MD    oxyCODONE (ROXICODONE) IR tablet 5 mg, 5 mg, Oral, Q4H PRN, Ruthie Bumpers, MD, 5 mg at 03/02/21 0259    pantoprazole (PROTONIX) injection 40 mg, 40 mg, Intravenous, Q24H [facility], Ruthie Bumpers, MD, 40 mg at 03/01/21 0936    verapamil (CALAN-SR) CR tablet 180 mg, 180 mg, Oral, Daily, Ruthie Bumpers, MD, 180 mg at 03/01/21 0932     Vitals:   Vitals:    03/02/21 0713   BP: 164/66   Pulse: 56   Resp: 18   Temp: 98 °F (36 7 °C)   SpO2: 93%       Intake/Output:  I/O       02/28 0701 - 03/01 0700 03/01 0701 - 03/02 0700 03/02 0701 - 03/03 0700    I  V   500     Total Intake  500     Urine 500 900     Total Output 500 900     Net -500 -400                   Nutrition/GI Proph/Bowel Reg: Regular/colace/senna    Physical Exam:   GENERAL APPEARANCE: NAD  NEURO: GCS-15  HEENT: NCAT  CV: RRR  LUNGS: CTAB  GI: nontender  : voiding  MSK: NVI x4, RENDON x4  SKIN: warm and dry    Invasive Devices     Peripheral Intravenous Line            Peripheral IV 02/28/21 Right Hand 1 day    Peripheral IV 03/01/21 Right Forearm less than 1 day                 Lab Results:   BMP/CMP:   Lab Results   Component Value Date    SODIUM 136 03/02/2021    K 3 9 03/02/2021    CL 103 03/02/2021    CO2 24 03/02/2021    BUN 14 03/02/2021    CREATININE 0 78 03/02/2021    CALCIUM 8 7 03/02/2021    EGFR 73 03/02/2021    and CBC:   Lab Results   Component Value Date    WBC 9 25 03/02/2021    HGB 10 3 (L) 03/02/2021    HCT 34 0 (L) 03/02/2021    MCV 98 03/02/2021     03/02/2021    MCH 29 8 03/02/2021    MCHC 30 3 (L) 03/02/2021    RDW 15 0 03/02/2021    MPV 10 8 03/02/2021    NRBC 0 03/02/2021     Imaging/EKG Studies: Results: I have personally reviewed pertinent reports      Other Studies:    VTE Prophylaxis: Sequential compression device (Venodyne)  and Enoxaparin (Lovenox)

## 2021-03-02 NOTE — PROGRESS NOTES
The pantoprazole has / have been converted to Oral per [facility] IV-to-PO Auto-Conversion Protocol for Adults as approved by the Pharmacy and Therapeutics Committee  The patient met all eligible criteria:  3 Age = 25years old   2) Received at least one dose of the IV form   3) Receiving at least one other scheduled oral/enteral medication   4) Tolerating an oral/enteral diet   and did not have any exclusions:   1) Critical care patient   2) Active GI bleed (IF assessing H2RAs or PPIs)   3) Continuous tube feeding (IF assessing cipro, doxycycline, levofloxacin, minocycline, rifampin, or voriconazole)   4) Receiving PO vancomycin (IF assessing metronidazole)   5) Persistent nausea and/or vomiting   6) Ileus or gastrointestinal obstruction   7) Whit/nasogastric tube set for continuous suction   8) Specific order not to automatically convert to PO (in the order's comments or if discussed in the most recent Infectious Disease or primary team's progress notes)

## 2021-03-02 NOTE — QUICK NOTE
Trauma   Post-Op Check Progress Note   Leo Gonzalez 68 y o  female MRN: [id number]  Unit/Bed#: S -01 Encounter: [id number]    Assessment and Plan:    57-year-old female with Left femoral neck fracture status post percutaneous fixation of femoral neck fracture Left    - P R N  Analgesia  - Encouraged incentive spirometry use  Subjective/Objective     Subjective: Feeling good, pain is minimal and well controlled with oral medications    Objective:     Blood pressure 134/60, pulse 66, temperature 98 1 °F (36 7 °C), temperature source Oral, resp  rate 18, SpO2 95 %  ,There is no height or weight on file to calculate BMI  Intake/Output Summary (Last 24 hours) at 3/1/2021 2041  Last data filed at 3/1/2021 1712  Gross per 24 hour   Intake 500 ml   Output 800 ml   Net -300 ml       Invasive Devices     Peripheral Intravenous Line            Peripheral IV 02/28/21 Right Hand 1 day    Peripheral IV 03/01/21 Right Forearm less than 1 day                Physical Exam:    GENERAL APPEARANCE: Patient in no acute distress  HEENT: NCAT; PERRL, EOMs intact; Mucous membranes moist  CV: Regular rate and rhythm; + S1, S2; no murmur/gallops/rubs appreciated  LUNGS: Clear to auscultation; no wheezes/rales/rhonci  ABD: NABS; soft; non-distended; non-tender  EXT: +2 pulses bilaterally upper & lower extremities; no clubbing/cyanosis/edema  LLE DNVI  NEURO: GCS 15; no focal neurologic deficits; neurovascularly intact  SKIN: Warm, dry and well perfused; no rash; no jaundice                  Patti Gooden PA-C  3/1/2021

## 2021-03-02 NOTE — CASE MANAGEMENT
CM met with the Patient to review Patient's progress with PT/OT  Patient reports that it went very well and she feels safe to return home with [facility]  Patient reports that PT/OT also made this recommendation  CM reviewed that a referral would be sent to [facility] as previously discussed  Patient is also requesting a RW from [place] [address]  to return home with  CM sent referral to [facility] for HHPT/OT via [address]  CM dept will follow for acceptance  CM sent referral for RW to [facility] via [facility]  CM dept will follow for delivery

## 2021-03-02 NOTE — OCCUPATIONAL THERAPY NOTE
Occupational Therapy Evaluation     Patient Name: Deni Dick  Today's Date: 3/2/2021  Problem List  Principal Problem:    Left femoral neck fracture  Active Problems:    Essential hypertension    GERD (gastroesophageal reflux disease)    Hypothyroidism    SAKSHI (obstructive sleep apnea)    History of breast cancer    Left knee pain    Rapid heart beat    Class 2 obesity in adult    Past Medical History  Past Medical History:   Diagnosis Date    Breast cancer ([facility] [address] )     Disease of thyroid gland     hypothyroidism    GERD (gastroesophageal reflux disease)     Hypertension     Rapid heart rate      Past Surgical History  Past Surgical History:   Procedure Laterality Date    BREAST SURGERY Left     lumpectomy     CHOLECYSTECTOMY      ORIF HIP FRACTURE Left 3/1/2021    Procedure: OPEN REDUCTION W/ INTERNAL FIXATION (ORIF) HIP WITH CANNUALATED SCREWS;  Surgeon: Baron Christo MD;  [facility]: [facility];  Service: Orthopedics    [place] COLONOSCOPY FLX DX W/COLLJ SPEC WHEN PFRMD N/A 6/26/2017    Procedure: COLONOSCOPY;  Surgeon: Sindhu Glover MD;  Location: [facility];   Service: Gastroenterology             03/02/21 1203   OT Last Visit   OT Visit Date 03/02/21   Note Type   Note type Evaluation   Pain Assessment   Pain Assessment Tool 0-10   Pain Score 2  (at rest)   Home Living   Type of Home Mobile home   Home Layout One level;Stairs to enter with rails  (3 ROMERO c HR)   Bathroom Shower/Tub Tub/shower unit  (reports using RW for support getting in/out)   Bathroom Toilet Standard   Bathroom Equipment   (none)   [address] Walker;Cane  (2 lift recliners, lift bed)   Additional Comments use of RW in mornings, graduating to [facility] throughout the day   Prior Function   Lives With Spouse  (+ 21 y o  granddaughter)   Receives Help From Family   ADL Assistance Independent  (A with 1 sock )   IADLs Independent  [facility]   Falls in the last 6 months 1 to 4  (1 fall, reason for admission)   Vocational Retired   Comments (+)drives   Lifestyle   Autonomy PTA pt living in mobile home with  and grandaughter, pt (I) with ADLs and IADLs, use of RW at baseline, (+)fall, (+)drives   Reciprocal Relationships reports 24/7 (S) and (A) if needed   Service to Others retired   Intrinsic Gratification cleaning the house, reports recently shampooing the carpet   Subjective   Subjective "I want to go home today"   ADL   Eating Assistance 7  Independent   Grooming Assistance 5  Supervision/Setup   Grooming Deficit Increased time to complete;Supervision/safety;Verbal cueing;Standing with assistive device   UB Bathing Assistance 5  Supervision/Setup   LB Bathing Assistance 4  Minimal Assistance   UB Dressing Assistance 5  Supervision/Setup   LB Dressing Assistance 4  Minimal Assistance   LB Dressing Deficit Increased time to complete;Supervision/safety;Verbal cueing; Don/doff R sock; Don/doff L sock  (A with socks able to don/doff underwear )   Toileting Assistance  5  Supervision/Setup   Bed Mobility   Supine to Sit 4  Minimal assistance   Additional items Assist x 1; Increased time required;Verbal cues;LE management   Transfers   Sit to Stand 4  Minimal assistance  (CGA)   Additional items Assist x 1; Increased time required;Verbal cues; Impulsive   Stand to Sit 4  Minimal assistance  (CGA)   Additional items Assist x 1; Increased time required; Impulsive;Verbal cues   Functional Mobility   Functional Mobility 4  Minimal assistance   Additional Comments Ax1, functional household distance   Additional items Rolling walker   Balance   Static Sitting Normal   Dynamic Sitting Good   Static Standing Good   Dynamic Standing Fair +   Ambulatory Fair -   Activity Tolerance   Activity Tolerance Patient tolerated treatment well   Medical Staff Made Aware PT Festus Lobato, FRANK ARENAS Assessment   RUE Assessment WFL   LUE Assessment   LUE Assessment WFL   Hand Function   Gross Motor Coordination Functional Fine Motor Coordination Functional   Sensation   Light Touch No apparent deficits   Sharp/Dull No apparent deficits   Cognition   Overall Cognitive Status WFL   Arousal/Participation Alert; Cooperative   Attention Within functional limits   Orientation Level Oriented X4   Memory Within functional limits   Following Commands Follows one step commands without difficulty   Comments Pleasant and cooperative   Assessment   Limitation Decreased ADL status; Decreased UE strength;Decreased Safe judgement during ADL;Decreased endurance;Decreased high-level ADLs; Decreased self-care trans   Prognosis Good   Assessment Patient is a 68 y o  female admitted to [facility] on 2/28/2021 due to Femoral neck fracture ([facility] [address] )  Pt is currently POD#1 s/p CRPP of L femoral neck fracture  Pt is currently WBAT on LLE  Comorbidities affecting pt's physical performance at time of assessment include HTN, GERD, hypothyroidism, SAKSHI, hx breast cancer, L knee pain, rapid heart beat, obesity  Patient has active OT orders and activity orders for ambulate with assistive device  PTA pt living in mobile home with  and grandaughter, pt (I) with ADLs and IADLs, use of RW at baseline, (+)fall, (+)drives  Personal factors affecting pt at time of IE include:steps to enter environment, difficulty performing ADLS and difficulty performing IADLS   At the time of evaluation patient currently requires (S) for UB ADLs, min A for LB ADLs, min A for functional transfers, and min A for functional mobility  The following deficits affected patient's occupational performance weakness, decreased functional strength, decreased functional balance, decreased activity tolerance, decreased safety awareness, increased pain, orthopedic restrictions and decreased coping skills  Patient would benefit from skilled OT services while in the hospital to address above deficits   Occupational performance areas to be addressed include ADL retraining, bed mobility, functional transfer training, endurance training, patient/family training, equipment evaluation/education, compensatory technique education, activity engagement and activity tolerance in order to maximize patient's level of function  The patient's raw score on the AM-PAC Daily Activity inpatient short form is 21, standardized score is 44 27, greater than 39 4  Patients at this level are likely to benefit from DC to home  From OT standpoint recommend Home OT upon D/C  Will continue to follow pt 3-5x/week to address the goals listed below evaluation  Goals   Patient Goals to go home today   LTG Time Frame 10-14   Long Term Goal see goals listed below   Plan   Treatment Interventions ADL retraining;Functional transfer training;UE strengthening/ROM; Endurance training;Patient/family training;Equipment evaluation/education; Compensatory technique education; Activityengagement; Energy conservation   Goal Expiration Date 03/16/21   OT Treatment Day 0   OT Frequency 3-5x/wk   Recommendation   OT Discharge Recommendation Home with skilled therapy  (HHOT vs OP OT pend pt preference)   AM-PAC Daily Activity Inpatient   Lower Body Dressing 3   Bathing 3   Toileting 3   Upper Body Dressing 4   Grooming 4   Eating 4   Daily Activity Raw Score 21   Daily Activity Standardized Score (Calc for Raw Score >=11) 44 27   AM-[facility] Applied Cognition Inpatient   Following a Speech/Presentation 4   Understanding Ordinary Conversation 4   Taking Medications 4   Remembering Where Things Are Placed or Put Away 4   Remembering List of 4-5 Errands 4   Taking Care of Complicated Tasks 4   Applied Cognition Raw Score 24   Applied Cognition Standardized Score 62 21         Goals    -Patient will complete UB ADLs w/ mod I using AE and AD as needed    -Patient will complete LB ADLs w/ mod I using AE and AD as needed    -Patient will complete toileting w/ mod I w/ G hygiene/thoroughness    -Patient will complete bed mobility with Mod I without use of bed rails    -Patient will tolerate therapeutic activities for greater than 15 min, in order to increase tolerance for functional activities      -Patient will perform functional transfers with Mod I to/from all surfaces using DME as needed    -Patient will complete functional mobility during ADL/IADL/leisure tasks with Mod I     -Patient will participate in simulated IADL management task to increase independence to Mod I w/ G safety and endurance        At the end of the session, all needs met and pt seated in bedside chair, chair alarm activated and call bell within reach    [facility], OTR/L

## 2021-03-02 NOTE — ASSESSMENT & PLAN NOTE
Patient admitted by Trauma Service after slip and fall on ice with associated left-sided hip pain   CT imaging noting an "Acute slightly impacted subcapital left femoral neck fracture"   Postop day 1 Status post left CRPP per orthopedic team  She has mild chronic anemia without any significant postoperative loss  XR of left knee no injury  PRN pain control  Recommend postoperative DVT prophylaxis  PT OT to eval  Further management per primary service (trauma)

## 2021-03-02 NOTE — ASSESSMENT & PLAN NOTE
· Left knee XR shows no osseous abnormality  · Feeling better today    · Multimodal pain control  · PT OT

## 2021-03-02 NOTE — PLAN OF CARE
Problem: PHYSICAL THERAPY ADULT  Goal: Performs mobility at highest level of function for planned discharge setting  See evaluation for individualized goals  Description: Treatment/Interventions: Functional transfer training, LE strengthening/ROM, Elevations, Therapeutic exercise, Endurance training, Patient/family training, Equipment eval/education, Bed mobility, Gait training, Spoke to nursing, Spoke to case management, Spoke to advanced practitioner, OT  Equipment Recommended: Walker(PT REPORTS HAVING AT HOME )       See flowsheet documentation for full assessment, interventions and recommendations  Note: Prognosis: Excellent  Problem List: Decreased strength, Decreased range of motion, Decreased endurance, Impaired balance, Decreased mobility, Obesity, Decreased skin integrity, Orthopedic restrictions, Pain  Assessment: Pt is 68 y o  female  has a past medical history of Breast cancer ([facility] [address] ), Disease of thyroid gland, GERD (gastroesophageal reflux disease), Hypertension, and Rapid heart rate  seen for PT evaluation s/p admit to [address] [place] [address] on 2/28/2021  Pt presenting s/p fall ~3 week prior w/ persistent L hip pain- imaging revealing L femoral neck fx and pt underwent  ORIF 3/1/21 w/ orders for WBAT post op    Personal factors affecting pt at time of IE include: steps to enter environment,  advanced age, past experience, inability to perform IADLs, mild impulsivity (baseline) anxiety Due to acute medical issues, ongoing medical management post op; pain, fall risk, increased reliance on more restrictive AD compared to baseline;  decreased activity tolerance compared to baseline, increased assistance needed from caregiver at current time, continuous monitoring, multiple lines, decline in overall functional mobility status; health management issues; note unstable clinical picture (high complexity)   Prior to admission, pt was MI w/ RW and living w/ spouse in a mobility home w/ [facility] and was I w/ ADl's   Currently pt  is requiring close S A for bed skills w/ cues for technique S  for functional transfers and close S for ambulation w/ RW and close S for stair training w/ L rail   Pt presentscurrently w/ overall mobility deficits 2* to:  Post op pain dn edema; decreased LE strength/AROM; limited flexibility; obesity;impaired balance; gait deviations;  (Please find additional objective findings from PT assessment regarding body systems outlined above ) Pt will continue to benefit from skilled PT interventions to address stated impairments; to maximize functional potential; for ongoing pt/ family training; and DME needs  Pt recommending home w/ family support and [facility] PT RW when medically cleared- (PT TO [address] MEDICALLY STABLE W/ ABOVE RECS) Pt/ family agreeable to plan and goals as stated on evaluation  Barriers to Discharge: None     PT Discharge Recommendation: Home with skilled therapy, Return to previous environment with social support          See flowsheet documentation for full assessment

## 2021-03-02 NOTE — PROGRESS NOTES
Orthopedics   Vik[facility]uerrero [facility] 68 y o  female MRN: [id number]  Unit/Bed#: AN VASCULAR LAB      Subjective:  68 y  o female post operative day 1 status post CRPP of left femoral neck fracture  Patient reports that she has some mild pain left hip  Overall is well controlled  She denies any chest pain or shortness of breath  No numbness or tingling      Labs:  0   Lab Value Date/Time    HCT 34 0 (L) 03/02/2021 0257    HCT 34 2 (L) 03/01/2021 0442    HCT 35 6 02/28/2021 1558    HGB 10 3 (L) 03/02/2021 0257    HGB 10 9 (L) 03/01/2021 0442    HGB 11 1 (L) 02/28/2021 1558    INR 1 08 03/01/2021 0442    WBC 9 25 03/02/2021 0257    WBC 6 19 03/01/2021 0442    WBC 6 50 02/28/2021 1558       Meds:    Current Facility-Administered Medications:     acetaminophen (TYLENOL) tablet 975 mg, 975 mg, Oral, Q8H [address], Burt Card MD, 975 mg at 03/02/21 0606    anastrozole (ARIMIDEX) tablet 1 mg, 1 mg, Oral, Daily, Burt aCrd MD, Stopped at 03/01/21 0917    aspirin (ECOTRIN LOW STRENGTH) EC tablet 81 mg, 81 mg, Oral, Daily, John Bowen PA-C    calcium carbonate-vitamin D (OSCAL-D) 500 mg-200 units per tablet 1 tablet, 1 tablet, Oral, Daily With Breakfast, Burt Card MD    docusate sodium (COLACE) capsule 100 mg, 100 mg, Oral, BID, Burt Card MD, 100 mg at 03/01/21 1712    enoxaparin (LOVENOX) subcutaneous injection 30 mg, 30 mg, Subcutaneous, Q12H [address], oJhn Bowen PA-C    hydrALAZINE (APRESOLINE) injection 5 mg, 5 mg, Intravenous, Q6H PRN, Burt Card MD, 5 mg at 03/01/21 0449    HYDROmorphone (DILAUDID) injection 0 2 mg, 0 2 mg, Intravenous, Q4H PRN, Burt Card MD    levothyroxine tablet 112 mcg, 112 mcg, Oral, Early Morning, Burt Card MD, 112 mcg at 03/02/21 0605    levothyroxine tablet 25 mcg, 25 mcg, Oral, Early Morning, Burt Card MD, 25 mcg at 03/02/21 0605    metoprolol tartrate (LOPRESSOR) tablet 25 mg, 25 mg, Oral, Q12H [address], Burt Card MD, 25 mg at 03/01/21 2117    multi-electrolyte (PLASMALYTE-A/ISOLYTE-S PH 7 4) IV solution, 75 mL/hr, Intravenous, Continuous, Kary Francis MD, Last Rate: 75 mL/hr at 03/02/21 0232, 75 mL/hr at 03/02/21 0232    naloxone (NARCAN) 0 04 mg/mL syringe 0 04 mg, 0 04 mg, Intravenous, Q1MIN PRN, Kary Francis MD    ondansetron [facility]) injection 4 mg, 4 mg, Intravenous, Q4H PRN, Kary Francis MD    oxyCODONE (ROXICODONE) IR tablet 2 5 mg, 2 5 mg, Oral, Q4H PRN, Kary Francis MD    oxyCODONE (ROXICODONE) IR tablet 5 mg, 5 mg, Oral, Q4H PRN, Kary Francis MD, 5 mg at 03/02/21 0259    pantoprazole (PROTONIX) injection 40 mg, 40 mg, Intravenous, Q24H [facility], Kary Francis MD, 40 mg at 03/01/21 6361    senna (SENOKOT) tablet 17 2 mg, 2 tablet, Oral, HS, Jackie Bright PA-C    verapamil (CALAN-SR) CR tablet 180 mg, 180 mg, Oral, Daily, Kary Francis MD, 180 mg at 03/01/21 0932    Blood Culture:   Lab Results   Component Value Date    BLOODCX Micrococcus luteus (A) 01/13/2020       Wound Culture:   No results found for: WOUNDCULT    Ins and Outs:  I/O last 24 hours: In: 500 [I V :500]  Out: 900 [Urine:900]          Physical exam:  Vitals:    03/02/21 0713   BP: 164/66   Pulse: 56   Resp: 18   Temp: 98 °F (36 7 °C)   SpO2: 93%     left lower extremity  · Dressing clean dry intact  · No excessive soft tissue swelling in the thigh  · Sensation intact L2-S1  · Motor intact to EHL/FHL  · 2+ DP pulse    Assessment:  68 y  o female post operative day 1 status post close reduction cannulated screw fixation of left femoral neck fracture   Pt doing well    Plan:  · Up and out of bed  · Weight-bearing as tolerated to left lower extremity  · PT/OT  · DVT prophylaxis - Lovenox for 30 days postoperatively  · Analgesics per primary team  · Will continue to assess for acute blood loss anemia - patient with chronic anemia prior to surgery and prior to this hospitalization, hemoglobin at 10 3 this morning this is not greater than 2 g drop as compared to preoperative hemoglobin levels, vital signs have remained stable, will continue to monitor  · Ortho stable for discharge, follow-up with Dr Jaylon Lincoln in 14 days from date of surgery      Johnny Chambers PA-C

## 2021-03-02 NOTE — PROGRESS NOTES
Progress Note - Karen Bridges 1940, 68 y o  female MRN: [id number]    Unit/Bed#: S -01 Encounter: [id number]    Primary Care Provider: Scott Greco MD   Date and time admitted to hospital: 2/28/2021  3:08 PM    * Left femoral neck fracture  Assessment & Plan  Patient admitted by Trauma Service after slip and fall on ice with associated left-sided hip pain   CT imaging noting an "Acute slightly impacted subcapital left femoral neck fracture"   Postop day 1 Status post left CRPP per orthopedic team  She has mild chronic anemia without any significant postoperative loss  XR of left knee no injury  PRN pain control  Recommend postoperative DVT prophylaxis  PT OT to eval  Further management per primary service (trauma)    Essential hypertension  Assessment & Plan  · Stop IV fluids  · Continue Verapamil at current dose  · Patient reports she is usually on metoprolol 100 mg q a m  States she has been on this for a long time and during a period of time where she was taken off of it by her family doctor she had spells of rapid heart rate" though she denies any known diagnosis of AFib  Noted that she has been running mildly bradycardic during this stay, but with initially uncontrolled hypertension in the 172-051 systolic range  Now remains in the 160 range  Yesterday I resumed a decreased dose of metoprolol and split it to 25 b i d  with parameters  Today I feel we can go back up to her usual home dose of 100 mg total but would still suggest splitting it into 50 mg twice a day dosing  · She reports some sort of "allergic reaction" to a previously attempted alternative antihypertensive agent though she does not know which one and is not describing angioedema or cough    Therefore I would suggest deferring to her family doctor regarding any other changes in the future  · PRN IV Hydralazine on board for BP spikes    Hypothyroidism  Assessment & Plan  TSH normal in December 2020 @ [facility]  Continue current Synthroid regimen (137 mcg QAM)    History of breast cancer  Assessment & Plan  S/p lumpectomy  Continue Arimidex w/ calcium/vitamin-D supplementation      Class 2 obesity in adult  Assessment & Plan  · BMI 36 4, comorbidity    VTE Pharmacologic Prophylaxis:   Pharmacologic: Enoxaparin (Lovenox)  Mechanical VTE Prophylaxis in Place: Yes    Patient Centered Rounds:     Discussions with Specialists or Other Care Team Provider:  Orthopedics, Trauma    Education and Discussions with Family / Patient:     Time Spent for Care: 20 minutes  More than 50% of total time spent on counseling and coordination of care as described above  Current Length of Stay: 2 day(s)    Current Patient Status: Inpatient   Certification Statement: The patient will continue to require additional inpatient hospital stay due to Per primary service    Discharge Plan:  From my standpoint she is stable for discharge whenever cleared by orthopedic and primary team   Will continue to monitor her blood pressure while she is here but do not feel she needs to stay for blood pressures in the 802 systolic range at this time    Code Status: Level 1 - Full Code    Subjective:   Patient states she is doing very well and is not reporting any pain  No dizziness, palpitations, lightheadedness or shortness of breath noted  She is very eager to work with physical therapy and go    Objective:     Vitals:   Temp (24hrs), Av 9 °F (36 6 °C), Min:97 6 °F (36 4 °C), Max:98 2 °F (36 8 °C)    Temp:  [97 6 °F (36 4 °C)-98 2 °F (36 8 °C)] 98 2 °F (36 8 °C)  HR:  [56-70] 66  Resp:  [18-22] 18  BP: (106-168)/(57-84) 168/74  SpO2:  [90 %-97 %] 95 %  There is no height or weight on file to calculate BMI  Input and Output Summary (last 24 hours): Intake/Output Summary (Last 24 hours) at 3/2/2021 1148  Last data filed at 3/2/2021 1103  Gross per 24 hour   Intake 500 ml   Output 1600 ml   Net -1100 ml       Physical Exam:     Physical Exam  Vitals signs reviewed  Constitutional:       General: She is not in acute distress  Appearance: She is obese  She is not ill-appearing, toxic-appearing or diaphoretic  Comments: Quite well-appearing smiling and not in any apparent distress   HENT:      Head: Normocephalic  Eyes:      General: No scleral icterus  Right eye: No discharge  Left eye: No discharge  Conjunctiva/sclera: Conjunctivae normal    Cardiovascular:      Rate and Rhythm: Normal rate and regular rhythm  Heart sounds: No murmur  Pulmonary:      Effort: No respiratory distress  Breath sounds: No stridor  No wheezing or rhonchi  Abdominal:      General: Bowel sounds are normal  There is no distension  Palpations: Abdomen is soft  Tenderness: There is no abdominal tenderness  There is no guarding  Musculoskeletal:      Right lower leg: No edema  Left lower leg: No edema  Comments: Left hip incision clean dry intact   Skin:     General: Skin is warm and dry  Coloration: Skin is not jaundiced or pale  Findings: No bruising, erythema, lesion or rash  Neurological:      Mental Status: She is alert  Comments: Awake alert interactive she is a very good historian, pleasant and cooperative with no confusion noted   Psychiatric:         Mood and Affect: Mood normal          Behavior: Behavior normal          Thought Content:  Thought content normal          Judgment: Judgment normal          Additional Data:     Labs:    Results from last 7 days   Lab Units 03/02/21  0257   WBC Thousand/uL 9 25   HEMOGLOBIN g/dL 10 3*   HEMATOCRIT % 34 0*   PLATELETS Thousands/uL 160   NEUTROS PCT % 83*   LYMPHS PCT % 11*   MONOS PCT % 5   EOS PCT % 0     Results from last 7 days   Lab Units 03/02/21  0257   SODIUM mmol/L 136   POTASSIUM mmol/L 3 9   CHLORIDE mmol/L 103   CO2 mmol/L 24   BUN mg/dL 14   CREATININE mg/dL 0 78   ANION GAP mmol/L 9   CALCIUM mg/dL 8 7   GLUCOSE RANDOM mg/dL 161*     Results from last 7 days   Lab Units 03/01/21  0442   INR  1 08                   * I Have Reviewed All Lab Data Listed Above  * Additional Pertinent Lab Tests Reviewed: All Labs Within Last 24 Hours Reviewed    Imaging:    Imaging Reports Reviewed Today Include:   Imaging Personally Reviewed by Myself Includes:      Recent Cultures (last 7 days):           Last 24 Hours Medication List:   Current Facility-Administered Medications   Medication Dose Route Frequency Provider Last Rate    acetaminophen  975 mg Oral [facility] Cecelia Mccarthy MD      anastrozole  1 mg Oral Daily Cecelia Mccarthy MD      aspirin  81 mg Oral Daily Cassius Hickey PA-C      calcium carbonate-vitamin D  1 tablet Oral Daily With Breakfast Cecelia Mccarthy MD      docusate sodium  100 mg Oral BID Cecelia Mccarthy MD      enoxaparin  30 mg Subcutaneous Q12H [address] Cassius Hickey PA-C      hydrALAZINE  5 mg Intravenous Q6H PRN Cecelia Mccarthy MD      HYDROmorphone  0 2 mg Intravenous Q4H PRN Cecelia Mccarthy MD      levothyroxine  112 mcg Oral Early Morning Cecelia Mccarthy MD      levothyroxine  25 mcg Oral Early Morning Cecelia Mccarthy MD      metoprolol tartrate  50 mg Oral Q12H [address] Meredith Wilkins PA-C      naloxone  0 04 mg Intravenous Q1MIN PRN Cecelia Mccarthy MD      ondansetron  4 mg Intravenous Q4H PRN Cecelia Mccarthy MD      oxyCODONE  2 5 mg Oral Q4H PRN Cecelia Mccarthy MD      oxyCODONE  5 mg Oral Q4H PRN Cecelia Mccarthy MD      [START ON 3/3/2021] pantoprazole  40 mg Oral Daily Jenni Mayfield MD      senna  2 tablet Oral HS Cassius Hickey PA-C      verapamil  180 mg Oral Daily Cecelia Mccarthy MD          Today, Patient Was Seen By: Rosamaria Horton PA-C    ** Please Note: Dictation voice to text software may have been used in the creation of this document   **

## 2021-03-02 NOTE — PLAN OF CARE
Problem: Potential for Falls  Goal: Patient will remain free of falls  Description: INTERVENTIONS:  - Assess patient frequently for physical needs  -  Identify cognitive and physical deficits and behaviors that affect risk of falls    -  Butler fall precautions as indicated by assessment   - Educate patient/family on patient safety including physical limitations  - Instruct patient to call for assistance with activity based on assessment  - Modify environment to reduce risk of injury  - Consider OT/PT consult to assist with strengthening/mobility  Outcome: Progressing     Problem: Prexisting or High Potential for Compromised Skin Integrity  Goal: Skin integrity is maintained or improved  Description: INTERVENTIONS:  - Identify patients at risk for skin breakdown  - Assess and monitor skin integrity  - Assess and monitor nutrition and hydration status  - Monitor labs   - Assess for incontinence   - Turn and reposition patient  - Assist with mobility/ambulation  - Relieve pressure over bony prominences  - Avoid friction and shearing  - Provide appropriate hygiene as needed including keeping skin clean and dry  - Evaluate need for skin moisturizer/barrier cream  - Collaborate with interdisciplinary team   - Patient/family teaching  - Consider wound care consult   Outcome: Progressing

## 2021-03-02 NOTE — ASSESSMENT & PLAN NOTE
· SLIM consult  · Patient takes 180 mg of verapamil and 100 mg of metoprolol, daily  · Metoprolol restarted pre op yesterday  · Home verapamil ordered  PRN Hydralazine as needed  · Is requiring hydralazine for high BPs   Will monitor

## 2021-03-02 NOTE — CASE MANAGEMENT
LOS: 2 DAYS  PATIENT IS A BUNDLE  PATIENT IS NOT A READMISSION  UNPLANNED RISK OF READMISSION: 9     CM met with the Patient at the bedside; Patient was alert and oriented, sitting up in bed  CM introduced self and role  Patient states that she is very motivated to get out of bed and would like to return home  Patient resides in a mobile home with her spouse and adult granddaughter  There are 3 ROMERO with a railing  Prior to admission, Patient was independent with all ADLs and utilized no DME  Patient has a cane and RW that she was using after her injury  Patient has had [facility] in the past  Patient has no STR history  Patient utilizes [facility] in [facility] and is able to afford all of her medications  Patient denies any MH/substance use history or concerns  Patient identifies her Granddaughter, Alejandro Borrero, as her health care representative  Patient states that she has a POA and AD  CM requested the Patient present a copy for her records  Patient sees her PCP, Dr Sarah Rick, every 6 months, or as needed  Patient is retired and receives [employer] as her main source of income  Patient is a community  and reports that her family will transport her home upon dc  Patient will be seen by PT/OT today  At this time, Patient reports that her preference is to return home with [facility]  CM dept will follow for rec's and discuss with the Patient  CM reviewed the [address] Notification Letter  CM reviewed discharge planning process including the following: identifying caregivers at home, preference for d/c planning needs,   availability of [facility] to [facility] program, availability of treatment team to discuss questions or concerns patient and/or family may have regarding diagnosis, plan of care, old or new medications and discharge planning   CM will continue to follow for care coordination and update assessment as appropriate

## 2021-03-02 NOTE — PLAN OF CARE
Problem: OCCUPATIONAL THERAPY ADULT  Goal: Performs self-care activities at highest level of function for planned discharge setting  See evaluation for individualized goals  Description: Treatment Interventions: ADL retraining, Functional transfer training, UE strengthening/ROM, Endurance training, Patient/family training, Equipment evaluation/education, Compensatory technique education, Activityengagement, Energy conservation          See flowsheet documentation for full assessment, interventions and recommendations  Note: Limitation: Decreased ADL status, Decreased UE strength, Decreased Safe judgement during ADL, Decreased endurance, Decreased high-level ADLs, Decreased self-care trans  Prognosis: Good  Assessment: Patient is a 68 y o  female admitted to [address] [place] [address] on 2/28/2021 due to Femoral neck fracture ([facility] [address] )  Pt is currently POD#1 s/p CRPP of L femoral neck fracture  Pt is currently WBAT on LLE  Comorbidities affecting pt's physical performance at time of assessment include HTN, GERD, hypothyroidism, SAKSHI, hx breast cancer, L knee pain, rapid heart beat, obesity  Patient has active OT orders and activity orders for ambulate with assistive device  PTA pt living in mobile home with  and grandaughter, pt (I) with ADLs and IADLs, use of RW at baseline, (+)fall, (+)drives  Personal factors affecting pt at time of IE include:steps to enter environment, difficulty performing ADLS and difficulty performing IADLS   At the time of evaluation patient currently requires (S) for UB ADLs, min A for LB ADLs, min A for functional transfers, and min A for functional mobility  The following deficits affected patient's occupational performance weakness, decreased functional strength, decreased functional balance, decreased activity tolerance, decreased safety awareness, increased pain, orthopedic restrictions and decreased coping skills   Patient would benefit from skilled OT services while in the hospital to address above deficits  Occupational performance areas to be addressed include ADL retraining, bed mobility, functional transfer training, endurance training, patient/family training, equipment evaluation/education, compensatory technique education, activity engagement and activity tolerance in order to maximize patient's level of function  The patient's raw score on the AM-PAC Daily Activity inpatient short form is 21, standardized score is 44 27, greater than 39 4  Patients at this level are likely to benefit from DC to home  From OT standpoint recommend Home OT upon D/C  Will continue to follow pt 3-5x/week to address the goals listed below evaluation        OT Discharge Recommendation: Home with skilled therapy(HHOT vs OP OT pend pt preference)

## 2021-03-02 NOTE — ASSESSMENT & PLAN NOTE
Orthopedics consulted  POD #1 s/p ORIF  Hip precautions  Multimodal pain control  Restart ASA    PT/OT post op eval pending

## 2021-03-04 ENCOUNTER — TELEPHONE (OUTPATIENT)
Dept: OBGYN CLINIC | Facility: HOSPITAL | Age: 81
End: 2021-03-04

## 2021-03-04 NOTE — TELEPHONE ENCOUNTER
Chandni Burnett wants to clarify it states "Follow Hip Precautions" but she is ORIF   I tried to clarify but she was confused      C/b # [phone]

## 2021-03-05 ENCOUNTER — PATIENT OUTREACH (OUTPATIENT)
Dept: CASE MANAGEMENT | Facility: OTHER | Age: 81
End: 2021-03-05

## 2021-03-05 NOTE — TELEPHONE ENCOUNTER
That is a mistake   No hip precautions necessary    If the incision is dry there is no need for dressings    thanks

## 2021-03-05 NOTE — PROGRESS NOTES
Outreach TC to patient for initial care management assessment  Patient reports that she is feeling well  Patient denies any complications from hip surgery including uncontrolled pain, numbness/tingling, lak of function, lack of sensation or internal/external rotation  Patient reports that she is independent with ADL's  Patient does need assist with IADLs  Patient resides with spouse and adult granddaughter who assist as needed   Patient did not yet complete her MIKHAIL appointment but made a f/u with orthopedics for 3/17/21  Patient  encouraged to keep appointments  Patient deferred a Review of all medications  Questions were answered regarding injection of Lovenox and proper disposal  Reviewed future appointments, s/sx to report and how/when to report symptoms to provider vs  911  Patient and CG verbalize understanding  Patient educated on role of CM, contact information for CM and BPCi program  Patient defers  additional calls  Chart review will continue until end of episode

## 2021-03-17 ENCOUNTER — APPOINTMENT (OUTPATIENT)
Dept: RADIOLOGY | Facility: AMBULARY SURGERY CENTER | Age: 81
End: 2021-03-17
Attending: ORTHOPAEDIC SURGERY
Payer: MEDICARE

## 2021-03-17 ENCOUNTER — OFFICE VISIT (OUTPATIENT)
Dept: OBGYN CLINIC | Facility: CLINIC | Age: 81
End: 2021-03-17

## 2021-03-17 VITALS
DIASTOLIC BLOOD PRESSURE: 73 MMHG | HEIGHT: 65 IN | BODY MASS INDEX: 36.44 KG/M2 | SYSTOLIC BLOOD PRESSURE: 178 MMHG | HEART RATE: 70 BPM

## 2021-03-17 DIAGNOSIS — S72.002A CLOSED FRACTURE OF NECK OF LEFT FEMUR, INITIAL ENCOUNTER (HCC): Primary | ICD-10-CM

## 2021-03-17 DIAGNOSIS — S72.002A CLOSED FRACTURE OF NECK OF LEFT FEMUR, INITIAL ENCOUNTER (HCC): ICD-10-CM

## 2021-03-17 PROCEDURE — 73502 X-RAY EXAM HIP UNI 2-3 VIEWS: CPT

## 2021-03-17 PROCEDURE — 1123F ACP DISCUSS/DSCN MKR DOCD: CPT | Performed by: ORTHOPAEDIC SURGERY

## 2021-03-17 PROCEDURE — 99024 POSTOP FOLLOW-UP VISIT: CPT | Performed by: ORTHOPAEDIC SURGERY

## 2021-03-17 NOTE — PROGRESS NOTES
Patient Name:  Burton Mahajan  MRN:  [id number]    Assessment & Plan     Percutaneous fixation left femoral neck 3/1/2021  1  Continue use of ambulatory aide as needed  2  Progress back to normal activities as tolerated  Avoid painful maneuvers  3  Follow up in 4 weeks  Repeat x rays of the left hip at this time    Chief Complaint     Left Hip Pain    History of the Present Illness     Burton Mahajan is a 68 y o  female presents today 2 weeks s/p ORIF of left hip with cannulated screws performed on 3/1/2021  Patient states that she is doing well post operatively  Her pain is well controlled  She ambulates with a rolling walker today but states she does ambulate with a single point cane while at home  Overall, she is happy with her progress so far post operatively  Denies numbness and tingling, fevers or chills    Physical Exam     BP (!) 178/73   Pulse 70   Ht 5' 5" (1 651 m)   BMI 36 44 kg/m²     Left Hip: Skin is intact  Incision is clean, dry and intact without erythema  Mild tenderness over incision site  ROM is intact without pain  Strength: 4/5 Flexion  Negative logroll test    Eyes:  Anicteric sclerae  ENT:  Trachea midline  Lungs:  Normal respiratory effort  Cardiovascular:  Capillary refill is less than 2 seconds  Lymphatic:  No palpable lymphadenopathy  Skin:  Intact without erythema  Neurologic:  Sensation grossly intact to light touch  Psychiatric:  Mood and affect are appropriate      Data Review     I have personally reviewed pertinent films in PACS, and my interpretation follows:    X Ray Left Hip: Orthopedic hardware and fracture in stable alignment and position without complications    Past Medical History:   Diagnosis Date    Breast cancer ([address] )     Disease of thyroid gland     hypothyroidism    GERD (gastroesophageal reflux disease)     Hypertension     Rapid heart rate        Past Surgical History:   Procedure Laterality Date    BREAST SURGERY Left     lumpectomy     CHOLECYSTECTOMY      ORIF HIP FRACTURE Left 3/1/2021    Procedure: OPEN REDUCTION W/ INTERNAL FIXATION (ORIF) HIP WITH CANNUALATED SCREWS;  Surgeon: Charan Connell MD;  [facility];  Service: Orthopedics    UT COLONOSCOPY FLX DX W/COLLJ SPEC WHEN PFRMD N/A 6/26/2017    Procedure: COLONOSCOPY;  Surgeon: Joyce Cabrera MD;  Location: [facility]; Service: Gastroenterology       No Known Allergies    Current Outpatient Medications on File Prior to Visit   Medication Sig Dispense Refill    acetaminophen (TYLENOL) 325 mg tablet Take 3 tablets (975 mg total) by mouth every 8 (eight) hours  0    anastrozole (ARIMIDEX) 1 mg tablet Take 1 mg by mouth daily      aspirin (ECOTRIN LOW STRENGTH) 81 mg EC tablet Take 1 tablet (81 mg total) by mouth daily  0    Calcium Carbonate-Vitamin D (CALTRATE 600+D PO) Take 1 tablet by mouth daily      docusate sodium (COLACE) 100 mg capsule Take 1 capsule (100 mg total) by mouth 2 (two) times a day 10 capsule 0    enoxaparin (LOVENOX) 30 mg/0 3 mL Inject 0 4 mL (40 mg total) under the skin daily for 29 days 17 4 mL 0    levothyroxine 112 mcg tablet Take 1 tablet (112 mcg total) by mouth daily in the early morning  0    levothyroxine 25 mcg tablet Take 1 tablet (25 mcg total) by mouth daily in the early morning  0    metoprolol tartrate (LOPRESSOR) 50 mg tablet Take 1 tablet (50 mg total) by mouth every 12 (twelve) hours  0    omeprazole (PriLOSEC) 40 MG capsule       Red Yeast Rice Extract (RED YEAST RICE PO) Take 1 tablet by mouth daily      senna (SENOKOT) 8 6 mg Take 2 tablets (17 2 mg total) by mouth daily at bedtime  0    verapamil (CALAN-SR) 180 mg CR tablet Take 180 mg by mouth daily        No current facility-administered medications on file prior to visit          Social History     Tobacco Use    Smoking status: Never Smoker    Smokeless tobacco: Never Used   Substance Use Topics    Alcohol use: Never     Frequency: Never    Drug use: Never Family History   Problem Relation Age of Onset    Prostate cancer Father     Breast cancer Sister     Sleep apnea Son        Review of Systems     As stated in the HPI  All other systems were reviewed and are negative      Scribe Attestation    I,:  Malick Gibson am acting as a scribe while in the presence of the attending physician :       I,:  Raquel Jalloh MD personally performed the services described in this documentation    as scribed in my presence :

## 2021-03-19 ENCOUNTER — PATIENT OUTREACH (OUTPATIENT)
Dept: CASE MANAGEMENT | Facility: OTHER | Age: 81
End: 2021-03-19

## 2021-03-19 NOTE — PROGRESS NOTES
Chart review reveals that patient had an OV with orthopedics on 3/17/21  Patient is healing well  Patient is to continue with ambulation and PT  Patient is to return to the office in 4 weeks  Patient will follow up with her heme/onc on 3/23/21  Chart review will continue until end of episode as patient has declined calls from this CM

## 2021-03-30 ENCOUNTER — APPOINTMENT (OUTPATIENT)
Dept: LAB | Facility: HOSPITAL | Age: 81
End: 2021-03-30
Payer: MEDICARE

## 2021-03-30 ENCOUNTER — TRANSCRIBE ORDERS (OUTPATIENT)
Dept: ADMINISTRATIVE | Facility: HOSPITAL | Age: 81
End: 2021-03-30

## 2021-03-30 DIAGNOSIS — Z00.00 ROUTINE GENERAL MEDICAL EXAMINATION AT A HEALTH CARE FACILITY: Primary | ICD-10-CM

## 2021-03-30 DIAGNOSIS — Z00.00 ROUTINE GENERAL MEDICAL EXAMINATION AT A HEALTH CARE FACILITY: ICD-10-CM

## 2021-03-30 LAB
EST. AVERAGE GLUCOSE BLD GHB EST-MCNC: 111 MG/DL
HBA1C MFR BLD: 5.5 %

## 2021-03-30 PROCEDURE — 83036 HEMOGLOBIN GLYCOSYLATED A1C: CPT

## 2021-03-30 PROCEDURE — 36415 COLL VENOUS BLD VENIPUNCTURE: CPT

## 2021-04-16 ENCOUNTER — PATIENT OUTREACH (OUTPATIENT)
Dept: CASE MANAGEMENT | Facility: OTHER | Age: 81
End: 2021-04-16

## 2021-04-16 NOTE — PROGRESS NOTES
Chart review reveals that patient had HGBA1C completed on 3/30/21 which was 5 5  Patient will follow up with orthopedics on 4/21/21  No ED/inpatient utilization in the last 45 days  Chart review will continue until end of episode

## 2021-04-21 ENCOUNTER — APPOINTMENT (OUTPATIENT)
Dept: RADIOLOGY | Facility: AMBULARY SURGERY CENTER | Age: 81
End: 2021-04-21
Attending: ORTHOPAEDIC SURGERY
Payer: MEDICARE

## 2021-04-21 ENCOUNTER — OFFICE VISIT (OUTPATIENT)
Dept: OBGYN CLINIC | Facility: CLINIC | Age: 81
End: 2021-04-21

## 2021-04-21 VITALS
SYSTOLIC BLOOD PRESSURE: 164 MMHG | BODY MASS INDEX: 36.44 KG/M2 | DIASTOLIC BLOOD PRESSURE: 83 MMHG | HEART RATE: 67 BPM | HEIGHT: 65 IN

## 2021-04-21 DIAGNOSIS — S72.002A CLOSED FRACTURE OF NECK OF LEFT FEMUR, INITIAL ENCOUNTER (HCC): ICD-10-CM

## 2021-04-21 DIAGNOSIS — S72.002A CLOSED FRACTURE OF NECK OF LEFT FEMUR, INITIAL ENCOUNTER (HCC): Primary | ICD-10-CM

## 2021-04-21 PROCEDURE — 99024 POSTOP FOLLOW-UP VISIT: CPT | Performed by: ORTHOPAEDIC SURGERY

## 2021-04-21 PROCEDURE — 73502 X-RAY EXAM HIP UNI 2-3 VIEWS: CPT

## 2021-04-21 NOTE — PROGRESS NOTES
Patient Name:  Christen Coughlin  MRN:  [id number]    Assessment & Plan     Percutaneous cannulated screw fixation left femoral neck fracture 3/1/21  1  Weightbearing as tolerated left lower extremity  2  Continue physical therapy  3  Gradually return to normal activities as tolerated  4  Follow up in 6 weeks if symptoms persist     History of the Present Illness     72-year-old female returns to the office today status post Percutaneous cannulated screw fixation left femoral neck fracture 3/1/21  Today she states her pain is well controlled  She notes mild soreness on the lateral aspect of the hip  She did one episode of scant yellow drainage from one of the incisions which has since resolved  She is participating in in-home physical therapy and ambulating with a cane  She denies any significant weakness or instability  She is happy with her progress  General ROS:  Negative for fever or chills  Neurological ROS:  Negative for numbness or tingling  Physical Exam     /83   Pulse 67   Ht 5' 5" (1 651 m)   BMI 36 44 kg/m²     Left hip:  Well-healed incisions without erythema or drainage  Tenderness to palpation hip and IT band  Hip range of motion is intact and full without pain  Negative logroll test   Negative PETER test   5/5 hip flexion and abduction strength  Sensation intact distally  Skin warm and well perfused  Data Review     I have personally reviewed pertinent films in PACS, and my interpretation follows  X-rays left hip 4/21/21:  Stable intact orthopedic hardware in satisfactory alignment  Fracture is healing nicely      Social History     Tobacco Use    Smoking status: Never Smoker    Smokeless tobacco: Never Used   Substance Use Topics    Alcohol use: Never     Frequency: Never    Drug use: Never         Scribe Attestation    I,:  Anisa Aquino PA-C am acting as a scribe while in the presence of the attending physician :       I,:  Karl Rogers MD personally performed the services described in this documentation    as scribed in my presence :

## 2021-05-13 ENCOUNTER — PATIENT OUTREACH (OUTPATIENT)
Dept: CASE MANAGEMENT | Facility: OTHER | Age: 81
End: 2021-05-13

## 2021-05-13 NOTE — PROGRESS NOTES
Chart review reveals that patient had an OV with orthopedics on 4/21/21  Patient is weight bearing as tolerated and ambulating with a cane  Patient will have her routine mammogram in May and then see  radiation oncology for a follow up s/p L breast CA in 2016  No new or worsening chronic or acute issues  Patient has declined calls from [facility]  Chart review will continue until end of episode

## 2021-06-01 ENCOUNTER — PATIENT OUTREACH (OUTPATIENT)
Dept: CASE MANAGEMENT | Facility: OTHER | Age: 81
End: 2021-06-01

## 2021-06-01 NOTE — PROGRESS NOTES
Chart review reveals that patient has not had any recent OV, labs or imaging to review  BPCI episode end  Resolved the episode, removed self from care team and updated care coordination note

## 2021-10-06 NOTE — TELEPHONE ENCOUNTER
Patient sees Dr Ivone Mcguire at home is at patients home, do they need to change the bandage daily if it is dry? The patient had a shower today  The incision is a little red, only the incision no where else  Patient only had ABD bandage to cover wound      Roshan Mustafa at home Brian Solis @ [phone] PRE-OP DIAGNOSIS:  Morbid obesity with BMI of 60.0-69.9, adult 06-Oct-2021 11:27:00  Shantanu Garcia  Adnexal mass 06-Oct-2021 11:27:23  Shantanu Garcia

## 2022-02-07 ENCOUNTER — APPOINTMENT (OUTPATIENT)
Dept: LAB | Facility: HOSPITAL | Age: 82
End: 2022-02-07
Payer: MEDICARE

## 2022-02-07 DIAGNOSIS — E78.5 HYPERLIPIDEMIA, UNSPECIFIED HYPERLIPIDEMIA TYPE: ICD-10-CM

## 2022-02-07 DIAGNOSIS — I10 HYPERTENSION, UNSPECIFIED TYPE: Primary | ICD-10-CM

## 2022-02-07 LAB
ALBUMIN SERPL BCP-MCNC: 3.5 G/DL (ref 3.5–5)
ALP SERPL-CCNC: 105 U/L (ref 46–116)
ALT SERPL W P-5'-P-CCNC: 36 U/L (ref 12–78)
ANION GAP SERPL CALCULATED.3IONS-SCNC: 12 MMOL/L (ref 4–13)
AST SERPL W P-5'-P-CCNC: 21 U/L (ref 5–45)
BASOPHILS # BLD AUTO: 0.04 THOUSANDS/ΜL (ref 0–0.1)
BASOPHILS NFR BLD AUTO: 0 % (ref 0–1)
BILIRUB SERPL-MCNC: 0.46 MG/DL (ref 0.2–1)
BUN SERPL-MCNC: 21 MG/DL (ref 5–25)
CALCIUM SERPL-MCNC: 9 MG/DL (ref 8.3–10.1)
CHLORIDE SERPL-SCNC: 101 MMOL/L (ref 100–108)
CHOLEST SERPL-MCNC: 181 MG/DL
CO2 SERPL-SCNC: 23 MMOL/L (ref 21–32)
CREAT SERPL-MCNC: 0.89 MG/DL (ref 0.6–1.3)
EOSINOPHIL # BLD AUTO: 0.22 THOUSAND/ΜL (ref 0–0.61)
EOSINOPHIL NFR BLD AUTO: 2 % (ref 0–6)
ERYTHROCYTE [DISTWIDTH] IN BLOOD BY AUTOMATED COUNT: 14.4 % (ref 11.6–15.1)
GFR SERPL CREATININE-BSD FRML MDRD: 61 ML/MIN/1.73SQ M
GLUCOSE P FAST SERPL-MCNC: 125 MG/DL (ref 65–99)
HCT VFR BLD AUTO: 42 % (ref 34.8–46.1)
HDLC SERPL-MCNC: 53 MG/DL
HGB BLD-MCNC: 14.1 G/DL (ref 11.5–15.4)
IMM GRANULOCYTES # BLD AUTO: 0.08 THOUSAND/UL (ref 0–0.2)
IMM GRANULOCYTES NFR BLD AUTO: 1 % (ref 0–2)
INR PPP: 1.06 (ref 0.84–1.19)
LDH SERPL-CCNC: 200 U/L (ref 81–234)
LDLC SERPL CALC-MCNC: 92 MG/DL (ref 0–100)
LYMPHOCYTES # BLD AUTO: 2.43 THOUSANDS/ΜL (ref 0.6–4.47)
LYMPHOCYTES NFR BLD AUTO: 27 % (ref 14–44)
MCH RBC QN AUTO: 30.2 PG (ref 26.8–34.3)
MCHC RBC AUTO-ENTMCNC: 33.6 G/DL (ref 31.4–37.4)
MCV RBC AUTO: 90 FL (ref 82–98)
MONOCYTES # BLD AUTO: 0.77 THOUSAND/ΜL (ref 0.17–1.22)
MONOCYTES NFR BLD AUTO: 9 % (ref 4–12)
NEUTROPHILS # BLD AUTO: 5.51 THOUSANDS/ΜL (ref 1.85–7.62)
NEUTS SEG NFR BLD AUTO: 61 % (ref 43–75)
NONHDLC SERPL-MCNC: 128 MG/DL
NRBC BLD AUTO-RTO: 0 /100 WBCS
PLATELET # BLD AUTO: 245 THOUSANDS/UL (ref 149–390)
PMV BLD AUTO: 10.4 FL (ref 8.9–12.7)
POTASSIUM SERPL-SCNC: 3.9 MMOL/L (ref 3.5–5.3)
PROT SERPL-MCNC: 7.7 G/DL (ref 6.4–8.2)
PROTHROMBIN TIME: 13.3 SECONDS (ref 11.6–14.5)
RBC # BLD AUTO: 4.67 MILLION/UL (ref 3.81–5.12)
SODIUM SERPL-SCNC: 136 MMOL/L (ref 136–145)
TRIGL SERPL-MCNC: 179 MG/DL
TSH SERPL DL<=0.05 MIU/L-ACNC: 2.48 UIU/ML (ref 0.36–3.74)
WBC # BLD AUTO: 9.05 THOUSAND/UL (ref 4.31–10.16)

## 2022-02-07 PROCEDURE — 80053 COMPREHEN METABOLIC PANEL: CPT

## 2022-02-07 PROCEDURE — 85025 COMPLETE CBC W/AUTO DIFF WBC: CPT

## 2022-02-07 PROCEDURE — 36415 COLL VENOUS BLD VENIPUNCTURE: CPT

## 2022-02-07 PROCEDURE — 85610 PROTHROMBIN TIME: CPT

## 2022-02-07 PROCEDURE — 83615 LACTATE (LD) (LDH) ENZYME: CPT

## 2022-02-07 PROCEDURE — 83036 HEMOGLOBIN GLYCOSYLATED A1C: CPT

## 2022-02-07 PROCEDURE — 80061 LIPID PANEL: CPT

## 2022-02-07 PROCEDURE — 84443 ASSAY THYROID STIM HORMONE: CPT

## 2022-02-08 LAB
EST. AVERAGE GLUCOSE BLD GHB EST-MCNC: 123 MG/DL
HBA1C MFR BLD: 5.9 %

## 2022-02-18 ENCOUNTER — HOSPITAL ENCOUNTER (OUTPATIENT)
Dept: RADIOLOGY | Facility: HOSPITAL | Age: 82
Discharge: HOME | End: 2022-02-18
Payer: MEDICARE

## 2022-02-18 DIAGNOSIS — K21.00 GASTRO-ESOPHAGEAL REFLUX DISEASE WITH ESOPHAGITIS: ICD-10-CM

## 2022-02-18 PROCEDURE — 74240 X-RAY XM UPR GI TRC 1CNTRST: CPT

## 2022-03-29 ENCOUNTER — OFFICE VISIT (OUTPATIENT)
Dept: OBGYN CLINIC | Facility: MEDICAL CENTER | Age: 82
End: 2022-03-29
Payer: MEDICARE

## 2022-03-29 VITALS — HEIGHT: 65 IN | HEART RATE: 84 BPM | WEIGHT: 215 LBS | BODY MASS INDEX: 35.82 KG/M2

## 2022-03-29 DIAGNOSIS — S52.134A CLOSED NONDISPLACED FRACTURE OF NECK OF RIGHT RADIUS, INITIAL ENCOUNTER: Primary | ICD-10-CM

## 2022-03-29 DIAGNOSIS — S63.501A SPRAIN OF RIGHT WRIST, INITIAL ENCOUNTER: ICD-10-CM

## 2022-03-29 PROCEDURE — 99214 OFFICE O/P EST MOD 30 MIN: CPT | Performed by: ORTHOPAEDIC SURGERY

## 2022-03-29 NOTE — PROGRESS NOTES
Assessment:   Diagnosis ICD-10-CM Associated Orders   1  Closed nondisplaced fracture of neck of right radius, initial encounter  S52 134A    2  Sprain of right wrist, initial encounter  S63 501A        Plan:  - HEP gentle AROM for 4 weeks   - No heavy lifting with right arm, NWB RUE  - Splint for wrist sprain  Can d/c the ace bandage at elbow    -will follow up in 4 weeks for repeat xray of the elbow       To do next visit:  Return in about 4 weeks (around 4/26/2022) for Recheck  The above stated was discussed in layman's terms and the patient expressed understanding  All questions were answered to the patient's satisfaction  Scribe Attestation    I,:  Alexa Garcias am acting as a scribe while in the presence of the attending physician :       I,:  Greg Garcia personally performed the services described in this documentation    as scribed in my presence :             Subjective:   Arlene Rojo is a 80 y o  female who presents for evaluation after a fall 3/26/2022  ED diagnosis closed nondisplaced fracture of neck of right radius  Patient states the majority of her pain is at her wrist   Reports 0/10 at its best when not moving  5/10 when she is out of the sling  Says the elbow is not really a location of pain  Review of systems negative unless otherwise specified in HPI  Review of Systems   Constitutional: Negative for chills and fever  HENT: Negative for ear pain and sore throat  Eyes: Negative for pain and visual disturbance  Respiratory: Negative for cough and shortness of breath  Cardiovascular: Negative for chest pain and palpitations  Gastrointestinal: Negative for abdominal pain and vomiting  Genitourinary: Negative for dysuria and hematuria  Musculoskeletal: Negative for arthralgias and back pain  Skin: Negative for color change and rash  Neurological: Negative for seizures and syncope  All other systems reviewed and are negative        Past Medical History: Diagnosis Date    Breast cancer ([facility] [address] )     Disease of thyroid gland     hypothyroidism    GERD (gastroesophageal reflux disease)     Hypertension     Rapid heart rate        Past Surgical History:   Procedure Laterality Date    BREAST SURGERY Left     lumpectomy     CHOLECYSTECTOMY      ORIF HIP FRACTURE Left 3/1/2021    Procedure: OPEN REDUCTION W/ INTERNAL FIXATION (ORIF) HIP WITH CANNUALATED SCREWS;  Surgeon: Benito Luna MD;  [facility];  Service: Orthopedics    IN COLONOSCOPY FLX DX W/COLLJ SPEC WHEN PFRMD N/A 6/26/2017    Procedure: COLONOSCOPY;  Surgeon: More Burciaga MD;  [facility]: [facility];   Service: Gastroenterology       Family History   Problem Relation Age of Onset    Prostate cancer Father     Breast cancer Sister     Sleep apnea Son        Social History     Occupational History    Not on file   Tobacco Use    Smoking status: Never Smoker    Smokeless tobacco: Never Used   Vaping Use    Vaping Use: Never used   Substance and Sexual Activity    Alcohol use: Never    Drug use: Never    Sexual activity: Never         Current Outpatient Medications:     acetaminophen (TYLENOL) 325 mg tablet, Take 3 tablets (975 mg total) by mouth every 8 (eight) hours, Disp: , Rfl: 0    anastrozole (ARIMIDEX) 1 mg tablet, Take 1 mg by mouth daily, Disp: , Rfl:     aspirin (ECOTRIN LOW STRENGTH) 81 mg EC tablet, Take 1 tablet (81 mg total) by mouth daily, Disp: , Rfl: 0    Calcium Carbonate-Vitamin D (CALTRATE 600+D PO), Take 1 tablet by mouth daily, Disp: , Rfl:     docusate sodium (COLACE) 100 mg capsule, Take 1 capsule (100 mg total) by mouth 2 (two) times a day, Disp: 10 capsule, Rfl: 0    enoxaparin (LOVENOX) 30 mg/0 3 mL, Inject 0 4 mL (40 mg total) under the skin daily for 29 days, Disp: 17 4 mL, Rfl: 0    levothyroxine 112 mcg tablet, Take 1 tablet (112 mcg total) by mouth daily in the early morning, Disp: , Rfl: 0    levothyroxine 25 mcg tablet, Take 1 tablet (25 mcg total) by mouth daily in the early morning, Disp:  , Rfl: 0    metoprolol tartrate (LOPRESSOR) 50 mg tablet, Take 1 tablet (50 mg total) by mouth every 12 (twelve) hours, Disp: , Rfl: 0    omeprazole (PriLOSEC) 40 MG capsule, , Disp: , Rfl:     Red Yeast Rice Extract (RED YEAST RICE PO), Take 1 tablet by mouth daily, Disp: , Rfl:     senna (SENOKOT) 8 6 mg, Take 2 tablets (17 2 mg total) by mouth daily at bedtime, Disp: , Rfl: 0    verapamil (CALAN-SR) 180 mg CR tablet, Take 180 mg by mouth daily , Disp: , Rfl:     No Known Allergies         Vitals:    03/29/22 1416   Pulse: 84       Objective:                    Right Hand Exam     Tenderness   The patient is experiencing no tenderness  Muscle Strength   : 5/5     Other   Erythema: absent  Sensation: normal    Comments:  No issues with , or opposition  Right Elbow Exam     Other   Erythema: absent  Sensation: normal    Comments:  Difficulty with supination due to pain at the wrist        elbow extension 5 degrees short of full extension  No block to pronation/supination  Right wrist/hand: nvi, +swelling about the wrist, +TTP dorsal aspect of distal radius  Diagnostics, reviewed and taken today if performed as documented:    None performed      Patient brought imaging from ED visit, which was reviewed and discussed with the patient  Closed nondisplaced fracture of neck of right radius  Procedures, if performed today:    Procedures    None performed      Portions of the record may have been created with voice recognition software  Occasional wrong word or "sound a like" substitutions may have occurred due to the inherent limitations of voice recognition software  Read the chart carefully and recognize, using context, where substitutions have occurred

## 2022-04-26 ENCOUNTER — OFFICE VISIT (OUTPATIENT)
Dept: OBGYN CLINIC | Facility: MEDICAL CENTER | Age: 82
End: 2022-04-26
Payer: MEDICARE

## 2022-04-26 VITALS
HEART RATE: 66 BPM | SYSTOLIC BLOOD PRESSURE: 160 MMHG | WEIGHT: 215 LBS | HEIGHT: 65 IN | BODY MASS INDEX: 35.82 KG/M2 | DIASTOLIC BLOOD PRESSURE: 72 MMHG

## 2022-04-26 DIAGNOSIS — S52.134D CLOSED NONDISPLACED FRACTURE OF NECK OF RIGHT RADIUS WITH ROUTINE HEALING, SUBSEQUENT ENCOUNTER: Primary | ICD-10-CM

## 2022-04-26 PROCEDURE — 99213 OFFICE O/P EST LOW 20 MIN: CPT | Performed by: ORTHOPAEDIC SURGERY

## 2022-04-26 PROCEDURE — 1123F ACP DISCUSS/DSCN MKR DOCD: CPT | Performed by: ORTHOPAEDIC SURGERY

## 2022-04-26 NOTE — PROGRESS NOTES
[facility] - [facility] [place]  [place] [id number]       Scott Navarrete  [id number]  1940    ORTHOPAEDIC SURGERY OUTPATIENT NOTE  4/26/2022      HISTORY:  80 y o  female who presents for follow-up of right radial neck fracture  Patient was last seen in the office on 3/19/2022  Patient notices improved function of wrist  Patient states she is able to do more and more with her hands  Patient states pain in elbow and wrist has lessened over time  Patient is doing well with wearing a brace  Patient offers no new complaints at this time  Past Medical History:   Diagnosis Date    Breast cancer ([address] )     Disease of thyroid gland     hypothyroidism    GERD (gastroesophageal reflux disease)     Hypertension     Rapid heart rate        Past Surgical History:   Procedure Laterality Date    BREAST SURGERY Left     lumpectomy     CHOLECYSTECTOMY      ORIF HIP FRACTURE Left 3/1/2021    Procedure: OPEN REDUCTION W/ INTERNAL FIXATION (ORIF) HIP WITH CANNUALATED SCREWS;  Surgeon: Jasmina Beltran MD;  [facility];  Service: Orthopedics    [place] COLONOSCOPY FLX DX W/COLLJ SPEC WHEN PFRMD N/A 6/26/2017    Procedure: COLONOSCOPY;  Surgeon: Erick Méndez MD;  [facility];   Service: Gastroenterology       Social History     Socioeconomic History    Marital status: /Civil Union     Spouse name: Not on file    Number of children: Not on file    Years of education: Not on file    Highest education level: Not on file   Occupational History    Not on file   Tobacco Use    Smoking status: Never Smoker    Smokeless tobacco: Never Used   Vaping Use    Vaping Use: Never used   Substance and Sexual Activity    Alcohol use: Never    Drug use: Never    Sexual activity: Never   Other Topics Concern    Not on file   Social History Narrative    Not on file     Social Determinants of Health     Financial Resource Strain: Not on file Food Insecurity: Not on file   Transportation Needs: Not on file   Physical Activity: Not on file   Stress: Not on file   Social Connections: Not on file   Intimate Partner Violence: Not on file   Housing Stability: Not on file       Family History   Problem Relation Age of Onset    Prostate cancer Father     Breast cancer Sister     Sleep apnea Son         Patient's Medications   New Prescriptions    No medications on file   Previous Medications    ACETAMINOPHEN (TYLENOL) 325 MG TABLET    Take 3 tablets (975 mg total) by mouth every 8 (eight) hours    ANASTROZOLE (ARIMIDEX) 1 MG TABLET    Take 1 mg by mouth daily    ASPIRIN (ECOTRIN LOW STRENGTH) 81 MG EC TABLET    Take 1 tablet (81 mg total) by mouth daily    CALCIUM CARBONATE-VITAMIN D (CALTRATE 600+D PO)    Take 1 tablet by mouth daily    DOCUSATE SODIUM (COLACE) 100 MG CAPSULE    Take 1 capsule (100 mg total) by mouth 2 (two) times a day    ENOXAPARIN (LOVENOX) 30 MG/0 3 ML    Inject 0 4 mL (40 mg total) under the skin daily for 29 days    LEVOTHYROXINE 112 MCG TABLET    Take 1 tablet (112 mcg total) by mouth daily in the early morning    LEVOTHYROXINE 25 MCG TABLET    Take 1 tablet (25 mcg total) by mouth daily in the early morning    METOPROLOL TARTRATE (LOPRESSOR) 50 MG TABLET    Take 1 tablet (50 mg total) by mouth every 12 (twelve) hours    OMEPRAZOLE (PRILOSEC) 40 MG CAPSULE        RED YEAST RICE EXTRACT (RED YEAST RICE PO)    Take 1 tablet by mouth daily    SENNA (SENOKOT) 8 6 MG    Take 2 tablets (17 2 mg total) by mouth daily at bedtime    VERAPAMIL (CALAN-SR) 180 MG CR TABLET    Take 180 mg by mouth daily    Modified Medications    No medications on file   Discontinued Medications    No medications on file       No Known Allergies     /72   Pulse 66   Ht 5' 5" (1 651 m)   Wt 97 5 kg (215 lb)   BMI 35 78 kg/m²      REVIEW OF SYSTEMS:  Constitutional: Negative  HEENT: Negative  Respiratory: Negative  Skin: Negative  Neurological: Negative  Psychiatric/Behavioral: Negative  Musculoskeletal: Negative except for that mentioned in the HPI  PHYSICAL EXAM:      R elbow:  Flexion: 140 degrees  Extension: 0 degrees  Pronation: 80 degrees  Supination: 80 degrees    TTP Lateral Epicondyle: negative  TTP Medial Epicondyle: negative  TTP Olecranon: negative  TTP Radial Head: Positive  TTP First dorsal compartment: Positive     Strength:  Flexion: 5/5  Extension: 5/5  Pronation: 5/5  Supination: 5/5    Pain with resisted wrist extension: Positive  Pain with resisted 3rd finger extension: negative  Pain with resisted wrist flexion: negative    Radial/median/ulnar nerve intact    <2 sec cap refill    IMAGING: No new imaging today    ASSESSMENT AND PLAN:  80 y o  female with fracture of neck of right radius   *Patient's symptoms are improving  *Patient may wear brace as needed for right wrist   *Patient is advised to work on at home exercises for wrist range of motion  *No further intervention is needed at this time  *If pain or symptoms persist in four weeks, patient may follow-up at that time       Scribe Attestation    I,:  Dillon Kirkland PA-C am acting as a scribe while in the presence of the attending physician :       I,:  Jeremy West personally performed the services described in this documentation    as scribed in my presence :

## 2022-07-26 ENCOUNTER — APPOINTMENT (OUTPATIENT)
Dept: LAB | Facility: HOSPITAL | Age: 82
End: 2022-07-26
Payer: MEDICARE

## 2022-07-26 DIAGNOSIS — E78.2 MIXED HYPERLIPIDEMIA: Primary | ICD-10-CM

## 2022-07-26 DIAGNOSIS — I10 ESSENTIAL HYPERTENSION, MALIGNANT: ICD-10-CM

## 2022-07-26 LAB
ALBUMIN SERPL BCP-MCNC: 3.5 G/DL (ref 3.5–5)
ALP SERPL-CCNC: 95 U/L (ref 46–116)
ALT SERPL W P-5'-P-CCNC: 24 U/L (ref 12–78)
ANION GAP SERPL CALCULATED.3IONS-SCNC: 10 MMOL/L (ref 4–13)
AST SERPL W P-5'-P-CCNC: 18 U/L (ref 5–45)
BASOPHILS # BLD AUTO: 0.02 THOUSANDS/ΜL (ref 0–0.1)
BASOPHILS NFR BLD AUTO: 0 % (ref 0–1)
BILIRUB SERPL-MCNC: 0.71 MG/DL (ref 0.2–1)
BUN SERPL-MCNC: 14 MG/DL (ref 5–25)
CALCIUM SERPL-MCNC: 8.9 MG/DL (ref 8.3–10.1)
CHLORIDE SERPL-SCNC: 103 MMOL/L (ref 96–108)
CHOLEST SERPL-MCNC: 171 MG/DL
CO2 SERPL-SCNC: 23 MMOL/L (ref 21–32)
CREAT SERPL-MCNC: 0.88 MG/DL (ref 0.6–1.3)
EOSINOPHIL # BLD AUTO: 0.17 THOUSAND/ΜL (ref 0–0.61)
EOSINOPHIL NFR BLD AUTO: 2 % (ref 0–6)
ERYTHROCYTE [DISTWIDTH] IN BLOOD BY AUTOMATED COUNT: 15 % (ref 11.6–15.1)
ERYTHROCYTE [SEDIMENTATION RATE] IN BLOOD: 56 MM/HOUR (ref 0–29)
GFR SERPL CREATININE-BSD FRML MDRD: 62 ML/MIN/1.73SQ M
GLUCOSE P FAST SERPL-MCNC: 120 MG/DL (ref 65–99)
HCT VFR BLD AUTO: 35.2 % (ref 34.8–46.1)
HDLC SERPL-MCNC: 45 MG/DL
HGB BLD-MCNC: 11.5 G/DL (ref 11.5–15.4)
IMM GRANULOCYTES # BLD AUTO: 0.05 THOUSAND/UL (ref 0–0.2)
IMM GRANULOCYTES NFR BLD AUTO: 1 % (ref 0–2)
LDH SERPL-CCNC: 194 U/L (ref 81–234)
LDLC SERPL CALC-MCNC: 103 MG/DL (ref 0–100)
LYMPHOCYTES # BLD AUTO: 1.81 THOUSANDS/ΜL (ref 0.6–4.47)
LYMPHOCYTES NFR BLD AUTO: 22 % (ref 14–44)
MCH RBC QN AUTO: 30.3 PG (ref 26.8–34.3)
MCHC RBC AUTO-ENTMCNC: 32.7 G/DL (ref 31.4–37.4)
MCV RBC AUTO: 93 FL (ref 82–98)
MONOCYTES # BLD AUTO: 0.67 THOUSAND/ΜL (ref 0.17–1.22)
MONOCYTES NFR BLD AUTO: 8 % (ref 4–12)
NEUTROPHILS # BLD AUTO: 5.65 THOUSANDS/ΜL (ref 1.85–7.62)
NEUTS SEG NFR BLD AUTO: 67 % (ref 43–75)
NONHDLC SERPL-MCNC: 126 MG/DL
NRBC BLD AUTO-RTO: 0 /100 WBCS
PLATELET # BLD AUTO: 211 THOUSANDS/UL (ref 149–390)
PMV BLD AUTO: 11.7 FL (ref 8.9–12.7)
POTASSIUM SERPL-SCNC: 3.8 MMOL/L (ref 3.5–5.3)
PROT SERPL-MCNC: 7.7 G/DL (ref 6.4–8.4)
RBC # BLD AUTO: 3.79 MILLION/UL (ref 3.81–5.12)
SODIUM SERPL-SCNC: 136 MMOL/L (ref 135–147)
TRIGL SERPL-MCNC: 115 MG/DL
TSH SERPL DL<=0.05 MIU/L-ACNC: 2.12 UIU/ML (ref 0.45–4.5)
WBC # BLD AUTO: 8.37 THOUSAND/UL (ref 4.31–10.16)

## 2022-07-26 PROCEDURE — 80053 COMPREHEN METABOLIC PANEL: CPT

## 2022-07-26 PROCEDURE — 36415 COLL VENOUS BLD VENIPUNCTURE: CPT

## 2022-07-26 PROCEDURE — 80061 LIPID PANEL: CPT

## 2022-07-26 PROCEDURE — 85025 COMPLETE CBC W/AUTO DIFF WBC: CPT

## 2022-07-26 PROCEDURE — 83615 LACTATE (LD) (LDH) ENZYME: CPT

## 2022-07-26 PROCEDURE — 85652 RBC SED RATE AUTOMATED: CPT

## 2022-07-26 PROCEDURE — 84443 ASSAY THYROID STIM HORMONE: CPT

## 2022-07-30 ENCOUNTER — HOSPITAL ENCOUNTER (INPATIENT)
Facility: HOSPITAL | Age: 82
LOS: 6 days | Discharge: HOME HEALTH | DRG: 291 | End: 2022-08-05
Attending: EMERGENCY MEDICINE | Admitting: FAMILY MEDICINE
Payer: MEDICARE

## 2022-07-30 ENCOUNTER — APPOINTMENT (EMERGENCY)
Dept: CT IMAGING | Facility: HOSPITAL | Age: 82
DRG: 291 | End: 2022-07-30
Payer: MEDICARE

## 2022-07-30 ENCOUNTER — APPOINTMENT (EMERGENCY)
Dept: RADIOLOGY | Facility: HOSPITAL | Age: 82
DRG: 291 | End: 2022-07-30
Payer: MEDICARE

## 2022-07-30 DIAGNOSIS — I48.91 ATRIAL FIBRILLATION WITH RVR (HCC): Primary | ICD-10-CM

## 2022-07-30 DIAGNOSIS — R53.1 GENERALIZED WEAKNESS: ICD-10-CM

## 2022-07-30 DIAGNOSIS — I48.91 NEW ONSET A-FIB (HCC): ICD-10-CM

## 2022-07-30 DIAGNOSIS — I50.9 CHF (CONGESTIVE HEART FAILURE) (HCC): ICD-10-CM

## 2022-07-30 PROBLEM — R79.89 ELEVATED BRAIN NATRIURETIC PEPTIDE (BNP) LEVEL: Status: ACTIVE | Noted: 2022-07-30

## 2022-07-30 PROBLEM — R07.9 CHEST PAIN: Status: ACTIVE | Noted: 2022-07-30

## 2022-07-30 PROBLEM — R79.89 ELEVATED D-DIMER: Status: ACTIVE | Noted: 2022-07-30

## 2022-07-30 LAB
2HR DELTA HS TROPONIN: 0 NG/L
ALBUMIN SERPL BCP-MCNC: 3.4 G/DL (ref 3.5–5)
ALP SERPL-CCNC: 102 U/L (ref 46–116)
ALT SERPL W P-5'-P-CCNC: 26 U/L (ref 12–78)
ANION GAP SERPL CALCULATED.3IONS-SCNC: 10 MMOL/L (ref 4–13)
APTT PPP: 24 SECONDS (ref 23–37)
AST SERPL W P-5'-P-CCNC: 17 U/L (ref 5–45)
BASOPHILS # BLD AUTO: 0.03 THOUSANDS/ΜL (ref 0–0.1)
BASOPHILS NFR BLD AUTO: 0 % (ref 0–1)
BILIRUB SERPL-MCNC: 0.64 MG/DL (ref 0.2–1)
BILIRUB UR QL STRIP: NEGATIVE
BUN SERPL-MCNC: 13 MG/DL (ref 5–25)
CALCIUM ALBUM COR SERPL-MCNC: 9.4 MG/DL (ref 8.3–10.1)
CALCIUM SERPL-MCNC: 8.9 MG/DL (ref 8.3–10.1)
CARDIAC TROPONIN I PNL SERPL HS: 4 NG/L
CARDIAC TROPONIN I PNL SERPL HS: 4 NG/L
CHLORIDE SERPL-SCNC: 103 MMOL/L (ref 96–108)
CLARITY UR: CLEAR
CO2 SERPL-SCNC: 24 MMOL/L (ref 21–32)
COLOR UR: YELLOW
CREAT SERPL-MCNC: 0.97 MG/DL (ref 0.6–1.3)
D DIMER PPP FEU-MCNC: 2.36 UG/ML FEU
EOSINOPHIL # BLD AUTO: 0.19 THOUSAND/ΜL (ref 0–0.61)
EOSINOPHIL NFR BLD AUTO: 2 % (ref 0–6)
ERYTHROCYTE [DISTWIDTH] IN BLOOD BY AUTOMATED COUNT: 15.2 % (ref 11.6–15.1)
ERYTHROCYTE [DISTWIDTH] IN BLOOD BY AUTOMATED COUNT: 15.2 % (ref 11.6–15.1)
GFR SERPL CREATININE-BSD FRML MDRD: 55 ML/MIN/1.73SQ M
GLUCOSE SERPL-MCNC: 123 MG/DL (ref 65–140)
GLUCOSE UR STRIP-MCNC: NEGATIVE MG/DL
HCT VFR BLD AUTO: 36.2 % (ref 34.8–46.1)
HCT VFR BLD AUTO: 36.3 % (ref 34.8–46.1)
HGB BLD-MCNC: 11.2 G/DL (ref 11.5–15.4)
HGB BLD-MCNC: 11.5 G/DL (ref 11.5–15.4)
HGB UR QL STRIP.AUTO: NEGATIVE
IMM GRANULOCYTES # BLD AUTO: 0.06 THOUSAND/UL (ref 0–0.2)
IMM GRANULOCYTES NFR BLD AUTO: 1 % (ref 0–2)
INR PPP: 1.14 (ref 0.84–1.19)
KETONES UR STRIP-MCNC: NEGATIVE MG/DL
LEUKOCYTE ESTERASE UR QL STRIP: NEGATIVE
LYMPHOCYTES # BLD AUTO: 1.46 THOUSANDS/ΜL (ref 0.6–4.47)
LYMPHOCYTES NFR BLD AUTO: 16 % (ref 14–44)
MCH RBC QN AUTO: 29.6 PG (ref 26.8–34.3)
MCH RBC QN AUTO: 29.7 PG (ref 26.8–34.3)
MCHC RBC AUTO-ENTMCNC: 30.9 G/DL (ref 31.4–37.4)
MCHC RBC AUTO-ENTMCNC: 31.8 G/DL (ref 31.4–37.4)
MCV RBC AUTO: 93 FL (ref 82–98)
MCV RBC AUTO: 96 FL (ref 82–98)
MONOCYTES # BLD AUTO: 0.76 THOUSAND/ΜL (ref 0.17–1.22)
MONOCYTES NFR BLD AUTO: 8 % (ref 4–12)
NEUTROPHILS # BLD AUTO: 6.84 THOUSANDS/ΜL (ref 1.85–7.62)
NEUTS SEG NFR BLD AUTO: 73 % (ref 43–75)
NITRITE UR QL STRIP: NEGATIVE
NRBC BLD AUTO-RTO: 0 /100 WBCS
NT-PROBNP SERPL-MCNC: 2420 PG/ML
PH UR STRIP.AUTO: 6 [PH]
PLATELET # BLD AUTO: 229 THOUSANDS/UL (ref 149–390)
PLATELET # BLD AUTO: 265 THOUSANDS/UL (ref 149–390)
PMV BLD AUTO: 11.2 FL (ref 8.9–12.7)
PMV BLD AUTO: 11.3 FL (ref 8.9–12.7)
POTASSIUM SERPL-SCNC: 4.1 MMOL/L (ref 3.5–5.3)
PROT SERPL-MCNC: 7.8 G/DL (ref 6.4–8.4)
PROT UR STRIP-MCNC: NEGATIVE MG/DL
PROTHROMBIN TIME: 14.4 SECONDS (ref 11.6–14.5)
RBC # BLD AUTO: 3.77 MILLION/UL (ref 3.81–5.12)
RBC # BLD AUTO: 3.89 MILLION/UL (ref 3.81–5.12)
SODIUM SERPL-SCNC: 137 MMOL/L (ref 135–147)
SP GR UR STRIP.AUTO: 1.01 (ref 1–1.03)
T4 FREE SERPL-MCNC: 1.36 NG/DL (ref 0.76–1.46)
TSH SERPL DL<=0.05 MIU/L-ACNC: 4.55 UIU/ML (ref 0.45–4.5)
UROBILINOGEN UR QL STRIP.AUTO: 0.2 E.U./DL
WBC # BLD AUTO: 9 THOUSAND/UL (ref 4.31–10.16)
WBC # BLD AUTO: 9.34 THOUSAND/UL (ref 4.31–10.16)

## 2022-07-30 PROCEDURE — 83880 ASSAY OF NATRIURETIC PEPTIDE: CPT | Performed by: EMERGENCY MEDICINE

## 2022-07-30 PROCEDURE — 85025 COMPLETE CBC W/AUTO DIFF WBC: CPT | Performed by: EMERGENCY MEDICINE

## 2022-07-30 PROCEDURE — 85730 THROMBOPLASTIN TIME PARTIAL: CPT | Performed by: FAMILY MEDICINE

## 2022-07-30 PROCEDURE — 71045 X-RAY EXAM CHEST 1 VIEW: CPT

## 2022-07-30 PROCEDURE — 36415 COLL VENOUS BLD VENIPUNCTURE: CPT | Performed by: EMERGENCY MEDICINE

## 2022-07-30 PROCEDURE — 84443 ASSAY THYROID STIM HORMONE: CPT | Performed by: EMERGENCY MEDICINE

## 2022-07-30 PROCEDURE — 84484 ASSAY OF TROPONIN QUANT: CPT | Performed by: EMERGENCY MEDICINE

## 2022-07-30 PROCEDURE — 84439 ASSAY OF FREE THYROXINE: CPT | Performed by: EMERGENCY MEDICINE

## 2022-07-30 PROCEDURE — 71275 CT ANGIOGRAPHY CHEST: CPT

## 2022-07-30 PROCEDURE — 99285 EMERGENCY DEPT VISIT HI MDM: CPT

## 2022-07-30 PROCEDURE — 99285 EMERGENCY DEPT VISIT HI MDM: CPT | Performed by: EMERGENCY MEDICINE

## 2022-07-30 PROCEDURE — 85027 COMPLETE CBC AUTOMATED: CPT | Performed by: FAMILY MEDICINE

## 2022-07-30 PROCEDURE — 93005 ELECTROCARDIOGRAM TRACING: CPT

## 2022-07-30 PROCEDURE — 81003 URINALYSIS AUTO W/O SCOPE: CPT | Performed by: EMERGENCY MEDICINE

## 2022-07-30 PROCEDURE — G1004 CDSM NDSC: HCPCS

## 2022-07-30 PROCEDURE — 85379 FIBRIN DEGRADATION QUANT: CPT | Performed by: EMERGENCY MEDICINE

## 2022-07-30 PROCEDURE — 96374 THER/PROPH/DIAG INJ IV PUSH: CPT

## 2022-07-30 PROCEDURE — 85610 PROTHROMBIN TIME: CPT | Performed by: FAMILY MEDICINE

## 2022-07-30 PROCEDURE — 80053 COMPREHEN METABOLIC PANEL: CPT | Performed by: EMERGENCY MEDICINE

## 2022-07-30 PROCEDURE — 99223 1ST HOSP IP/OBS HIGH 75: CPT | Performed by: FAMILY MEDICINE

## 2022-07-30 RX ORDER — FUROSEMIDE 10 MG/ML
40 INJECTION INTRAMUSCULAR; INTRAVENOUS 2 TIMES DAILY
Status: DISCONTINUED | OUTPATIENT
Start: 2022-07-30 | End: 2022-08-02

## 2022-07-30 RX ORDER — FUROSEMIDE 10 MG/ML
20 INJECTION INTRAMUSCULAR; INTRAVENOUS ONCE
Status: DISCONTINUED | OUTPATIENT
Start: 2022-07-30 | End: 2022-07-30

## 2022-07-30 RX ORDER — ASPIRIN 81 MG/1
81 TABLET ORAL DAILY
Status: DISCONTINUED | OUTPATIENT
Start: 2022-07-30 | End: 2022-07-31

## 2022-07-30 RX ORDER — HEPARIN SODIUM 10000 [USP'U]/100ML
3-30 INJECTION, SOLUTION INTRAVENOUS
Status: DISCONTINUED | OUTPATIENT
Start: 2022-07-30 | End: 2022-07-31

## 2022-07-30 RX ORDER — METOPROLOL TARTRATE 50 MG/1
50 TABLET, FILM COATED ORAL EVERY 12 HOURS SCHEDULED
Status: DISCONTINUED | OUTPATIENT
Start: 2022-07-30 | End: 2022-08-02

## 2022-07-30 RX ORDER — DILTIAZEM HYDROCHLORIDE 5 MG/ML
10 INJECTION INTRAVENOUS ONCE
Status: COMPLETED | OUTPATIENT
Start: 2022-07-30 | End: 2022-07-30

## 2022-07-30 RX ORDER — FUROSEMIDE 10 MG/ML
40 INJECTION INTRAMUSCULAR; INTRAVENOUS 2 TIMES DAILY
Status: DISCONTINUED | OUTPATIENT
Start: 2022-07-30 | End: 2022-07-30

## 2022-07-30 RX ORDER — SENNOSIDES 8.6 MG
17.2 TABLET ORAL
Status: DISCONTINUED | OUTPATIENT
Start: 2022-07-30 | End: 2022-08-05 | Stop reason: HOSPADM

## 2022-07-30 RX ORDER — ANASTROZOLE 1 MG/1
1 TABLET ORAL DAILY
Status: CANCELLED | OUTPATIENT
Start: 2022-07-30

## 2022-07-30 RX ORDER — LEVOTHYROXINE SODIUM 112 UG/1
112 TABLET ORAL
Status: DISCONTINUED | OUTPATIENT
Start: 2022-07-31 | End: 2022-08-05 | Stop reason: HOSPADM

## 2022-07-30 RX ADMIN — IOHEXOL 65 ML: 350 INJECTION, SOLUTION INTRAVENOUS at 13:24

## 2022-07-30 RX ADMIN — DILTIAZEM HYDROCHLORIDE 10 MG: 5 INJECTION INTRAVENOUS at 12:28

## 2022-07-30 RX ADMIN — SENNOSIDES 17.2 MG: 8.6 TABLET, FILM COATED ORAL at 21:16

## 2022-07-30 RX ADMIN — HEPARIN SODIUM 18 UNITS/KG/HR: 10000 INJECTION, SOLUTION INTRAVENOUS at 16:16

## 2022-07-30 RX ADMIN — ASPIRIN 81 MG: 81 TABLET, COATED ORAL at 15:58

## 2022-07-30 RX ADMIN — VERAPAMIL HYDROCHLORIDE 180 MG: 180 TABLET ORAL at 16:24

## 2022-07-30 RX ADMIN — FUROSEMIDE 40 MG: 10 INJECTION, SOLUTION INTRAMUSCULAR; INTRAVENOUS at 16:12

## 2022-07-30 RX ADMIN — METOPROLOL TARTRATE 50 MG: 50 TABLET, FILM COATED ORAL at 21:16

## 2022-07-30 NOTE — ASSESSMENT & PLAN NOTE
· Chronic  · TSH 4 550; Free T4 pending    · Would recommend TSH in 4-6 wks with PCP  · Continue home medication regimen 112 mcg

## 2022-07-30 NOTE — ED PROVIDER NOTES
History  Chief Complaint   Patient presents with    Rapid Heart Rate     Pt states she has been feeling her heart beating fast /CP x 3 days, pt also c/o SOB when leaning back      81 y/o female, hx of AFIB, presents to the ED for sob and chest pain x 3 days  Patient states that she has had sob and chest pressure intermittent x 3 days  States that symptoms are worse when she lays flat  States that they improve when sitting up  She typically takes her pulse and bp at home and noticed that her HR has been high- in the 120s- over the last few days  She denies any abd pain, n/v, d/c, urinary symptoms, fever, or recent illnesses  States that she takes verapamil at home and reports that she took it and that there has been no changes to her meds  Denies any leg swelling  No other complaints      History provided by:  Patient  Shortness of Breath  Severity:  Mild  Onset quality:  Sudden  Duration:  3 days  Timing:  Intermittent  Progression:  Worsening  Chronicity:  New  Relieved by:  Sitting up  Exacerbated by: lying down   Ineffective treatments:  None tried  Associated symptoms: chest pain    Associated symptoms: no abdominal pain, no cough, no ear pain, no fever, no headaches, no neck pain, no rash, no sore throat, no vomiting and no wheezing        Prior to Admission Medications   Prescriptions Last Dose Informant Patient Reported? Taking?    Calcium Carbonate-Vitamin D (CALTRATE 600+D PO)  Self Yes No   Sig: Take 1 tablet by mouth daily   Red Yeast Rice Extract (RED YEAST RICE PO)   Yes No   Sig: Take 1 tablet by mouth daily   acetaminophen (TYLENOL) 325 mg tablet   No No   Sig: Take 3 tablets (975 mg total) by mouth every 8 (eight) hours   anastrozole (ARIMIDEX) 1 mg tablet  Self Yes No   Sig: Take 1 mg by mouth daily   aspirin (ECOTRIN LOW STRENGTH) 81 mg EC tablet   No No   Sig: Take 1 tablet (81 mg total) by mouth daily   docusate sodium (COLACE) 100 mg capsule   No No   Sig: Take 1 capsule (100 mg total) by mouth 2 (two) times a day   enoxaparin (LOVENOX) 30 mg/0 3 mL   No No   Sig: Inject 0 4 mL (40 mg total) under the skin daily for 29 days   levothyroxine 112 mcg tablet   No No   Sig: Take 1 tablet (112 mcg total) by mouth daily in the early morning   levothyroxine 25 mcg tablet   No No   Sig: Take 1 tablet (25 mcg total) by mouth daily in the early morning   metoprolol tartrate (LOPRESSOR) 50 mg tablet   No No   Sig: Take 1 tablet (50 mg total) by mouth every 12 (twelve) hours   omeprazole (PriLOSEC) 40 MG capsule  Self Yes No   senna (SENOKOT) 8 6 mg   No No   Sig: Take 2 tablets (17 2 mg total) by mouth daily at bedtime   verapamil (CALAN-SR) 180 mg CR tablet  Self Yes No   Sig: Take 180 mg by mouth daily       Facility-Administered Medications: None       Past Medical History:   Diagnosis Date    Breast cancer ([facility] )     Disease of thyroid gland     hypothyroidism    GERD (gastroesophageal reflux disease)     Hypertension     Rapid heart rate        Past Surgical History:   Procedure Laterality Date    BREAST SURGERY Left     lumpectomy     CHOLECYSTECTOMY      ORIF HIP FRACTURE Left 3/1/2021    Procedure: OPEN REDUCTION W/ INTERNAL FIXATION (ORIF) HIP WITH CANNUALATED SCREWS;  Surgeon: Papo Hewitt MD;  Location: [facility];  Service: Orthopedics    DE COLONOSCOPY FLX DX W/COLLJ SPEC WHEN PFRMD N/A 6/26/2017    Procedure: COLONOSCOPY;  Surgeon: Heydi Douglass MD;  Location: [facility]; Service: Gastroenterology       Family History   Problem Relation Age of Onset    Prostate cancer Father     Breast cancer Sister     Sleep apnea Son      I have reviewed and agree with the history as documented      E-Cigarette/Vaping    E-Cigarette Use Never User      E-Cigarette/Vaping Substances     Social History     Tobacco Use    Smoking status: Never Smoker    Smokeless tobacco: Never Used   Vaping Use    Vaping Use: Never used   Substance Use Topics    Alcohol use: Never    Drug use: Never       Review of Systems   Constitutional: Negative for chills and fever  HENT: Negative for congestion, ear pain and sore throat  Eyes: Negative for pain and visual disturbance  Respiratory: Positive for shortness of breath  Negative for cough and wheezing  Cardiovascular: Positive for chest pain  Negative for leg swelling  Gastrointestinal: Negative for abdominal pain, diarrhea, nausea and vomiting  Genitourinary: Negative for dysuria, frequency, hematuria and urgency  Musculoskeletal: Negative for neck pain and neck stiffness  Skin: Negative for rash and wound  Neurological: Negative for weakness, numbness and headaches  Psychiatric/Behavioral: Negative for agitation and confusion  All other systems reviewed and are negative  Physical Exam  Physical Exam  Vitals and nursing note reviewed  Constitutional:       Appearance: She is well-developed  HENT:      Head: Normocephalic and atraumatic  Eyes:      Pupils: Pupils are equal, round, and reactive to light  Cardiovascular:      Rate and Rhythm: Tachycardia present  Rhythm irregular  Pulmonary:      Effort: Pulmonary effort is normal       Breath sounds: Normal breath sounds  Abdominal:      General: Bowel sounds are normal       Palpations: Abdomen is soft  Musculoskeletal:         General: Normal range of motion  Cervical back: Normal range of motion and neck supple  Right lower leg: No edema  Left lower leg: No edema  Skin:     General: Skin is warm and dry  Neurological:      General: No focal deficit present  Mental Status: She is alert and oriented to person, place, and time        Comments: No focal deficits         Vital Signs  ED Triage Vitals [07/30/22 1146]   Temperature Pulse Respirations Blood Pressure SpO2   97 7 °F (36 5 °C) (!) 128 18 146/85 95 %      Temp Source Heart Rate Source Patient Position - Orthostatic VS BP Location FiO2 (%)   Oral Monitor Sitting Left arm --      Pain Score       -- Vitals:    07/30/22 1237 07/30/22 1245 07/30/22 1300 07/30/22 1430   BP: 115/88 115/88 126/92 121/84   Pulse: 87 (!) 116 (!) 112 105   Patient Position - Orthostatic VS:             Visual Acuity      ED Medications  Medications   furosemide (LASIX) injection 40 mg (has no administration in time range)   heparin (VTE/PE) high (has no administration in time range)   aspirin (ECOTRIN LOW STRENGTH) EC tablet 81 mg (has no administration in time range)   senna (SENOKOT) tablet 17 2 mg (has no administration in time range)   levothyroxine tablet 112 mcg (has no administration in time range)   metoprolol tartrate (LOPRESSOR) tablet 50 mg (has no administration in time range)   verapamil (CALAN-SR) CR tablet 180 mg (has no administration in time range)   diltiazem (CARDIZEM) injection 10 mg (10 mg Intravenous Given 7/30/22 1228)   iohexol (OMNIPAQUE) 350 MG/ML injection (SINGLE-DOSE) 65 mL (65 mL Intravenous Given 7/30/22 1324)       Diagnostic Studies  Results Reviewed     Procedure Component Value Units Date/Time    CBC [[id number]]     Lab Status: No result Specimen: Blood     APTT [[id number]]     Lab Status: No result Specimen: Blood     Protime-INR [[id number]]     Lab Status: No result Specimen: Blood     UA w Reflex to Microscopic w Reflex to Culture [[id number]]     Lab Status: No result Specimen: Urine     HS Troponin I 2hr [[id number]]  (Normal) Collected: 07/30/22 1355    Lab Status: Final result Specimen: Blood from Arm, Right Updated: 07/30/22 1432     hs TnI 2hr 4 ng/L      Delta 2hr hsTnI 0 ng/L     TSH, 3rd generation with Free T4 reflex [[id number]]  (Abnormal) Collected: 07/30/22 1200    Lab Status: Final result Specimen: Blood from Hand, Right Updated: 07/30/22 1356     TSH 3RD GENERATON 4 550 uIU/mL     Narrative:      Patients undergoing fluorescein dye angiography may retain small amounts of fluorescein in the body for 48-72 hours post procedure   Samples containing fluorescein can produce falsely depressed TSH values  If the patient had this procedure,a specimen should be resubmitted post fluorescein clearance  T4, free [[id number]] Collected: 07/30/22 1200    Lab Status: In process Specimen: Blood from Hand, Right Updated: 07/30/22 1356    D-dimer, quantitative [[id number]]  (Abnormal) Collected: 07/30/22 1227    Lab Status: Final result Specimen: Blood from Arm, Right Updated: 07/30/22 1301     D-Dimer, Quant 2 36 ug/ml FEU     Narrative: In the evaluation for possible pulmonary embolism, in the appropriate (Well's Score of 4 or less) patient, the age adjusted d-dimer cutoff for this patient can be calculated as:    Age x 0 01 (in ug/mL) for Age-adjusted D-dimer exclusion threshold for a patient over 50 years      NT-BNP PRO [[id number]]  (Abnormal) Collected: 07/30/22 1200    Lab Status: Final result Specimen: Blood from Hand, Right Updated: 07/30/22 1249     NT-proBNP 2,420 pg/mL     HS Troponin I 4hr [[id number]]     Lab Status: No result Specimen: Blood     HS Troponin 0hr (reflex protocol) [[id number]]  (Normal) Collected: 07/30/22 1200    Lab Status: Final result Specimen: Blood from Hand, Right Updated: 07/30/22 1235     hs TnI 0hr 4 ng/L     Comprehensive metabolic panel [[id number]]  (Abnormal) Collected: 07/30/22 1200    Lab Status: Final result Specimen: Blood from Hand, Right Updated: 07/30/22 1230     Sodium 137 mmol/L      Potassium 4 1 mmol/L      Chloride 103 mmol/L      CO2 24 mmol/L      ANION GAP 10 mmol/L      BUN 13 mg/dL      Creatinine 0 97 mg/dL      Glucose 123 mg/dL      Calcium 8 9 mg/dL      Corrected Calcium 9 4 mg/dL      AST 17 U/L      ALT 26 U/L      Alkaline Phosphatase 102 U/L      Total Protein 7 8 g/dL      Albumin 3 4 g/dL      Total Bilirubin 0 64 mg/dL      eGFR 55 ml/min/1 73sq m     Narrative:      [place] guidelines for Chronic Kidney Disease (CKD):     Stage 1 with normal or high GFR (GFR > 90 mL/min/1 73 square meters)    Stage 2 Mild CKD (GFR = 60-89 mL/min/1 73 square meters)    Stage 3A Moderate CKD (GFR = 45-59 mL/min/1 73 square meters)    Stage 3B Moderate CKD (GFR = 30-44 mL/min/1 73 square meters)    Stage 4 Severe CKD (GFR = 15-29 mL/min/1 73 square meters)    Stage 5 End Stage CKD (GFR <15 mL/min/1 73 square meters)  Note: GFR calculation is accurate only with a steady state creatinine    CBC and differential [[id number]]  (Abnormal) Collected: 07/30/22 1200    Lab Status: Final result Specimen: Blood from Hand, Right Updated: 07/30/22 1209     WBC 9 34 Thousand/uL      RBC 3 89 Million/uL      Hemoglobin 11 5 g/dL      Hematocrit 36 2 %      MCV 93 fL      MCH 29 6 pg      MCHC 31 8 g/dL      RDW 15 2 %      MPV 11 3 fL      Platelets 243 Thousands/uL      nRBC 0 /100 WBCs      Neutrophils Relative 73 %      Immat GRANS % 1 %      Lymphocytes Relative 16 %      Monocytes Relative 8 %      Eosinophils Relative 2 %      Basophils Relative 0 %      Neutrophils Absolute 6 84 Thousands/µL      Immature Grans Absolute 0 06 Thousand/uL      Lymphocytes Absolute 1 46 Thousands/µL      Monocytes Absolute 0 76 Thousand/µL      Eosinophils Absolute 0 19 Thousand/µL      Basophils Absolute 0 03 Thousands/µL                  CTA ED chest PE Study   Final Result by Shanel Milton MD (07/30 3997)      No evidence for pulmonary embolism  Mild pulmonary edema with small to moderate right and small left layering bilateral pleural effusions  Surgical clips in the left chest wall with stable postoperative changes in this patient with a reported history of breast cancer    No definite new nodular lesion in this region on the current exam             Workstation performed: [id number]         XR chest 1 view portable    (Results Pending)              Procedures  ECG 12 Lead Documentation Only    Date/Time: 7/30/2022 3:19 PM  Performed by: Chris Ingram DO  Authorized by: Chris Ingram DO     Indications / Diagnosis:  Sob   Patient location: ED  Previous ECG:     Previous ECG:  Compared to current    Comparison ECG info:  2/28/21    Similarity:  Changes noted  Rate:     ECG rate:  129    ECG rate assessment: tachycardic    Rhythm:     Rhythm: atrial fibrillation    Ectopy:     Ectopy: none    QRS:     QRS axis:  Left    QRS intervals: Wide  Conduction:     Conduction: abnormal      Abnormal conduction: complete LBBB    ST segments:     ST segments: no acute changes   T waves:     T waves comment:  No acute changes              ED Course  ED Course as of 07/30/22 1522   Sat Jul 30, 2022   1308 D-Dimer, Quant(!): 2 36             HEART Risk Score    Flowsheet Row Most Recent Value   Heart Score Risk Calculator    History 1 Filed at: 07/30/2022 1258   ECG 1 Filed at: 07/30/2022 1258   Age 2 Filed at: 07/30/2022 1258   Risk Factors 2 Filed at: 07/30/2022 1258   Troponin 0 Filed at: 07/30/2022 1258   HEART Score 6 Filed at: 07/30/2022 1258                        SBIRT 20yo+    Flowsheet Row Most Recent Value   SBIRT (23 yo +)    In order to provide better care to our patients, we are screening all of our patients for alcohol and drug use  Would it be okay to ask you these screening questions?  No Filed at: 07/30/2022 1348        MORIAH Risk Score    Flowsheet Row Most Recent Value   Age >= 72 1 Filed at: 07/30/2022 1517   Known CAD (stenosis >= 50%) 0 Filed at: 07/30/2022 1517   Recent (<=24 hrs) Service Angina 0 Filed at: 07/30/2022 1517   ST Deviation >= 0 5 mm 0 Filed at: 07/30/2022 1517   3+ CAD Risk Factors (FHx, HTN, HLP, DM, Smoker) 1 Filed at: 07/30/2022 1517   Aspirin Use Past 7 Days 1 Filed at: 07/30/2022 1517   Elevated Cardiac Markers 0 Filed at: 07/30/2022 1517   MORIAH Risk Score (Calculated) 3 Filed at: 07/30/2022 1517                  MDM  Number of Diagnoses or Management Options  Atrial fibrillation with RVR ([facility] [address] ): new and requires workup  CHF (congestive heart failure) ([facility] ): new and requires workup  Diagnosis management comments: Patient with sob, cp, and elevated HR- will get cardiac workup, bnp, ddimer, and give cardizem and admit  Patient reevaluated and feels improved  Patient updated on results of tests and plan of care including admission to hospital for further evaluation of presenting complaint  Patient demonstrates verbal understanding and agrees with plan  Report to Dr Albertina Moran  with YRIS for continuation of patient care  Amount and/or Complexity of Data Reviewed  Clinical lab tests: ordered and reviewed  Tests in the radiology section of CPT®: ordered and reviewed  Tests in the medicine section of CPT®: ordered and reviewed  Discussion of test results with the performing providers: yes  Decide to obtain previous medical records or to obtain history from someone other than the patient: yes  Obtain history from someone other than the patient: yes  Review and summarize past medical records: yes  Discuss the patient with other providers: yes  Independent visualization of images, tracings, or specimens: yes    Patient Progress  Patient progress: improved      Disposition  Final diagnoses:   Atrial fibrillation with RVR ([facility] [address] )   CHF (congestive heart failure) ([facility] )     Time reflects when diagnosis was documented in both MDM as applicable and the Disposition within this note     Time User Action Codes Description Comment    7/30/2022  2:54 PM Abbie CUEVAS Add [I48 91] Atrial fibrillation with RVR ([facility] [address] )     7/30/2022  2:54 PM Abbie CUEVAS Add [I50 9] CHF (congestive heart failure) [facility])       ED Disposition     ED Disposition   Admit    Condition   Stable    Date/Time   Sat Jul 30, 2022  2:54 PM    Comment   Case was discussed with YRIS and the patient's admission status was agreed to be Admission Status: inpatient status to the service of Dr Albertina Moran   Follow-up Information    None         Patient's Medications   Discharge Prescriptions    No medications on file       No discharge procedures on file      PDMP Review Value Time User    PDMP Reviewed  Yes 3/2/2021  1:39 PM Arik Hirsch PA-C          ED Provider  Electronically Signed by           Danuta Delaney DO  07/30/22 9070

## 2022-07-30 NOTE — ASSESSMENT & PLAN NOTE
· Patient with complaints of chest pain x 3 days, especially when laying flat  · MORIAH score of 3  · D-Dimer elevated at 2 36; CTA Chest completed, Negative for PE  · Initial trop 4, 2 hr delta 0, 4 hr pending    · Patient initiated on IV Heparin drip in setting of new onset a-fib  · Cardiology consult, appreciate input

## 2022-07-30 NOTE — ASSESSMENT & PLAN NOTE
· Present on admission, evidenced by d-dimer of 2 36  · CTA Chest completed, negative for PE   · On IV heparin drip for new onset a-fib

## 2022-07-30 NOTE — H&P
[facility]  H&P- Hemalatha Baptise 1940, 80 y o  female MRN: [id number]  Unit/Bed#: ED 10 Encounter: [id number]  Primary Care Provider: Darnell Ortega MD   Date and time admitted to hospital: 7/30/2022 12:04 PM    * New onset a-fib [facility])  Assessment & Plan  · There is a history of cardiac arrhythmia is noted in patient's chart but unsure if it was AFib  Patient is not on any anticoagulation at home which probably makes it less likely for the arrhythmia to be AFib  Consult Cardiology Dr Annabella Souza  Patient presented to the ED with complaints of shortness of breath and chest pain for 3 days  · On arrival to the ED, patient noted to be in A-Fib with heart rates 120s  · Patient with a ChadsVasc score of 3  · Initiate patient on IV Heparin Drip  · Cardiology consult  · Initiated on Lopressor 50 mg BID    Elevated d-dimer  Assessment & Plan  · Present on admission, evidenced by d-dimer of 2 36  · CTA Chest completed, negative for PE   · On IV heparin drip for new onset a-fib  Acute congestive heart failure (HCC)  Assessment & Plan  · Present on admission, BNP 2420  · Patient does not have a formal diagnosis of CHF  · Symptoms consistent with CHF - orthopnea, shortness of breath  · CTA Chest (7/30/22): Negative for PE but mild pulmonary edema with small to moderate right and small left layering bilateral pleural effusions  · Echo ordered to assess EF  · Patient to receive 40 mg IV Lasix BID  · Cardiology consult, appreciate input    Chest pain  Assessment & Plan  · Patient with complaints of chest pain x 3 days, especially when laying flat  · MORIAH score of 3  · D-Dimer elevated at 2 36; CTA Chest completed, Negative for PE  · Initial trop 4, 2 hr delta 0, 4 hr pending  · Patient initiated on IV Heparin drip in setting of new onset a-fib  · Cardiology consult, appreciate input    Hypothyroidism  Assessment & Plan  · Chronic  · TSH 4 550; Free T4 pending    · Would recommend TSH in 4-6 wks with PCP  · Continue home medication regimen 112 mcg    GERD (gastroesophageal reflux disease)  Assessment & Plan  · Chronic  · Continue home medication regimen  Essential hypertension  Assessment & Plan  · Chronic  · Continue home medication regimen with hold parameters  · Controlled on admission  VTE Pharmacologic Prophylaxis: VTE Score: 5 High Risk (Score >/= 5) - Pharmacological DVT Prophylaxis Ordered: heparin drip  Sequential Compression Devices Ordered  Code Status: Level 1 - Full Code   Discussion with family: Updated  (mother) at bedside  Anticipated Length of Stay: Patient will be admitted on an inpatient basis with an anticipated length of stay of greater than 2 midnights secondary to New onset a-fib, CHF, Chest Pain work up requiring Cardiology consult  Total Time for Visit, including Counseling / Coordination of Care: 60 minutes Greater than 50% of this total time spent on direct patient counseling and coordination of care  Chief Complaint: Chest Pain, Shortness of breath    History of Present Illness:  Cleveland Vazquez is a 80 y o  female with a PMH of breast cancer, hypothyroidism, GERD, HTN who presents with chest pain, shortness of breath for 3 days worse with laying flat  At times could feel her heart was beating fast   Patient also has uterine prolapse and she started feeling pressure in her groin area after which she started feeling pressure on the left central chest with no radiation, 6/10, no exacerbating or relieving factors  Slightly reproducible but it is a different quality of chest pain, no syncope  Associated lightheadedness and dizziness but no falls or loss of consciousness  No dysuria, fever, cough, hemoptysis or hematemesis or melena or hematochezia  No recent intracranial surgeries or other surgeries  No recent brain bleed  Review of Systems:  Review of Systems   Constitutional: Negative for chills and fever     HENT: Negative for ear pain and sore throat  Eyes: Negative for pain and visual disturbance  Respiratory: Positive for chest tightness and shortness of breath  Negative for cough  Cardiovascular: Positive for chest pain and palpitations  Gastrointestinal: Negative for abdominal pain and vomiting  Genitourinary: Negative for dysuria and hematuria  Musculoskeletal: Negative for arthralgias and back pain  Skin: Negative for color change and rash  Neurological: Negative for seizures and syncope  Psychiatric/Behavioral: Negative for agitation, behavioral problems, confusion and decreased concentration  All other systems reviewed and are negative  Past Medical and Surgical History:   Past Medical History:   Diagnosis Date    Breast cancer ([facility] [address] )     Disease of thyroid gland     hypothyroidism    GERD (gastroesophageal reflux disease)     Hypertension     Rapid heart rate        Past Surgical History:   Procedure Laterality Date    BREAST SURGERY Left     lumpectomy     CHOLECYSTECTOMY      ORIF HIP FRACTURE Left 3/1/2021    Procedure: OPEN REDUCTION W/ INTERNAL FIXATION (ORIF) HIP WITH CANNUALATED SCREWS;  Surgeon: Maria E Montiel MD;  [facility];  Service: Orthopedics    ME COLONOSCOPY FLX DX W/COLLJ SPEC WHEN PFRMD N/A 6/26/2017    Procedure: COLONOSCOPY;  Surgeon: Barnie Gosselin, MD;  [facility]; Service: Gastroenterology       Meds/Allergies:  Prior to Admission medications    Medication Sig Start Date End Date Taking?  Authorizing Provider   acetaminophen (TYLENOL) 325 mg tablet Take 3 tablets (975 mg total) by mouth every 8 (eight) hours 3/2/21   Elizabeth Roca PA-C   anastrozole (ARIMIDEX) 1 mg tablet Take 1 mg by mouth daily    Historical Provider, MD   aspirin (ECOTRIN LOW STRENGTH) 81 mg EC tablet Take 1 tablet (81 mg total) by mouth daily 3/3/21   Elizabeth Roca PA-C   Calcium Carbonate-Vitamin D (CALTRATE 600+D PO) Take 1 tablet by mouth daily    Historical Provider, MD   docusate sodium (COLACE) 100 mg capsule Take 1 capsule (100 mg total) by mouth 2 (two) times a day 3/2/21   Arik Hirsch PA-C   enoxaparin (LOVENOX) 30 mg/0 3 mL Inject 0 4 mL (40 mg total) under the skin daily for 29 days 3/2/21 3/31/21  Arik Hirsch PA-C   levothyroxine 112 mcg tablet Take 1 tablet (112 mcg total) by mouth daily in the early morning 3/3/21   Arik Hirsch PA-C   levothyroxine 25 mcg tablet Take 1 tablet (25 mcg total) by mouth daily in the early morning 3/3/21   Arik Hirsch PA-C   metoprolol tartrate (LOPRESSOR) 50 mg tablet Take 1 tablet (50 mg total) by mouth every 12 (twelve) hours 3/2/21   Arik Hirsch PA-C   omeprazole (PriLOSEC) 40 MG capsule  12/12/19   Historical Provider, MD   Red Yeast Rice Extract (RED YEAST RICE PO) Take 1 tablet by mouth daily    Historical Provider, MD   senna (SENOKOT) 8 6 mg Take 2 tablets (17 2 mg total) by mouth daily at bedtime 3/2/21   Arik Hirsch PA-C   verapamil (CALAN-SR) 180 mg CR tablet Take 180 mg by mouth daily     Historical Provider, MD     I have reviewed home medications with patient personally      Allergies: No Known Allergies    Social History:  Marital Status: /Civil Union     Substance Use History:   Social History     Substance and Sexual Activity   Alcohol Use Never     Social History     Tobacco Use   Smoking Status Never Smoker   Smokeless Tobacco Never Used     Social History     Substance and Sexual Activity   Drug Use Never       Family History:  Family History   Problem Relation Age of Onset    Prostate cancer Father     Breast cancer Sister     Sleep apnea Son        Physical Exam:     Vitals:   Blood Pressure: 137/69 (07/30/22 1545)  Pulse: 105 (07/30/22 1430)  Temperature: 97 7 °F (36 5 °C) (07/30/22 1146)  Temp Source: Oral (07/30/22 1146)  Respirations: 20 (07/30/22 1430)  SpO2: 94 % (07/30/22 1430)    Physical Exam General- Awake, alert and oriented x 3, looks comfortable  HEENT- Normocephalic, atraumatic, oral mucosa- moist  Neck- Supple, No carotid bruit, no JVD  CVS- Normal S1/ S2, irregularly irregular, No murmur, chronic pedal edema  Respiratory system- bilateral crackles at bases, no wheezing   Abdomen- Soft, Non distended, no tenderness, Bowel sound- present 4 quads  Genitourinary- No suprapubic tenderness, No CVA tenderness  Skin- No new bruise or rash  Musculoskeletal- No gross deformity  Psych- No acute psychosis  CNS- CN II- XII grossly intact, No acute focal neurologic deficit noted      Additional Data:     Lab Results:  Results from last 7 days   Lab Units 07/30/22  1200   WBC Thousand/uL 9 34   HEMOGLOBIN g/dL 11 5   HEMATOCRIT % 36 2   PLATELETS Thousands/uL 265   NEUTROS PCT % 73   LYMPHS PCT % 16   MONOS PCT % 8   EOS PCT % 2     Results from last 7 days   Lab Units 07/30/22  1200   SODIUM mmol/L 137   POTASSIUM mmol/L 4 1   CHLORIDE mmol/L 103   CO2 mmol/L 24   BUN mg/dL 13   CREATININE mg/dL 0 97   ANION GAP mmol/L 10   CALCIUM mg/dL 8 9   ALBUMIN g/dL 3 4*   TOTAL BILIRUBIN mg/dL 0 64   ALK PHOS U/L 102   ALT U/L 26   AST U/L 17   GLUCOSE RANDOM mg/dL 123                       Imaging: Reviewed radiology reports from this admission including: CTA Chest and Personally reviewed the following imaging: chest xray  CTA ED chest PE Study   Final Result by Bentley Lora MD (07/30 3312)      No evidence for pulmonary embolism  Mild pulmonary edema with small to moderate right and small left layering bilateral pleural effusions  Surgical clips in the left chest wall with stable postoperative changes in this patient with a reported history of breast cancer  No definite new nodular lesion in this region on the current exam             Workstation performed: [id number]         XR chest 1 view portable    (Results Pending)       EKG and Other Studies Reviewed on Admission:   · EKG: AFib, ranging around , nonspecific ST T wave changes      ** Please Note: This note has been constructed using a voice recognition system   **

## 2022-07-30 NOTE — ASSESSMENT & PLAN NOTE
· Present on admission, BNP 2420  · Patient does not have a formal diagnosis of CHF  · Symptoms consistent with CHF - orthopnea, shortness of breath  · CTA Chest (7/30/22): Negative for PE but mild pulmonary edema with small to moderate right and small left layering bilateral pleural effusions    · Echo ordered to assess EF  · Patient to receive 40 mg IV Lasix BID  · Cardiology consult, appreciate input

## 2022-07-30 NOTE — ASSESSMENT & PLAN NOTE
· There is a history of cardiac arrhythmia is noted in patient's chart but unsure if it was AFib  Patient is not on any anticoagulation at home which probably makes it less likely for the arrhythmia to be AFib  Consult Cardiology Dr Darwin Lopez  Patient presented to the ED with complaints of shortness of breath and chest pain for 3 days  · On arrival to the ED, patient noted to be in A-Fib with heart rates 120s    · Patient with a ChadsVasc score of 3  · Initiate patient on IV Heparin Drip  · Cardiology consult  · Initiated on Lopressor 50 mg BID

## 2022-07-31 ENCOUNTER — APPOINTMENT (INPATIENT)
Dept: NON INVASIVE DIAGNOSTICS | Facility: HOSPITAL | Age: 82
DRG: 291 | End: 2022-07-31
Payer: MEDICARE

## 2022-07-31 LAB
ALBUMIN SERPL BCP-MCNC: 3.1 G/DL (ref 3.5–5)
ALP SERPL-CCNC: 91 U/L (ref 46–116)
ALT SERPL W P-5'-P-CCNC: 20 U/L (ref 12–78)
ANION GAP SERPL CALCULATED.3IONS-SCNC: 9 MMOL/L (ref 4–13)
APTT PPP: 30 SECONDS (ref 23–37)
APTT PPP: 72 SECONDS (ref 23–37)
APTT PPP: 93 SECONDS (ref 23–37)
AST SERPL W P-5'-P-CCNC: 14 U/L (ref 5–45)
BASOPHILS # BLD AUTO: 0.03 THOUSANDS/ΜL (ref 0–0.1)
BASOPHILS NFR BLD AUTO: 0 % (ref 0–1)
BILIRUB SERPL-MCNC: 0.74 MG/DL (ref 0.2–1)
BUN SERPL-MCNC: 14 MG/DL (ref 5–25)
CALCIUM ALBUM COR SERPL-MCNC: 9.3 MG/DL (ref 8.3–10.1)
CALCIUM SERPL-MCNC: 8.6 MG/DL (ref 8.3–10.1)
CHLORIDE SERPL-SCNC: 102 MMOL/L (ref 96–108)
CO2 SERPL-SCNC: 26 MMOL/L (ref 21–32)
CREAT SERPL-MCNC: 0.88 MG/DL (ref 0.6–1.3)
EOSINOPHIL # BLD AUTO: 0.21 THOUSAND/ΜL (ref 0–0.61)
EOSINOPHIL NFR BLD AUTO: 2 % (ref 0–6)
ERYTHROCYTE [DISTWIDTH] IN BLOOD BY AUTOMATED COUNT: 15.1 % (ref 11.6–15.1)
ERYTHROCYTE [DISTWIDTH] IN BLOOD BY AUTOMATED COUNT: 15.3 % (ref 11.6–15.1)
GFR SERPL CREATININE-BSD FRML MDRD: 62 ML/MIN/1.73SQ M
GLUCOSE SERPL-MCNC: 109 MG/DL (ref 65–140)
HCT VFR BLD AUTO: 33.3 % (ref 34.8–46.1)
HCT VFR BLD AUTO: 34 % (ref 34.8–46.1)
HGB BLD-MCNC: 10.5 G/DL (ref 11.5–15.4)
HGB BLD-MCNC: 10.8 G/DL (ref 11.5–15.4)
IMM GRANULOCYTES # BLD AUTO: 0.05 THOUSAND/UL (ref 0–0.2)
IMM GRANULOCYTES NFR BLD AUTO: 1 % (ref 0–2)
INR PPP: 1.31 (ref 0.84–1.19)
LYMPHOCYTES # BLD AUTO: 1.93 THOUSANDS/ΜL (ref 0.6–4.47)
LYMPHOCYTES NFR BLD AUTO: 22 % (ref 14–44)
MAGNESIUM SERPL-MCNC: 2.1 MG/DL (ref 1.6–2.6)
MCH RBC QN AUTO: 29.6 PG (ref 26.8–34.3)
MCH RBC QN AUTO: 29.8 PG (ref 26.8–34.3)
MCHC RBC AUTO-ENTMCNC: 31.5 G/DL (ref 31.4–37.4)
MCHC RBC AUTO-ENTMCNC: 31.8 G/DL (ref 31.4–37.4)
MCV RBC AUTO: 94 FL (ref 82–98)
MCV RBC AUTO: 94 FL (ref 82–98)
MONOCYTES # BLD AUTO: 0.68 THOUSAND/ΜL (ref 0.17–1.22)
MONOCYTES NFR BLD AUTO: 8 % (ref 4–12)
NEUTROPHILS # BLD AUTO: 6.08 THOUSANDS/ΜL (ref 1.85–7.62)
NEUTS SEG NFR BLD AUTO: 67 % (ref 43–75)
NRBC BLD AUTO-RTO: 0 /100 WBCS
PLATELET # BLD AUTO: 202 THOUSANDS/UL (ref 149–390)
PLATELET # BLD AUTO: 223 THOUSANDS/UL (ref 149–390)
PMV BLD AUTO: 11.1 FL (ref 8.9–12.7)
PMV BLD AUTO: 11.2 FL (ref 8.9–12.7)
POTASSIUM SERPL-SCNC: 3.5 MMOL/L (ref 3.5–5.3)
PROT SERPL-MCNC: 7.2 G/DL (ref 6.4–8.4)
PROTHROMBIN TIME: 16.1 SECONDS (ref 11.6–14.5)
RBC # BLD AUTO: 3.55 MILLION/UL (ref 3.81–5.12)
RBC # BLD AUTO: 3.63 MILLION/UL (ref 3.81–5.12)
SODIUM SERPL-SCNC: 137 MMOL/L (ref 135–147)
WBC # BLD AUTO: 8.37 THOUSAND/UL (ref 4.31–10.16)
WBC # BLD AUTO: 8.98 THOUSAND/UL (ref 4.31–10.16)

## 2022-07-31 PROCEDURE — 80053 COMPREHEN METABOLIC PANEL: CPT | Performed by: FAMILY MEDICINE

## 2022-07-31 PROCEDURE — 85025 COMPLETE CBC W/AUTO DIFF WBC: CPT | Performed by: FAMILY MEDICINE

## 2022-07-31 PROCEDURE — 85730 THROMBOPLASTIN TIME PARTIAL: CPT | Performed by: FAMILY MEDICINE

## 2022-07-31 PROCEDURE — 85027 COMPLETE CBC AUTOMATED: CPT | Performed by: PHYSICIAN ASSISTANT

## 2022-07-31 PROCEDURE — 83735 ASSAY OF MAGNESIUM: CPT | Performed by: FAMILY MEDICINE

## 2022-07-31 PROCEDURE — 85610 PROTHROMBIN TIME: CPT | Performed by: PHYSICIAN ASSISTANT

## 2022-07-31 PROCEDURE — 99232 SBSQ HOSP IP/OBS MODERATE 35: CPT | Performed by: PHYSICIAN ASSISTANT

## 2022-07-31 PROCEDURE — 85730 THROMBOPLASTIN TIME PARTIAL: CPT | Performed by: INTERNAL MEDICINE

## 2022-07-31 PROCEDURE — 85730 THROMBOPLASTIN TIME PARTIAL: CPT | Performed by: PHYSICIAN ASSISTANT

## 2022-07-31 RX ORDER — HEPARIN SODIUM 1000 [USP'U]/ML
7600 INJECTION, SOLUTION INTRAVENOUS; SUBCUTANEOUS ONCE
Status: COMPLETED | OUTPATIENT
Start: 2022-07-31 | End: 2022-07-31

## 2022-07-31 RX ORDER — WARFARIN SODIUM 5 MG/1
5 TABLET ORAL
Status: DISCONTINUED | OUTPATIENT
Start: 2022-07-31 | End: 2022-08-02

## 2022-07-31 RX ORDER — HEPARIN SODIUM 10000 [USP'U]/100ML
3-30 INJECTION, SOLUTION INTRAVENOUS
Status: DISCONTINUED | OUTPATIENT
Start: 2022-07-31 | End: 2022-08-03

## 2022-07-31 RX ORDER — HEPARIN SODIUM 1000 [USP'U]/ML
3800 INJECTION, SOLUTION INTRAVENOUS; SUBCUTANEOUS
Status: DISCONTINUED | OUTPATIENT
Start: 2022-07-31 | End: 2022-08-03

## 2022-07-31 RX ORDER — HEPARIN SODIUM 1000 [USP'U]/ML
7600 INJECTION, SOLUTION INTRAVENOUS; SUBCUTANEOUS
Status: DISCONTINUED | OUTPATIENT
Start: 2022-07-31 | End: 2022-08-03

## 2022-07-31 RX ADMIN — VERAPAMIL HYDROCHLORIDE 180 MG: 180 TABLET ORAL at 09:32

## 2022-07-31 RX ADMIN — HEPARIN SODIUM 18 UNITS/KG/HR: 10000 INJECTION, SOLUTION INTRAVENOUS at 17:56

## 2022-07-31 RX ADMIN — FUROSEMIDE 40 MG: 10 INJECTION, SOLUTION INTRAMUSCULAR; INTRAVENOUS at 09:32

## 2022-07-31 RX ADMIN — WARFARIN SODIUM 5 MG: 5 TABLET ORAL at 17:56

## 2022-07-31 RX ADMIN — HEPARIN SODIUM 7600 UNITS: 1000 INJECTION INTRAVENOUS; SUBCUTANEOUS at 17:56

## 2022-07-31 RX ADMIN — APIXABAN 5 MG: 5 TABLET, FILM COATED ORAL at 12:37

## 2022-07-31 RX ADMIN — SENNOSIDES 17.2 MG: 8.6 TABLET, FILM COATED ORAL at 21:10

## 2022-07-31 RX ADMIN — ASPIRIN 81 MG: 81 TABLET, COATED ORAL at 09:32

## 2022-07-31 RX ADMIN — METOPROLOL TARTRATE 50 MG: 50 TABLET, FILM COATED ORAL at 21:10

## 2022-07-31 RX ADMIN — LEVOTHYROXINE SODIUM 112 MCG: 112 TABLET ORAL at 05:38

## 2022-07-31 RX ADMIN — HEPARIN SODIUM 16 UNITS/KG/HR: 10000 INJECTION, SOLUTION INTRAVENOUS at 06:47

## 2022-07-31 RX ADMIN — FUROSEMIDE 40 MG: 10 INJECTION, SOLUTION INTRAMUSCULAR; INTRAVENOUS at 17:56

## 2022-07-31 RX ADMIN — METOPROLOL TARTRATE 50 MG: 50 TABLET, FILM COATED ORAL at 09:32

## 2022-07-31 NOTE — CONSULTS
Consultation - Cardiology   Shelby Edwards 80 y o  female MRN: [id number]  Unit/Bed#: -01 Encounter: [id number]    Assessment/Plan     Assessment:  1  Atrial fibrillation    Plan:  1  IV Cardizem  2  IV heparin  3   Echocardiogram     History of Present Illness   Physician Requesting Consult: Maliha Newton MD  Reason for Consult / Principal Problem: Atrial fibrillation  HPI: Shelby Edwards is a 80 y o  female with a PMH of breast cancer, hypothyroidism, GERD, HTN who presents with chest pain, shortness of breath for 3 days worse with laying flat  At times could feel her heart was beating fast   Patient also has uterine prolapse and she started feeling pressure in her groin area after which she started feeling pressure on the left central chest with no radiation, 6/10, no exacerbating or relieving factors  Slightly reproducible but it is a different quality of chest pain, no syncope  Associated lightheadedness and dizziness but no falls or loss of consciousness  No dysuria, fever, cough, hemoptysis or hematemesis or melena or hematochezia  No recent intracranial surgeries or other surgeries  Consults    Review of Systems    Historical Information   Past Medical History:   Diagnosis Date    Breast cancer ([address] )     Disease of thyroid gland     hypothyroidism    GERD (gastroesophageal reflux disease)     Hypertension     Rapid heart rate      Past Surgical History:   Procedure Laterality Date    BREAST SURGERY Left     lumpectomy     CHOLECYSTECTOMY      ORIF HIP FRACTURE Left 3/1/2021    Procedure: OPEN REDUCTION W/ INTERNAL FIXATION (ORIF) HIP WITH CANNUALATED SCREWS;  Surgeon: Sita Hernandez MD;  [facility];  Service: Orthopedics    [place] COLONOSCOPY FLX DX W/COLLJ SPEC WHEN PFRMD N/A 6/26/2017    Procedure: COLONOSCOPY;  Surgeon: Keith Werner MD;  [facility];   Service: Gastroenterology     Social History     Substance and Sexual Activity   Alcohol Use Never     Social History     Substance and Sexual Activity   Drug Use Never     E-Cigarette/Vaping    E-Cigarette Use Never User      E-Cigarette/Vaping Substances     Social History     Tobacco Use   Smoking Status Never Smoker   Smokeless Tobacco Never Used     Family History:   Family History   Problem Relation Age of Onset    Prostate cancer Father     Breast cancer Sister     Sleep apnea Son        Meds/Allergies   current meds:   Current Facility-Administered Medications   Medication Dose Route Frequency    aspirin (ECOTRIN LOW STRENGTH) EC tablet 81 mg  81 mg Oral Daily    furosemide (LASIX) injection 40 mg  40 mg Intravenous BID    heparin (porcine) 25,000 units in 0 45% NaCl 250 mL infusion (premix)  3-30 Units/kg/hr (Order-Specific) Intravenous Titrated    levothyroxine tablet 112 mcg  112 mcg Oral Early Morning    metoprolol tartrate (LOPRESSOR) tablet 50 mg  50 mg Oral Q12H [facility]    senna (SENOKOT) tablet 17 2 mg  17 2 mg Oral HS    verapamil (CALAN-SR) CR tablet 180 mg  180 mg Oral Daily    and PTA meds:   Prior to Admission Medications   Prescriptions Last Dose Informant Patient Reported? Taking?    Calcium Carbonate-Vitamin D (CALTRATE 600+D PO)  Self Yes No   Sig: Take 1 tablet by mouth daily   Red Yeast Rice Extract (RED YEAST RICE PO)   Yes No   Sig: Take 1 tablet by mouth daily   acetaminophen (TYLENOL) 325 mg tablet   No No   Sig: Take 3 tablets (975 mg total) by mouth every 8 (eight) hours   anastrozole (ARIMIDEX) 1 mg tablet  Self Yes No   Sig: Take 1 mg by mouth daily   aspirin (ECOTRIN LOW STRENGTH) 81 mg EC tablet   No No   Sig: Take 1 tablet (81 mg total) by mouth daily   docusate sodium (COLACE) 100 mg capsule   No No   Sig: Take 1 capsule (100 mg total) by mouth 2 (two) times a day   enoxaparin (LOVENOX) 30 mg/0 3 mL   No No   Sig: Inject 0 4 mL (40 mg total) under the skin daily for 29 days   levothyroxine 112 mcg tablet   No No   Sig: Take 1 tablet (112 mcg total) by mouth daily in the early morning   levothyroxine 25 mcg tablet   No No   Sig: Take 1 tablet (25 mcg total) by mouth daily in the early morning   metoprolol tartrate (LOPRESSOR) 50 mg tablet   No No   Sig: Take 1 tablet (50 mg total) by mouth every 12 (twelve) hours   omeprazole (PriLOSEC) 40 MG capsule  Self Yes No   senna (SENOKOT) 8 6 mg   No No   Sig: Take 2 tablets (17 2 mg total) by mouth daily at bedtime   verapamil (CALAN-SR) 180 mg CR tablet  Self Yes No   Sig: Take 180 mg by mouth daily       Facility-Administered Medications: None     No Known Allergies    Objective   Vitals: Blood pressure 133/87, pulse (!) 108, temperature (!) 97 2 °F (36 2 °C), resp  rate 20, height 5' 6" (1 676 m), weight 98 1 kg (216 lb 4 3 oz), SpO2 92 %  Orthostatic Blood Pressures    Flowsheet Row Most Recent Value   Blood Pressure 133/87 filed at 07/31/2022 2188   Patient Position - Orthostatic VS Lying filed at 07/30/2022 1942            Intake/Output Summary (Last 24 hours) at 7/31/2022 7380  Last data filed at 7/31/2022 0300  Gross per 24 hour   Intake 480 ml   Output 700 ml   Net -220 ml       Invasive Devices  Report    Peripheral Intravenous Line  Duration           Peripheral IV 07/30/22 Right Wrist <1 day                Physical Exam    Lab Results:   HS Troponin:   0   Lab Value Date/Time    HSTNI0 4 07/30/2022 1200    HSTNI2 4 07/30/2022 1355     Imaging:   EKG: Atrial fibrillation  VTE Prophylaxis: Sequential compression device (Venodyne)     Code Status: Level 1 - Full Code  Advance Directive and Living Will:      Power of :    POLST:      Counseling / Coordination of Care  Total floor / unit time spent today 60 minutes  Greater than 50% of total time was spent with the patient and / or family counseling and / or coordination of care  A description of the counseling / coordination of care: 61

## 2022-07-31 NOTE — PLAN OF CARE
Problem: Potential for Falls  Goal: Patient will remain free of falls  Description: INTERVENTIONS:  - Educate patient/family on patient safety including physical limitations  - Instruct patient to call for assistance with activity   - Consult OT/PT to assist with strengthening/mobility   - Keep Call bell within reach  - Keep bed low and locked with side rails adjusted as appropriate  - Keep care items and personal belongings within reach  - Initiate and maintain comfort rounds  - Make Fall Risk Sign visible to staff  - Offer Toileting every  Hours, in advance of need  - Initiate/Maintain alarm  - Obtain necessary fall risk management equipment:   - Apply yellow socks and bracelet for high fall risk patients  - Consider moving patient to room near nurses station  Outcome: Progressing     Problem: PAIN - ADULT  Goal: Verbalizes/displays adequate comfort level or baseline comfort level  Description: Interventions:  - Encourage patient to monitor pain and request assistance  - Assess pain using appropriate pain scale  - Administer analgesics based on type and severity of pain and evaluate response  - Implement non-pharmacological measures as appropriate and evaluate response  - Consider cultural and social influences on pain and pain management  - Notify physician/advanced practitioner if interventions unsuccessful or patient reports new pain  Outcome: Progressing     Problem: INFECTION - ADULT  Goal: Absence or prevention of progression during hospitalization  Description: INTERVENTIONS:  - Assess and monitor for signs and symptoms of infection  - Monitor lab/diagnostic results  - Monitor all insertion sites, i e  indwelling lines, tubes, and drains  - Monitor endotracheal if appropriate and nasal secretions for changes in amount and color  - [place] appropriate cooling/warming therapies per order  - Administer medications as ordered  - Instruct and encourage patient and family to use good hand hygiene technique  - Identify and instruct in appropriate isolation precautions for identified infection/condition  Outcome: Progressing  Goal: Absence of fever/infection during neutropenic period  Description: INTERVENTIONS:  - Monitor WBC    Outcome: Progressing     Problem: SAFETY ADULT  Goal: Patient will remain free of falls  Description: INTERVENTIONS:  - Educate patient/family on patient safety including physical limitations  - Instruct patient to call for assistance with activity   - Consult OT/PT to assist with strengthening/mobility   - Keep Call bell within reach  - Keep bed low and locked with side rails adjusted as appropriate  - Keep care items and personal belongings within reach  - Initiate and maintain comfort rounds  - Make Fall Risk Sign visible to staff  - Offer Toileting every  Hours, in advance of need  - Initiate/Maintain alarm  - Obtain necessary fall risk management equipment:   - Apply yellow socks and bracelet for high fall risk patients  - Consider moving patient to room near nurses station  Outcome: Progressing  Goal: Maintain or return to baseline ADL function  Description: INTERVENTIONS:  -  Assess patient's ability to carry out ADLs; assess patient's baseline for ADL function and identify physical deficits which impact ability to perform ADLs (bathing, care of mouth/teeth, toileting, grooming, dressing, etc )  - Assess/evaluate cause of self-care deficits   - Assess range of motion  - Assess patient's mobility; develop plan if impaired  - Assess patient's need for assistive devices and provide as appropriate  - Encourage maximum independence but intervene and supervise when necessary  - Involve family in performance of ADLs  - Assess for home care needs following discharge   - Consider OT consult to assist with ADL evaluation and planning for discharge  - Provide patient education as appropriate  Outcome: Progressing  Goal: Maintains/Returns to pre admission functional level  Description: INTERVENTIONS:  - Perform BMAT or MOVE assessment daily    - Set and communicate daily mobility goal to care team and patient/family/caregiver  - Collaborate with rehabilitation services on mobility goals if consulted  - Perform Range of Motion  times a day  - Reposition patient every  hours  - Dangle patient  times a day  - Stand patient  times a day  - Ambulate patient  times a day  - Out of bed to chair  times a day   - Out of bed for meal times a day  - Out of bed for toileting  - Record patient progress and toleration of activity level   Outcome: Progressing     Problem: DISCHARGE PLANNING  Goal: Discharge to home or other facility with appropriate resources  Description: INTERVENTIONS:  - Identify barriers to discharge w/patient and caregiver  - Arrange for needed discharge resources and transportation as appropriate  - Identify discharge learning needs (meds, wound care, etc )  - Arrange for interpretive services to assist at discharge as needed  - Refer to Case Management Department for coordinating discharge planning if the patient needs post-hospital services based on physician/advanced practitioner order or complex needs related to functional status, cognitive ability, or social support system  Outcome: Progressing     Problem: Knowledge Deficit  Goal: Patient/family/caregiver demonstrates understanding of disease process, treatment plan, medications, and discharge instructions  Description: Complete learning assessment and assess knowledge base    Interventions:  - Provide teaching at level of understanding  - Provide teaching via preferred learning methods  Outcome: Progressing

## 2022-07-31 NOTE — PROGRESS NOTES
[address] [facility]  Progress Note - Neel Barahona 1940, 80 y o  female MRN: [id number]  Unit/Bed#: -[place] Encounter: [id number]  Primary Care Provider: Letty Thomas MD   Date and time admitted to hospital: 7/30/2022 12:04 PM    * New onset a-fib [facility])  Assessment & Plan  · Presents with 3 days of shortness of breath, and associated chest pain  Noted history of arrhythmia, however no documented history of atrial fibrillation  · On arrival to the ED, patient noted to be in A-Fib with heart rates 120s  · Patient with a ChadsVasc score of 3  · Initiate patient on IV Heparin Drip  · Will price check Eliquis  · Initiated on Lopressor 50 mg BID  · Heart rates averaging 110 on telemetry  · Echo and cardiology consult pending    Acute congestive heart failure ([facility] [address] )  Assessment & Plan  · Present on admission, BNP 2420  · Patient does not have a formal diagnosis of CHF  · Symptoms consistent with CHF - orthopnea, shortness of breath  · CTA Chest (7/30/22): Negative for PE but mild pulmonary edema with small to moderate right and small left layering bilateral pleural effusions  · Still with rales on bilateral lower lobes to auscultation  · Currently on Lasix 40 mg IV b i d  · Cardiology consult pending  · Echocardiogram pending  · Cardiac diet, intakes and outputs    Chest pain  Assessment & Plan  · Patient with complaints of chest pain x 3 days, especially when laying flat  · MORIAH score of 3  · D-Dimer elevated at 2 36; CTA Chest completed, Negative for PE  · Troponins negative  · Patient initiated on IV Heparin drip in setting of new onset a-fib  · Cardiology consult, appreciate input    Elevated d-dimer  Assessment & Plan  · Present on admission, evidenced by d-dimer of 2 36  · CTA Chest completed, negative for PE     GERD (gastroesophageal reflux disease)  Assessment & Plan  · Chronic  · Continue home medication regimen      Essential hypertension  Assessment & Plan  · Chronic  · Continue home medication regimen with hold parameters  · Controlled on admission  Hypothyroidism  Assessment & Plan  · Chronic  · TSH 4 550; Free T4 normal  · Would recommend TSH in 4-6 wks with PCP  · Continue home medication regimen 112 mcg        VTE Pharmacologic Prophylaxis: VTE Score: 5 High Risk (Score >/= 5) - Pharmacological DVT Prophylaxis Ordered: heparin drip  Sequential Compression Devices Ordered  Patient Centered Rounds: I performed bedside rounds with nursing staff today  Discussions with Specialists or Other Care Team Provider: cm, nursing     Education and Discussions with Family / Patient: Updated  (son) via phone  Time Spent for Care: 30 minutes  More than 50% of total time spent on counseling and coordination of care as described above  Current Length of Stay: 1 day(s)  Current Patient Status: Inpatient   Certification Statement: The patient will continue to require additional inpatient hospital stay due to pending echo and cardiology consult   Discharge Plan: Anticipate discharge in 24-48 hrs to home  Code Status: Level 1 - Full Code    Subjective:   Patient w/ sob overnight  Still having some palpitations  Objective:     Vitals:   Temp (24hrs), Av 4 °F (36 3 °C), Min:97 1 °F (36 2 °C), Max:97 7 °F (36 5 °C)    Temp:  [97 1 °F (36 2 °C)-97 7 °F (36 5 °C)] 97 2 °F (36 2 °C)  HR:  [] 108  Resp:  [18-20] 20  BP: (115-146)/(65-92) 133/87  SpO2:  [89 %-95 %] 92 %  Body mass index is 34 91 kg/m²  Input and Output Summary (last 24 hours): Intake/Output Summary (Last 24 hours) at 2022 0834  Last data filed at 2022 0300  Gross per 24 hour   Intake 480 ml   Output 700 ml   Net -220 ml       Physical Exam:   Physical Exam  Vitals and nursing note reviewed  Constitutional:       General: She is not in acute distress  HENT:      Head: Normocephalic and atraumatic  Mouth/Throat:      Mouth: Mucous membranes are moist       Pharynx: Oropharynx is clear  Eyes:      General:         Right eye: No discharge  Left eye: No discharge  Cardiovascular:      Rate and Rhythm: Tachycardia present  Rhythm irregular  Heart sounds: Normal heart sounds  No murmur heard  Pulmonary:      Effort: Pulmonary effort is normal  No respiratory distress  Breath sounds: Rales present  No wheezing  Abdominal:      General: Abdomen is flat  There is no distension  Palpations: Abdomen is soft  Tenderness: There is no abdominal tenderness  Musculoskeletal:      Cervical back: No muscular tenderness  Right lower leg: Edema present  Left lower leg: Edema present  Skin:     General: Skin is warm and dry  Capillary Refill: Capillary refill takes less than 2 seconds  Coloration: Skin is not jaundiced  Neurological:      General: No focal deficit present  Mental Status: She is alert and oriented to person, place, and time     Psychiatric:         Mood and Affect: Mood normal           Additional Data:     Labs:  Results from last 7 days   Lab Units 07/31/22  0449   WBC Thousand/uL 8 98   HEMOGLOBIN g/dL 10 5*   HEMATOCRIT % 33 3*   PLATELETS Thousands/uL 202   NEUTROS PCT % 67   LYMPHS PCT % 22   MONOS PCT % 8   EOS PCT % 2     Results from last 7 days   Lab Units 07/31/22  0449   SODIUM mmol/L 137   POTASSIUM mmol/L 3 5   CHLORIDE mmol/L 102   CO2 mmol/L 26   BUN mg/dL 14   CREATININE mg/dL 0 88   ANION GAP mmol/L 9   CALCIUM mg/dL 8 6   ALBUMIN g/dL 3 1*   TOTAL BILIRUBIN mg/dL 0 74   ALK PHOS U/L 91   ALT U/L 20   AST U/L 14   GLUCOSE RANDOM mg/dL 109     Results from last 7 days   Lab Units 07/30/22  1607   INR  1 14                   Lines/Drains:  Invasive Devices  Report    Peripheral Intravenous Line  Duration           Peripheral IV 07/30/22 Right Wrist <1 day                  Telemetry:  Telemetry Orders (From admission, onward)             48 Hour Telemetry Monitoring  Continuous x 48 hours        References:    Telemetry Guidelines   Question:  Reason for 48 Hour Telemetry  Answer:  Arrhythmias Requiring Medical Therapy (eg  SVT, Vtach/fib, Bradycardia, Uncontrolled A-fib)                 Telemetry Reviewed: Atrial fibrillation  HR averaging 110  Indication for Continued Telemetry Use: Arrthymias requiring medical therapy             Imaging: Reviewed radiology reports from this admission including: chest CT scan    Recent Cultures (last 7 days):         Last 24 Hours Medication List:   Current Facility-Administered Medications   Medication Dose Route Frequency Provider Last Rate    aspirin  81 mg Oral Daily Elvira Negrete MD      furosemide  40 mg Intravenous BID Elvira Negrete MD      heparin (porcine)  3-30 Units/kg/hr (Order-Specific) Intravenous Titrated Elvira Negrete MD 16 Units/kg/hr (07/31/22 0647)    levothyroxine  112 mcg Oral Early Morning Elvira Negrete MD      metoprolol tartrate  50 mg Oral Q12H [facility] Elvira Negrete MD      senna  17 2 mg Oral HS Elvira Negrete MD      verapamil  180 mg Oral Daily Elvira Negrete MD          Today, Patient Was Seen By: Franck Steiner PA-C    **Please Note: This note may have been constructed using a voice recognition system  **

## 2022-07-31 NOTE — ASSESSMENT & PLAN NOTE
· Present on admission, BNP 2420  · Patient does not have a formal diagnosis of CHF  · Symptoms consistent with CHF - orthopnea, shortness of breath  · CTA Chest (7/30/22): Negative for PE but mild pulmonary edema with small to moderate right and small left layering bilateral pleural effusions  · Still with rales on bilateral lower lobes to auscultation  · Currently on Lasix 40 mg IV b i d    · Cardiology consult pending  · Echocardiogram pending  · Cardiac diet, intakes and outputs

## 2022-07-31 NOTE — PLAN OF CARE
Problem: PAIN - ADULT  Goal: Verbalizes/displays adequate comfort level or baseline comfort level  Description: Interventions:  - Encourage patient to monitor pain and request assistance  - Assess pain using appropriate pain scale  - Administer analgesics based on type and severity of pain and evaluate response  - Implement non-pharmacological measures as appropriate and evaluate response  - Consider cultural and social influences on pain and pain management  - Notify physician/advanced practitioner if interventions unsuccessful or patient reports new pain  Outcome: Progressing     Problem: INFECTION - ADULT  Goal: Absence or prevention of progression during hospitalization  Description: INTERVENTIONS:  - Assess and monitor for signs and symptoms of infection  - Monitor lab/diagnostic results  - Monitor all insertion sites, i e  indwelling lines, tubes, and drains  - Monitor endotracheal if appropriate and nasal secretions for changes in amount and color  - Spring appropriate cooling/warming therapies per order  - Administer medications as ordered  - Instruct and encourage patient and family to use good hand hygiene technique  - Identify and instruct in appropriate isolation precautions for identified infection/condition  Outcome: Progressing  Goal: Absence of fever/infection during neutropenic period  Description: INTERVENTIONS:  - Monitor WBC    Outcome: Progressing

## 2022-07-31 NOTE — ASSESSMENT & PLAN NOTE
· Chronic  · TSH 4 550; Free T4 normal  · Would recommend TSH in 4-6 wks with PCP  · Continue home medication regimen 112 mcg

## 2022-07-31 NOTE — QUICK NOTE
eliquis price checked w/ pts home pharmacy  >400$  She will likely need to start coumadin  Will discontinue eliquis started today 1130   Next dose due 1800 will restart heparin gtt at 1800 and give first dose of coumadin tonight

## 2022-07-31 NOTE — ASSESSMENT & PLAN NOTE
· Patient with complaints of chest pain x 3 days, especially when laying flat  · MORIAH score of 3  · D-Dimer elevated at 2 36; CTA Chest completed, Negative for PE    · Troponins negative  · Patient initiated on IV Heparin drip in setting of new onset a-fib  · Cardiology consult, appreciate input

## 2022-08-01 ENCOUNTER — EPISODE CHANGES (OUTPATIENT)
Dept: CASE MANAGEMENT | Facility: OTHER | Age: 82
End: 2022-08-01

## 2022-08-01 LAB
ANION GAP SERPL CALCULATED.3IONS-SCNC: 12 MMOL/L (ref 4–13)
APTT PPP: 133 SECONDS (ref 23–37)
APTT PPP: 197 SECONDS (ref 23–37)
APTT PPP: 55 SECONDS (ref 23–37)
APTT PPP: 64 SECONDS (ref 23–37)
APTT PPP: 77 SECONDS (ref 23–37)
BUN SERPL-MCNC: 17 MG/DL (ref 5–25)
CALCIUM SERPL-MCNC: 8.7 MG/DL (ref 8.3–10.1)
CHLORIDE SERPL-SCNC: 100 MMOL/L (ref 96–108)
CO2 SERPL-SCNC: 24 MMOL/L (ref 21–32)
CREAT SERPL-MCNC: 1.07 MG/DL (ref 0.6–1.3)
ERYTHROCYTE [DISTWIDTH] IN BLOOD BY AUTOMATED COUNT: 15 % (ref 11.6–15.1)
GFR SERPL CREATININE-BSD FRML MDRD: 49 ML/MIN/1.73SQ M
GLUCOSE SERPL-MCNC: 171 MG/DL (ref 65–140)
HCT VFR BLD AUTO: 35.9 % (ref 34.8–46.1)
HGB BLD-MCNC: 11.6 G/DL (ref 11.5–15.4)
INR PPP: 1.3 (ref 0.84–1.19)
MAGNESIUM SERPL-MCNC: 2.1 MG/DL (ref 1.6–2.6)
MCH RBC QN AUTO: 29.5 PG (ref 26.8–34.3)
MCHC RBC AUTO-ENTMCNC: 32.3 G/DL (ref 31.4–37.4)
MCV RBC AUTO: 91 FL (ref 82–98)
PLATELET # BLD AUTO: 243 THOUSANDS/UL (ref 149–390)
PMV BLD AUTO: 11.3 FL (ref 8.9–12.7)
POTASSIUM SERPL-SCNC: 3.6 MMOL/L (ref 3.5–5.3)
PROTHROMBIN TIME: 15.9 SECONDS (ref 11.6–14.5)
RBC # BLD AUTO: 3.93 MILLION/UL (ref 3.81–5.12)
SODIUM SERPL-SCNC: 136 MMOL/L (ref 135–147)
WBC # BLD AUTO: 8.8 THOUSAND/UL (ref 4.31–10.16)

## 2022-08-01 PROCEDURE — 85027 COMPLETE CBC AUTOMATED: CPT | Performed by: PHYSICIAN ASSISTANT

## 2022-08-01 PROCEDURE — 80048 BASIC METABOLIC PNL TOTAL CA: CPT | Performed by: PHYSICIAN ASSISTANT

## 2022-08-01 PROCEDURE — 97162 PT EVAL MOD COMPLEX 30 MIN: CPT

## 2022-08-01 PROCEDURE — 83735 ASSAY OF MAGNESIUM: CPT | Performed by: PHYSICIAN ASSISTANT

## 2022-08-01 PROCEDURE — 85730 THROMBOPLASTIN TIME PARTIAL: CPT | Performed by: NURSE PRACTITIONER

## 2022-08-01 PROCEDURE — 85730 THROMBOPLASTIN TIME PARTIAL: CPT | Performed by: PHYSICIAN ASSISTANT

## 2022-08-01 PROCEDURE — 85610 PROTHROMBIN TIME: CPT | Performed by: PHYSICIAN ASSISTANT

## 2022-08-01 PROCEDURE — 99233 SBSQ HOSP IP/OBS HIGH 50: CPT | Performed by: NURSE PRACTITIONER

## 2022-08-01 PROCEDURE — 97166 OT EVAL MOD COMPLEX 45 MIN: CPT

## 2022-08-01 PROCEDURE — 85730 THROMBOPLASTIN TIME PARTIAL: CPT | Performed by: INTERNAL MEDICINE

## 2022-08-01 RX ORDER — PANTOPRAZOLE SODIUM 40 MG/1
40 TABLET, DELAYED RELEASE ORAL
Status: DISCONTINUED | OUTPATIENT
Start: 2022-08-01 | End: 2022-08-05 | Stop reason: HOSPADM

## 2022-08-01 RX ADMIN — WARFARIN SODIUM 5 MG: 5 TABLET ORAL at 18:15

## 2022-08-01 RX ADMIN — SENNOSIDES 17.2 MG: 8.6 TABLET, FILM COATED ORAL at 22:28

## 2022-08-01 RX ADMIN — LEVOTHYROXINE SODIUM 112 MCG: 112 TABLET ORAL at 05:36

## 2022-08-01 RX ADMIN — PANTOPRAZOLE SODIUM 40 MG: 40 TABLET, DELAYED RELEASE ORAL at 09:02

## 2022-08-01 RX ADMIN — METOPROLOL TARTRATE 50 MG: 50 TABLET, FILM COATED ORAL at 09:02

## 2022-08-01 RX ADMIN — HEPARIN SODIUM 18 UNITS/KG/HR: 10000 INJECTION, SOLUTION INTRAVENOUS at 09:09

## 2022-08-01 RX ADMIN — METOPROLOL TARTRATE 50 MG: 50 TABLET, FILM COATED ORAL at 22:28

## 2022-08-01 RX ADMIN — FUROSEMIDE 40 MG: 10 INJECTION, SOLUTION INTRAMUSCULAR; INTRAVENOUS at 09:02

## 2022-08-01 RX ADMIN — FUROSEMIDE 40 MG: 10 INJECTION, SOLUTION INTRAMUSCULAR; INTRAVENOUS at 18:16

## 2022-08-01 RX ADMIN — VERAPAMIL HYDROCHLORIDE 240 MG: 120 TABLET, FILM COATED, EXTENDED RELEASE ORAL at 09:02

## 2022-08-01 NOTE — OCCUPATIONAL THERAPY NOTE
Occupational Therapy Evaluation      Patrick Oakley    8/1/2022    Patient Active Problem List   Diagnosis    Pneumonia    Essential hypertension    GERD (gastroesophageal reflux disease)    Hypothyroidism    Humeral fracture    Hyponatremia    Anemia    SAKSHI (obstructive sleep apnea)    Other insomnia    Left femoral neck fracture    History of breast cancer    Left knee pain    Rapid heart beat    Class 2 obesity in adult    New onset a-fib (HCC)    Chest pain    Acute congestive heart failure (HCC)    Elevated d-dimer       Past Medical History:   Diagnosis Date    Breast cancer ([facility] [address] )     Disease of thyroid gland     hypothyroidism    GERD (gastroesophageal reflux disease)     Hypertension     Rapid heart rate        Past Surgical History:   Procedure Laterality Date    BREAST SURGERY Left     lumpectomy     CHOLECYSTECTOMY      ORIF HIP FRACTURE Left 3/1/2021    Procedure: OPEN REDUCTION W/ INTERNAL FIXATION (ORIF) HIP WITH CANNUALATED SCREWS;  Surgeon: Saintclair Erie, MD;  Location: [facility];  Service: Orthopedics    OH COLONOSCOPY FLX DX W/COLLJ SPEC WHEN PFRMD N/A 6/26/2017    Procedure: COLONOSCOPY;  Surgeon: Tita Claros MD;  Location: [facility]; Service: Gastroenterology        08/01/22 0755   OT Last Visit   OT Visit Date 08/01/22   Note Type   Note type Evaluation   Additional Comments Pt agreeable to OT eval  Upon arrival pt supine in bed with HOB elevated   Restrictions/Precautions   Weight Bearing Precautions Per Order No   Braces or Orthoses Other (Comment)  (none per pt)   Other Precautions Chair Alarm; Bed Alarm;Multiple lines;Telemetry; Fall Risk   Pain Assessment   Pain Assessment Tool 0-10   Pain Score No Pain   Home Living   Type of Home Mobile home   Home Layout One level;Performs ADLs on one level; Able to live on main level with bedroom/bathroom;Stairs to enter with rails  (3 ROMERO)   Bathroom Shower/Tub Tub/shower unit   Bathroom Toilet Standard   Bathroom Equipment   (no DME reported) [address] Cane;Walker  (utilizes RW in bathroom; [facility] used for long distances)   Prior Function   Level of [place] Independent with ADLs and functional mobility   Lives With Spouse; Other (Comment)  (& 24 y o  granddaughter)   Receives Help From [facility], [address] in the last 6 months 1 to 4  (1 fall per pt)   Vocational Retired   Comments (+) driving   Lifestyle   Autonomy Patient reporting being independent with ADLs/IADLs, ambulatory with RW vs SPC, and lives with family in a mobile home   Reciprocal Relationships Supportive family   Service to Others Retired   ADL   Eating Assistance 6  Modified independent   [address] [facility] 6  [address]  [address] [place] [address] [address]  [address]  [address] [facility] 4  [address]  5  Supervision/Setup   Bed Mobility   Supine to [address]   Additional items Assist x 1;HOB elevated; Bedrails; Increased time required   Sit to Supine   (DNT: pt seated OOB in recliner at end of eval)   Additional Comments Pt denied lightheaded/dizziness with transitional movements   Transfers   Sit to Stand 4  Minimal assistance   Additional items Assist x 1; Armrests; Increased time required;Verbal cues   Stand to Sit 4  Minimal assistance   Additional items Assist x 1; Increased time required;Verbal cues;Armrests   Functional Mobility   Functional Mobility 4  Minimal assistance   Additional Comments Pt ambulated in hallway with no overt SOB  HR noted to increase to 130's with short ambulation   Pt grossly unsteady,   Additional items Rolling walker   Balance   Static Sitting Fair +   Dynamic Sitting Fair   Static Standing Fair -   Dynamic Standing Poor +   Activity Tolerance   Activity Tolerance Treatment limited secondary to medical complications (Comment)  (limited due to elevated HR)   Medical Staff Made Aware Pt seen as a co-eval with PT due to the patient's co-morbidities & clinically unstable presentation   Nurse Made Aware Spoke c RN Elvira ARENAS Assessment   RUE Assessment WFL  (full AROM, 4/5 MMT)   LUE Assessment   LUE Assessment WFL  (full AROM, 4/5 MMT)   Hand Function   Gross Motor Coordination Functional   Fine Motor Coordination Functional   Sensation   Light Touch No apparent deficits   Vision-Basic Assessment   Current Vision Wears glasses only for reading   Cognition   Overall Cognitive Status [facility]   Arousal/Participation Alert; Responsive; Cooperative   Attention Within functional limits   Orientation Level Oriented X4   Memory Within functional limits   Following Commands Follows all commands and directions without difficulty   Assessment   Limitation Decreased ADL status; Decreased UE strength;Decreased endurance;Decreased self-care trans;Decreased high-level ADLs   Prognosis Good   Assessment Patient is a 80 y o  female seen for OT evaluation s/p admit to [address] [place] [address]  [facility]  Commorbidities affecting patient's functional performance at time of assessment include: CHF, chest pain, HTN, GERD, and hypothyroidism  Orders placed for OT evaluation and treatment and up as tolerated  Performed at least two patient identifiers during session including name and wristband  Prior to admission, Patient reporting being independent with ADLs/IADLs, ambulatory with RW vs SPC, and lives with family in a mobile home  Personal factors affecting patient at time of initial evaluation include: steps to enter, difficulty performing ADLs and difficulty performing IADLs  Upon evaluation, patient requires supervision assist for UB ADLs, minimal  assist for LB ADLs, transfers and functional ambulation in room and bathroom with minimal  assist, with the use of Rolling Walker  Patient is oriented x 4    Occupational performance is affected by the following deficits: decreased muscle strength, dynamic sit/ stand balance deficit with poor standing tolerance time for self care and functional mobility, decreased activity tolerance and delayed righting and equilibrium reactions  Patient to benefit from continued Occupational Therapy treatment while in the hospital to address deficits as defined above and maximize level of functional independence with ADLs and functional mobility  Occupational Performance areas to address include: grooming , bathing/ shower, dressing, toilet hygiene, transfer to all surfaces, functional ambulation, medication routine/ management, IADLS: Household maintenance, IADLs: safety procedures and IADLs: meal prep/ clean up  From OT standpoint, recommendation at time of d/c would be Home with home health rehabilitation  Goals   Patient Goals to go home   Plan   Treatment Interventions ADL retraining;Functional transfer training;UE strengthening/ROM; Endurance training;Patient/family training; Compensatory technique education; Activityengagement   Goal Expiration Date 08/11/22   OT Treatment Day 0   OT Frequency 2-3x/wk   Recommendation   OT Discharge Recommendation Home with home health rehabilitation   Additional Comments  At end of eval, pt seated OOB in recliner with all needs met   Additional Comments 2 The patient's raw score on the AM-PAC Daily Activity inpatient short form is 20, standardized score is 42 03, greater than 39 4  Patients at this level are likely to benefit from discharge to home  Please refer to the recommendation of the Occupational Therapist for safe discharge planning     AM-PAC Daily Activity Inpatient   Lower Body Dressing 3   Bathing 3   Toileting 3   Upper Body Dressing 3   Grooming 4   Eating 4   Daily Activity Raw Score 20   Daily Activity Standardized Score (Calc for Raw Score >=11) 42 03   AM-[facility] Applied Cognition Inpatient   Following a Speech/Presentation 4   Understanding Ordinary Conversation 4   Taking Medications 4   Remembering Where Things Are Placed or Put Away 4   Remembering List of 4-5 Errands 4   Taking Care of Complicated Tasks 4   Applied Cognition Raw Score 24   Applied Cognition Standardized Score 62 21     GOALS:    *ADL transfers with (I) for inc'd independence with ADLs/purposeful tasks    *UB ADL with (I) for inc'd independence with self cares    *LB ADL with (I) using AE prn for inc'd independence with self cares    *Toileting with (I) for clothing management and hygiene for return to PLOF with personal care    *Increase stand tolerance x 5  m for inc'd tolerance with standing purposeful tasks    *Participate in 10m UE therex to increase overall stamina/activity tolerance for purposeful tasks    *Bed mobility- (I) for inc'd independence to manage own comfort and initiate EOB & OOB purposeful tasks    *Patient will verbalize 3 safety awareness/ principles to prevent falls in the home setting  *Patient will increase OOB/sitting tolerance to 2-4 hours per day to increase participation in self-care and leisure tasks with no s/s of exertion       Karl Rasheed, OTD, OTR/L

## 2022-08-01 NOTE — PHYSICAL THERAPY NOTE
Physical Therapy Evaluation   Time in: 742  Time out: 755  Total evaluation time: 13 minutes    Patient's Name: Francisco Ferguson    Admitting Diagnosis  CHF (congestive heart failure) ([place] [address] ) [I50 9]  Rapid heart rate [R00 0]  [facility] [I48 91]    Problem List  Patient Active Problem List   Diagnosis    Pneumonia    Essential hypertension    GERD (gastroesophageal reflux disease)    Hypothyroidism    Humeral fracture    Hyponatremia    Anemia    SAKSHI (obstructive sleep apnea)    Other insomnia    Left femoral neck fracture    History of breast cancer    Left knee pain    Rapid heart beat    Class 2 obesity in adult    New onset a-fib ([place] [address] )    Chest pain    Acute congestive heart failure ([place] [address] )    Elevated d-dimer       Past Medical History  Past Medical History:   Diagnosis Date    Breast cancer ([place] [address] )     Disease of thyroid gland     hypothyroidism    GERD (gastroesophageal reflux disease)     Hypertension     Rapid heart rate        Past Surgical History  Past Surgical History:   Procedure Laterality Date    BREAST SURGERY Left     lumpectomy     CHOLECYSTECTOMY      ORIF HIP FRACTURE Left 3/1/2021    Procedure: OPEN REDUCTION W/ INTERNAL FIXATION (ORIF) HIP WITH CANNUALATED SCREWS;  Surgeon: Malachi Hou MD;  Location: [facility];  Service: Orthopedics    SC COLONOSCOPY FLX DX W/COLLJ SPEC WHEN PFRMD N/A 6/26/2017    Procedure: COLONOSCOPY;  Surgeon: Jannet Jacob MD;  Location: [facility]; Service: Gastroenterology       PT performed at least 2 patient identifiers during session: Name and wristband  08/01/22 0742   PT Last Visit   PT Visit Date 08/01/22   Note Type   Note type Evaluation   Pain Assessment   Pain Assessment Tool 0-10   Pain Score No Pain   Restrictions/Precautions   Weight Bearing Precautions Per Order No   Braces or Orthoses Other (Comment)  (none per pt)   Other Precautions Chair Alarm; Bed Alarm;Multiple lines;Telemetry; Fall Risk   Home Living   Type of Home Mobile home   Home Layout Stairs to enter with rails; Performs ADLs on one level; One level  (3 ROMERO)   Bathroom Shower/Tub Tub/shower unit   Bathroom Toilet Standard   Bathroom Equipment Other (Comment)  (none per pt)   Bathroom Accessibility Accessible via walker   Home Equipment Cane;Walker  (walker used in bathroom for support, cane used at baseline outdoors for longer distances)   Prior Function   Level of [place] Independent with ADLs and functional mobility   Lives With Spouse; Other (Comment)  (24 y o  granddaughter)   [place] Help From [facility], [address] in the last 6 months 1 to 4  (1 fall)   Vocational Retired   Comments +, no O2 used at baseline; pt stays busy with chores/working on computer   General   Family/Caregiver Present No   Cognition   Overall Cognitive Status WFL   Arousal/Participation Alert   Orientation Level Oriented X4   Memory Within functional limits   Following Commands Follows all commands and directions without difficulty   Comments Pt agreeable to PT  Subjective   Subjective "I can walk "   RUE Assessment   RUE Assessment   (refer to OT assessment)   LUE Assessment   LUE Assessment   (refer to OT assessment)   RLE Assessment   RLE Assessment X  (at least 3+/5 demonstrated by functional mobility)   LLE Assessment   LLE Assessment X  (at least 3+/5 demonstrated by functional mobility)   Vision-Basic Assessment   Current Vision Wears glasses for distance only  (for close-up)   Coordination   Movements are Fluid and Coordinated 1   Sensation [facility]   Light Touch   RLE Light Touch Grossly intact   LLE Light Touch Grossly intact   Bed Mobility   Supine to Sit 4  Minimal assistance   Additional items Assist x 1;Verbal cues; Increased time required; Bedrails;HOB elevated   Sit to Supine   (DNT: pt seated in bedside chair following mobility)   Transfers   Sit to Stand 4  Minimal assistance   Additional items Assist x 1;Verbal cues;Increased time required   Stand to Sit 4  Minimal assistance   Additional items Assist x 1; Increased time required;Verbal cues   Ambulation/Elevation   Gait pattern Step through pattern; Inconsistent israel; Wide IBIS;Shuffling   Gait Assistance 4  Minimal assist   Additional items Assist x 1; Tactile cues; Verbal cues   Assistive Device Rolling walker   Distance 20'   Stair Management Assistance Not tested   Ambulation/Elevation Additional Comments pt's SpO2 decreased to 90% following ambulation with HR in the mid 130s, pt educated on proper breathing techniques; further ambulation distance and stair negotiation deferred secondary to elevated HR   Balance   Static Sitting Fair +   Dynamic Sitting Fair   Static Standing Fair -   Dynamic Standing Poor +   Ambulatory Poor +   Endurance Deficit   Endurance Deficit Yes   Endurance Deficit Description decreased activity tolerance   Activity Tolerance   Activity Tolerance Patient tolerated treatment well;Treatment limited secondary to medical complications (Comment)  (limited due to elevated HR)   Medical Staff Made Aware PT Supa Vivar   Nurse Made Aware RN Elvira   Assessment   Prognosis Good   Problem List Decreased strength;Decreased endurance; Impaired balance;Decreased mobility   Assessment Pt is a 80 y o  female seen for a moderate complexity PT evaluation on 8/1/22 s/p admit to [id number] [place] [address] on 7/30/22 with new onset a-fib  PT consulted to evaluate and assess pt's funcitonal mobility and safe d/c needs  Pt livesin a mobile home with 3 ROMERO, and uses a cane at baseline for outdoor ambulation  Upon evaluation, pt presents with decreasd strength, decreased endurance, decreased mobility, and impaired balance  Pt requiring min assist x 1 with bed mobility, transfers, and ambulation with use of RW for 20'  Objective outcome measures performed at time of eval include: AM-PAC 17/24, Barthel Index 55/100, and Modified Jo Scale 4   Pertinent PMH and comorbidities include essential hypertension, acute congestive heart failure, class 2 obesity in adult, rapid heart beart, left knee pain, h/o left femoral neck fracture, h/o breast cancer, anemia, and chest pain  Personal factors limiting pt at time of eval include abnormal lab values, increased need for assistance with mobility, use of supplemental O2, and ROMERO home  Pt's overall rehab potential and prognosis is good to return to OF as impacted by objective outcome measures  Pt would benefit from further skilled PT to address participation restrictions, activity limitations, and impairments noted above  PT d/c recommendation at this time is home with home health rehabilitation services to address overall impairments and functional mobility  PT will continue to see PT throughout hospital stay to address POC and meet STGs  Barriers to Discharge Inaccessible home environment   Goals   Patient Goals none expressed by pt   STG Expiration Date 08/11/22   Short Term Goal #1 In 7-10 days: pt will increase BL LE strength by 1/2 grade to facilitate safe ambulation in the household and community, pt will improve balance scores by 1 point to improve dynamic mobility, pt will perform bed mobility and transfers with mod I to decrease caregiver burden, pt will ambulate 150' with least restrictive AD and supervision to navigate community distances safely, goals for stairs/elevations to be created upon further evaluation   Plan   Treatment/Interventions Functional transfer training;LE strengthening/ROM; Therapeutic exercise; Endurance training;Patient/family training;Gait training;Bed mobility;Continued evaluation;Spoke to nursing;OT   PT Frequency 2-3x/wk   Recommendation   PT Discharge Recommendation Home with home health rehabilitation   [address] [place] [address] Mobility Inpatient   Turning in Bed Without Bedrails 3   Lying on Back to Sitting on Edge of Flat Bed 3   Moving Bed to Chair 3   Standing Up From Chair 3   Walk in Room 3   Climb 3-5 Stairs 2   Basic Mobility Inpatient Raw Score 17   Basic Mobility Standardized Score 39 67   Highest Level Of Mobility   -[facility] Goal 5: Stand one or more mins   -[facility] Achieved 6: Walk 10 steps or more   Modified Jo Scale   Modified Boundary Scale 4   Barthel Index   Feeding 10   Bathing 0   Grooming Score 5   Dressing Score 5   Bladder Score 10   Bowels Score 10   Toilet Use Score 5   Transfers (Bed/Chair) Score 10   Mobility (Level Surface) Score 0   Stairs Score 0   Barthel Index Score 55   End of Consult   Patient Position at End of Consult Bedside chair;Bed/Chair alarm activated; All needs within reach       [place], SPT

## 2022-08-01 NOTE — ASSESSMENT & PLAN NOTE
· Presents with 3 days of shortness of breath, and associated chest pain  Noted history of arrhythmia, however no documented history of atrial fibrillation  · On arrival to the ED, patient noted to be in A-Fib with heart rates 120s    · Patient with a ChadsVasc score of 3  · Initiate patient on IV Heparin Drip  · Eliquis $400 patient cannot afford  · Patient started on coumadin 5mg continue heparin gtt until INR therapeutic   · INR 1 31  · Patient initiated on Lopressor 50 mg BID  · Echo pending  · Cardiology following agree with BB and continuing Heparin   · Echo and cardiology consult pending

## 2022-08-01 NOTE — PLAN OF CARE
Problem: Potential for Falls  Goal: Patient will remain free of falls  Description: INTERVENTIONS:  - Educate patient/family on patient safety including physical limitations  - Instruct patient to call for assistance with activity   - Consult OT/PT to assist with strengthening/mobility   - Keep Call bell within reach  - Keep bed low and locked with side rails adjusted as appropriate  - Keep care items and personal belongings within reach  - Initiate and maintain comfort rounds  - Make Fall Risk Sign visible to staff  - Offer Toileting every    Hours, in advance of need  - Initiate/Maintain   alarm  - Obtain necessary fall risk management equipment:     - Apply yellow socks and bracelet for high fall risk patients  - Consider moving patient to room near nurses station  Outcome: Progressing     Problem: PAIN - ADULT  Goal: Verbalizes/displays adequate comfort level or baseline comfort level  Description: Interventions:  - Encourage patient to monitor pain and request assistance  - Assess pain using appropriate pain scale  - Administer analgesics based on type and severity of pain and evaluate response  - Implement non-pharmacological measures as appropriate and evaluate response  - Consider cultural and social influences on pain and pain management  - Notify physician/advanced practitioner if interventions unsuccessful or patient reports new pain  Outcome: Progressing     Problem: INFECTION - ADULT  Goal: Absence or prevention of progression during hospitalization  Description: INTERVENTIONS:  - Assess and monitor for signs and symptoms of infection  - Monitor lab/diagnostic results  - Monitor all insertion sites, i e  indwelling lines, tubes, and drains  - Monitor endotracheal if appropriate and nasal secretions for changes in amount and color  - [place] appropriate cooling/warming therapies per order  - Administer medications as ordered  - Instruct and encourage patient and family to use good hand hygiene technique  - Identify and instruct in appropriate isolation precautions for identified infection/condition  Outcome: Progressing  Goal: Absence of fever/infection during neutropenic period  Description: INTERVENTIONS:  - Monitor WBC    Outcome: Progressing     Problem: SAFETY ADULT  Goal: Patient will remain free of falls  Description: INTERVENTIONS:  - Educate patient/family on patient safety including physical limitations  - Instruct patient to call for assistance with activity   - Consult OT/PT to assist with strengthening/mobility   - Keep Call bell within reach  - Keep bed low and locked with side rails adjusted as appropriate  - Keep care items and personal belongings within reach  - Initiate and maintain comfort rounds  - Make Fall Risk Sign visible to staff  - Offer Toileting every    Hours, in advance of need  - Initiate/Maintain   alarm  - Obtain necessary fall risk management equipment:     - Apply yellow socks and bracelet for high fall risk patients  - Consider moving patient to room near nurses station  Outcome: Progressing  Goal: Maintain or return to baseline ADL function  Description: INTERVENTIONS:  -  Assess patient's ability to carry out ADLs; assess patient's baseline for ADL function and identify physical deficits which impact ability to perform ADLs (bathing, care of mouth/teeth, toileting, grooming, dressing, etc )  - Assess/evaluate cause of self-care deficits   - Assess range of motion  - Assess patient's mobility; develop plan if impaired  - Assess patient's need for assistive devices and provide as appropriate  - Encourage maximum independence but intervene and supervise when necessary  - Involve family in performance of ADLs  - Assess for home care needs following discharge   - Consider OT consult to assist with ADL evaluation and planning for discharge  - Provide patient education as appropriate  Outcome: Progressing  Goal: Maintains/Returns to pre admission functional level  Description: INTERVENTIONS:  - Perform BMAT or MOVE assessment daily    - Set and communicate daily mobility goal to care team and patient/family/caregiver  - Collaborate with rehabilitation services on mobility goals if consulted  - Perform Range of Motion    times a day  - Reposition patient every    hours  - Dangle patient    times a day  - Stand patient    times a day  - Ambulate patient    times a day  - Out of bed to chair    times a day   - Out of bed for meals  times a day  - Out of bed for toileting  - Record patient progress and toleration of activity level   Outcome: Progressing     Problem: DISCHARGE PLANNING  Goal: Discharge to home or other facility with appropriate resources  Description: INTERVENTIONS:  - Identify barriers to discharge w/patient and caregiver  - Arrange for needed discharge resources and transportation as appropriate  - Identify discharge learning needs (meds, wound care, etc )  - Arrange for interpretive services to assist at discharge as needed  - Refer to Case Management Department for coordinating discharge planning if the patient needs post-hospital services based on physician/advanced practitioner order or complex needs related to functional status, cognitive ability, or social support system  Outcome: Progressing     Problem: Knowledge Deficit  Goal: Patient/family/caregiver demonstrates understanding of disease process, treatment plan, medications, and discharge instructions  Description: Complete learning assessment and assess knowledge base    Interventions:  - Provide teaching at level of understanding  - Provide teaching via preferred learning methods  Outcome: Progressing

## 2022-08-01 NOTE — ASSESSMENT & PLAN NOTE
· Patient with complaints of chest pain x 3 days, especially when laying flat  · MORIAH score of 3  · D-Dimer elevated at 2 36; CTA Chest completed, Negative for PE    · Troponins negative  · Patient initiated on IV Heparin drip in setting of new onset a-fib  · Cardiology consult, appreciate input  · Await echo

## 2022-08-01 NOTE — PLAN OF CARE
Problem: PHYSICAL THERAPY ADULT  Goal: Performs mobility at highest level of function for planned discharge setting  See evaluation for individualized goals  Description: Treatment/Interventions: Functional transfer training, LE strengthening/ROM, Therapeutic exercise, Endurance training, Patient/family training, Gait training, Bed mobility, Continued evaluation, Spoke to nursing, OT  Equipment Recommended: Cadence Lacy       See flowsheet documentation for full assessment, interventions and recommendations  Note: Prognosis: Good  Problem List: Decreased strength, Decreased endurance, Impaired balance, Decreased mobility  Assessment: Pt is a 80 y o  female seen for a moderate complexity PT evaluation on 8/1/22 s/p admit to [address] [place] [address] on 7/30/22 with new onset a-fib  PT consulted to evaluate and assess pt's funcitonal mobility and safe d/c needs  Pt livesin a mobile home with 3 ROMERO, and uses a cane at baseline for outdoor ambulation  Upon evaluation, pt presents with decreasd strength, decreased endurance, decreased mobility, and impaired balance  Pt requiring min assist x 1 with bed mobility, transfers, and ambulation with use of RW for 20'  Objective outcome measures performed at time of eval include: AM-PAC 17/24, Barthel Index 55/100, and Modified Jo Scale 4  Pertinent PMH and comorbidities include essential hypertension, acute congestive heart failure, class 2 obesity in adult, rapid heart beart, left knee pain, h/o left femoral neck fracture, h/o breast cancer, anemia, and chest pain  Personal factors limiting pt at time of eval include abnormal lab values, increased need for assistance with mobility, use of supplemental O2, and ROMERO home  Pt's overall rehab potential and prognosis is good to return to PLOF as impacted by objective outcome measures  Pt would benefit from further skilled PT to address participation restrictions, activity limitations, and impairments noted above   PT d/c recommendation at this time is home with home health rehabilitation services to address overall impairments and functional mobility  PT will continue to see PT throughout hospital stay to address POC and meet STGs  Barriers to Discharge: Inaccessible home environment     PT Discharge Recommendation: Home with home health rehabilitation    See flowsheet documentation for full assessment

## 2022-08-01 NOTE — ASSESSMENT & PLAN NOTE
· Chronic BP reasonable  · Continue Verapamil, metoprolol, and lasix with parameters   · Routine vitals per unit

## 2022-08-01 NOTE — PLAN OF CARE
Problem: OCCUPATIONAL THERAPY ADULT  Goal: Performs self-care activities at highest level of function for planned discharge setting  See evaluation for individualized goals  Description: Treatment Interventions: ADL retraining, Functional transfer training, UE strengthening/ROM, Endurance training, Patient/family training, Compensatory technique education, Activityengagement          See flowsheet documentation for full assessment, interventions and recommendations  Note: Limitation: Decreased ADL status, Decreased UE strength, Decreased endurance, Decreased self-care trans, Decreased high-level ADLs  Prognosis: Good  Assessment: Patient is a 80 y o  female seen for OT evaluation s/p admit to [address] [facility]  on 7/30/2022 w/New onset a-fib [facility])  Commorbidities affecting patient's functional performance at time of assessment include: CHF, chest pain, HTN, GERD, and hypothyroidism  Orders placed for OT evaluation and treatment and up as tolerated  Performed at least two patient identifiers during session including name and wristband  Prior to admission, Patient reporting being independent with ADLs/IADLs, ambulatory with RW vs [facility], and lives with family in a mobile home  Personal factors affecting patient at time of initial evaluation include: steps to enter, difficulty performing ADLs and difficulty performing IADLs  Upon evaluation, patient requires supervision assist for UB ADLs, minimal  assist for LB ADLs, transfers and functional ambulation in room and bathroom with minimal  assist, with the use of Rolling Walker  Patient is oriented x 4  Occupational performance is affected by the following deficits: decreased muscle strength, dynamic sit/ stand balance deficit with poor standing tolerance time for self care and functional mobility, decreased activity tolerance and delayed righting and equilibrium reactions   Patient to benefit from continued Occupational Therapy treatment while in the hospital to address deficits as defined above and maximize level of functional independence with ADLs and functional mobility  Occupational Performance areas to address include: grooming , bathing/ shower, dressing, toilet hygiene, transfer to all surfaces, functional ambulation, medication routine/ management, IADLS: Household maintenance, IADLs: safety procedures and IADLs: meal prep/ clean up  From OT standpoint, recommendation at time of d/c would be Home with home health rehabilitation       OT Discharge Recommendation: Home with home health rehabilitation     Remy Gallagher, OT

## 2022-08-01 NOTE — PLAN OF CARE
Problem: Potential for Falls  Goal: Patient will remain free of falls  Description: INTERVENTIONS:  - Educate patient/family on patient safety including physical limitations  - Instruct patient to call for assistance with activity   - Consult OT/PT to assist with strengthening/mobility   - Keep Call bell within reach  - Keep bed low and locked with side rails adjusted as appropriate  - Keep care items and personal belongings within reach  - Initiate and maintain comfort rounds  - Make Fall Risk Sign visible to staff  - Offer Toileting every  Hours, in advance of need  - Initiate/Maintain alarm  - Obtain necessary fall risk management equipment:   - Apply yellow socks and bracelet for high fall risk patients  - Consider moving patient to room near nurses station  Outcome: Progressing     Problem: PAIN - ADULT  Goal: Verbalizes/displays adequate comfort level or baseline comfort level  Description: Interventions:  - Encourage patient to monitor pain and request assistance  - Assess pain using appropriate pain scale  - Administer analgesics based on type and severity of pain and evaluate response  - Implement non-pharmacological measures as appropriate and evaluate response  - Consider cultural and social influences on pain and pain management  - Notify physician/advanced practitioner if interventions unsuccessful or patient reports new pain  Outcome: Progressing

## 2022-08-01 NOTE — PROGRESS NOTES
[facility]  Progress Note - Karina Freeze 1940, 80 y o  female MRN: [id number]  Unit/Bed#: -01 Encounter: [id number]  Primary Care Provider: Stalin Samaniego MD   Date and time admitted to hospital: 7/30/2022 12:04 PM    * New onset a-fib [facility])  Assessment & Plan  · Presents with 3 days of shortness of breath, and associated chest pain  Noted history of arrhythmia, however no documented history of atrial fibrillation  · On arrival to the ED, patient noted to be in A-Fib with heart rates 120s  · Patient with a ChadsVasc score of 3  · Initiate patient on IV Heparin Drip  · Eliquis $400 patient cannot afford  · Patient started on coumadin 5mg continue heparin gtt until INR therapeutic   · INR 1 31  · Patient initiated on Lopressor 50 mg BID  · Echo pending  · Cardiology following agree with BB and continuing Heparin   · Echo and cardiology consult pending    Elevated d-dimer  Assessment & Plan  · Present on admission, evidenced by d-dimer of 2 36  · CTA Chest completed, negative for PE  Acute congestive heart failure (HCC)  Assessment & Plan  · Present on admission, BNP 2420  · Patient does not have a formal diagnosis of CHF  · Symptoms consistent with CHF - orthopnea, shortness of breath  · CTA Chest (7/30/22): Negative for PE but mild pulmonary edema with small to moderate right and small left layering bilateral pleural effusions  · Still with rales on bilateral lower lobes to auscultation  · Currently on Lasix 40 mg IV b i d  · Cardiology consulted  · Echocardiogram pending  · Cardiac diet, intakes and outputs    Chest pain  Assessment & Plan  · Patient with complaints of chest pain x 3 days, especially when laying flat  · MORIAH score of 3  · D-Dimer elevated at 2 36; CTA Chest completed, Negative for PE    · Troponins negative  · Patient initiated on IV Heparin drip in setting of new onset a-fib  · Cardiology consult, appreciate input  · Await echo    Hypothyroidism  Assessment & Plan  · Chronic  · TSH 4 550; Free T4 normal  · Would recommend TSH in 4-6 wks with PCP  · Continue home medication regimen 112 mcg    GERD (gastroesophageal reflux disease)  Assessment & Plan  · Chronic  · Will start auto sub PPI    Essential hypertension  Assessment & Plan  · Chronic BP reasonable  · Continue Verapamil, metoprolol, and lasix with parameters   · Routine vitals per unit           VTE Pharmacologic Prophylaxis: VTE Score: 5 High Risk (Score >/= 5) - Pharmacological DVT Prophylaxis Ordered: heparin drip  Sequential Compression Devices Ordered  Bridge to Coumadin     Patient Centered Rounds: I performed bedside rounds with nursing staff today  Discussions with Specialists or Other Care Team Provider:  Cardiology note reviewed    Education and Discussions with Family / Patient: Updated  (son) via phone  Time Spent for Care: 35 minutes  More than 50% of total time spent on counseling and coordination of care as described above  Current Length of Stay: 2 day(s)  Current Patient Status: Inpatient   Certification Statement: The patient will continue to require additional inpatient hospital stay due to Ongoing treatment in setting of atrial fibrillation with RVR  Discharge Plan: Anticipate discharge in 48 hrs to home  Code Status: Level 1 - Full Code    Subjective:   Patient discussion is she was under the impression she could go home today lengthy discussion regarding patient's disease process patient states she has dealt with her heart rate like this for years and does not understand the difference at this point will while she continues to struggle shortness of breath  Patient then endorses this is because she was just out of bed patient educated that she does not appear to be understanding her disease presentation sat with patient for 20 minutes explaining the arrhythmia volume overload and need for heparin drip in why it would not be advisable for her to discharge today  Patient states she would like cause call placed to her son who has heart condition and may better understand the depth and be able to explained to her  Son states this is been an ongoing issue with his mother at length that she has been avoiding many conditions until they have become a problem and states that he has felt she has had a cardiac issue that she has not addressed for some time and is not telling her cardiologist everything that has been going on he has noted this symptoms of complaints of dizziness shortness of breath at home and is really pressing for her to get a full workup son agrees the patient needs to stay received an echocardiogram get better heart rate control and any further recommendations of Cardiology may have  Objective:     Vitals:   Temp (24hrs), Av 4 °F (36 3 °C), Min:97 3 °F (36 3 °C), Max:97 7 °F (36 5 °C)    Temp:  [97 3 °F (36 3 °C)-97 7 °F (36 5 °C)] 97 4 °F (36 3 °C)  HR:  [] 84  Resp:  [16-20] 16  BP: (112-135)/(51-93) 135/93  SpO2:  [91 %-96 %] 96 %  Body mass index is 35 3 kg/m²  Input and Output Summary (last 24 hours): Intake/Output Summary (Last 24 hours) at 2022 1125  Last data filed at 2022 0900  Gross per 24 hour   Intake 240 ml   Output 800 ml   Net -560 ml       Physical Exam:   Physical Exam  Vitals and nursing note reviewed  Constitutional:       General: She is not in acute distress  Appearance: She is well-developed  HENT:      Head: Normocephalic and atraumatic  Eyes:      Conjunctiva/sclera: Conjunctivae normal    Cardiovascular:      Rate and Rhythm: Tachycardia present  Rhythm irregular  Heart sounds: No murmur heard  Pulmonary:      Effort: Pulmonary effort is normal  No respiratory distress  Breath sounds: Normal breath sounds  Abdominal:      Palpations: Abdomen is soft  Tenderness: There is no abdominal tenderness  Musculoskeletal:      Cervical back: Neck supple  Right lower leg: Edema present  Left lower leg: Edema present  Skin:     General: Skin is warm and dry  Neurological:      Mental Status: She is alert and oriented to person, place, and time  Mental status is at baseline  Psychiatric:         Mood and Affect: Mood normal          Behavior: Behavior normal            Additional Data:     Labs:  Results from last 7 days   Lab Units 08/01/22  0842 07/31/22  1646 07/31/22  0449   WBC Thousand/uL 8 80   < > 8 98   HEMOGLOBIN g/dL 11 6   < > 10 5*   HEMATOCRIT % 35 9   < > 33 3*   PLATELETS Thousands/uL 243   < > 202   NEUTROS PCT %  --   --  67   LYMPHS PCT %  --   --  22   MONOS PCT %  --   --  8   EOS PCT %  --   --  2    < > = values in this interval not displayed  Results from last 7 days   Lab Units 08/01/22  0842 07/31/22  0449   SODIUM mmol/L 136 137   POTASSIUM mmol/L 3 6 3 5   CHLORIDE mmol/L 100 102   CO2 mmol/L 24 26   BUN mg/dL 17 14   CREATININE mg/dL 1 07 0 88   ANION GAP mmol/L 12 9   CALCIUM mg/dL 8 7 8 6   ALBUMIN g/dL  --  3 1*   TOTAL BILIRUBIN mg/dL  --  0 74   ALK PHOS U/L  --  91   ALT U/L  --  20   AST U/L  --  14   GLUCOSE RANDOM mg/dL 171* 109     Results from last 7 days   Lab Units 08/01/22  0842   INR  1 30*                   Lines/Drains:  Invasive Devices  Report    Peripheral Intravenous Line  Duration           Peripheral IV 07/30/22 Right Wrist 1 day                  Telemetry:  Telemetry Orders (From admission, onward)             48 Hour Telemetry Monitoring  Continuous x 48 hours        References:    Telemetry Guidelines   Question:  Reason for 48 Hour Telemetry  Answer:  Arrhythmias Requiring Medical Therapy (eg  SVT, Vtach/fib, Bradycardia, Uncontrolled A-fib)                 Telemetry Reviewed: Atrial fibrillation   HR averaging 120  Indication for Continued Telemetry Use: Arrthymias requiring medical therapy             Imaging: Reviewed radiology reports from this admission including: chest CT scan    Recent Cultures (last 7 days):         Last 24 Hours Medication List:   Current Facility-Administered Medications   Medication Dose Route Frequency Provider Last Rate    furosemide  40 mg Intravenous BID Nighat Duncan MD      heparin (porcine)  3-30 Units/kg/hr (Order-Specific) Intravenous Titrated Remy Juan PA-C 18 Units/kg/hr (08/01/22 0909)    heparin (porcine)  3,800 Units Intravenous Q1H PRN Remy Juan PA-C      heparin (porcine)  7,600 Units Intravenous Q1H PRN Ladan Stockton PA-C      levothyroxine  112 mcg Oral Early Morning Nighat Duncan MD      metoprolol tartrate  50 mg Oral Q12H [facility] Nighat Duncan MD      pantoprazole  40 mg Oral Early Morning AAMIR Huynh      senna  17 2 mg Oral HS Nighat Duncan MD      verapamil  240 mg Oral Daily Constantin Robles MD      warfarin  5 mg Oral Daily (warfarin) Ladan Stockton PA-C          Today, Patient Was Seen By: AAMIR Huynh    **Please Note: This note may have been constructed using a voice recognition system  **

## 2022-08-02 ENCOUNTER — APPOINTMENT (INPATIENT)
Dept: NON INVASIVE DIAGNOSTICS | Facility: HOSPITAL | Age: 82
DRG: 291 | End: 2022-08-02
Payer: MEDICARE

## 2022-08-02 LAB
AORTIC ROOT: 2.7 CM
APICAL FOUR CHAMBER EJECTION FRACTION: 30 %
APTT PPP: 106 SECONDS (ref 23–37)
APTT PPP: 182 SECONDS (ref 23–37)
APTT PPP: 53 SECONDS (ref 23–37)
ASCENDING AORTA: 2.5 CM
FRACTIONAL SHORTENING: 26 (ref 28–44)
INR PPP: 1.3 (ref 0.84–1.19)
INTERVENTRICULAR SEPTUM IN DIASTOLE (PARASTERNAL SHORT AXIS VIEW): 1.1 CM
INTERVENTRICULAR SEPTUM: 1.1 CM (ref 0.6–1.1)
LAAS-AP2: 19 CM2
LAAS-AP4: 19.4 CM2
LEFT ATRIUM AREA SYSTOLE SINGLE PLANE A4C: 18.7 CM2
LEFT ATRIUM SIZE: 4.4 CM
LEFT INTERNAL DIMENSION IN SYSTOLE: 3.7 CM (ref 2.1–4)
LEFT VENTRICULAR INTERNAL DIMENSION IN DIASTOLE: 5 CM (ref 3.5–6)
LEFT VENTRICULAR POSTERIOR WALL IN END DIASTOLE: 1.2 CM
LEFT VENTRICULAR STROKE VOLUME: 56 ML
LVSV (TEICH): 56 ML
PROTHROMBIN TIME: 15.9 SECONDS (ref 11.6–14.5)
RIGHT ATRIUM AREA SYSTOLE A4C: 14.2 CM2
RIGHT VENTRICLE ID DIMENSION: 3.1 CM
SL CV LEFT ATRIUM LENGTH A2C: 5.6 CM
SL CV LV EF: 40
SL CV PED ECHO LEFT VENTRICLE DIASTOLIC VOLUME (MOD BIPLANE) 2D: 116 ML
SL CV PED ECHO LEFT VENTRICLE SYSTOLIC VOLUME (MOD BIPLANE) 2D: 60 ML
TR MAX PG: 28 MMHG
TR PEAK VELOCITY: 2.7 M/S
TRICUSPID VALVE PEAK REGURGITATION VELOCITY: 2.66 M/S

## 2022-08-02 PROCEDURE — 85730 THROMBOPLASTIN TIME PARTIAL: CPT | Performed by: INTERNAL MEDICINE

## 2022-08-02 PROCEDURE — B24BZZZ ULTRASONOGRAPHY OF HEART WITH AORTA: ICD-10-PCS | Performed by: INTERNAL MEDICINE

## 2022-08-02 PROCEDURE — 85610 PROTHROMBIN TIME: CPT | Performed by: NURSE PRACTITIONER

## 2022-08-02 PROCEDURE — 93306 TTE W/DOPPLER COMPLETE: CPT

## 2022-08-02 PROCEDURE — 5A2204Z RESTORATION OF CARDIAC RHYTHM, SINGLE: ICD-10-PCS | Performed by: INTERNAL MEDICINE

## 2022-08-02 PROCEDURE — 99232 SBSQ HOSP IP/OBS MODERATE 35: CPT | Performed by: NURSE PRACTITIONER

## 2022-08-02 RX ORDER — METOPROLOL TARTRATE 5 MG/5ML
2.5 INJECTION INTRAVENOUS EVERY 6 HOURS PRN
Status: DISCONTINUED | OUTPATIENT
Start: 2022-08-02 | End: 2022-08-05 | Stop reason: HOSPADM

## 2022-08-02 RX ORDER — WARFARIN SODIUM 7.5 MG/1
7.5 TABLET ORAL
Status: DISCONTINUED | OUTPATIENT
Start: 2022-08-02 | End: 2022-08-04

## 2022-08-02 RX ORDER — FUROSEMIDE 40 MG/1
40 TABLET ORAL DAILY
Status: DISCONTINUED | OUTPATIENT
Start: 2022-08-03 | End: 2022-08-05 | Stop reason: HOSPADM

## 2022-08-02 RX ORDER — METOPROLOL SUCCINATE 100 MG/1
100 TABLET, EXTENDED RELEASE ORAL DAILY
Status: DISCONTINUED | OUTPATIENT
Start: 2022-08-02 | End: 2022-08-05 | Stop reason: HOSPADM

## 2022-08-02 RX ORDER — FUROSEMIDE 40 MG/1
40 TABLET ORAL DAILY
Status: DISCONTINUED | OUTPATIENT
Start: 2022-08-02 | End: 2022-08-02

## 2022-08-02 RX ADMIN — FUROSEMIDE 40 MG: 10 INJECTION, SOLUTION INTRAMUSCULAR; INTRAVENOUS at 08:35

## 2022-08-02 RX ADMIN — PANTOPRAZOLE SODIUM 40 MG: 40 TABLET, DELAYED RELEASE ORAL at 05:15

## 2022-08-02 RX ADMIN — HEPARIN SODIUM 15.1 UNITS/KG/HR: 10000 INJECTION, SOLUTION INTRAVENOUS at 20:27

## 2022-08-02 RX ADMIN — HEPARIN SODIUM 3800 UNITS: 1000 INJECTION INTRAVENOUS; SUBCUTANEOUS at 22:30

## 2022-08-02 RX ADMIN — VERAPAMIL HYDROCHLORIDE 240 MG: 120 TABLET, FILM COATED, EXTENDED RELEASE ORAL at 08:28

## 2022-08-02 RX ADMIN — HEPARIN SODIUM 20 UNITS/KG/HR: 10000 INJECTION, SOLUTION INTRAVENOUS at 03:08

## 2022-08-02 RX ADMIN — HEPARIN SODIUM 3800 UNITS: 1000 INJECTION INTRAVENOUS; SUBCUTANEOUS at 00:12

## 2022-08-02 RX ADMIN — METOPROLOL TARTRATE 50 MG: 50 TABLET, FILM COATED ORAL at 08:28

## 2022-08-02 RX ADMIN — WARFARIN SODIUM 7.5 MG: 7.5 TABLET ORAL at 17:35

## 2022-08-02 RX ADMIN — SENNOSIDES 17.2 MG: 8.6 TABLET, FILM COATED ORAL at 22:15

## 2022-08-02 RX ADMIN — LEVOTHYROXINE SODIUM 112 MCG: 112 TABLET ORAL at 05:15

## 2022-08-02 NOTE — PROGRESS NOTES
Progress Note   Name:Fide Mccormack   Room: /-01     IMPRESSION:  1  Atrial fibrillation  Duration unknown    PLAN:  Warfarin therapy  ? SUBJECTIVE:  She denies problems of chest pain, shortness of breath, orthopnea, PND, palpitations, syncope, or bleeding problems  ?  OBJECTIVE:  Scheduled Medicines  Current Facility-Administered Medications   Medication Dose Route Frequency Provider Last Rate    [START ON 8/3/2022] furosemide  40 mg Oral Daily AAMIR Ortiz      heparin (porcine)  3-30 Units/kg/hr (Order-Specific) Intravenous Titrated Remy Juan PA-C 17 Units/kg/hr (08/02/22 5418)    heparin (porcine)  3,800 Units Intravenous Q1H PRN Remy Juan PA-C      heparin (porcine)  7,600 Units Intravenous Q1H PRN Laura Augustine PA-C      levothyroxine  112 mcg Oral Early Morning Serafin Salvador MD      metoprolol  2 5 mg Intravenous Q6H PRN AAMIR Ortiz      metoprolol succinate  100 mg Oral Daily AAMIR Mccallum      pantoprazole  40 mg Oral Early Morning Launie ElAAMIR blackwell      senna  17 2 mg Oral HS Serafin Salvador MD      verapamil  240 mg Oral Daily Serena Xiong MD      warfarin  7 5 mg Oral Daily (warfarin) AAMIR Ortiz        Infusions  heparin (porcine), 3-30 Units/kg/hr (Order-Specific), Last Rate: 17 Units/kg/hr (08/02/22 0812)       PRN Medicines    heparin (porcine)    heparin (porcine)    metoprolol   No Known Allergies   Diet Cardiovascular; Cardiac; Sodium 2 GM    Intake/Output Summary (Last 24 hours) at 8/2/2022 1421  Last data filed at 8/2/2022 1201  Gross per 24 hour   Intake 400 ml   Output 1500 ml   Net -1100 ml      /57   Pulse (!) 53   Temp 97 7 °F (36 5 °C)   Resp 17   Ht 5' 6" (1 676 m)   Wt 93 4 kg (206 lb)   SpO2 93%   BMI 33 25 kg/m²    BP Readings from Last 3 Encounters:   08/02/22 107/57   04/26/22 160/72   04/21/21 164/83     ?   Physical Exam:  General Appearance:  Alert, cooperative, in no acute distress   Eyes:  Sclerae anicteric  HEET:  Normocephalic, atraumatic  Neck: Supple, no JVD   Cardiovascular:  Normal rate, regular rhythm  No murmurs   No edema, normal pulses   Respiratory:  Breathing unlabored  Lungs clear to auscultation   Gastrointestinal:  Normoactive bowel sounds  Abdomen soft, nontender to palpation  Musculoskeletal: No back or joint deformity  Dermatologic:  Skin is warm and dry  Neurologic: Alert and oriented and neurologic exam is grossly normal     Psychiatric: Normal affect and mood  LABS:  Lab Results   Component Value Date    BUN 17 08/01/2022    CREATININE 1 07 08/01/2022    CALCIUM 8 7 08/01/2022    K 3 6 08/01/2022    CO2 24 08/01/2022     08/01/2022    ALKPHOS 91 07/31/2022    AST 14 07/31/2022    ALT 20 07/31/2022     Lab Results   Component Value Date    WBC 8 80 08/01/2022    HGB 11 6 08/01/2022    HCT 35 9 08/01/2022    MCV 91 08/01/2022     08/01/2022     Lab Results   Component Value Date    HDL 45 (L) 07/26/2022    LDLCALC 103 (H) 07/26/2022    TRIG 115 07/26/2022     ?    ?  ?  ?   Gary Esteves MD

## 2022-08-02 NOTE — PLAN OF CARE
Problem: Potential for Falls  Goal: Patient will remain free of falls  Description: INTERVENTIONS:  - Educate patient/family on patient safety including physical limitations  - Instruct patient to call for assistance with activity   - Consult OT/PT to assist with strengthening/mobility   - Keep Call bell within reach  - Keep bed low and locked with side rails adjusted as appropriate  - Keep care items and personal belongings within reach  - Initiate and maintain comfort rounds  - Make Fall Risk Sign visible to staff  - Offer Toileting every  Hours, in advance of need  - Initiate/Maintain alarm  - Obtain necessary fall risk management equipmet  - Apply yellow socks and bracelet for high fall risk patients  - Consider moving patient to room near nurses station  Outcome: Progressing     Problem: PAIN - ADULT  Goal: Verbalizes/displays adequate comfort level or baseline comfort level  Description: Interventions:  - Encourage patient to monitor pain and request assistance  - Assess pain using appropriate pain scale  - Administer analgesics based on type and severity of pain and evaluate response  - Implement non-pharmacological measures as appropriate and evaluate response  - Consider cultural and social influences on pain and pain management  - Notify physician/advanced practitioner if interventions unsuccessful or patient reports new pain  Outcome: Progressing     Problem: INFECTION - ADULT  Goal: Absence or prevention of progression during hospitalization  Description: INTERVENTIONS:  - Assess and monitor for signs and symptoms of infection  - Monitor lab/diagnostic results  - Monitor all insertion sites, i e  indwelling lines, tubes, and drains  - Monitor endotracheal if appropriate and nasal secretions for changes in amount and color  - [place] appropriate cooling/warming therapies per order  - Administer medications as ordered  - Instruct and encourage patient and family to use good hand hygiene technique  - Identify and instruct in appropriate isolation precautions for identified infection/condition  Outcome: Progressing  Goal: Absence of fever/infection during neutropenic period  Description: INTERVENTIONS:  - Monitor WBC    Outcome: Progressing

## 2022-08-02 NOTE — ASSESSMENT & PLAN NOTE
· Present on admission, BNP 2420  · Does not have a formal diagnosis of CHF  · Symptoms consistent with CHF - orthopnea, shortness of breath  · CTA Chest (7/30/22): Negative for PE but mild pulmonary edema with small to moderate right and small left layering bilateral pleural effusions    · Initially received IV Lasix> transitioned to PO on 08/02  · Continue PO lasix at time of discharge to be follow up OP with Cardiology team in regards to further titration   · Cardiology consulted; input appreicated   · Echocardiogram reviewed   · Cardiac diet, intakes and outputs

## 2022-08-02 NOTE — ASSESSMENT & PLAN NOTE
· With complaints of chest pain x 3 days, especially when laying flat  · On 08/02 asymptomatic without any chest pain  · MORIAH score of 3  · D-Dimer elevated at 2 36; CTA Chest completed, Negative for PE    · Troponins negative  · See rest of plan as noted above

## 2022-08-02 NOTE — ASSESSMENT & PLAN NOTE
Background: Presented with 3 days of SOB associated CP  · Noted history of arrhythmia, however no documented history of atrial fibrillation  · On arrival to the ED, Noted to be in A-Fib with heart rates 120s    · ChadsVasc score of 3  · Under went Heparin Drip> coumadin bridge   · Eliquis $400 not affordable  · INR subtherapeutic> continue daily labs as below> 7 5 mg ordered on 8/2; continue at 7 5 mg daily  · Pending INR on 8/4 would consider discharging with lovenox if HR controlled   · Started Lopressor 50 mg BID> changed to 100mg Toprol XL on 8/2;  continue at discharge   · Echo with EF of 40% with global hypokinesis   · Discussed with Cardiologist prior to discharge and does not want to pursue ischemic evaluation at this time however given persistent elevated HR with minimal ambulation into the 140-180s will pursue ALINA cardioversion  · Cardiology following; input appreciated

## 2022-08-02 NOTE — PLAN OF CARE
Problem: Potential for Falls  Goal: Patient will remain free of falls  Description: INTERVENTIONS:  - Educate patient/family on patient safety including physical limitations  - Instruct patient to call for assistance with activity   - Consult OT/PT to assist with strengthening/mobility   - Keep Call bell within reach  - Keep bed low and locked with side rails adjusted as appropriate  - Keep care items and personal belongings within reach  - Initiate and maintain comfort rounds  - Make Fall Risk Sign visible to staff  - Offer Toileting every 2 Hours, in advance of need  - Initiate/Maintain bed alarm  - Obtain necessary fall risk management equipment: yellow socks and bracelet   - Apply yellow socks and bracelet for high fall risk patients  - Consider moving patient to room near nurses station  8/2/2022 0323 by Zina Gitelman, RN  Outcome: Progressing  8/2/2022 0318 by Zina Gitelman, RN  Outcome: Progressing     Problem: PAIN - ADULT  Goal: Verbalizes/displays adequate comfort level or baseline comfort level  Description: Interventions:  - Encourage patient to monitor pain and request assistance  - Assess pain using appropriate pain scale  - Administer analgesics based on type and severity of pain and evaluate response  - Implement non-pharmacological measures as appropriate and evaluate response  - Consider cultural and social influences on pain and pain management  - Notify physician/advanced practitioner if interventions unsuccessful or patient reports new pain  8/2/2022 0323 by Zina Gitelman, RN  Outcome: Progressing  8/2/2022 0318 by Zina Gitelman, RN  Outcome: Progressing     Problem: INFECTION - ADULT  Goal: Absence or prevention of progression during hospitalization  Description: INTERVENTIONS:  - Assess and monitor for signs and symptoms of infection  - Monitor lab/diagnostic results  - Monitor all insertion sites, i e  indwelling lines, tubes, and drains  - Monitor endotracheal if appropriate and nasal secretions for changes in amount and color  - [place] appropriate cooling/warming therapies per order  - Administer medications as ordered  - Instruct and encourage patient and family to use good hand hygiene technique  - Identify and instruct in appropriate isolation precautions for identified infection/condition  8/2/2022 0323 by Kenya Fleming RN  Outcome: Progressing  8/2/2022 0318 by Kenya Fleming RN  Outcome: Progressing  Goal: Absence of fever/infection during neutropenic period  Description: INTERVENTIONS:  - Monitor WBC    8/2/2022 0323 by Kenya Fleming RN  Outcome: Progressing  8/2/2022 0318 by Kenya Fleming RN  Outcome: Progressing     Problem: SAFETY ADULT  Goal: Patient will remain free of falls  Description: INTERVENTIONS:  - Educate patient/family on patient safety including physical limitations  - Instruct patient to call for assistance with activity   - Consult OT/PT to assist with strengthening/mobility   - Keep Call bell within reach  - Keep bed low and locked with side rails adjusted as appropriate  - Keep care items and personal belongings within reach  - Initiate and maintain comfort rounds  - Make Fall Risk Sign visible to staff  - Offer Toileting every 2 Hours, in advance of need  - Initiate/Maintain alarm  - Obtain necessary fall risk management equipment:   - Apply yellow socks and bracelet for high fall risk patients  - Consider moving patient to room near nurses station  8/2/2022 0323 by Kenya Fleming RN  Outcome: Progressing  8/2/2022 0318 by Kenya Fleming RN  Outcome: Progressing  Goal: Maintain or return to baseline ADL function  Description: INTERVENTIONS:  -  Assess patient's ability to carry out ADLs; assess patient's baseline for ADL function and identify physical deficits which impact ability to perform ADLs (bathing, care of mouth/teeth, toileting, grooming, dressing, etc )  - Assess/evaluate cause of self-care deficits   - Assess range of motion  - Assess patient's mobility; develop plan if impaired  - Assess patient's need for assistive devices and provide as appropriate  - Encourage maximum independence but intervene and supervise when necessary  - Involve family in performance of ADLs  - Assess for home care needs following discharge   - Consider OT consult to assist with ADL evaluation and planning for discharge  - Provide patient education as appropriate  8/2/2022 0323 by Husam Oconnor RN  Outcome: Progressing  8/2/2022 0318 by Husam Oconnor RN  Outcome: Progressing  Goal: Maintains/Returns to pre admission functional level  Description: INTERVENTIONS:  - Perform BMAT or MOVE assessment daily    - Set and communicate daily mobility goal to care team and patient/family/caregiver  - Collaborate with rehabilitation services on mobility goals if consulted  - Perform Range of Motion 3 times a day  - Reposition patient every 2 hours    - Dangle patient 3 times a day  - Stand patient 3 times a day  - Ambulate patient 3 times a day  - Out of bed to chair 3 times a day   - Out of bed for meals 3 times a day  - Out of bed for toileting  - Record patient progress and toleration of activity level   8/2/2022 0323 by Husam Oconnor RN  Outcome: Progressing  8/2/2022 0318 by Husam Oconnor RN  Outcome: Progressing     Problem: DISCHARGE PLANNING  Goal: Discharge to home or other facility with appropriate resources  Description: INTERVENTIONS:  - Identify barriers to discharge w/patient and caregiver  - Arrange for needed discharge resources and transportation as appropriate  - Identify discharge learning needs (meds, wound care, etc )  - Arrange for interpretive services to assist at discharge as needed  - Refer to Case Management Department for coordinating discharge planning if the patient needs post-hospital services based on physician/advanced practitioner order or complex needs related to functional status, cognitive ability, or social support system  8/2/2022 0323 by Husam Oconnor RN  Outcome: Progressing  8/2/2022 0318 by Monster Bean RN  Outcome: Progressing     Problem: Knowledge Deficit  Goal: Patient/family/caregiver demonstrates understanding of disease process, treatment plan, medications, and discharge instructions  Description: Complete learning assessment and assess knowledge base  Interventions:  - Provide teaching at level of understanding  - Provide teaching via preferred learning methods  8/2/2022 0323 by Monster Bean RN  Outcome: Progressing  8/2/2022 0318 by Monster Bean RN  Outcome: Progressing     Problem: Nutrition/Hydration-ADULT  Goal: Nutrient/Hydration intake appropriate for improving, restoring or maintaining nutritional needs  Description: Monitor and assess patient's nutrition/hydration status for malnutrition  Collaborate with interdisciplinary team and initiate plan and interventions as ordered  Monitor patient's weight and dietary intake as ordered or per policy  Utilize nutrition screening tool and intervene as necessary  Determine patient's food preferences and provide high-protein, high-caloric foods as appropriate       INTERVENTIONS:  - Monitor oral intake, urinary output, labs, and treatment plans  - Assess nutrition and hydration status and recommend course of action  - Evaluate amount of meals eaten  - Assist patient with eating if necessary   - Allow adequate time for meals  - Recommend/ encourage appropriate diets, oral nutritional supplements, and vitamin/mineral supplements  - Order, calculate, and assess calorie counts as needed  - Recommend, monitor, and adjust tube feedings and TPN/PPN based on assessed needs  - Assess need for intravenous fluids  - Provide specific nutrition/hydration education as appropriate  - Include patient/family/caregiver in decisions related to nutrition  8/2/2022 0323 by Monsetr Bean RN  Outcome: Progressing  8/2/2022 0318 by Monster Bean RN  Outcome: Progressing     Problem: Prexisting or High Potential for Compromised Skin Integrity  Goal: Skin integrity is maintained or improved  Description: INTERVENTIONS:  - Identify patients at risk for skin breakdown  - Assess and monitor skin integrity  - Assess and monitor nutrition and hydration status  - Monitor labs   - Assess for incontinence   - Turn and reposition patient  - Assist with mobility/ambulation  - Relieve pressure over bony prominences  - Avoid friction and shearing  - Provide appropriate hygiene as needed including keeping skin clean and dry  - Evaluate need for skin moisturizer/barrier cream  - Collaborate with interdisciplinary team   - Patient/family teaching  - Consider wound care consult   8/2/2022 0323 by Marcie Garnica RN  Outcome: Progressing  8/2/2022 0318 by Marcie Garnica RN  Outcome: Progressing

## 2022-08-02 NOTE — ASSESSMENT & PLAN NOTE
· Chronic; Continue home medication regimen 112 mcg  · TSH 4 550; Free T4 normal  · Would recommend TSH in 4-6 wks with PCP

## 2022-08-02 NOTE — PROGRESS NOTES
[address] [facility]  Progress Note - Afshin Garcia 1940, 80 y o  female MRN: [id number]  Unit/Bed#: -[facility] Encounter: [id number]  Primary Care Provider: Madiha Gregory MD   Date and time admitted to hospital: 7/30/2022 12:04 PM    * New onset a-fib [facility])  Assessment & Plan  Background: Presented with 3 days of SOB associated CP  · Noted history of arrhythmia, however no documented history of atrial fibrillation  · On arrival to the ED, Noted to be in A-Fib with heart rates 120s  · ChadsVasc score of 3  · AC with IV Heparin Drip> coumadin bridge   · Eliquis $400 not affordable  · INR subtherapeutic> continue daily labs as below> 7 5 mg ordered on 8/2  · Started Lopressor 50 mg BID> changed to 100mg Toprol XL on 8/2 given persistent tachycardia   · Echo pending  · Cardiology following; input appreciated     Acute congestive heart failure ([facility] [address] )  Assessment & Plan  · Present on admission, BNP 2420  · Does not have a formal diagnosis of CHF  · Symptoms consistent with CHF - orthopnea, shortness of breath  · CTA Chest (7/30/22): Negative for PE but mild pulmonary edema with small to moderate right and small left layering bilateral pleural effusions  · Sounds clear auscultation on 8/2  · Initially received IV Lasix> transition to PO on 08/02  · Cardiology consulted  · Echocardiogram pending  · Cardiac diet, intakes and outputs    Chest pain  Assessment & Plan  · With complaints of chest pain x 3 days, especially when laying flat  · On 08/02 asymptomatic without any chest pain  · MORIAH score of 3  · D-Dimer elevated at 2 36; CTA Chest completed, Negative for PE  · Troponins negative  · See rest of plan as noted above    Elevated d-dimer  Assessment & Plan  · Present on admission, evidenced by d-dimer of 2 36  · CTA Chest completed, negative for PE      Essential hypertension  Assessment & Plan  · Chronic BP reasonable on current regimen  · Continue Verapamil, metoprolol, and lasix with parameters · Monitor with routine vitals    GERD (gastroesophageal reflux disease)  Assessment & Plan  · Chronic; continue PPI    Hypothyroidism  Assessment & Plan  · Chronic; Continue home medication regimen 112 mcg  · TSH 4 550; Free T4 normal  · Would recommend TSH in 4-6 wks with PCP      VTE Pharmacologic Prophylaxis: VTE Score: 5 High Risk (Score >/= 5) - Pharmacological DVT Prophylaxis Ordered: warfarin (Coumadin)  Sequential Compression Devices Ordered  Patient Centered Rounds: I performed bedside rounds with nursing staff today  Discussions with Specialists or Other Care Team Provider:  Nursing staff and case management and review of cardiology documentation    Education and Discussions with Family / Patient: Updated  (son) via phone  Time Spent for Care: 30 minutes  More than 50% of total time spent on counseling and coordination of care as described above  Current Length of Stay: 3 day(s)  Current Patient Status: Inpatient   Certification Statement: The patient will continue to require additional inpatient hospital stay due to Ongoing rate control and anticoagulation  Discharge Plan: Anticipate discharge in 24-48 hrs to home with home services  Code Status: Level 1 - Full Code    Subjective:   Patient denies any chest pain, shortness of breath  She does report she does get short of breath mildly however better than before  Objective:     Vitals:   Temp (24hrs), Av 4 °F (36 3 °C), Min:96 4 °F (35 8 °C), Max:98 2 °F (36 8 °C)    Temp:  [96 4 °F (35 8 °C)-98 2 °F (36 8 °C)] 97 7 °F (36 5 °C)  HR:  [] 68  Resp:  [17-18] 17  BP: ()/(54-87) 107/57  SpO2:  [89 %-96 %] 96 %  Body mass index is 33 25 kg/m²  Input and Output Summary (last 24 hours):      Intake/Output Summary (Last 24 hours) at 2022 1139  Last data filed at 2022 0308  Gross per 24 hour   Intake 400 ml   Output 900 ml   Net -500 ml       Physical Exam:   Physical Exam  Constitutional: Appearance: Normal appearance  She is not ill-appearing  Cardiovascular:      Rate and Rhythm: Tachycardia present  Pulses: Normal pulses  Radial pulses are 2+ on the right side and 2+ on the left side  Dorsalis pedis pulses are 2+ on the right side and 2+ on the left side  Heart sounds: Murmur heard  Pulmonary:      Effort: Pulmonary effort is normal  No respiratory distress  Breath sounds: No wheezing or rales  Abdominal:      General: Bowel sounds are normal       Palpations: Abdomen is soft  Musculoskeletal:         General: No swelling  Skin:     General: Skin is warm and dry  Capillary Refill: Capillary refill takes less than 2 seconds  Neurological:      Mental Status: She is alert  Additional Data:     Labs:  Results from last 7 days   Lab Units 08/01/22  0842 07/31/22  1646 07/31/22  0449   WBC Thousand/uL 8 80   < > 8 98   HEMOGLOBIN g/dL 11 6   < > 10 5*   HEMATOCRIT % 35 9   < > 33 3*   PLATELETS Thousands/uL 243   < > 202   NEUTROS PCT %  --   --  67   LYMPHS PCT %  --   --  22   MONOS PCT %  --   --  8   EOS PCT %  --   --  2    < > = values in this interval not displayed       Results from last 7 days   Lab Units 08/01/22  0842 07/31/22  0449   SODIUM mmol/L 136 137   POTASSIUM mmol/L 3 6 3 5   CHLORIDE mmol/L 100 102   CO2 mmol/L 24 26   BUN mg/dL 17 14   CREATININE mg/dL 1 07 0 88   ANION GAP mmol/L 12 9   CALCIUM mg/dL 8 7 8 6   ALBUMIN g/dL  --  3 1*   TOTAL BILIRUBIN mg/dL  --  0 74   ALK PHOS U/L  --  91   ALT U/L  --  20   AST U/L  --  14   GLUCOSE RANDOM mg/dL 171* 109     Results from last 7 days   Lab Units 08/02/22  0508   INR  1 30*                   Lines/Drains:  Invasive Devices  Report    Peripheral Intravenous Line  Duration           Peripheral IV 08/01/22 Right;Ventral (anterior) Forearm <1 day                  Telemetry:  Telemetry Orders (From admission, onward)             48 Hour Telemetry Monitoring  Continuous x 48 hours References:    Telemetry Guidelines   Question:  Reason for 48 Hour Telemetry  Answer:  Arrhythmias Requiring Medical Therapy (eg  SVT, Vtach/fib, Bradycardia, Uncontrolled A-fib)                 Telemetry Reviewed: Atrial fibrillation  HR averaging 120  Indication for Continued Telemetry Use: Arrthymias requiring medical therapy             Imaging: Reviewed radiology reports from this admission including: chest CT scan    Recent Cultures (last 7 days):         Last 24 Hours Medication List:   Current Facility-Administered Medications   Medication Dose Route Frequency Provider Last Rate    [START ON 8/3/2022] furosemide  40 mg Oral Daily AAMIR Ortiz      heparin (porcine)  3-30 Units/kg/hr (Order-Specific) Intravenous Titrated Remy Juan PA-C 17 Units/kg/hr (08/02/22 7264)    heparin (porcine)  3,800 Units Intravenous Q1H PRN DALLIN Clinton-DIXON      heparin (porcine)  7,600 Units Intravenous Q1H PRN [employer] PA-C      levothyroxine  112 mcg Oral Early Morning Maliha Tan MD      metoprolol  2 5 mg Intravenous Q6H PRN AAMIR Ortiz      metoprolol succinate  100 mg Oral Daily AAMIR Mccallum      pantoprazole  40 mg Oral Early Morning AAMIR Raza      senna  17 2 mg Oral HS Maliha Tan MD      verapamil  240 mg Oral Daily Steve Lundberg MD      warfarin  7 5 mg Oral Daily (warfarin) AAMIR Mccallum          Today, Patient Was Seen By: AAMIR Mccallum    **Please Note: This note may have been constructed using a voice recognition system  **

## 2022-08-02 NOTE — PLAN OF CARE
Problem: Potential for Falls  Goal: Patient will remain free of falls  Description: INTERVENTIONS:  - Educate patient/family on patient safety including physical limitations  - Instruct patient to call for assistance with activity   - Consult OT/PT to assist with strengthening/mobility   - Keep Call bell within reach  - Keep bed low and locked with side rails adjusted as appropriate  - Keep care items and personal belongings within reach  - Initiate and maintain comfort rounds  - Make Fall Risk Sign visible to staff  - Offer Toileting every 2 Hours, in advance of need  - Initiate/Maintain bed alarm  - Obtain necessary fall risk management equipment: yellow socks and bracelet   - Apply yellow socks and bracelet for high fall risk patients  - Consider moving patient to room near nurses station  Outcome: Progressing     Problem: PAIN - ADULT  Goal: Verbalizes/displays adequate comfort level or baseline comfort level  Description: Interventions:  - Encourage patient to monitor pain and request assistance  - Assess pain using appropriate pain scale  - Administer analgesics based on type and severity of pain and evaluate response  - Implement non-pharmacological measures as appropriate and evaluate response  - Consider cultural and social influences on pain and pain management  - Notify physician/advanced practitioner if interventions unsuccessful or patient reports new pain  Outcome: Progressing     Problem: INFECTION - ADULT  Goal: Absence or prevention of progression during hospitalization  Description: INTERVENTIONS:  - Assess and monitor for signs and symptoms of infection  - Monitor lab/diagnostic results  - Monitor all insertion sites, i e  indwelling lines, tubes, and drains  - Monitor endotracheal if appropriate and nasal secretions for changes in amount and color  - [place] appropriate cooling/warming therapies per order  - Administer medications as ordered  - Instruct and encourage patient and family to use good hand hygiene technique  - Identify and instruct in appropriate isolation precautions for identified infection/condition  Outcome: Progressing  Goal: Absence of fever/infection during neutropenic period  Description: INTERVENTIONS:  - Monitor WBC    Outcome: Progressing     Problem: SAFETY ADULT  Goal: Patient will remain free of falls  Description: INTERVENTIONS:  - Educate patient/family on patient safety including physical limitations  - Instruct patient to call for assistance with activity   - Consult OT/PT to assist with strengthening/mobility   - Keep Call bell within reach  - Keep bed low and locked with side rails adjusted as appropriate  - Keep care items and personal belongings within reach  - Initiate and maintain comfort rounds  - Make Fall Risk Sign visible to staff  - Offer Toileting every 2 Hours, in advance of need  - Initiate/Maintain alarm  - Obtain necessary fall risk management equipment:   - Apply yellow socks and bracelet for high fall risk patients  - Consider moving patient to room near nurses station  Outcome: Progressing  Goal: Maintain or return to baseline ADL function  Description: INTERVENTIONS:  -  Assess patient's ability to carry out ADLs; assess patient's baseline for ADL function and identify physical deficits which impact ability to perform ADLs (bathing, care of mouth/teeth, toileting, grooming, dressing, etc )  - Assess/evaluate cause of self-care deficits   - Assess range of motion  - Assess patient's mobility; develop plan if impaired  - Assess patient's need for assistive devices and provide as appropriate  - Encourage maximum independence but intervene and supervise when necessary  - Involve family in performance of ADLs  - Assess for home care needs following discharge   - Consider OT consult to assist with ADL evaluation and planning for discharge  - Provide patient education as appropriate  Outcome: Progressing  Goal: Maintains/Returns to pre admission functional level  Description: INTERVENTIONS:  - Perform BMAT or MOVE assessment daily    - Set and communicate daily mobility goal to care team and patient/family/caregiver  - Collaborate with rehabilitation services on mobility goals if consulted  - Perform Range of Motion 3 times a day  - Reposition patient every 2 hours  - Dangle patient 3 times a day  - Stand patient 3 times a day  - Ambulate patient 3 times a day  - Out of bed to chair 3 times a day   - Out of bed for meals 3 times a day  - Out of bed for toileting  - Record patient progress and toleration of activity level   Outcome: Progressing     Problem: DISCHARGE PLANNING  Goal: Discharge to home or other facility with appropriate resources  Description: INTERVENTIONS:  - Identify barriers to discharge w/patient and caregiver  - Arrange for needed discharge resources and transportation as appropriate  - Identify discharge learning needs (meds, wound care, etc )  - Arrange for interpretive services to assist at discharge as needed  - Refer to Case Management Department for coordinating discharge planning if the patient needs post-hospital services based on physician/advanced practitioner order or complex needs related to functional status, cognitive ability, or social support system  Outcome: Progressing     Problem: Knowledge Deficit  Goal: Patient/family/caregiver demonstrates understanding of disease process, treatment plan, medications, and discharge instructions  Description: Complete learning assessment and assess knowledge base  Interventions:  - Provide teaching at level of understanding  - Provide teaching via preferred learning methods  Outcome: Progressing     Problem: Nutrition/Hydration-ADULT  Goal: Nutrient/Hydration intake appropriate for improving, restoring or maintaining nutritional needs  Description: Monitor and assess patient's nutrition/hydration status for malnutrition   Collaborate with interdisciplinary team and initiate plan and interventions as ordered  Monitor patient's weight and dietary intake as ordered or per policy  Utilize nutrition screening tool and intervene as necessary  Determine patient's food preferences and provide high-protein, high-caloric foods as appropriate       INTERVENTIONS:  - Monitor oral intake, urinary output, labs, and treatment plans  - Assess nutrition and hydration status and recommend course of action  - Evaluate amount of meals eaten  - Assist patient with eating if necessary   - Allow adequate time for meals  - Recommend/ encourage appropriate diets, oral nutritional supplements, and vitamin/mineral supplements  - Order, calculate, and assess calorie counts as needed  - Recommend, monitor, and adjust tube feedings and TPN/PPN based on assessed needs  - Assess need for intravenous fluids  - Provide specific nutrition/hydration education as appropriate  - Include patient/family/caregiver in decisions related to nutrition  Outcome: Progressing     Problem: Prexisting or High Potential for Compromised Skin Integrity  Goal: Skin integrity is maintained or improved  Description: INTERVENTIONS:  - Identify patients at risk for skin breakdown  - Assess and monitor skin integrity  - Assess and monitor nutrition and hydration status  - Monitor labs   - Assess for incontinence   - Turn and reposition patient  - Assist with mobility/ambulation  - Relieve pressure over bony prominences  - Avoid friction and shearing  - Provide appropriate hygiene as needed including keeping skin clean and dry  - Evaluate need for skin moisturizer/barrier cream  - Collaborate with interdisciplinary team   - Patient/family teaching  - Consider wound care consult   Outcome: Progressing

## 2022-08-02 NOTE — ASSESSMENT & PLAN NOTE
Background: Presented with 3 days of SOB associated CP  · Noted history of arrhythmia, however no documented history of atrial fibrillation  · On arrival to the ED, Noted to be in A-Fib with heart rates 120s    · ChadsVasc score of 3  · AC with IV Heparin Drip> coumadin bridge   · Eliquis $400 not affordable  · INR subtherapeutic> continue daily labs as below> 7 5 mg ordered on 8/2  · Started Lopressor 50 mg BID> changed to 100mg Toprol XL on 8/2 given persistent tachycardia   · Echo pending  · Cardiology following; input appreciated

## 2022-08-02 NOTE — ASSESSMENT & PLAN NOTE
· Present on admission, BNP 2420  · Does not have a formal diagnosis of CHF  · Symptoms consistent with CHF - orthopnea, shortness of breath  · CTA Chest (7/30/22): Negative for PE but mild pulmonary edema with small to moderate right and small left layering bilateral pleural effusions    · Sounds clear auscultation on 8/2  · Initially received IV Lasix> transition to PO on 08/02  · Cardiology consulted  · Echocardiogram pending  · Cardiac diet, intakes and outputs

## 2022-08-02 NOTE — DISCHARGE SUMMARY
[address] [facility]  Discharge- [place] 1940, 80 y o  female MRN: [id number]  Unit/Bed#: -[place] Encounter: [id number]  Primary Care Provider: Haseeb Riggs MD   Date and time admitted to hospital: 7/30/2022 12:04 PM    No new Assessment & Plan notes have been filed under this hospital service since the last note was generated  Service: Hospitalist    Discharging Physician / Practitioner: AAMIR Mccallum  PCP: Haseeb Riggs MD  Admission Date:   Admission Orders (From admission, onward)     Ordered        07/30/22 1454  Inpatient Admission  Once                      Discharge Date: 08/03/22    Medical Problems             Resolved Problems  Date Reviewed: 8/3/2022   None                 Consultations During Hospital Stay:  IP CONSULT TO NUTRITION SERVICES  IP CONSULT TO CARDIOLOGY    Procedures Performed:   XR chest 1 view portable Result Date: 7/31/2022  · Narrative: CHEST INDICATION:   chest pain  COMPARISON:  2/28/2021 EXAM PERFORMED/VIEWS:  XR CHEST PORTABLE FINDINGS: Cardiomediastinal silhouette appears unremarkable  The lungs are clear  No pneumothorax or pleural effusion  Osseous structures appear within normal limits for patient age  Impression: No acute cardiopulmonary disease  Workstation performed: [id number]   · CTA ED chest PE Study Result Date: 7/30/2022  · Narrative: CTA - CHEST WITH IV CONTRAST - PULMONARY ANGIOGRAM INDICATION:   sob rapid afib hx pf cancer  History of breast cancer COMPARISON: 12/14/2020 and CTA dated back to 1/13/2020 TECHNIQUE: CTA examination of the chest was performed using angiographic technique according to a protocol specifically tailored to evaluate for pulmonary embolism  Axial, sagittal, and coronal 2D reformatted images were created from the source data and  submitted for interpretation  In addition, coronal 3D MIP postprocessing was performed on the acquisition scanner  Radiation dose length product (DLP) for this visit:  542 mGy-cm   This examination, like all CT scans performed in the [facility], was performed utilizing techniques to minimize radiation dose exposure, including the use of iterative reconstruction and automated exposure control  IV Contrast:  65 mL of iohexol (OMNIPAQUE)  FINDINGS: PULMONARY ARTERIAL TREE:  No pulmonary embolus is seen  LUNGS:  Mild septal thickening suggesting mild pulmonary edema  No suspicious pulmonary nodules in the current exam   Central airways are patent  Resolution of previously seen areas of groundglass within the right middle lobe and lingula  Tree-in-bud type nodular opacities within the lower lung fields previously described may be obscured secondary to effusions and subjacent compressive atelectasis  PLEURA:  Small to moderate right and small left layering bilateral pleural effusions  No pneumothorax  HEART/GREAT VESSELS:  Unremarkable for patient's age  No thoracic aortic aneurysm  MEDIASTINUM AND PAVAN:  Unremarkable  CHEST WALL AND LOWER NECK:   Surgical clips in the left chest wall this patient with reported history of prior breast cancer  Similar postsurgical changes again back to 2020 without definite nodular lesion  VISUALIZED STRUCTURES IN THE UPPER ABDOMEN:  Status post cholecystectomy  OSSEOUS STRUCTURES:  No acute fracture or destructive osseous lesion  Impression: No evidence for pulmonary embolism  Mild pulmonary edema with small to moderate right and small left layering bilateral pleural effusions  Surgical clips in the left chest wall with stable postoperative changes in this patient with a reported history of breast cancer  No definite new nodular lesion in this region on the current exam  Workstation performed: [id number]   · Echo complete w/ contrast if indicated Result Date: 8/2/2022  · Narrative:   Left Ventricle: Left ventricular cavity size is normal  Wall thickness is mildly increased  The left ventricular ejection fraction is 40%   Systolic function is mildly reduced  There is global hypokinesis    Mitral Valve: There is mild regurgitation    Tricuspid Valve: There is mild regurgitation    Pulmonic Valve: There is mild regurgitation  · Cardioversion ALINA; please see report in chart for details       Significant Findings / Test Results:   ·  As noted above    Incidental Findings:   · None     Test Results Pending at Discharge (will require follow up): · None     Outpatient Tests Requested:  · Repeat INR in 2 days  to be followed up outpatient by cardiologist    Complications:  None    Past Medical History:   Diagnosis Date    Breast cancer ([address] )     Disease of thyroid gland     hypothyroidism    GERD (gastroesophageal reflux disease)     Hypertension     Rapid heart rate        Reason for Admission: Rapid Heart Rate (Pt states she has been feeling her heart beating fast /CP x 3 days, pt also c/o SOB when leaning back )       Hospital Course:     Jasiel Turner is a 80 y o  female patient with past medical history as noted in table above who originally presented to the hospital on 7/30/2022 due to Rapid Heart Rate (Pt states she has been feeling her heart beating fast /CP x 3 days, pt also c/o SOB when leaning back )     Presented the emergency department as she reported that she had been having chest pain as well as fast heart rate for multiple days  Found to be in new onset atrial fibrillation  Initially with heart rate in the 120s and noted to be as high in the 140s  Chads Vasc score of 3 and as Eliquis not affordable underwent heparin to Coumadin bridge  Medication titrated to achieve rate control  Cardiology followed recommendations appreciated  Patient had persistent tachycardia and underwent ALINA with cardioversion on 8/3  ***    Will undergo close outpatient follow-up with cardiology  Please see above list of diagnoses and related plan for additional information       Condition at Discharge: stable     Discharge Day Visit / Exam: Subjective:  ***  Vitals: Blood Pressure: (!) 131/105 (08/03/22 0738)  Pulse: 74 (08/03/22 0738)  Temperature: 97 5 °F (36 4 °C) (08/03/22 0738)  Temp Source: Oral (08/02/22 2205)  Respirations: 17 (08/03/22 0738)  Height: 5' 6" (167 6 cm) (08/02/22 0830)  Weight - Scale: 93 9 kg (207 lb 0 2 oz) (08/03/22 0600)  SpO2: 95 % (08/03/22 0738)  Exam:   Physical Exam  ( *** Be Sure to Include Physical Exam: Delete this entire line when you have entered your exam)  Discussion with Family: ***    Discharge instructions/Information to patient and family:   See after visit summary for information provided to patient and family  Provisions for Follow-Up Care:  See after visit summary for information related to follow-up care and any pertinent home health orders  Disposition:     Home with [facility]    Discharge Statement:  I spent 45 minutes discharging the patient  This time was spent on the day of discharge  I had direct contact with the patient on the day of discharge  Greater than 50% of the total time was spent examining patient, answering all patient questions, arranging and discussing plan of care with patient as well as directly providing post-discharge instructions  Additional time then spent on discharge activities  Discharge Medications:  See after visit summary for reconciled discharge medications provided to patient and family        ** Please Note: This note has been constructed using a voice recognition system **

## 2022-08-02 NOTE — ASSESSMENT & PLAN NOTE
· Chronic BP reasonable on current regimen  · Continue Verapamil, metoprolol, and lasix with parameters   · Monitor with routine vitals

## 2022-08-02 NOTE — PROGRESS NOTES
Progress Note   Name:Fide Mccormack   Room: /-01     IMPRESSION:  1  Atrial fibrillation  Duration unknown    PLAN:  Warfarin therapy  ? SUBJECTIVE:  She denies problems of chest pain, shortness of breath, orthopnea, PND, palpitations, syncope, or bleeding problems  ?  OBJECTIVE:  Scheduled Medicines  Current Facility-Administered Medications   Medication Dose Route Frequency Provider Last Rate    furosemide  40 mg Intravenous BID Cary Rose MD      heparin (porcine)  3-30 Units/kg/hr (Order-Specific) Intravenous Titrated Remy Juan PA-C 17 Units/kg/hr (08/02/22 6261)    heparin (porcine)  3,800 Units Intravenous Q1H PRN Lay Hicks PA-C      heparin (porcine)  7,600 Units Intravenous Q1H PRN Lay Hicks PA-C      levothyroxine  112 mcg Oral Early Morning Cary Rose MD      metoprolol tartrate  50 mg Oral Q12H [address] Cary Rose MD      pantoprazole  40 mg Oral Early Morning AAMIR Rosas      senna  17 2 mg Oral HS Cary Rose MD      verapamil  240 mg Oral Daily Sajan Stanton MD      warfarin  7 5 mg Oral Daily (warfarin) AAMIR Ortiz        Infusions  heparin (porcine), 3-30 Units/kg/hr (Order-Specific), Last Rate: 17 Units/kg/hr (08/02/22 0812)       PRN Medicines    heparin (porcine)    heparin (porcine)   No Known Allergies   Diet Cardiovascular; Cardiac; Sodium 2 GM    Intake/Output Summary (Last 24 hours) at 8/2/2022 0902  Last data filed at 8/2/2022 0308  Gross per 24 hour   Intake 400 ml   Output 900 ml   Net -500 ml      /75   Pulse 94   Temp (!) 97 3 °F (36 3 °C)   Resp 17   Ht 5' 6" (1 676 m)   Wt 93 8 kg (206 lb 12 7 oz)   SpO2 (!) 89%   BMI 33 38 kg/m²    BP Readings from Last 3 Encounters:   08/02/22 115/75   04/26/22 160/72   04/21/21 164/83     ? Physical Exam:  General Appearance:  Alert, cooperative, in no acute distress   Eyes:  Sclerae anicteric  HEET:  Normocephalic, atraumatic      Neck: Supple, no JVD Cardiovascular:  Normal rate, regular rhythm  No murmurs   No edema, normal pulses   Respiratory:  Breathing unlabored  Lungs clear to auscultation   Gastrointestinal:  Normoactive bowel sounds  Abdomen soft, nontender to palpation  Musculoskeletal: No back or joint deformity  Dermatologic:  Skin is warm and dry  Neurologic: Alert and oriented and neurologic exam is grossly normal     Psychiatric: Normal affect and mood  LABS:  Lab Results   Component Value Date    BUN 17 08/01/2022    CREATININE 1 07 08/01/2022    CALCIUM 8 7 08/01/2022    K 3 6 08/01/2022    CO2 24 08/01/2022     08/01/2022    ALKPHOS 91 07/31/2022    AST 14 07/31/2022    ALT 20 07/31/2022     Lab Results   Component Value Date    WBC 8 80 08/01/2022    HGB 11 6 08/01/2022    HCT 35 9 08/01/2022    MCV 91 08/01/2022     08/01/2022     Lab Results   Component Value Date    HDL 45 (L) 07/26/2022    LDLCALC 103 (H) 07/26/2022    TRIG 115 07/26/2022     ?    ?  ?  ?   Opal Byers MD

## 2022-08-03 PROBLEM — I50.21 ACUTE SYSTOLIC CONGESTIVE HEART FAILURE (HCC): Status: ACTIVE | Noted: 2022-07-30

## 2022-08-03 LAB
APTT PPP: 189 SECONDS (ref 23–37)
APTT PPP: 73 SECONDS (ref 23–37)
APTT PPP: 94 SECONDS (ref 23–37)
INR PPP: 1.57 (ref 0.84–1.19)
PROTHROMBIN TIME: 18.5 SECONDS (ref 11.6–14.5)

## 2022-08-03 PROCEDURE — 99232 SBSQ HOSP IP/OBS MODERATE 35: CPT | Performed by: NURSE PRACTITIONER

## 2022-08-03 PROCEDURE — 85730 THROMBOPLASTIN TIME PARTIAL: CPT | Performed by: INTERNAL MEDICINE

## 2022-08-03 PROCEDURE — 85610 PROTHROMBIN TIME: CPT | Performed by: NURSE PRACTITIONER

## 2022-08-03 PROCEDURE — 85730 THROMBOPLASTIN TIME PARTIAL: CPT | Performed by: PHYSICIAN ASSISTANT

## 2022-08-03 RX ORDER — WARFARIN SODIUM 5 MG/1
TABLET ORAL
Qty: 30 TABLET | Refills: 0 | Status: SHIPPED | OUTPATIENT
Start: 2022-08-03 | End: 2022-08-04

## 2022-08-03 RX ORDER — DIGOXIN 125 MCG
125 TABLET ORAL DAILY
Status: DISCONTINUED | OUTPATIENT
Start: 2022-08-03 | End: 2022-08-05 | Stop reason: HOSPADM

## 2022-08-03 RX ORDER — ENOXAPARIN SODIUM 100 MG/ML
1 INJECTION SUBCUTANEOUS EVERY 12 HOURS SCHEDULED
Status: DISCONTINUED | OUTPATIENT
Start: 2022-08-03 | End: 2022-08-03

## 2022-08-03 RX ORDER — METOPROLOL SUCCINATE 100 MG/1
100 TABLET, EXTENDED RELEASE ORAL DAILY
Qty: 30 TABLET | Refills: 0 | Status: SHIPPED | OUTPATIENT
Start: 2022-08-03 | End: 2022-09-02

## 2022-08-03 RX ORDER — HEPARIN SODIUM 1000 [USP'U]/ML
4000 INJECTION, SOLUTION INTRAVENOUS; SUBCUTANEOUS
Status: DISCONTINUED | OUTPATIENT
Start: 2022-08-03 | End: 2022-08-04

## 2022-08-03 RX ORDER — ENOXAPARIN SODIUM 100 MG/ML
1 INJECTION SUBCUTANEOUS EVERY 12 HOURS SCHEDULED
Qty: 14 ML | Refills: 0 | Status: SHIPPED | OUTPATIENT
Start: 2022-08-03 | End: 2022-08-03

## 2022-08-03 RX ORDER — HEPARIN SODIUM 1000 [USP'U]/ML
2000 INJECTION, SOLUTION INTRAVENOUS; SUBCUTANEOUS
Status: DISCONTINUED | OUTPATIENT
Start: 2022-08-03 | End: 2022-08-04

## 2022-08-03 RX ORDER — HEPARIN SODIUM 10000 [USP'U]/100ML
3-20 INJECTION, SOLUTION INTRAVENOUS
Status: DISCONTINUED | OUTPATIENT
Start: 2022-08-03 | End: 2022-08-04

## 2022-08-03 RX ORDER — FUROSEMIDE 40 MG/1
40 TABLET ORAL DAILY
Qty: 30 TABLET | Refills: 0 | Status: SHIPPED | OUTPATIENT
Start: 2022-08-03 | End: 2022-09-02

## 2022-08-03 RX ADMIN — PANTOPRAZOLE SODIUM 40 MG: 40 TABLET, DELAYED RELEASE ORAL at 05:56

## 2022-08-03 RX ADMIN — VERAPAMIL HYDROCHLORIDE 240 MG: 120 TABLET, FILM COATED, EXTENDED RELEASE ORAL at 08:32

## 2022-08-03 RX ADMIN — WARFARIN SODIUM 7.5 MG: 7.5 TABLET ORAL at 18:12

## 2022-08-03 RX ADMIN — FUROSEMIDE 40 MG: 40 TABLET ORAL at 08:32

## 2022-08-03 RX ADMIN — HEPARIN SODIUM 15 UNITS/KG/HR: 10000 INJECTION, SOLUTION INTRAVENOUS at 18:12

## 2022-08-03 RX ADMIN — SENNOSIDES 17.2 MG: 8.6 TABLET, FILM COATED ORAL at 23:37

## 2022-08-03 RX ADMIN — LEVOTHYROXINE SODIUM 112 MCG: 112 TABLET ORAL at 05:56

## 2022-08-03 RX ADMIN — HEPARIN SODIUM 15 UNITS/KG/HR: 10000 INJECTION, SOLUTION INTRAVENOUS at 09:25

## 2022-08-03 RX ADMIN — METOPROLOL SUCCINATE 100 MG: 100 TABLET, EXTENDED RELEASE ORAL at 08:32

## 2022-08-03 RX ADMIN — DIGOXIN 125 MCG: 125 TABLET ORAL at 16:22

## 2022-08-03 NOTE — QUICK NOTE
Discussed with Cardiologist Dr Pilar St and despite ordering ALINA with cardioversion this morning, unfortunately cannot be scheduled and thus he reports he added digoxin for better rate control  He reported she was stable for discharge  I also asked that he talk with patient and family about his plan as they have been requesting this  Unfortunately while rate control may be achieved, I spoke to pharmacist at [facility] and lovenox(even for short period of time), Xarelto, and Eliquis are not affordable  Because of this she cannot be discharged until INR therapeutic and thus not ready for discharge  Ideally could discharge with cardiac rehab per Cardiology request on 8/4 if therapeutic         Lacy Burgos

## 2022-08-03 NOTE — PLAN OF CARE
Problem: Potential for Falls  Goal: Patient will remain free of falls  Description: INTERVENTIONS:  - Educate patient/family on patient safety including physical limitations  - Instruct patient to call for assistance with activity   - Consult OT/PT to assist with strengthening/mobility   - Keep Call bell within reach  - Keep bed low and locked with side rails adjusted as appropriate  - Keep care items and personal belongings within reach  - Initiate and maintain comfort rounds  - Make Fall Risk Sign visible to staff  - Offer Toileting every 2 Hours, in advance of need  - Initiate/Maintain alarm  - Obtain necessary fall risk management equipment:   - Apply yellow socks and bracelet for high fall risk patients  - Consider moving patient to room near nurses station  Outcome: Progressing     Problem: PAIN - ADULT  Goal: Verbalizes/displays adequate comfort level or baseline comfort level  Description: Interventions:  - Encourage patient to monitor pain and request assistance  - Assess pain using appropriate pain scale  - Administer analgesics based on type and severity of pain and evaluate response  - Implement non-pharmacological measures as appropriate and evaluate response  - Consider cultural and social influences on pain and pain management  - Notify physician/advanced practitioner if interventions unsuccessful or patient reports new pain  Outcome: Progressing     Problem: INFECTION - ADULT  Goal: Absence or prevention of progression during hospitalization  Description: INTERVENTIONS:  - Assess and monitor for signs and symptoms of infection  - Monitor lab/diagnostic results  - Monitor all insertion sites, i e  indwelling lines, tubes, and drains  - Monitor endotracheal if appropriate and nasal secretions for changes in amount and color  - [place] appropriate cooling/warming therapies per order  - Administer medications as ordered  - Instruct and encourage patient and family to use good hand hygiene technique  - Identify and instruct in appropriate isolation precautions for identified infection/condition  Outcome: Progressing  Goal: Absence of fever/infection during neutropenic period  Description: INTERVENTIONS:  - Monitor WBC    Outcome: Progressing     Problem: SAFETY ADULT  Goal: Patient will remain free of falls  Description: INTERVENTIONS:  - Educate patient/family on patient safety including physical limitations  - Instruct patient to call for assistance with activity   - Consult OT/PT to assist with strengthening/mobility   - Keep Call bell within reach  - Keep bed low and locked with side rails adjusted as appropriate  - Keep care items and personal belongings within reach  - Initiate and maintain comfort rounds  - Make Fall Risk Sign visible to staff  - Offer Toileting every 2 Hours, in advance of need  - Initiate/Maintain alarm  - Obtain necessary fall risk management equipment:   - Apply yellow socks and bracelet for high fall risk patients  - Consider moving patient to room near nurses station  Outcome: Progressing  Goal: Maintain or return to baseline ADL function  Description: INTERVENTIONS:  -  Assess patient's ability to carry out ADLs; assess patient's baseline for ADL function and identify physical deficits which impact ability to perform ADLs (bathing, care of mouth/teeth, toileting, grooming, dressing, etc )  - Assess/evaluate cause of self-care deficits   - Assess range of motion  - Assess patient's mobility; develop plan if impaired  - Assess patient's need for assistive devices and provide as appropriate  - Encourage maximum independence but intervene and supervise when necessary  - Involve family in performance of ADLs  - Assess for home care needs following discharge   - Consider OT consult to assist with ADL evaluation and planning for discharge  - Provide patient education as appropriate  Outcome: Progressing  Goal: Maintains/Returns to pre admission functional level  Description: INTERVENTIONS:  - Perform BMAT or MOVE assessment daily    - Set and communicate daily mobility goal to care team and patient/family/caregiver  - Collaborate with rehabilitation services on mobility goals if consulted  - Perform Range of Motion 3 times a day  - Reposition patient every 2 hours  - Dangle patient 3 times a day  - Stand patient 3 times a day  - Ambulate patient 3 times a day  - Out of bed to chair 3 times a day   - Out of bed for meals 3 times a day  - Out of bed for toileting  - Record patient progress and toleration of activity level   Outcome: Progressing     Problem: DISCHARGE PLANNING  Goal: Discharge to home or other facility with appropriate resources  Description: INTERVENTIONS:  - Identify barriers to discharge w/patient and caregiver  - Arrange for needed discharge resources and transportation as appropriate  - Identify discharge learning needs (meds, wound care, etc )  - Arrange for interpretive services to assist at discharge as needed  - Refer to Case Management Department for coordinating discharge planning if the patient needs post-hospital services based on physician/advanced practitioner order or complex needs related to functional status, cognitive ability, or social support system  Outcome: Progressing     Problem: Knowledge Deficit  Goal: Patient/family/caregiver demonstrates understanding of disease process, treatment plan, medications, and discharge instructions  Description: Complete learning assessment and assess knowledge base  Interventions:  - Provide teaching at level of understanding  - Provide teaching via preferred learning methods  Outcome: Progressing     Problem: Nutrition/Hydration-ADULT  Goal: Nutrient/Hydration intake appropriate for improving, restoring or maintaining nutritional needs  Description: Monitor and assess patient's nutrition/hydration status for malnutrition  Collaborate with interdisciplinary team and initiate plan and interventions as ordered  Monitor patient's weight and dietary intake as ordered or per policy  Utilize nutrition screening tool and intervene as necessary  Determine patient's food preferences and provide high-protein, high-caloric foods as appropriate       INTERVENTIONS:  - Monitor oral intake, urinary output, labs, and treatment plans  - Assess nutrition and hydration status and recommend course of action  - Evaluate amount of meals eaten  - Assist patient with eating if necessary   - Allow adequate time for meals  - Recommend/ encourage appropriate diets, oral nutritional supplements, and vitamin/mineral supplements  - Order, calculate, and assess calorie counts as needed  - Recommend, monitor, and adjust tube feedings and TPN/PPN based on assessed needs  - Assess need for intravenous fluids  - Provide specific nutrition/hydration education as appropriate  - Include patient/family/caregiver in decisions related to nutrition  Outcome: Progressing     Problem: Prexisting or High Potential for Compromised Skin Integrity  Goal: Skin integrity is maintained or improved  Description: INTERVENTIONS:  - Identify patients at risk for skin breakdown  - Assess and monitor skin integrity  - Assess and monitor nutrition and hydration status  - Monitor labs   - Assess for incontinence   - Turn and reposition patient  - Assist with mobility/ambulation  - Relieve pressure over bony prominences  - Avoid friction and shearing  - Provide appropriate hygiene as needed including keeping skin clean and dry  - Evaluate need for skin moisturizer/barrier cream  - Collaborate with interdisciplinary team   - Patient/family teaching  - Consider wound care consult   Outcome: Progressing

## 2022-08-03 NOTE — PLAN OF CARE
Problem: Potential for Falls  Goal: Patient will remain free of falls  Description: INTERVENTIONS:  - Educate patient/family on patient safety including physical limitations  - Instruct patient to call for assistance with activity   - Consult OT/PT to assist with strengthening/mobility   - Keep Call bell within reach  - Keep bed low and locked with side rails adjusted as appropriate  - Keep care items and personal belongings within reach  - Initiate and maintain comfort rounds  - Make Fall Risk Sign visible to staff  - Offer Toileting every  Hours, in advance of need  - Initiate/Maintain alarm  - Obtain necessary fall risk management equipment  - Apply yellow socks and bracelet for high fall risk patients  - Consider moving patient to room near nurses station  8/3/2022 0758 by Lo Mckinley RN  Outcome: Progressing  8/2/2022 1809 by Lo Mckinley RN  Outcome: Progressing     Problem: PAIN - ADULT  Goal: Verbalizes/displays adequate comfort level or baseline comfort level  Description: Interventions:  - Encourage patient to monitor pain and request assistance  - Assess pain using appropriate pain scale  - Administer analgesics based on type and severity of pain and evaluate response  - Implement non-pharmacological measures as appropriate and evaluate response  - Consider cultural and social influences on pain and pain management  - Notify physician/advanced practitioner if interventions unsuccessful or patient reports new pain  8/3/2022 0758 by Lo Mckinley RN  Outcome: Progressing  8/2/2022 1809 by Lo Mckinley RN  Outcome: Progressing

## 2022-08-03 NOTE — PROGRESS NOTES
[facility]  Progress Note - [place] 1940, 80 y o  female MRN: [id number]  Unit/Bed#: -[place] Encounter: [id number]  Primary Care Provider: Haseeb Riggs MD   Date and time admitted to hospital: 7/30/2022 12:04 PM    * New onset a-fib [facility])  Assessment & Plan  Background: Presented with 3 days of SOB associated CP  · Noted history of arrhythmia, however no documented history of atrial fibrillation  · On arrival to the ED, Noted to be in A-Fib with heart rates 120s  · ChadsVasc score of 3  · Under went Heparin Drip> coumadin bridge   · Eliquis $400 not affordable  · INR subtherapeutic> continue daily labs as below> 7 5 mg ordered on 8/2; continue at 7 5 mg daily  · Pending INR on 8/4 would consider discharging with lovenox if HR controlled   · Started Lopressor 50 mg BID> changed to 100mg Toprol XL on 8/2;  continue at discharge   · Echo with EF of 40% with global hypokinesis   · Discussed with Cardiologist prior to discharge and does not want to pursue ischemic evaluation at this time however given persistent elevated HR with minimal ambulation into the 140-180s will pursue ALINA cardioversion  · Cardiology following; input appreciated     Acute congestive heart failure ([address] )  Assessment & Plan  · Present on admission, BNP 2420  · Does not have a formal diagnosis of CHF  · Symptoms consistent with CHF - orthopnea, shortness of breath  · CTA Chest (7/30/22): Negative for PE but mild pulmonary edema with small to moderate right and small left layering bilateral pleural effusions    · Initially received IV Lasix> transitioned to PO on 08/02  · Continue PO lasix at time of discharge to be follow up OP with Cardiology team in regards to further titration   · Cardiology consulted; input appreicated   · Echocardiogram reviewed   · Cardiac diet, intakes and outputs    Chest pain  Assessment & Plan  · With complaints of chest pain x 3 days, especially when laying flat  · On 08/02 asymptomatic without any chest pain  · MORIAH score of 3  · D-Dimer elevated at 2 36; CTA Chest completed, Negative for PE  · Troponins negative  · See rest of plan as noted above    Elevated d-dimer  Assessment & Plan  · Present on admission, evidenced by d-dimer of 2 36  · CTA Chest completed, negative for PE  Essential hypertension  Assessment & Plan  · Chronic BP reasonable on current regimen  · Continue Verapamil, metoprolol, and lasix with parameters   · Monitor with routine vitals    GERD (gastroesophageal reflux disease)  Assessment & Plan  · Chronic; continue PPI    Hypothyroidism  Assessment & Plan  · Chronic; Continue home medication regimen 112 mcg  · TSH 4 550; Free T4 normal  · Would recommend TSH in 4-6 wks with PCP        VTE Pharmacologic Prophylaxis: VTE Score: 5 High Risk (Score >/= 5) - Pharmacological DVT Prophylaxis Ordered: heparin drip  Sequential Compression Devices Ordered  Patient Centered Rounds: I performed bedside rounds with nursing staff today  Discussions with Specialists or Other Care Team Provider: nursing staff, CM as well as Cardiology team     Education and Discussions with Family / Patient: Updated  (son) via phone  Time Spent for Care: 30 minutes  More than 50% of total time spent on counseling and coordination of care as described above  Current Length of Stay: 4 day(s)  Current Patient Status: Inpatient   Certification Statement: The patient will continue to require additional inpatient hospital stay due to need [facility]  Discharge Plan: Anticipate discharge in 24-48 hrs to home with home services   pending controlled HR and cards clearance     Code Status: Level 1 - Full Code    Subjective:   Patient reports that overall she does feel well and even when her HR would spike she did not feel palpitations, SOB, or chest pain     Objective:     Vitals:   Temp (24hrs), Av 7 °F (36 5 °C), Min:97 2 °F (36 2 °C), Max:98 1 °F (36 7 °C)    Temp:  [97 2 °F (36 2 °C)-98 1 °F (36 7 °C)] 97 5 °F (36 4 °C)  HR:  [] 74  Resp:  [16-17] 17  BP: (103-163)/() 131/105  SpO2:  [91 %-96 %] 95 %  Body mass index is 33 41 kg/m²  Input and Output Summary (last 24 hours): Intake/Output Summary (Last 24 hours) at 8/3/2022 1031  Last data filed at 8/3/2022 0240  Gross per 24 hour   Intake 360 ml   Output 600 ml   Net -240 ml       Physical Exam:   Physical Exam  Constitutional:       Appearance: Normal appearance  She is not ill-appearing  Cardiovascular:      Rate and Rhythm: Tachycardia present  Pulses: Normal pulses  Radial pulses are 2+ on the right side and 2+ on the left side  Dorsalis pedis pulses are 2+ on the right side and 2+ on the left side  Heart sounds: Murmur heard  Pulmonary:      Effort: Pulmonary effort is normal  No respiratory distress  Breath sounds: No wheezing or rales  Abdominal:      General: Bowel sounds are normal       Palpations: Abdomen is soft  Musculoskeletal:         General: No swelling  Skin:     General: Skin is warm and dry  Capillary Refill: Capillary refill takes less than 2 seconds  Neurological:      Mental Status: She is alert  Additional Data:     Labs:  Results from last 7 days   Lab Units 08/01/22  0842 07/31/22  1646 07/31/22  0449   WBC Thousand/uL 8 80   < > 8 98   HEMOGLOBIN g/dL 11 6   < > 10 5*   HEMATOCRIT % 35 9   < > 33 3*   PLATELETS Thousands/uL 243   < > 202   NEUTROS PCT %  --   --  67   LYMPHS PCT %  --   --  22   MONOS PCT %  --   --  8   EOS PCT %  --   --  2    < > = values in this interval not displayed       Results from last 7 days   Lab Units 08/01/22  0842 07/31/22  0449   SODIUM mmol/L 136 137   POTASSIUM mmol/L 3 6 3 5   CHLORIDE mmol/L 100 102   CO2 mmol/L 24 26   BUN mg/dL 17 14   CREATININE mg/dL 1 07 0 88   ANION GAP mmol/L 12 9   CALCIUM mg/dL 8 7 8 6   ALBUMIN g/dL  --  3 1*   TOTAL BILIRUBIN mg/dL  --  0 74 ALK PHOS U/L  --  91   ALT U/L  --  20   AST U/L  --  14   GLUCOSE RANDOM mg/dL 171* 109     Results from last 7 days   Lab Units 08/03/22  0500   INR  1 57*                   Lines/Drains:  Invasive Devices  Report    Peripheral Intravenous Line  Duration           Peripheral IV 08/03/22 Dorsal (posterior); Right Forearm <1 day                  Telemetry:  Telemetry Orders (From admission, onward)             48 Hour Telemetry Monitoring  Continuous x 48 hours        References:    Telemetry Guidelines   Question:  Reason for 48 Hour Telemetry  Answer:  Arrhythmias Requiring Medical Therapy (eg  SVT, Vtach/fib, Bradycardia, Uncontrolled A-fib)                 Telemetry Reviewed: Atrial fibrillation   HR averaging 140  Indication for Continued Telemetry Use: Arrthymias requiring medical therapy             Imaging: Reviewed radiology reports from this admission including: ECHO    Recent Cultures (last 7 days):         Last 24 Hours Medication List:   Current Facility-Administered Medications   Medication Dose Route Frequency Provider Last Rate    furosemide  40 mg Oral Daily AAMIR Ortiz      heparin (porcine)  3-20 Units/kg/hr (Order-Specific) Intravenous Titrated AAMIR Ortiz 15 Units/kg/hr (08/03/22 0925)    heparin (porcine)  2,000 Units Intravenous Q1H PRN AAMIR Ortiz      heparin (porcine)  4,000 Units Intravenous Q1H PRN AAMIR Ortiz      levothyroxine  112 mcg Oral Early Morning Daisy Robert MD      metoprolol  2 5 mg Intravenous Q6H PRN AAMIR Ortiz      metoprolol succinate  100 mg Oral Daily AAMIR Mccallum      pantoprazole  40 mg Oral Early Morning AAMIR Reich      senna  17 2 mg Oral HS Daisy Robert MD      verapamil  240 mg Oral Daily Ena Nolan MD      warfarin  7 5 mg Oral Daily (warfarin) AAMIR Mccallum          Today, Patient Was Seen By: AAMIR Mccallum    **Please Note: This note may have been constructed using a voice recognition system  **

## 2022-08-04 ENCOUNTER — APPOINTMENT (INPATIENT)
Dept: NON INVASIVE DIAGNOSTICS | Facility: HOSPITAL | Age: 82
DRG: 291 | End: 2022-08-04
Attending: INTERNAL MEDICINE
Payer: MEDICARE

## 2022-08-04 PROBLEM — R53.1 GENERALIZED WEAKNESS: Status: ACTIVE | Noted: 2022-08-04

## 2022-08-04 LAB
ANION GAP SERPL CALCULATED.3IONS-SCNC: 9 MMOL/L (ref 4–13)
APTT PPP: 42 SECONDS (ref 23–37)
APTT PPP: 89 SECONDS (ref 23–37)
ATRIAL RATE: 120 BPM
BUN SERPL-MCNC: 20 MG/DL (ref 5–25)
CALCIUM SERPL-MCNC: 9.1 MG/DL (ref 8.3–10.1)
CHLORIDE SERPL-SCNC: 103 MMOL/L (ref 96–108)
CO2 SERPL-SCNC: 28 MMOL/L (ref 21–32)
CREAT SERPL-MCNC: 1.07 MG/DL (ref 0.6–1.3)
GFR SERPL CREATININE-BSD FRML MDRD: 49 ML/MIN/1.73SQ M
GLUCOSE SERPL-MCNC: 115 MG/DL (ref 65–140)
INR PPP: 2.08 (ref 0.84–1.19)
MAGNESIUM SERPL-MCNC: 2.1 MG/DL (ref 1.6–2.6)
POTASSIUM SERPL-SCNC: 3.2 MMOL/L (ref 3.5–5.3)
PROTHROMBIN TIME: 22.9 SECONDS (ref 11.6–14.5)
QRS AXIS: -32 DEGREES
QRSD INTERVAL: 122 MS
QT INTERVAL: 338 MS
QTC INTERVAL: 495 MS
SODIUM SERPL-SCNC: 140 MMOL/L (ref 135–147)
T WAVE AXIS: 47 DEGREES
VENTRICULAR RATE: 129 BPM

## 2022-08-04 PROCEDURE — 83735 ASSAY OF MAGNESIUM: CPT | Performed by: NURSE PRACTITIONER

## 2022-08-04 PROCEDURE — 99233 SBSQ HOSP IP/OBS HIGH 50: CPT | Performed by: INTERNAL MEDICINE

## 2022-08-04 PROCEDURE — 85730 THROMBOPLASTIN TIME PARTIAL: CPT | Performed by: INTERNAL MEDICINE

## 2022-08-04 PROCEDURE — 85610 PROTHROMBIN TIME: CPT | Performed by: NURSE PRACTITIONER

## 2022-08-04 PROCEDURE — 93010 ELECTROCARDIOGRAM REPORT: CPT | Performed by: INTERNAL MEDICINE

## 2022-08-04 PROCEDURE — 80048 BASIC METABOLIC PNL TOTAL CA: CPT | Performed by: NURSE PRACTITIONER

## 2022-08-04 RX ORDER — WARFARIN SODIUM 5 MG/1
5 TABLET ORAL
Status: DISCONTINUED | OUTPATIENT
Start: 2022-08-04 | End: 2022-08-05

## 2022-08-04 RX ORDER — DIGOXIN 125 MCG
125 TABLET ORAL DAILY
Qty: 30 TABLET | Refills: 0 | Status: SHIPPED | OUTPATIENT
Start: 2022-08-05 | End: 2022-09-04

## 2022-08-04 RX ORDER — WARFARIN SODIUM 5 MG/1
5 TABLET ORAL
Qty: 30 TABLET | Refills: 0 | Status: SHIPPED | OUTPATIENT
Start: 2022-08-04 | End: 2022-08-05 | Stop reason: SDUPTHER

## 2022-08-04 RX ORDER — POTASSIUM CHLORIDE 20 MEQ/1
40 TABLET, EXTENDED RELEASE ORAL EVERY 4 HOURS
Status: COMPLETED | OUTPATIENT
Start: 2022-08-04 | End: 2022-08-04

## 2022-08-04 RX ADMIN — POTASSIUM CHLORIDE 40 MEQ: 1500 TABLET, EXTENDED RELEASE ORAL at 09:04

## 2022-08-04 RX ADMIN — PANTOPRAZOLE SODIUM 40 MG: 40 TABLET, DELAYED RELEASE ORAL at 04:52

## 2022-08-04 RX ADMIN — POTASSIUM CHLORIDE 40 MEQ: 1500 TABLET, EXTENDED RELEASE ORAL at 13:56

## 2022-08-04 RX ADMIN — METOPROLOL SUCCINATE 100 MG: 100 TABLET, EXTENDED RELEASE ORAL at 09:00

## 2022-08-04 RX ADMIN — LEVOTHYROXINE SODIUM 112 MCG: 112 TABLET ORAL at 04:52

## 2022-08-04 RX ADMIN — DIGOXIN 125 MCG: 125 TABLET ORAL at 09:04

## 2022-08-04 RX ADMIN — WARFARIN SODIUM 5 MG: 5 TABLET ORAL at 17:16

## 2022-08-04 RX ADMIN — FUROSEMIDE 40 MG: 40 TABLET ORAL at 09:03

## 2022-08-04 RX ADMIN — VERAPAMIL HYDROCHLORIDE 240 MG: 120 TABLET, FILM COATED, EXTENDED RELEASE ORAL at 09:04

## 2022-08-04 NOTE — PLAN OF CARE
Problem: Potential for Falls  Goal: Patient will remain free of falls  Description: INTERVENTIONS:  - Educate patient/family on patient safety including physical limitations  - Instruct patient to call for assistance with activity   - Consult OT/PT to assist with strengthening/mobility   - Keep Call bell within reach  - Keep bed low and locked with side rails adjusted as appropriate  - Keep care items and personal belongings within reach  - Initiate and maintain comfort rounds  - Make Fall Risk Sign visible to staff  - Offer Toileting every  Hours, in advance of need  - Initiate/Maintain alarm  - Obtain necessary fall risk management equipment:   - Apply yellow socks and bracelet for high fall risk patients  - Consider moving patient to room near nurses station  Outcome: Progressing     Problem: PAIN - ADULT  Goal: Verbalizes/displays adequate comfort level or baseline comfort level  Description: Interventions:  - Encourage patient to monitor pain and request assistance  - Assess pain using appropriate pain scale  - Administer analgesics based on type and severity of pain and evaluate response  - Implement non-pharmacological measures as appropriate and evaluate response  - Consider cultural and social influences on pain and pain management  - Notify physician/advanced practitioner if interventions unsuccessful or patient reports new pain  Outcome: Progressing     Problem: INFECTION - ADULT  Goal: Absence or prevention of progression during hospitalization  Description: INTERVENTIONS:  - Assess and monitor for signs and symptoms of infection  - Monitor lab/diagnostic results  - Monitor all insertion sites, i e  indwelling lines, tubes, and drains  - Monitor endotracheal if appropriate and nasal secretions for changes in amount and color  - [place] appropriate cooling/warming therapies per order  - Administer medications as ordered  - Instruct and encourage patient and family to use good hand hygiene technique  - Identify and instruct in appropriate isolation precautions for identified infection/condition  Outcome: Progressing  Goal: Absence of fever/infection during neutropenic period  Description: INTERVENTIONS:  - Monitor WBC    Outcome: Progressing     Problem: SAFETY ADULT  Goal: Patient will remain free of falls  Description: INTERVENTIONS:  - Educate patient/family on patient safety including physical limitations  - Instruct patient to call for assistance with activity   - Consult OT/PT to assist with strengthening/mobility   - Keep Call bell within reach  - Keep bed low and locked with side rails adjusted as appropriate  - Keep care items and personal belongings within reach  - Initiate and maintain comfort rounds  - Make Fall Risk Sign visible to staff  - Offer Toileting every  Hours, in advance of need  - Initiate/Maintain alarm  - Obtain necessary fall risk management equipment:   - Apply yellow socks and bracelet for high fall risk patients  - Consider moving patient to room near nurses station  Outcome: Progressing  Goal: Maintain or return to baseline ADL function  Description: INTERVENTIONS:  -  Assess patient's ability to carry out ADLs; assess patient's baseline for ADL function and identify physical deficits which impact ability to perform ADLs (bathing, care of mouth/teeth, toileting, grooming, dressing, etc )  - Assess/evaluate cause of self-care deficits   - Assess range of motion  - Assess patient's mobility; develop plan if impaired  - Assess patient's need for assistive devices and provide as appropriate  - Encourage maximum independence but intervene and supervise when necessary  - Involve family in performance of ADLs  - Assess for home care needs following discharge   - Consider OT consult to assist with ADL evaluation and planning for discharge  - Provide patient education as appropriate  Outcome: Progressing  Goal: Maintains/Returns to pre admission functional level  Description: INTERVENTIONS:  - Perform BMAT or MOVE assessment daily    - Set and communicate daily mobility goal to care team and patient/family/caregiver  - Collaborate with rehabilitation services on mobility goals if consulted  - Perform Range of Motion  times a day  - Reposition patient every  hours  - Dangle patient  times a day  - Stand patient  times a day  - Ambulate patient  times a day  - Out of bed to chair  times a day   - Out of bed for meals  times a day  - Out of bed for toileting  - Record patient progress and toleration of activity level   Outcome: Progressing     Problem: DISCHARGE PLANNING  Goal: Discharge to home or other facility with appropriate resources  Description: INTERVENTIONS:  - Identify barriers to discharge w/patient and caregiver  - Arrange for needed discharge resources and transportation as appropriate  - Identify discharge learning needs (meds, wound care, etc )  - Arrange for interpretive services to assist at discharge as needed  - Refer to Case Management Department for coordinating discharge planning if the patient needs post-hospital services based on physician/advanced practitioner order or complex needs related to functional status, cognitive ability, or social support system  Outcome: Progressing     Problem: Knowledge Deficit  Goal: Patient/family/caregiver demonstrates understanding of disease process, treatment plan, medications, and discharge instructions  Description: Complete learning assessment and assess knowledge base  Interventions:  - Provide teaching at level of understanding  - Provide teaching via preferred learning methods  Outcome: Progressing     Problem: Nutrition/Hydration-ADULT  Goal: Nutrient/Hydration intake appropriate for improving, restoring or maintaining nutritional needs  Description: Monitor and assess patient's nutrition/hydration status for malnutrition  Collaborate with interdisciplinary team and initiate plan and interventions as ordered    Monitor patient's weight and dietary intake as ordered or per policy  Utilize nutrition screening tool and intervene as necessary  Determine patient's food preferences and provide high-protein, high-caloric foods as appropriate       INTERVENTIONS:  - Monitor oral intake, urinary output, labs, and treatment plans  - Assess nutrition and hydration status and recommend course of action  - Evaluate amount of meals eaten  - Assist patient with eating if necessary   - Allow adequate time for meals  - Recommend/ encourage appropriate diets, oral nutritional supplements, and vitamin/mineral supplements  - Order, calculate, and assess calorie counts as needed  - Recommend, monitor, and adjust tube feedings and TPN/PPN based on assessed needs  - Assess need for intravenous fluids  - Provide specific nutrition/hydration education as appropriate  - Include patient/family/caregiver in decisions related to nutrition  Outcome: Progressing     Problem: Prexisting or High Potential for Compromised Skin Integrity  Goal: Skin integrity is maintained or improved  Description: INTERVENTIONS:  - Identify patients at risk for skin breakdown  - Assess and monitor skin integrity  - Assess and monitor nutrition and hydration status  - Monitor labs   - Assess for incontinence   - Turn and reposition patient  - Assist with mobility/ambulation  - Relieve pressure over bony prominences  - Avoid friction and shearing  - Provide appropriate hygiene as needed including keeping skin clean and dry  - Evaluate need for skin moisturizer/barrier cream  - Collaborate with interdisciplinary team   - Patient/family teaching  - Consider wound care consult   Outcome: Progressing     Problem: MOBILITY - ADULT  Goal: Maintain or return to baseline ADL function  Description: INTERVENTIONS:  -  Assess patient's ability to carry out ADLs; assess patient's baseline for ADL function and identify physical deficits which impact ability to perform ADLs (bathing, care of mouth/teeth, toileting, grooming, dressing, etc )  - Assess/evaluate cause of self-care deficits   - Assess range of motion  - Assess patient's mobility; develop plan if impaired  - Assess patient's need for assistive devices and provide as appropriate  - Encourage maximum independence but intervene and supervise when necessary  - Involve family in performance of ADLs  - Assess for home care needs following discharge   - Consider OT consult to assist with ADL evaluation and planning for discharge  - Provide patient education as appropriate  Outcome: Progressing  Goal: Maintains/Returns to pre admission functional level  Description: INTERVENTIONS:  - Perform BMAT or MOVE assessment daily    - Set and communicate daily mobility goal to care team and patient/family/caregiver  - Collaborate with rehabilitation services on mobility goals if consulted  - Perform Range of Motion  times a day  - Reposition patient every  hours    - Dangle patient  times a day  - Stand patient  times a day  - Ambulate patient  times a day  - Out of bed to chair  times a day   - Out of bed for meals times a day  - Out of bed for toileting  - Record patient progress and toleration of activity level   Outcome: Progressing

## 2022-08-04 NOTE — PROGRESS NOTES
[address] [facility]  Progress Note - Miguel Townsend 1940, 80 y o  female MRN: [id number]  Unit/Bed#: -[facility] Encounter: [id number]  Primary Care Provider: Charly Petersen MD   Date and time admitted to hospital: 7/30/2022 12:04 PM    * New onset a-fib [facility])  Assessment & Plan  Background: Presented with 3 days of SOB associated CP  · Noted history of arrhythmia, however no documented history of atrial fibrillation  · On arrival to the ED, Noted to be in A-Fib with heart rates 120s  · Heart rate better controlled today  · ChadsVasc score of 3  · Under went Heparin Drip> coumadin bridge   · DOAC and Lovenox are not affordable  · INR therapeutic today, discontinued heparin drip, continue to monitor INR daily  · Decrease Coumadin to 5 mg today  · Started Lopressor 50 mg BID> changed to 100mg Toprol XL on 8/2;  continue at discharge   · Continue digoxin  · Echo with EF of 40% with global hypokinesis   · Discussed with Cardiologist prior to discharge and does not want to pursue ischemic evaluation at this time however given persistent elevated HR with minimal ambulation into the 140-180s will pursue ALINA cardioversion  · Cardiology following; input appreciated     Generalized weakness  Assessment & Plan  PT/OT evaluated patient and recommended home PT, case management on board    Elevated d-dimer  Assessment & Plan  · Present on admission, evidenced by d-dimer of 2 36  · CTA Chest completed, negative for PE  Acute systolic congestive heart failure (HCC)  Assessment & Plan  · Present on admission, BNP 2420  · Does not have a formal diagnosis of CHF  · Symptoms consistent with CHF - orthopnea, shortness of breath  · CTA Chest (7/30/22): Negative for PE but mild pulmonary edema with small to moderate right and small left layering bilateral pleural effusions    · Initially received IV Lasix> transitioned to PO on 08/02  · Continue PO lasix at time of discharge to be follow up OP with Cardiology team in regards to further titration   · Cardiology consulted; input appreicated   · Echocardiogram reviewed   · Cardiac diet, intakes and outputs    Chest pain  Assessment & Plan  · With complaints of chest pain x 3 days, especially when laying flat  · On  asymptomatic without any chest pain  · MORIAH score of 3  · D-Dimer elevated at 2 36; CTA Chest completed, Negative for PE  · Troponins negative  · See rest of plan as noted above    Hypothyroidism  Assessment & Plan  · Chronic; Continue home medication regimen 112 mcg  · TSH 4 550; Free T4 normal  · Would recommend TSH in 4-6 wks with PCP    GERD (gastroesophageal reflux disease)  Assessment & Plan  · Chronic; continue PPI    Essential hypertension  Assessment & Plan  · Blood pressure within acceptable range  · Continue Verapamil, metoprolol, and lasix with parameters   · Continue to monitor blood pressure      VTE Pharmacologic Prophylaxis:   Pharmacologic: Warfarin (Coumadin)  Mechanical VTE Prophylaxis in Place: Yes    Patient Centered Rounds: I have performed bedside rounds with nursing staff today  Discussions with Specialists or Other Care Team Provider:  Nursing, case management, cardiology    Education and Discussions with Family / Patient:  Patient and family at bedside    Time Spent for Care: 30 minutes  More than 50% of total time spent on counseling and coordination of care as described above  Current Length of Stay: 5 day(s)    Current Patient Status: Inpatient   Certification Statement: The patient will continue to require additional inpatient hospital stay due to Pending home health service referral    Discharge Plan:  Tentatively in 24 hours    Code Status: Level 1 - Full Code      Subjective:   Patient denies any chest pain, shortness of breath, palpitation      Objective:     Vitals:   Temp (24hrs), Av 6 °F (36 4 °C), Min:97 3 °F (36 3 °C), Max:98 2 °F (36 8 °C)    Temp:  [97 3 °F (36 3 °C)-98 2 °F (36 8 °C)] 97 6 °F (36 4 °C)  HR: [] 60  Resp:  [14-18] 14  BP: (112-143)/(51-73) 112/51  SpO2:  [91 %-96 %] 92 %  Body mass index is 33 38 kg/m²  Input and Output Summary (last 24 hours): Intake/Output Summary (Last 24 hours) at 8/4/2022 1624  Last data filed at 8/4/2022 1510  Gross per 24 hour   Intake 610 ml   Output 1500 ml   Net -890 ml       Physical Exam:     Physical Exam  Vitals and nursing note reviewed  Constitutional:       General: She is not in acute distress  Appearance: She is not ill-appearing or diaphoretic  HENT:      Head: Normocephalic and atraumatic  Eyes:      General: No scleral icterus  Extraocular Movements: Extraocular movements intact  Conjunctiva/sclera: Conjunctivae normal    Cardiovascular:      Rate and Rhythm: Tachycardia present  Heart sounds: Normal heart sounds  Pulmonary:      Effort: Pulmonary effort is normal  No respiratory distress  Breath sounds: Normal breath sounds  No wheezing or rales  Abdominal:      General: Bowel sounds are normal       Palpations: Abdomen is soft  Tenderness: There is no abdominal tenderness  Musculoskeletal:         General: Normal range of motion  Cervical back: Normal range of motion and neck supple  Right lower leg: Edema (Trace) present  Left lower leg: Edema (Trace) present  Skin:     General: Skin is warm  Neurological:      General: No focal deficit present  Mental Status: She is alert and oriented to person, place, and time     Psychiatric:         Mood and Affect: Mood normal          Behavior: Behavior normal          Additional Data:     Labs:    Results from last 7 days   Lab Units 08/01/22  0842 07/31/22  1646 07/31/22  0449   WBC Thousand/uL 8 80   < > 8 98   HEMOGLOBIN g/dL 11 6   < > 10 5*   HEMATOCRIT % 35 9   < > 33 3*   PLATELETS Thousands/uL 243   < > 202   NEUTROS PCT %  --   --  67   LYMPHS PCT %  --   --  22   MONOS PCT %  --   --  8   EOS PCT %  --   --  2    < > = values in this interval not displayed  Results from last 7 days   Lab Units 08/04/22  0444 08/01/22  0842 07/31/22  0449   SODIUM mmol/L 140   < > 137   POTASSIUM mmol/L 3 2*   < > 3 5   CHLORIDE mmol/L 103   < > 102   CO2 mmol/L 28   < > 26   BUN mg/dL 20   < > 14   CREATININE mg/dL 1 07   < > 0 88   ANION GAP mmol/L 9   < > 9   CALCIUM mg/dL 9 1   < > 8 6   ALBUMIN g/dL  --   --  3 1*   TOTAL BILIRUBIN mg/dL  --   --  0 74   ALK PHOS U/L  --   --  91   ALT U/L  --   --  20   AST U/L  --   --  14   GLUCOSE RANDOM mg/dL 115   < > 109    < > = values in this interval not displayed  Results from last 7 days   Lab Units 08/04/22  0444   INR  2 08*                       * I Have Reviewed All Lab Data Listed Above  * Additional Pertinent Lab Tests Reviewed: [facility]    Imaging:    Imaging Reports Reviewed Today Include:  CTA chest, chest x-ray  Imaging Personally Reviewed by Myself Includes:  None    Recent Cultures (last 7 days):           Last 24 Hours Medication List:   Current Facility-Administered Medications   Medication Dose Route Frequency Provider Last Rate    digoxin  125 mcg Oral Daily Elvin Markham MD      furosemide  40 mg Oral Daily AAMIR Mccallum      levothyroxine  112 mcg Oral Early Morning Jamin Benito MD      metoprolol  2 5 mg Intravenous Q6H PRN AAMIR Ortiz      metoprolol succinate  100 mg Oral Daily Rene vitaAAMIR      pantoprazole  40 mg Oral Early Morning Miles GuardAAMIR      senna  17 2 mg Oral HS Jamin Benito MD      verapamil  240 mg Oral Daily Elvin Markham MD      warfarin  5 mg Oral Daily (warfarin) Torrey Potter MD          Today, Patient Was Seen By: Ethan Martin MD    ** Please Note: Dictation voice to text software may have been used in the creation of this document   **

## 2022-08-04 NOTE — PLAN OF CARE
Problem: Potential for Falls  Goal: Patient will remain free of falls  Description: INTERVENTIONS:  - Educate patient/family on patient safety including physical limitations  - Instruct patient to call for assistance with activity   - Consult OT/PT to assist with strengthening/mobility   - Keep Call bell within reach  - Keep bed low and locked with side rails adjusted as appropriate  - Keep care items and personal belongings within reach  - Initiate and maintain comfort rounds  - Make Fall Risk Sign visible to staff  - Offer Toileting every Hours, in advance of need  - Initiate/Maintain alarm  - Obtain necessary fall risk management equipment:   - Apply yellow socks and bracelet for high fall risk patient  - Consider moving patient to room near nurses station  Outcome: Progressing     Problem: PAIN - ADULT  Goal: Verbalizes/displays adequate comfort level or baseline comfort level  Description: Interventions:  - Encourage patient to monitor pain and request assistance  - Assess pain using appropriate pain scale  - Administer analgesics based on type and severity of pain and evaluate response  - Implement non-pharmacological measures as appropriate and evaluate response  - Consider cultural and social influences on pain and pain management  - Notify physician/advanced practitioner if interventions unsuccessful or patient reports new pain  Outcome: Progressing

## 2022-08-04 NOTE — PROGRESS NOTES
Progress Note   Name:Fide Mccormack   Room: /-01     IMPRESSION:  1  Atrial fibrillation  Duration unknown    PLAN:  1  Add digoxin for rate cotrol  2  Cancel ALINA/cardioversion  ? SUBJECTIVE:  She denies problems of chest pain, shortness of breath, orthopnea, PND, palpitations, syncope, or bleeding problems     ?  OBJECTIVE:  Scheduled Medicines  Current Facility-Administered Medications   Medication Dose Route Frequency Provider Last Rate    digoxin  125 mcg Oral Daily Gary Esteves MD      furosemide  40 mg Oral Daily AAMIR Ortiz      heparin (porcine)  3-20 Units/kg/hr (Order-Specific) Intravenous Titrated [facility], GIORGIONP 15 Units/kg/hr (08/03/22 1812)    heparin (porcine)  2,000 Units Intravenous Q1H PRN AAMIR Ortiz      heparin (porcine)  4,000 Units Intravenous Q1H PRN [facility], GIORGIONP      levothyroxine  112 mcg Oral Early Morning Adrianna Hernandez MD      metoprolol  2 5 mg Intravenous Q6H PRN [facility], AAMIR      metoprolol succinate  100 mg Oral Daily [facility], AAMIR      pantoprazole  40 mg Oral Early Morning Mearl Grade, CRNP      senna  17 2 mg Oral HS Adrianna Hernandez MD      verapamil  240 mg Oral Daily Gary Esteves MD      warfarin  7 5 mg Oral Daily (warfarin) AAMIR Ortiz        Infusions  heparin (porcine), 3-20 Units/kg/hr (Order-Specific), Last Rate: 15 Units/kg/hr (08/03/22 1812)       PRN Medicines    heparin (porcine)    heparin (porcine)    metoprolol   No Known Allergies   Diet Cardiovascular; Cardiac    Intake/Output Summary (Last 24 hours) at 8/3/2022 2123  Last data filed at 8/3/2022 1902  Gross per 24 hour   Intake 360 ml   Output 600 ml   Net -240 ml      /60   Pulse 66   Temp 97 7 °F (36 5 °C)   Resp 16   Ht 5' 6" (1 676 m)   Wt 93 9 kg (207 lb 0 2 oz)   SpO2 92%   BMI 33 41 kg/m²    BP Readings from Last 3 Encounters:   08/03/22 122/60   04/26/22 160/72   04/21/21 164/83 ?  Physical Exam:  General Appearance:  Alert, cooperative, in no acute distress   Eyes:  Sclerae anicteric  HEET:  Normocephalic, atraumatic  Neck: Supple, no JVD   Cardiovascular:  Normal rate, regular rhythm  No murmurs   No edema, normal pulses   Respiratory:  Breathing unlabored  Lungs clear to auscultation   Gastrointestinal:  Normoactive bowel sounds  Abdomen soft, nontender to palpation  Musculoskeletal: No back or joint deformity  Dermatologic:  Skin is warm and dry  Neurologic: Alert and oriented and neurologic exam is grossly normal     Psychiatric: Normal affect and mood  LABS:  Lab Results   Component Value Date    BUN 17 08/01/2022    CREATININE 1 07 08/01/2022    CALCIUM 8 7 08/01/2022    K 3 6 08/01/2022    CO2 24 08/01/2022     08/01/2022    ALKPHOS 91 07/31/2022    AST 14 07/31/2022    ALT 20 07/31/2022     Lab Results   Component Value Date    WBC 8 80 08/01/2022    HGB 11 6 08/01/2022    HCT 35 9 08/01/2022    MCV 91 08/01/2022     08/01/2022     Lab Results   Component Value Date    HDL 45 (L) 07/26/2022    LDLCALC 103 (H) 07/26/2022    TRIG 115 07/26/2022     ?    ?  ?  ?   Jr Lai MD

## 2022-08-04 NOTE — PLAN OF CARE
Problem: Potential for Falls  Goal: Patient will remain free of falls  Description: INTERVENTIONS:  - Educate patient/family on patient safety including physical limitations  - Instruct patient to call for assistance with activity   - Consult OT/PT to assist with strengthening/mobility   - Keep Call bell within reach  - Keep bed low and locked with side rails adjusted as appropriate  - Keep care items and personal belongings within reach  - Initiate and maintain comfort rounds  - Make Fall Risk Sign visible to staff  - Offer Toileting every 2 Hours, in advance of need  - Initiate/Maintain alarm  - Obtain necessary fall risk management equipment:   - Apply yellow socks and bracelet for high fall risk patients  - Consider moving patient to room near nurses station  Outcome: Progressing     Problem: PAIN - ADULT  Goal: Verbalizes/displays adequate comfort level or baseline comfort level  Description: Interventions:  - Encourage patient to monitor pain and request assistance  - Assess pain using appropriate pain scale  - Administer analgesics based on type and severity of pain and evaluate response  - Implement non-pharmacological measures as appropriate and evaluate response  - Consider cultural and social influences on pain and pain management  - Notify physician/advanced practitioner if interventions unsuccessful or patient reports new pain  Outcome: Progressing     Problem: INFECTION - ADULT  Goal: Absence or prevention of progression during hospitalization  Description: INTERVENTIONS:  - Assess and monitor for signs and symptoms of infection  - Monitor lab/diagnostic results  - Monitor all insertion sites, i e  indwelling lines, tubes, and drains  - Monitor endotracheal if appropriate and nasal secretions for changes in amount and color  - [place] appropriate cooling/warming therapies per order  - Administer medications as ordered  - Instruct and encourage patient and family to use good hand hygiene technique  - Identify and instruct in appropriate isolation precautions for identified infection/condition  Outcome: Progressing  Goal: Absence of fever/infection during neutropenic period  Description: INTERVENTIONS:  - Monitor WBC    Outcome: Progressing     Problem: SAFETY ADULT  Goal: Patient will remain free of falls  Description: INTERVENTIONS:  - Educate patient/family on patient safety including physical limitations  - Instruct patient to call for assistance with activity   - Consult OT/PT to assist with strengthening/mobility   - Keep Call bell within reach  - Keep bed low and locked with side rails adjusted as appropriate  - Keep care items and personal belongings within reach  - Initiate and maintain comfort rounds  - Make Fall Risk Sign visible to staff  - Offer Toileting every 2 Hours, in advance of need  - Initiate/Maintain alarm  - Obtain necessary fall risk management equipment:   - Apply yellow socks and bracelet for high fall risk patients  - Consider moving patient to room near nurses station  Outcome: Progressing  Goal: Maintain or return to baseline ADL function  Description: INTERVENTIONS:  -  Assess patient's ability to carry out ADLs; assess patient's baseline for ADL function and identify physical deficits which impact ability to perform ADLs (bathing, care of mouth/teeth, toileting, grooming, dressing, etc )  - Assess/evaluate cause of self-care deficits   - Assess range of motion  - Assess patient's mobility; develop plan if impaired  - Assess patient's need for assistive devices and provide as appropriate  - Encourage maximum independence but intervene and supervise when necessary  - Involve family in performance of ADLs  - Assess for home care needs following discharge   - Consider OT consult to assist with ADL evaluation and planning for discharge  - Provide patient education as appropriate  Outcome: Progressing  Goal: Maintains/Returns to pre admission functional level  Description: INTERVENTIONS:  - Perform BMAT or MOVE assessment daily    - Set and communicate daily mobility goal to care team and patient/family/caregiver  - Collaborate with rehabilitation services on mobility goals if consulted  - Perform Range of Motion 3 times a day  - Reposition patient every 2 hours  - Dangle patient 3 times a day  - Stand patient 3 times a day  - Ambulate patient 3 times a day  - Out of bed to chair 3 times a day   - Out of bed for meals 3 times a day  - Out of bed for toileting  - Record patient progress and toleration of activity level   Outcome: Progressing     Problem: DISCHARGE PLANNING  Goal: Discharge to home or other facility with appropriate resources  Description: INTERVENTIONS:  - Identify barriers to discharge w/patient and caregiver  - Arrange for needed discharge resources and transportation as appropriate  - Identify discharge learning needs (meds, wound care, etc )  - Arrange for interpretive services to assist at discharge as needed  - Refer to Case Management Department for coordinating discharge planning if the patient needs post-hospital services based on physician/advanced practitioner order or complex needs related to functional status, cognitive ability, or social support system  Outcome: Progressing     Problem: Knowledge Deficit  Goal: Patient/family/caregiver demonstrates understanding of disease process, treatment plan, medications, and discharge instructions  Description: Complete learning assessment and assess knowledge base  Interventions:  - Provide teaching at level of understanding  - Provide teaching via preferred learning methods  Outcome: Progressing     Problem: Nutrition/Hydration-ADULT  Goal: Nutrient/Hydration intake appropriate for improving, restoring or maintaining nutritional needs  Description: Monitor and assess patient's nutrition/hydration status for malnutrition  Collaborate with interdisciplinary team and initiate plan and interventions as ordered  Monitor patient's weight and dietary intake as ordered or per policy  Utilize nutrition screening tool and intervene as necessary  Determine patient's food preferences and provide high-protein, high-caloric foods as appropriate       INTERVENTIONS:  - Monitor oral intake, urinary output, labs, and treatment plans  - Assess nutrition and hydration status and recommend course of action  - Evaluate amount of meals eaten  - Assist patient with eating if necessary   - Allow adequate time for meals  - Recommend/ encourage appropriate diets, oral nutritional supplements, and vitamin/mineral supplements  - Order, calculate, and assess calorie counts as needed  - Recommend, monitor, and adjust tube feedings and TPN/PPN based on assessed needs  - Assess need for intravenous fluids  - Provide specific nutrition/hydration education as appropriate  - Include patient/family/caregiver in decisions related to nutrition  Outcome: Progressing     Problem: Prexisting or High Potential for Compromised Skin Integrity  Goal: Skin integrity is maintained or improved  Description: INTERVENTIONS:  - Identify patients at risk for skin breakdown  - Assess and monitor skin integrity  - Assess and monitor nutrition and hydration status  - Monitor labs   - Assess for incontinence   - Turn and reposition patient  - Assist with mobility/ambulation  - Relieve pressure over bony prominences  - Avoid friction and shearing  - Provide appropriate hygiene as needed including keeping skin clean and dry  - Evaluate need for skin moisturizer/barrier cream  - Collaborate with interdisciplinary team   - Patient/family teaching  - Consider wound care consult   Outcome: Progressing     Problem: MOBILITY - ADULT  Goal: Maintain or return to baseline ADL function  Description: INTERVENTIONS:  -  Assess patient's ability to carry out ADLs; assess patient's baseline for ADL function and identify physical deficits which impact ability to perform ADLs (bathing, care of mouth/teeth, toileting, grooming, dressing, etc )  - Assess/evaluate cause of self-care deficits   - Assess range of motion  - Assess patient's mobility; develop plan if impaired  - Assess patient's need for assistive devices and provide as appropriate  - Encourage maximum independence but intervene and supervise when necessary  - Involve family in performance of ADLs  - Assess for home care needs following discharge   - Consider OT consult to assist with ADL evaluation and planning for discharge  - Provide patient education as appropriate  Outcome: Progressing  Goal: Maintains/Returns to pre admission functional level  Description: INTERVENTIONS:  - Perform BMAT or MOVE assessment daily    - Set and communicate daily mobility goal to care team and patient/family/caregiver  - Collaborate with rehabilitation services on mobility goals if consulted  - Perform Range of Motion 3 times a day  - Reposition patient every 2 hours    - Dangle patient 3 times a day  - Stand patient 3 times a day  - Ambulate patient 3 times a day  - Out of bed to chair 3 times a day   - Out of bed for meals 3 times a day  - Out of bed for toileting  - Record patient progress and toleration of activity level   Outcome: Progressing

## 2022-08-04 NOTE — ASSESSMENT & PLAN NOTE
· Blood pressure within acceptable range  · Continue Verapamil, metoprolol, and lasix with parameters   · Continue to monitor blood pressure

## 2022-08-04 NOTE — ASSESSMENT & PLAN NOTE
Background: Presented with 3 days of SOB associated CP  · Noted history of arrhythmia, however no documented history of atrial fibrillation  · On arrival to the ED, Noted to be in A-Fib with heart rates 120s    · Heart rate better controlled today  · ChadsVasc score of 3  · Under went Heparin Drip> coumadin bridge   · DOAC and Lovenox are not affordable  · INR therapeutic today, discontinued heparin drip, continue to monitor INR daily  · Decrease Coumadin to 5 mg today  · Started Lopressor 50 mg BID> changed to 100mg Toprol XL on 8/2;  continue at discharge   · Continue digoxin  · Echo with EF of 40% with global hypokinesis   · Discussed with Cardiologist prior to discharge and does not want to pursue ischemic evaluation at this time however given persistent elevated HR with minimal ambulation into the 140-180s will pursue ALINA cardioversion  · Cardiology following; input appreciated

## 2022-08-05 VITALS
DIASTOLIC BLOOD PRESSURE: 79 MMHG | SYSTOLIC BLOOD PRESSURE: 135 MMHG | HEIGHT: 66 IN | WEIGHT: 205.47 LBS | TEMPERATURE: 97.7 F | OXYGEN SATURATION: 94 % | RESPIRATION RATE: 18 BRPM | HEART RATE: 105 BPM | BODY MASS INDEX: 33.02 KG/M2

## 2022-08-05 LAB
ANION GAP SERPL CALCULATED.3IONS-SCNC: 9 MMOL/L (ref 4–13)
BUN SERPL-MCNC: 20 MG/DL (ref 5–25)
CALCIUM SERPL-MCNC: 9.1 MG/DL (ref 8.3–10.1)
CHLORIDE SERPL-SCNC: 103 MMOL/L (ref 96–108)
CO2 SERPL-SCNC: 26 MMOL/L (ref 21–32)
CREAT SERPL-MCNC: 1.1 MG/DL (ref 0.6–1.3)
GFR SERPL CREATININE-BSD FRML MDRD: 47 ML/MIN/1.73SQ M
GLUCOSE SERPL-MCNC: 103 MG/DL (ref 65–140)
INR PPP: 2.62 (ref 0.84–1.19)
POTASSIUM SERPL-SCNC: 4 MMOL/L (ref 3.5–5.3)
PROTHROMBIN TIME: 27.4 SECONDS (ref 11.6–14.5)
SODIUM SERPL-SCNC: 138 MMOL/L (ref 135–147)

## 2022-08-05 PROCEDURE — 80048 BASIC METABOLIC PNL TOTAL CA: CPT | Performed by: INTERNAL MEDICINE

## 2022-08-05 PROCEDURE — 85610 PROTHROMBIN TIME: CPT | Performed by: NURSE PRACTITIONER

## 2022-08-05 PROCEDURE — 99239 HOSP IP/OBS DSCHRG MGMT >30: CPT | Performed by: INTERNAL MEDICINE

## 2022-08-05 RX ORDER — WARFARIN SODIUM 3 MG/1
3 TABLET ORAL
Status: DISCONTINUED | OUTPATIENT
Start: 2022-08-05 | End: 2022-08-05 | Stop reason: HOSPADM

## 2022-08-05 RX ORDER — WARFARIN SODIUM 5 MG/1
2.5 TABLET ORAL
Qty: 30 TABLET | Refills: 0 | Status: SHIPPED | OUTPATIENT
Start: 2022-08-05 | End: 2022-09-04

## 2022-08-05 RX ADMIN — METOPROLOL SUCCINATE 100 MG: 100 TABLET, EXTENDED RELEASE ORAL at 08:42

## 2022-08-05 RX ADMIN — VERAPAMIL HYDROCHLORIDE 240 MG: 120 TABLET, FILM COATED, EXTENDED RELEASE ORAL at 08:42

## 2022-08-05 RX ADMIN — DIGOXIN 125 MCG: 125 TABLET ORAL at 08:42

## 2022-08-05 RX ADMIN — FUROSEMIDE 40 MG: 40 TABLET ORAL at 08:42

## 2022-08-05 RX ADMIN — PANTOPRAZOLE SODIUM 40 MG: 40 TABLET, DELAYED RELEASE ORAL at 05:34

## 2022-08-05 RX ADMIN — LEVOTHYROXINE SODIUM 112 MCG: 112 TABLET ORAL at 05:34

## 2022-08-05 NOTE — DISCHARGE SUMMARY
[facility]  Discharge- Shahbaz Fuse 1940, 80 y o  female MRN: [id number]  Unit/Bed#: -01 Encounter: [id number]  Primary Care Provider: Mauricio Esquivel MD   Date and time admitted to hospital: 7/30/2022 12:04 PM    * New onset a-fib [facility])  Assessment & Plan  Background: Presented with 3 days of SOB associated CP  · Noted history of arrhythmia, however no documented history of atrial fibrillation  · On arrival to the ED, Noted to be in A-Fib with heart rates 120s  · Heart rate better controlled today  · ChadsVasc score of 3  · Under went Heparin Drip> coumadin bridge   · DOAC and Lovenox are not affordable  · INR therapeutic today, discontinued heparin drip  · Continue Coumadin 2 5 mg daily  · Continue 100mg Toprol XL on 8/2  · Continue digoxin  · Cardiology following; input appreciated     Generalized weakness  Assessment & Plan  PT/OT evaluated patient and recommended home PT, case management on board    Elevated d-dimer  Assessment & Plan  · Present on admission, evidenced by d-dimer of 2 36  · CTA Chest completed, negative for PE  Acute systolic congestive heart failure (HCC)  Assessment & Plan  · Present on admission, BNP 2420  · Does not have a formal diagnosis of CHF  · Symptoms consistent with CHF - orthopnea, shortness of breath  · CTA Chest (7/30/22): Negative for PE but mild pulmonary edema with small to moderate right and small left layering bilateral pleural effusions    · Initially received IV Lasix> transitioned to PO on 08/02  · Continue PO lasix at time of discharge to be follow up OP with Cardiology team in regards to further titration   · Cardiology consulted; input appreicated   · Echocardiogram reviewed   · Cardiac diet, intakes and outputs    Chest pain  Assessment & Plan  · With complaints of chest pain x 3 days, especially when laying flat  · On 08/02 asymptomatic without any chest pain  · MORIAH score of 3  · D-Dimer elevated at 2 36; CTA Chest completed, Negative for PE  · Troponins negative  · See rest of plan as noted above    Hypothyroidism  Assessment & Plan  · Chronic; Continue home medication regimen 112 mcg  · TSH 4 550; Free T4 normal  · Would recommend TSH in 4-6 wks with PCP    GERD (gastroesophageal reflux disease)  Assessment & Plan  · Chronic; continue PPI    Essential hypertension  Assessment & Plan  · Blood pressure within acceptable range  · Continue Verapamil, metoprolol, and lasix       Discharging Physician / Practitioner: Haroldo Phan MD  PCP: Jsoselyn Gill MD  Admission Date:   Admission Orders (From admission, onward)     Ordered        07/30/22 1454  Inpatient Admission  Once                      Discharge Date: 08/05/22    Medical Problems             Resolved Problems  Date Reviewed: 8/5/2022   None                 Consultations During Hospital Stay:  · Cardiology    Procedures Performed:   · None    Significant Findings / Test Results:   · ECG AFib with RVR  · ProBNP 2420  · TSH of 4 5 with normal free T4  XR chest 1 view portable: No acute cardiopulmonary disease  Workstation   · CTA ED chest PE Study: No evidence for pulmonary embolism  Mild pulmonary edema with small to moderate right and small left layering bilateral pleural effusions  Surgical clips in the left chest wall with stable postoperative changes in this patient with a reported history of breast cancer  No definite new nodular lesion in this region on the current exam      Incidental Findings:   · None     Test Results Pending at Discharge (will require follow up): · None     Outpatient Tests Requested:  · Repeat INR on Monday 8/8    Complications:  None    Reason for Admission:  Shortness of breath    Hospital Course:     Arnoldo Gutiérrez is a 80 y o  female patient who originally presented to the hospital on 7/30/2022 due to shortness of breath and chest pain and found to have new onset AFib with RVR  Cardiology was consulted    Patient started on rate control medications and initially had difficulty controlling rate therefore digoxin was added and patient rate was controlled and stated stable with resolution of symptoms  Price cheek of to lack done and they were all on affordable therefore patient started on Coumadin with heparin bridge until INR goal achieved and heparin drip was discontinued and patient will be discharged on Coumadin 2 5 mg with repeat INR in in 3 days and follow-up with cardiology for INR monitoring and Coumadin dose adjustments if needed  Physical therapy evaluated patient and recommended home PT therefore case management was on board and home health service with home PT was set up for her upon discharge  Patient is medically stable for discharge today she is agreeable to the discharge planning  Please see above list of diagnoses and related plan for additional information  Condition at Discharge: stable     Discharge Day Visit / Exam:     Subjective:  Denies any shortness of breath or chest pain  Vitals: Blood Pressure: 135/79 (08/05/22 0700)  Pulse: 105 (08/05/22 0700)  Temperature: 97 7 °F (36 5 °C) (08/05/22 0700)  Temp Source: Oral (08/05/22 0700)  Respirations: 18 (08/05/22 0700)  Height: 5' 6" (167 6 cm) (08/02/22 0830)  Weight - Scale: 93 2 kg (205 lb 7 5 oz) (08/05/22 0600)  SpO2: 94 % (08/05/22 0700)  Exam:   Physical Exam  Vitals and nursing note reviewed  Constitutional:       General: She is not in acute distress  Appearance: She is not ill-appearing or diaphoretic  HENT:      Head: Normocephalic and atraumatic  Mouth/Throat:      Mouth: Mucous membranes are moist       Pharynx: Oropharynx is clear  Eyes:      Extraocular Movements: Extraocular movements intact  Conjunctiva/sclera: Conjunctivae normal    Cardiovascular:      Rate and Rhythm: Normal rate  Heart sounds: Normal heart sounds  No murmur heard  Pulmonary:      Effort: Pulmonary effort is normal  No respiratory distress        Breath sounds: Normal breath sounds  No wheezing or rales  Abdominal:      General: Bowel sounds are normal       Palpations: Abdomen is soft  Tenderness: There is no abdominal tenderness  Musculoskeletal:         General: Normal range of motion  Cervical back: Normal range of motion and neck supple  Right lower leg: Edema (Trace) present  Left lower leg: Edema (Trace) present  Skin:     General: Skin is warm  Capillary Refill: Capillary refill takes less than 2 seconds  Neurological:      General: No focal deficit present  Mental Status: She is alert and oriented to person, place, and time  Psychiatric:         Mood and Affect: Mood normal          Behavior: Behavior normal          Discussion with Family:  Discussed with granddaughter at bedside and all questions answered    Discharge instructions/Information to patient and family:   See after visit summary for information provided to patient and family  Provisions for Follow-Up Care:  See after visit summary for information related to follow-up care and any pertinent home health orders  Disposition:     Home with VNA Services (Reminder: Complete face to face encounter)    For Discharges to [facility] SNF:   · Not Applicable to this Patient - Not Applicable to this Patient    Planned Readmission:  No     Discharge Statement:  I spent 45 minutes discharging the patient  This time was spent on the day of discharge  I had direct contact with the patient on the day of discharge  Greater than 50% of the total time was spent examining patient, answering all patient questions, arranging and discussing plan of care with patient as well as directly providing post-discharge instructions  Additional time then spent on discharge activities  Discharge Medications:  See after visit summary for reconciled discharge medications provided to patient and family        ** Please Note: This note has been constructed using a voice recognition system ** Class II - visualization of the soft palate, fauces, and uvula

## 2022-08-05 NOTE — CASE MANAGEMENT
Case Management Discharge Planning Note    Patient name Brain Points  Location [address] 210/-46 MRN [id number]  : 1940 Date 2022       Current Admission Date: 2022  Current Admission Diagnosis:New onset a-fib [facility])   Patient Active Problem List    Diagnosis Date Noted    Generalized weakness 2022    New onset a-fib ([facility] ) 2022    Chest pain     Acute systolic congestive heart failure ([facility] ) 2022    Elevated d-dimer 2022    Class 2 obesity in adult 2021    Left femoral neck fracture 2021    History of breast cancer 2021    Left knee pain 2021    Rapid heart beat 2021    Other insomnia     SAKSHI (obstructive sleep apnea)     Anemia 2020    Pneumonia 2020    Essential hypertension 2020    GERD (gastroesophageal reflux disease) 2020    Hypothyroidism 2020    Humeral fracture 2020    Hyponatremia 2020      LOS (days): 6  Geometric Mean LOS (GMLOS) (days): 3 80  Days to GMLOS:-2 1     OBJECTIVE:  Risk of Unplanned Readmission Score: 13 72      Current admission status: Inpatient   Preferred Pharmacy:   [facility], [address] [place] [address]  Phone: [phone] Fax: [phone]    [address]) - [place], [address] [address] [address] Mandeep Portillo [address] [address]  Phone: [phone] Fax: [address] ([place]) [facility]  [address] [address]  Phone: [phone] Fax: [phone]    Primary Care Provider: Marquez [place] MD    Primary Insurance: MEDICARE  Secondary Insurance: [employer]    DISCHARGE DETAILS:    IMM Given (Date):: 22  IMM Given to[de-identified] Patient     Additional Comments: Per the medical team, the patient is medical ready to discharge home today with home care   [facility] will be the accepting home care agency  The patients grand-daughter will provide transportation home

## 2022-08-05 NOTE — ASSESSMENT & PLAN NOTE
Background: Presented with 3 days of SOB associated CP  · Noted history of arrhythmia, however no documented history of atrial fibrillation  · On arrival to the ED, Noted to be in A-Fib with heart rates 120s    · Heart rate better controlled today  · ChadsVasc score of 3  · Under went Heparin Drip> coumadin bridge   · DOAC and Lovenox are not affordable  · INR therapeutic today, discontinued heparin drip  · Continue Coumadin 2 5 mg daily  · Continue 100mg Toprol XL on 8/2  · Continue digoxin  · Cardiology following; input appreciated

## 2022-08-09 ENCOUNTER — PATIENT OUTREACH (OUTPATIENT)
Dept: CASE MANAGEMENT | Facility: OTHER | Age: 82
End: 2022-08-09

## 2022-08-09 NOTE — PROGRESS NOTES
In basket message received with hospital discharge  Chart reviewed  [facility] with new onset Afib and acute systolic heart failure  She discharged to home with home health care with [facility]  I left a message on patient's voicemail with my contact information

## 2022-08-10 ENCOUNTER — PATIENT OUTREACH (OUTPATIENT)
Dept: CASE MANAGEMENT | Facility: OTHER | Age: 82
End: 2022-08-10

## 2022-08-10 NOTE — PROGRESS NOTES
I spoke with patient who reports she is doing well  She is taking her blood pressure, pulse oximetry and doing daily weights  She denies shortness of breath, palpitations or weight gain  Her steveg nurse has started care through [facility]  She has an appointment with her PCP tomorrow  She has no needs at this time  She has my contact information and I advised her to call me with any questions  She did consent to another call

## 2022-08-19 ENCOUNTER — HOSPITAL ENCOUNTER (OUTPATIENT)
Dept: CT IMAGING | Facility: HOSPITAL | Age: 82
Discharge: HOME | End: 2022-08-19
Payer: MEDICARE

## 2022-08-19 ENCOUNTER — PATIENT OUTREACH (OUTPATIENT)
Dept: CASE MANAGEMENT | Facility: OTHER | Age: 82
End: 2022-08-19

## 2022-08-19 DIAGNOSIS — R91.1 LUNG NODULE: ICD-10-CM

## 2022-08-19 DIAGNOSIS — R91.1 COIN LESION: ICD-10-CM

## 2022-08-19 PROCEDURE — 71250 CT THORAX DX C-: CPT

## 2022-08-19 PROCEDURE — G1004 CDSM NDSC: HCPCS

## 2022-08-19 NOTE — PROGRESS NOTES
I spoke briefly with patient due to patient having a nurse visit from [facility]  She asked me to call back on Monday

## 2022-08-22 ENCOUNTER — PATIENT OUTREACH (OUTPATIENT)
Dept: CASE MANAGEMENT | Facility: OTHER | Age: 82
End: 2022-08-22

## 2022-08-22 NOTE — PROGRESS NOTES
I spoke with patient today who reports she is doing well  She is asymptomatic for shortness of breath swelling in lower extremities or weight gain  She was seen by her PCP today and no changes were made to her medications  She is receiving home care with [facility], both physical therapy and nursing  She has no needs at this time

## 2022-09-06 ENCOUNTER — PATIENT OUTREACH (OUTPATIENT)
Dept: CASE MANAGEMENT | Facility: OTHER | Age: 82
End: 2022-09-06

## 2022-09-06 NOTE — PROGRESS NOTES
I spoke with patient who is at home doing well  She states she has no chest pain, palpitations, weight gain or shortness of breath  She reports [facility]  She reports her lasix was not refilled  I advised her to call the cardiology office and ask if Lasix was discontinued  She agreed she would call  She has only missed today's dose  She continues with [address] visits and physical therapy

## 2022-09-27 ENCOUNTER — PATIENT OUTREACH (OUTPATIENT)
Dept: CASE MANAGEMENT | Facility: OTHER | Age: 82
End: 2022-09-27

## 2022-09-27 NOTE — PROGRESS NOTES
I spoke with patient who reports her ear feels "congested" and she can't breathe out of her nose  She stated she is affected by seasonal allergies  Her PCP is on vacation but she stated if her symptoms worsen she will go to an urgent care  I also suggested OTC decongestants  She has no palpitations shortness of breath or chest pain  She has my contact information and I advised her to call me if I can be of assistance

## 2022-10-11 ENCOUNTER — PATIENT OUTREACH (OUTPATIENT)
Dept: CASE MANAGEMENT | Facility: OTHER | Age: 82
End: 2022-10-11

## 2022-11-03 ENCOUNTER — PATIENT OUTREACH (OUTPATIENT)
Dept: CASE MANAGEMENT | Facility: OTHER | Age: 82
End: 2022-11-03

## 2024-02-21 PROBLEM — J18.9 PNEUMONIA: Status: RESOLVED | Noted: 2020-01-13 | Resolved: 2024-02-21

## 2025-01-30 ENCOUNTER — HOSPITAL ENCOUNTER (OUTPATIENT)
Dept: RADIOLOGY | Facility: HOSPITAL | Age: 85
End: 2025-01-30
Payer: MEDICARE

## 2025-01-30 DIAGNOSIS — J18.9 PNEUMONIA DUE TO INFECTIOUS ORGANISM, UNSPECIFIED LATERALITY, UNSPECIFIED PART OF LUNG: ICD-10-CM

## 2025-01-30 PROCEDURE — 71046 X-RAY EXAM CHEST 2 VIEWS: CPT

## 2025-02-04 ENCOUNTER — HOSPITAL ENCOUNTER (INPATIENT)
Facility: HOSPITAL | Age: 85
LOS: 3 days | Discharge: HOME | DRG: 291 | End: 2025-02-07
Attending: EMERGENCY MEDICINE | Admitting: INTERNAL MEDICINE
Payer: MEDICARE

## 2025-02-04 ENCOUNTER — APPOINTMENT (EMERGENCY)
Dept: RADIOLOGY | Facility: HOSPITAL | Age: 85
DRG: 291 | End: 2025-02-04
Payer: MEDICARE

## 2025-02-04 ENCOUNTER — APPOINTMENT (EMERGENCY)
Dept: CT IMAGING | Facility: HOSPITAL | Age: 85
DRG: 291 | End: 2025-02-04
Payer: MEDICARE

## 2025-02-04 DIAGNOSIS — I48.91 ATRIAL FIBRILLATION WITH RVR (HCC): ICD-10-CM

## 2025-02-04 DIAGNOSIS — J90 PLEURAL EFFUSION, BILATERAL: ICD-10-CM

## 2025-02-04 DIAGNOSIS — I50.9 CHF (CONGESTIVE HEART FAILURE) (HCC): Primary | ICD-10-CM

## 2025-02-04 PROBLEM — J96.01 ACUTE HYPOXIC RESPIRATORY FAILURE (HCC): Status: ACTIVE | Noted: 2025-02-04

## 2025-02-04 LAB
2HR DELTA HS TROPONIN: 2 NG/L
4HR DELTA HS TROPONIN: 8 NG/L
ALBUMIN SERPL BCG-MCNC: 4.2 G/DL (ref 3.5–5)
ALP SERPL-CCNC: 86 U/L (ref 34–104)
ALT SERPL W P-5'-P-CCNC: 17 U/L (ref 7–52)
ANION GAP SERPL CALCULATED.3IONS-SCNC: 13 MMOL/L (ref 4–13)
APTT PPP: 29 SECONDS (ref 23–34)
AST SERPL W P-5'-P-CCNC: 22 U/L (ref 13–39)
ATRIAL RATE: 416 BPM
BASOPHILS # BLD AUTO: 0.03 THOUSANDS/ΜL (ref 0–0.1)
BASOPHILS NFR BLD AUTO: 0 % (ref 0–1)
BILIRUB SERPL-MCNC: 0.93 MG/DL (ref 0.2–1)
BNP SERPL-MCNC: 1665 PG/ML (ref 0–100)
BUN SERPL-MCNC: 23 MG/DL (ref 5–25)
CALCIUM SERPL-MCNC: 9.2 MG/DL (ref 8.4–10.2)
CARDIAC TROPONIN I PNL SERPL HS: 11 NG/L (ref ?–50)
CARDIAC TROPONIN I PNL SERPL HS: 13 NG/L (ref ?–50)
CARDIAC TROPONIN I PNL SERPL HS: 19 NG/L (ref ?–50)
CHLORIDE SERPL-SCNC: 105 MMOL/L (ref 96–108)
CO2 SERPL-SCNC: 18 MMOL/L (ref 21–32)
CREAT SERPL-MCNC: 1.06 MG/DL (ref 0.6–1.3)
D DIMER PPP FEU-MCNC: 0.88 UG/ML FEU
EOSINOPHIL # BLD AUTO: 0.12 THOUSAND/ΜL (ref 0–0.61)
EOSINOPHIL NFR BLD AUTO: 1 % (ref 0–6)
ERYTHROCYTE [DISTWIDTH] IN BLOOD BY AUTOMATED COUNT: 16 % (ref 11.6–15.1)
FLUAV AG UPPER RESP QL IA.RAPID: NEGATIVE
FLUBV AG UPPER RESP QL IA.RAPID: NEGATIVE
GFR SERPL CREATININE-BSD FRML MDRD: 49 ML/MIN/1.73SQ M
GLUCOSE SERPL-MCNC: 172 MG/DL (ref 65–140)
HCT VFR BLD AUTO: 44.3 % (ref 34.8–46.1)
HGB BLD-MCNC: 13.3 G/DL (ref 11.5–15.4)
IMM GRANULOCYTES # BLD AUTO: 0.08 THOUSAND/UL (ref 0–0.2)
IMM GRANULOCYTES NFR BLD AUTO: 1 % (ref 0–2)
INR PPP: 1.67 (ref 0.85–1.19)
LIPASE SERPL-CCNC: 34 U/L (ref 11–82)
LYMPHOCYTES # BLD AUTO: 2.66 THOUSANDS/ΜL (ref 0.6–4.47)
LYMPHOCYTES NFR BLD AUTO: 16 % (ref 14–44)
MCH RBC QN AUTO: 27.5 PG (ref 26.8–34.3)
MCHC RBC AUTO-ENTMCNC: 30 G/DL (ref 31.4–37.4)
MCV RBC AUTO: 92 FL (ref 82–98)
MONOCYTES # BLD AUTO: 0.92 THOUSAND/ΜL (ref 0.17–1.22)
MONOCYTES NFR BLD AUTO: 6 % (ref 4–12)
NEUTROPHILS # BLD AUTO: 12.44 THOUSANDS/ΜL (ref 1.85–7.62)
NEUTS SEG NFR BLD AUTO: 76 % (ref 43–75)
NRBC BLD AUTO-RTO: 0 /100 WBCS
PLATELET # BLD AUTO: 266 THOUSANDS/UL (ref 149–390)
PMV BLD AUTO: 11.3 FL (ref 8.9–12.7)
POTASSIUM SERPL-SCNC: 4.5 MMOL/L (ref 3.5–5.3)
PROCALCITONIN SERPL-MCNC: <0.05 NG/ML
PROT SERPL-MCNC: 7.7 G/DL (ref 6.4–8.4)
PROTHROMBIN TIME: 20.4 SECONDS (ref 12.3–15)
QRS AXIS: -55 DEGREES
QRSD INTERVAL: 122 MS
QT INTERVAL: 338 MS
QTC INTERVAL: 479 MS
RBC # BLD AUTO: 4.83 MILLION/UL (ref 3.81–5.12)
SARS-COV+SARS-COV-2 AG RESP QL IA.RAPID: NEGATIVE
SODIUM SERPL-SCNC: 136 MMOL/L (ref 135–147)
T WAVE AXIS: 78 DEGREES
VENTRICULAR RATE: 121 BPM
WBC # BLD AUTO: 16.25 THOUSAND/UL (ref 4.31–10.16)

## 2025-02-04 PROCEDURE — 85379 FIBRIN DEGRADATION QUANT: CPT

## 2025-02-04 PROCEDURE — 99285 EMERGENCY DEPT VISIT HI MDM: CPT

## 2025-02-04 PROCEDURE — 94664 DEMO&/EVAL PT USE INHALER: CPT

## 2025-02-04 PROCEDURE — 84484 ASSAY OF TROPONIN QUANT: CPT

## 2025-02-04 PROCEDURE — 84145 PROCALCITONIN (PCT): CPT | Performed by: INTERNAL MEDICINE

## 2025-02-04 PROCEDURE — 94002 VENT MGMT INPAT INIT DAY: CPT

## 2025-02-04 PROCEDURE — 83690 ASSAY OF LIPASE: CPT

## 2025-02-04 PROCEDURE — 71275 CT ANGIOGRAPHY CHEST: CPT

## 2025-02-04 PROCEDURE — 93010 ELECTROCARDIOGRAM REPORT: CPT | Performed by: INTERNAL MEDICINE

## 2025-02-04 PROCEDURE — 96374 THER/PROPH/DIAG INJ IV PUSH: CPT

## 2025-02-04 PROCEDURE — 94760 N-INVAS EAR/PLS OXIMETRY 1: CPT

## 2025-02-04 PROCEDURE — 36415 COLL VENOUS BLD VENIPUNCTURE: CPT

## 2025-02-04 PROCEDURE — 87804 INFLUENZA ASSAY W/OPTIC: CPT

## 2025-02-04 PROCEDURE — G0008 ADMIN INFLUENZA VIRUS VAC: HCPCS | Performed by: INTERNAL MEDICINE

## 2025-02-04 PROCEDURE — 85025 COMPLETE CBC W/AUTO DIFF WBC: CPT

## 2025-02-04 PROCEDURE — 90662 IIV NO PRSV INCREASED AG IM: CPT | Performed by: INTERNAL MEDICINE

## 2025-02-04 PROCEDURE — 87811 SARS-COV-2 COVID19 W/OPTIC: CPT

## 2025-02-04 PROCEDURE — 99223 1ST HOSP IP/OBS HIGH 75: CPT | Performed by: INTERNAL MEDICINE

## 2025-02-04 PROCEDURE — 71045 X-RAY EXAM CHEST 1 VIEW: CPT

## 2025-02-04 PROCEDURE — 83880 ASSAY OF NATRIURETIC PEPTIDE: CPT

## 2025-02-04 PROCEDURE — 85610 PROTHROMBIN TIME: CPT

## 2025-02-04 PROCEDURE — 85730 THROMBOPLASTIN TIME PARTIAL: CPT

## 2025-02-04 PROCEDURE — 93005 ELECTROCARDIOGRAM TRACING: CPT

## 2025-02-04 PROCEDURE — 80053 COMPREHEN METABOLIC PANEL: CPT

## 2025-02-04 RX ORDER — WARFARIN SODIUM 5 MG/1
5 TABLET ORAL
Status: DISCONTINUED | OUTPATIENT
Start: 2025-02-05 | End: 2025-02-07

## 2025-02-04 RX ORDER — PANTOPRAZOLE SODIUM 40 MG/1
40 TABLET, DELAYED RELEASE ORAL
Status: DISCONTINUED | OUTPATIENT
Start: 2025-02-05 | End: 2025-02-07 | Stop reason: HOSPADM

## 2025-02-04 RX ORDER — WARFARIN SODIUM 2.5 MG/1
2.5 TABLET ORAL
Status: DISCONTINUED | OUTPATIENT
Start: 2025-02-04 | End: 2025-02-07

## 2025-02-04 RX ORDER — FUROSEMIDE 10 MG/ML
20 INJECTION INTRAMUSCULAR; INTRAVENOUS ONCE
Status: COMPLETED | OUTPATIENT
Start: 2025-02-04 | End: 2025-02-04

## 2025-02-04 RX ORDER — VERAPAMIL HYDROCHLORIDE 180 MG/1
180 TABLET, EXTENDED RELEASE ORAL DAILY
Status: DISCONTINUED | OUTPATIENT
Start: 2025-02-05 | End: 2025-02-04

## 2025-02-04 RX ORDER — METOPROLOL TARTRATE 25 MG/1
25 TABLET, FILM COATED ORAL EVERY 8 HOURS SCHEDULED
Status: DISCONTINUED | OUTPATIENT
Start: 2025-02-04 | End: 2025-02-04

## 2025-02-04 RX ORDER — AZITHROMYCIN 250 MG/1
500 TABLET, FILM COATED ORAL EVERY 24 HOURS
Status: COMPLETED | OUTPATIENT
Start: 2025-02-04 | End: 2025-02-06

## 2025-02-04 RX ORDER — ACETAMINOPHEN 325 MG/1
650 TABLET ORAL EVERY 4 HOURS PRN
Status: DISCONTINUED | OUTPATIENT
Start: 2025-02-04 | End: 2025-02-07 | Stop reason: HOSPADM

## 2025-02-04 RX ORDER — WARFARIN SODIUM 5 MG/1
5 TABLET ORAL
COMMUNITY

## 2025-02-04 RX ORDER — METOPROLOL TARTRATE 25 MG/1
25 TABLET, FILM COATED ORAL EVERY 8 HOURS SCHEDULED
Status: DISCONTINUED | OUTPATIENT
Start: 2025-02-04 | End: 2025-02-07 | Stop reason: HOSPADM

## 2025-02-04 RX ORDER — METOPROLOL TARTRATE 50 MG
50 TABLET ORAL EVERY 12 HOURS SCHEDULED
COMMUNITY

## 2025-02-04 RX ORDER — ONDANSETRON 2 MG/ML
4 INJECTION INTRAMUSCULAR; INTRAVENOUS EVERY 6 HOURS PRN
Status: DISCONTINUED | OUTPATIENT
Start: 2025-02-04 | End: 2025-02-07 | Stop reason: HOSPADM

## 2025-02-04 RX ORDER — METOPROLOL SUCCINATE 50 MG/1
100 TABLET, EXTENDED RELEASE ORAL DAILY
Status: DISCONTINUED | OUTPATIENT
Start: 2025-02-04 | End: 2025-02-04

## 2025-02-04 RX ORDER — DIGOXIN 125 MCG
125 TABLET ORAL DAILY
Status: DISCONTINUED | OUTPATIENT
Start: 2025-02-04 | End: 2025-02-07 | Stop reason: HOSPADM

## 2025-02-04 RX ORDER — ASPIRIN 81 MG/1
81 TABLET ORAL DAILY
Status: DISCONTINUED | OUTPATIENT
Start: 2025-02-04 | End: 2025-02-04

## 2025-02-04 RX ORDER — FUROSEMIDE 40 MG/1
40 TABLET ORAL 2 TIMES DAILY
COMMUNITY

## 2025-02-04 RX ORDER — PREDNISOLONE ACETATE 10 MG/ML
1 SUSPENSION/ DROPS OPHTHALMIC 4 TIMES DAILY
COMMUNITY

## 2025-02-04 RX ORDER — POTASSIUM CHLORIDE 1500 MG/1
40 TABLET, EXTENDED RELEASE ORAL DAILY
Status: DISCONTINUED | OUTPATIENT
Start: 2025-02-04 | End: 2025-02-07 | Stop reason: HOSPADM

## 2025-02-04 RX ORDER — MAGNESIUM HYDROXIDE/ALUMINUM HYDROXICE/SIMETHICONE 120; 1200; 1200 MG/30ML; MG/30ML; MG/30ML
30 SUSPENSION ORAL EVERY 6 HOURS PRN
Status: DISCONTINUED | OUTPATIENT
Start: 2025-02-04 | End: 2025-02-07 | Stop reason: HOSPADM

## 2025-02-04 RX ORDER — FUROSEMIDE 10 MG/ML
50 INJECTION INTRAMUSCULAR; INTRAVENOUS 2 TIMES DAILY
Status: DISCONTINUED | OUTPATIENT
Start: 2025-02-04 | End: 2025-02-07 | Stop reason: HOSPADM

## 2025-02-04 RX ORDER — VERAPAMIL HYDROCHLORIDE 180 MG/1
180 TABLET, EXTENDED RELEASE ORAL DAILY
Status: DISCONTINUED | OUTPATIENT
Start: 2025-02-04 | End: 2025-02-04

## 2025-02-04 RX ORDER — BENZONATATE 100 MG/1
100 CAPSULE ORAL 3 TIMES DAILY
Status: DISCONTINUED | OUTPATIENT
Start: 2025-02-04 | End: 2025-02-07 | Stop reason: HOSPADM

## 2025-02-04 RX ORDER — ACETAMINOPHEN 325 MG/1
650 TABLET ORAL EVERY 6 HOURS PRN
Status: DISCONTINUED | OUTPATIENT
Start: 2025-02-04 | End: 2025-02-04

## 2025-02-04 RX ORDER — METOPROLOL TARTRATE 50 MG
50 TABLET ORAL ONCE
Status: COMPLETED | OUTPATIENT
Start: 2025-02-04 | End: 2025-02-04

## 2025-02-04 RX ORDER — ALBUTEROL SULFATE 0.83 MG/ML
2.5 SOLUTION RESPIRATORY (INHALATION) EVERY 4 HOURS PRN
Status: DISCONTINUED | OUTPATIENT
Start: 2025-02-04 | End: 2025-02-07 | Stop reason: HOSPADM

## 2025-02-04 RX ORDER — VERAPAMIL HYDROCHLORIDE 180 MG/1
180 TABLET, EXTENDED RELEASE ORAL DAILY
Status: DISCONTINUED | OUTPATIENT
Start: 2025-02-04 | End: 2025-02-07 | Stop reason: HOSPADM

## 2025-02-04 RX ORDER — LEVOTHYROXINE SODIUM 112 UG/1
112 TABLET ORAL
Status: DISCONTINUED | OUTPATIENT
Start: 2025-02-05 | End: 2025-02-04

## 2025-02-04 RX ORDER — GLIPIZIDE 5 MG/1
5 TABLET ORAL
COMMUNITY

## 2025-02-04 RX ORDER — LEVOTHYROXINE SODIUM 25 UG/1
25 TABLET ORAL
Status: DISCONTINUED | OUTPATIENT
Start: 2025-02-05 | End: 2025-02-04

## 2025-02-04 RX ADMIN — FUROSEMIDE 50 MG: 10 INJECTION, SOLUTION INTRAMUSCULAR; INTRAVENOUS at 14:57

## 2025-02-04 RX ADMIN — FUROSEMIDE 20 MG: 10 INJECTION, SOLUTION INTRAMUSCULAR; INTRAVENOUS at 08:00

## 2025-02-04 RX ADMIN — METOPROLOL TARTRATE 50 MG: 50 TABLET, FILM COATED ORAL at 14:56

## 2025-02-04 RX ADMIN — WARFARIN SODIUM 2.5 MG: 2.5 TABLET ORAL at 17:47

## 2025-02-04 RX ADMIN — POTASSIUM CHLORIDE 40 MEQ: 1500 TABLET, EXTENDED RELEASE ORAL at 14:57

## 2025-02-04 RX ADMIN — VERAPAMIL HYDROCHLORIDE 180 MG: 180 TABLET ORAL at 14:56

## 2025-02-04 RX ADMIN — DIGOXIN 125 MCG: 125 TABLET ORAL at 14:57

## 2025-02-04 RX ADMIN — CEFTRIAXONE SODIUM 1000 MG: 10 INJECTION, POWDER, FOR SOLUTION INTRAVENOUS at 19:35

## 2025-02-04 RX ADMIN — IOHEXOL 85 ML: 350 INJECTION, SOLUTION INTRAVENOUS at 07:54

## 2025-02-04 RX ADMIN — AZITHROMYCIN 500 MG: 250 TABLET, FILM COATED ORAL at 18:40

## 2025-02-04 RX ADMIN — BENZONATATE 100 MG: 100 CAPSULE ORAL at 20:13

## 2025-02-04 RX ADMIN — INFLUENZA A VIRUS A/VICTORIA/4897/2022 IVR-238 (H1N1) ANTIGEN (FORMALDEHYDE INACTIVATED), INFLUENZA A VIRUS A/CALIFORNIA/122/2022 SAN-022 (H3N2) ANTIGEN (FORMALDEHYDE INACTIVATED), AND INFLUENZA B VIRUS B/MICHIGAN/01/2021 ANTIGEN (FORMALDEHYDE INACTIVATED) 0.5 ML: 60; 60; 60 INJECTION, SUSPENSION INTRAMUSCULAR at 17:45

## 2025-02-04 NOTE — ASSESSMENT & PLAN NOTE
Continue metoprolol and verapamil with holding parameters  Given patient continued to have significant tachycardia, digoxin added for the patient  Cardiology consulted  On Coumadin for anticoagulation with a target INR of between 2 and 3

## 2025-02-04 NOTE — H&P
H&P - Hospitalist   Name: Fide Mccormack 84 y.o. female I MRN: [id number]  Unit/Bed#: ICU 01 I Date of Admission: 2/4/2025   Date of Service: 2/4/2025 I Hospital Day: 0     Assessment & Plan  Acute hypoxic respiratory failure (HCC)  Patient hospitalized with acute hypoxic respiratory failure and needed BiPAP on admission.  This is secondary to acute systolic congestive heart failure  Subsequently the patient is transition to 4 L oxygen nasal cannula and her pulse ox is 90%  See details of plan under acute systolic CHF  Acute systolic congestive heart failure (HCC)  Wt Readings from Last 3 Encounters:   02/04/25 79.4 kg (175 lb 0.7 oz)   08/05/22 93.2 kg (205 lb 7.5 oz)   04/26/22 97.5 kg (215 lb)     Patient placed on CHF pathway.  Given that she is taking 40 mg of Lasix at home, patient's Lasix has been ordered at 50 mg IV every 12 hours  Monitor input output closely, daily weight, cardiology consult  Patient hospitalized under stepdown level 2        Atrial fibrillation with RVR (HCC)  Continue metoprolol and verapamil with holding parameters  Given patient continued to have significant tachycardia, digoxin added for the patient  Cardiology consulted  On Coumadin for anticoagulation with a target INR of between 2 and 3  Essential hypertension  Continue metoprolol and verapamil with holding parameters.  Continue intravenous Lasix for her acute systolic CHF  GERD (gastroesophageal reflux disease)  Continue Protonix daily.  Maalox as needed  Hypothyroidism  Continue levothyroxine 137 mcg daily  SAKSHI (obstructive sleep apnea)  Was on BiPAP, now improved.  BiPAP as needed and oxygen as needed during this hospitalization  Pneumonia of upper lobe of lung  CT scan shows questionable pneumonia in the left upper lobe of the lung.  No evidence of sepsis on admission.    Patient did have a white count of 16 on admission  Patient's heart rate and respiratory rate were high secondary to her acute CHF and A-fib with rapid  ventricular rate and not secondary to infection.  Will give intravenous ceftriaxone and oral azithromycin for now.  Placed on respiratory pathway  If procalcitonin is negative then consider discontinuing antibiotics during the hospitalization course      VTE Pharmacologic Prophylaxis: VTE Score: 3 Coumadin  Code Status: Level 1 - Full Code   Discussion with family:  Spoke with patient at length.     Anticipated Length of Stay: Patient will be admitted on an inpatient basis with an anticipated length of stay of greater than 2 midnights secondary to acute hypoxic respiratory failure secondary to acute congestive heart failure.    History of Present Illness   Chief Complaint: Shortness of breath and weakness    Fide Mccormack is a 84 y.o. female with a PMH of Acute systolic CHF, hypertension, atrial fibrillation, GERD who presents with acute onset of shortness of breath.  Patient did not have any fever.  Shortness of breath was profound and associated with cough.  Patient could not breathe and when she came to the ER patient was significantly hypoxic and needed BiPAP.  Patient subsequently recovered from the same.  No nausea or vomiting at this point of time.  Patient does have generalized weakness.  Patient also has known history of A-fib and her heart rate was very high on admission.  Did not have a sense of palpitations    Review of Systems   Constitutional:  Negative for activity change, appetite change and fever.   HENT:  Positive for congestion.    Eyes:  Negative for visual disturbance.   Respiratory:  Positive for cough and shortness of breath.    Cardiovascular:  Negative for chest pain.   Gastrointestinal:  Negative for abdominal pain, nausea and vomiting.   Endocrine: Negative for heat intolerance and polyuria.   Genitourinary:  Negative for hematuria.   Musculoskeletal:  Negative for arthralgias and neck pain.   Skin:  Negative for rash and wound.   Neurological:  Negative for light-headedness and  headaches.   Hematological:  Does not bruise/bleed easily.   Psychiatric/Behavioral:  Negative for agitation and behavioral problems.      Detailed review of systems done over 10 systems reviewed and except as above it is essentially negative    Historical Information   Past Medical History:   Diagnosis Date    Breast cancer (HCC)     Disease of thyroid gland     hypothyroidism    GERD (gastroesophageal reflux disease)     Hypertension     Rapid heart rate      Past Surgical History:   Procedure Laterality Date    BREAST SURGERY Left     lumpectomy     CHOLECYSTECTOMY      ORIF HIP FRACTURE Left 3/1/2021    Procedure: OPEN REDUCTION W/ INTERNAL FIXATION (ORIF) HIP WITH CANNUALATED SCREWS;  Surgeon: Richie Brennan MD;  Location: [facility];  Service: Orthopedics    ND COLONOSCOPY FLX DX W/COLLJ SPEC WHEN PFRMD N/A 6/26/2017    Procedure: COLONOSCOPY;  Surgeon: Hugo Hunter III, MD;  Location: [facility];  Service: Gastroenterology     Social History     Tobacco Use    Smoking status: Never    Smokeless tobacco: Never   Vaping Use    Vaping status: Never Used   Substance and Sexual Activity    Alcohol use: Never    Drug use: Never    Sexual activity: Never     E-Cigarette/Vaping    E-Cigarette Use Never User      E-Cigarette/Vaping Substances     Family history non-contributory asked and reviewed  Social History:  Marital Status: /Civil Union   Patient Pre-hospital Living Situation: Home  Patient Pre-hospital Level of Mobility: walks  Patient Pre-hospital Diet Restrictions: Healthy diet    Meds/Allergies   I have reviewed home medications with patient personally.  Prior to Admission medications    Medication Sig Start Date End Date Taking? Authorizing Provider   Calcium Carbonate-Vitamin D (CALTRATE 600+D PO) Take 1 tablet by mouth daily   Yes Historical Provider, MD   furosemide (LASIX) 40 mg tablet Take 40 mg by mouth 2 (two) times a day   Yes Historical Provider, MD   glipiZIDE (GLUCOTROL) 5 mg tablet Take  5 mg by mouth 2 (two) times a day before meals   Yes Historical Provider, MD   levothyroxine 112 mcg tablet Take 1 tablet (112 mcg total) by mouth daily in the early morning 3/3/21  Yes Sonia Arrington PA-C   levothyroxine 25 mcg tablet Take 1 tablet (25 mcg total) by mouth daily in the early morning 3/3/21  Yes Sonia Arrington PA-C   metoprolol tartrate (LOPRESSOR) 50 mg tablet Take 50 mg by mouth every 12 (twelve) hours   Yes Historical Provider, MD   omeprazole (PriLOSEC) 40 MG capsule  12/12/19  Yes Historical Provider, MD   prednisoLONE acetate (PRED FORTE) 1 % ophthalmic suspension Administer 1 drop to the right eye 4 (four) times a day   Yes Historical Provider, MD   Red Yeast Rice Extract (RED YEAST RICE PO) Take 1 tablet by mouth daily   Yes Historical Provider, MD   verapamil (CALAN-SR) 180 mg CR tablet Take 180 mg by mouth daily    Yes Historical Provider, MD   warfarin (COUMADIN) 5 mg tablet Take 5 mg by mouth daily Taken at bedtime   Yes Historical Provider, MD   anastrozole (ARIMIDEX) 1 mg tablet Take 1 mg by mouth daily    Historical Provider, MD   aspirin (ECOTRIN LOW STRENGTH) 81 mg EC tablet Take 1 tablet (81 mg total) by mouth daily 3/3/21   Sonia Arrington PA-C   digoxin (LANOXIN) 0.125 mg tablet Take 1 tablet (125 mcg total) by mouth daily 8/5/22 9/4/22  Jennifer Cam MD   docusate sodium (COLACE) 100 mg capsule Take 1 capsule (100 mg total) by mouth 2 (two) times a day 3/2/21   Sonia Arrington PA-C   metoprolol succinate (TOPROL-XL) 100 mg 24 hr tablet Take 1 tablet (100 mg total) by mouth daily 8/3/22 9/2/22  AAMIR Ortiz   senna (SENOKOT) 8.6 mg Take 2 tablets (17.2 mg total) by mouth daily at bedtime 3/2/21   Sonia Arrington PA-C   apixaban (Eliquis) 5 mg Take 1 tablet (5 mg total) by mouth 2 (two) times a day 7/31/22 8/3/22  Remy Juan PA-C   furosemide (LASIX) 40 mg tablet Take 1 tablet (40 mg total) by mouth daily 8/3/22 2/4/25  AAMIR Ortiz   rivaroxaban  (Xarelto) 20 mg tablet Take 1 tablet (20 mg total) by mouth daily with breakfast 8/3/22 8/3/22  AAMIR Ortiz     No Known Allergies    Objective :  Temp:  [96.8 °F (36 °C)-97.6 °F (36.4 °C)] 97.6 °F (36.4 °C)  HR:  [] 73  BP: ()/() 107/54  Resp:  [18-37] 35  SpO2:  [90 %-96 %] 91 %  O2 Device: Nasal cannula  Nasal Cannula O2 Flow Rate (L/min):  [3 L/min-4 L/min] 4 L/min    Physical Exam     General exam- looks a little weak  HEENT - atraumatic and normocephalic  Neck- supple  Skin - no fresh rash  Extremities no fresh focal deformities  Cardiovascular- S1-S2 heard  Respiratory- bilateral air entry present, has crackles bilaterally at the bases and diminished breath sounds bilaterally at the bases of the lungs  Skin - no fresh rash  Abdomen - normal bowel sounds present, no rebound tenderness  CNS- No fresh focal deficits  Psych- no acute psychosis      Lines/Drains:            Lab Results: I have reviewed the following results:  Results from last 7 days   Lab Units 02/04/25  0555   WBC Thousand/uL 16.25*   HEMOGLOBIN g/dL 13.3   HEMATOCRIT % 44.3   PLATELETS Thousands/uL 266   SEGS PCT % 76*   LYMPHO PCT % 16   MONO PCT % 6   EOS PCT % 1     Results from last 7 days   Lab Units 02/04/25  0555   SODIUM mmol/L 136   POTASSIUM mmol/L 4.5   CHLORIDE mmol/L 105   CO2 mmol/L 18*   BUN mg/dL 23   CREATININE mg/dL 1.06   ANION GAP mmol/L 13   CALCIUM mg/dL 9.2   ALBUMIN g/dL 4.2   TOTAL BILIRUBIN mg/dL 0.93   ALK PHOS U/L 86   ALT U/L 17   AST U/L 22   GLUCOSE RANDOM mg/dL 172*     Results from last 7 days   Lab Units 02/04/25  0606   INR  1.67*         Lab Results   Component Value Date    HGBA1C 5.5 12/11/2024    HGBA1C 6.3 (H) 06/26/2024    HGBA1C 5.6 09/14/2023           Imaging Results Review: I reviewed radiology reports from this admission including: CT chest.  CT chest shows congestion, pleural effusion and possible left upper lobe pneumonia  Other Study Results Review: EKG was  personally reviewed and my interpretation is: Atrial fibrillation. ..    Administrative Statements   I have spent a total time of 75 minutes in caring for this patient on the day of the visit/encounter including Diagnostic results, Prognosis, Risks and benefits of tx options, Instructions for management, Patient and family education, Counseling / Coordination of care, Documenting in the medical record, Reviewing / ordering tests, medicine, procedures  , Obtaining or reviewing history  , and Communicating with other healthcare professionals .    ** Please Note: This note has been constructed using a voice recognition system. **

## 2025-02-04 NOTE — RESPIRATORY THERAPY NOTE
RT Protocol Note  Fide Mccormack 84 y.o. female MRN: [id number]  Unit/Bed#: ED 16 Encounter: [id number]    Assessment    Active Problems:  There are no active Hospital Problems.      Home Pulmonary Medications:  none       Past Medical History:   Diagnosis Date    Breast cancer (HCC)     Disease of thyroid gland     hypothyroidism    GERD (gastroesophageal reflux disease)     Hypertension     Rapid heart rate      Social History     Socioeconomic History    Marital status: /Civil Union     Spouse name: None    Number of children: None    Years of education: None    Highest education level: None   Occupational History    None   Tobacco Use    Smoking status: Never    Smokeless tobacco: Never   Vaping Use    Vaping status: Never Used   Substance and Sexual Activity    Alcohol use: Never    Drug use: Never    Sexual activity: Never   Other Topics Concern    None   Social History Narrative    None     Social Drivers of Health     Financial Resource Strain: Not on file   Food Insecurity: Not on file   Transportation Needs: Not on file   Physical Activity: Not on file   Stress: Not on file   Social Connections: Unknown (6/18/2024)    Received from [email or web address]     How often do you feel lonely or isolated from those around you? (Adult - for ages 18 years and over): Not on file   Intimate Partner Violence: Not on file   Housing Stability: Not on file       Subjective         Objective    Physical Exam:   Assessment Type: Assess only  General Appearance: Awake, Alert  Respiratory Pattern: Dyspnea with exertion, Dyspnea at rest  Chest Assessment: Chest expansion symmetrical  Bilateral Breath Sounds: Diminished, Crackles  O2 Device: v60    Vitals:  Blood pressure 118/83, pulse (!) 152, temperature (!) 96.8 °F (36 °C), temperature source Temporal, resp. rate 20, SpO2 93%.          Imaging and other studies: Results Review Statement: No pertinent imaging studies reviewed.    O2 Device:  v60     Plan    Respiratory Plan: No distress/Pulmonary history        Resp Comments: pt with hx of chf presents with sob, no definitive copd dx  will continue with prn albuterol

## 2025-02-04 NOTE — ASSESSMENT & PLAN NOTE
Continue metoprolol and verapamil with holding parameters.  Continue intravenous Lasix for her acute systolic CHF

## 2025-02-04 NOTE — PLAN OF CARE
Problem: PAIN - ADULT  Goal: Verbalizes/displays adequate comfort level or baseline comfort level  Description: Interventions:  - Encourage patient to monitor pain and request assistance  - Assess pain using appropriate pain scale  - Administer analgesics based on type and severity of pain and evaluate response  - Implement non-pharmacological measures as appropriate and evaluate response  - Consider cultural and social influences on pain and pain management  - Notify physician/advanced practitioner if interventions unsuccessful or patient reports new pain  Outcome: Progressing     Problem: INFECTION - ADULT  Goal: Absence or prevention of progression during hospitalization  Description: INTERVENTIONS:  - Assess and monitor for signs and symptoms of infection  - Monitor lab/diagnostic results  - Monitor all insertion sites, i.e. indwelling lines, tubes, and drains  - Monitor endotracheal if appropriate and nasal secretions for changes in amount and color  - [place] appropriate cooling/warming therapies per order  - Administer medications as ordered  - Instruct and encourage patient and family to use good hand hygiene technique  - Identify and instruct in appropriate isolation precautions for identified infection/condition  Outcome: Progressing  Goal: Absence of fever/infection during neutropenic period  Description: INTERVENTIONS:  - Monitor WBC    Outcome: Progressing     Problem: SAFETY ADULT  Goal: Patient will remain free of falls  Description: INTERVENTIONS:  - Educate patient/family on patient safety including physical limitations  - Instruct patient to call for assistance with activity   - Consult OT/PT to assist with strengthening/mobility   - Keep Call bell within reach  - Keep bed low and locked with side rails adjusted as appropriate  - Keep care items and personal belongings within reach  - Initiate and maintain comfort rounds  - Make Fall Risk Sign visible to staff  - Offer Toileting every 2 Hours,  in advance of need  - Initiate/Maintain bedalarm  - Obtain necessary fall risk management equipment:    - Apply yellow socks and bracelet for high fall risk patients  - Consider moving patient to room near nurses station  Outcome: Progressing  Goal: Maintain or return to baseline ADL function  Description: INTERVENTIONS:  -  Assess patient's ability to carry out ADLs; assess patient's baseline for ADL function and identify physical deficits which impact ability to perform ADLs (bathing, care of mouth/teeth, toileting, grooming, dressing, etc.)  - Assess/evaluate cause of self-care deficits   - Assess range of motion  - Assess patient's mobility; develop plan if impaired  - Assess patient's need for assistive devices and provide as appropriate  - Encourage maximum independence but intervene and supervise when necessary  - Involve family in performance of ADLs  - Assess for home care needs following discharge   - Consider OT consult to assist with ADL evaluation and planning for discharge  - Provide patient education as appropriate  Outcome: Progressing  Goal: Maintains/Returns to pre admission functional level  Description: INTERVENTIONS:  - Perform AM-PAC 6 Click Basic Mobility/ Daily Activity assessment daily.  - Set and communicate daily mobility goal to care team and patient/family/caregiver.   - Collaborate with rehabilitation services on mobility goals if consulted  - Perform Range of Motion 3 times a day.  - Reposition patient every 2 hours.  - Dangle patient 3 times a day  - Stand patient 3 times a day  - Ambulate patient 3 times a day  - Out of bed to chair 3 times a day   - Out of bed for meals 3 times a day  - Out of bed for toileting  - Record patient progress and toleration of activity level   Outcome: Progressing     Problem: DISCHARGE PLANNING  Goal: Discharge to home or other facility with appropriate resources  Description: INTERVENTIONS:  - Identify barriers to discharge w/patient and caregiver  -  Arrange for needed discharge resources and transportation as appropriate  - Identify discharge learning needs (meds, wound care, etc.)  - Arrange for interpretive services to assist at discharge as needed  - Refer to Case Management Department for coordinating discharge planning if the patient needs post-hospital services based on physician/advanced practitioner order or complex needs related to functional status, cognitive ability, or social support system  Outcome: Progressing     Problem: Knowledge Deficit  Goal: Patient/family/caregiver demonstrates understanding of disease process, treatment plan, medications, and discharge instructions  Description: Complete learning assessment and assess knowledge base.  Interventions:  - Provide teaching at level of understanding  - Provide teaching via preferred learning methods  Outcome: Progressing     Problem: CARDIOVASCULAR - ADULT  Goal: Maintains optimal cardiac output and hemodynamic stability  Description: INTERVENTIONS:  - Monitor I/O, vital signs and rhythm  - Monitor for S/S and trends of decreased cardiac output  - Administer and titrate ordered vasoactive medications to optimize hemodynamic stability  - Assess quality of pulses, skin color and temperature  - Assess for signs of decreased coronary artery perfusion  - Instruct patient to report change in severity of symptoms  Outcome: Progressing  Goal: Absence of cardiac dysrhythmias or at baseline rhythm  Description: INTERVENTIONS:  - Continuous cardiac monitoring, vital signs, obtain 12 lead EKG if ordered  - Administer antiarrhythmic and heart rate control medications as ordered  - Monitor electrolytes and administer replacement therapy as ordered  Outcome: Progressing     Problem: RESPIRATORY - ADULT  Goal: Achieves optimal ventilation and oxygenation  Description: INTERVENTIONS:  - Assess for changes in respiratory status  - Assess for changes in mentation and behavior  - Position to facilitate oxygenation  and minimize respiratory effort  - Oxygen administered by appropriate delivery if ordered  - Initiate smoking cessation education as indicated  - Encourage broncho-pulmonary hygiene including cough, deep breathe, Incentive Spirometry  - Assess the need for suctioning and aspirate as needed  - Assess and instruct to report SOB or any respiratory difficulty  - Respiratory Therapy support as indicated  Outcome: Progressing     Problem: METABOLIC, FLUID AND ELECTROLYTES - ADULT  Goal: Electrolytes maintained within normal limits  Description: INTERVENTIONS:  - Monitor labs and assess patient for signs and symptoms of electrolyte imbalances  - Administer electrolyte replacement as ordered  - Monitor response to electrolyte replacements, including repeat lab results as appropriate  - Instruct patient on fluid and nutrition as appropriate  Outcome: Progressing  Goal: Fluid balance maintained  Description: INTERVENTIONS:  - Monitor labs   - Monitor I/O and WT  - Instruct patient on fluid and nutrition as appropriate  - Assess for signs & symptoms of volume excess or deficit  Outcome: Progressing

## 2025-02-04 NOTE — ED PROVIDER NOTES
Time reflects when diagnosis was documented in both MDM as applicable and the Disposition within this note       Time User Action Codes Description Comment    2/4/2025  8:54 AM Cynthia Ortiz Add [I50.9] CHF (congestive heart failure) (HCC)     2/4/2025  8:54 AM SchCynthia loredo M Add [J90] Pleural effusion     2/4/2025  8:54 AM SchCynthia loredo M Remove [J90] Pleural effusion     2/4/2025  8:54 AM SchCynthia loredo M Add [J90] Pleural effusion, bilateral     2/4/2025  8:55 AM Cynthia Ortiz Add [I48.91] Atrial fibrillation with RVR (HCC)           ED Disposition       ED Disposition   Admit    Condition   Stable    Date/Time   Tue Feb 4, 2025  8:54 AM    Comment   Case was discussed with YRIS and the patient's admission status was agreed to be Admission Status: inpatient status to the service of Dr. Estrada .               Assessment & Plan       Medical Decision Making  This is a 85 y/o female patient with history of heart failure, presenting with likely acute decompensated heart failure causing volume overload and pulmonary edema. The etiology of the decompensation is not certain but is likely due to not being on any maintenance diuretics. Alternative etiologies I considered include cardiac (ACS, valvular disease, arrhythmia, myocarditis/endocarditis, dissection) however given unremarkable trop, ekg, cardiac exam have low suspicion. Also considered but low risk for respiratory cause (COPD, asthma, PE, or PNA), medication noncompliance or dietary indiscretion, alcohol or drug abuse, endocrine (thyrotoxicosis), and anemia.  No signs of infection, patient afebrile, no cough or symptoms of viral URI or pneumonia. Patient's heart rate and respiratory rate were elevated likely secondary to her acute CHF and A-fib with rapid ventricular rate and does not seem related to infection.  Patient not given fluid given her clinical volume status being overloaded with acute heart failure.  Signed out to Cynthia Ortiz PA-C pending  CTA PE study. The patient was put on bipap given lasix and planned to be admitted for acute management of ADHF.     Problems Addressed:  Atrial fibrillation with RVR (HCC): acute illness or injury  CHF (congestive heart failure) (HCC): acute illness or injury  Pleural effusion, bilateral: acute illness or injury    Amount and/or Complexity of Data Reviewed  Labs: ordered. Decision-making details documented in ED Course.  Radiology: ordered.    Risk  Prescription drug management.  Decision regarding hospitalization.        ED Course as of 02/04/25 2226 Tue Feb 04, 2025 0621 SARS COV Rapid Antigen: Negative   0621 Influenza A Rapid Antigen: Negative   0621 Influenza B Rapid Antigen: Negative   0637 hs TnI 0hr: 11   0730 BNP(!): 1,665       Medications   digoxin (LANOXIN) tablet 125 mcg (125 mcg Oral Given 2/4/25 1457)   pantoprazole (PROTONIX) EC tablet 40 mg (has no administration in time range)   warfarin (COUMADIN) tablet 2.5 mg (has no administration in time range)   furosemide (LASIX) injection 50 mg (50 mg Intravenous Given 2/4/25 1457)   magnesium hydroxide (MILK OF MAGNESIA) oral suspension 15 mL (has no administration in time range)   ondansetron (ZOFRAN) injection 4 mg (has no administration in time range)   aluminum-magnesium hydroxide-simethicone (MAALOX) oral suspension 30 mL (has no administration in time range)   acetaminophen (TYLENOL) tablet 650 mg (has no administration in time range)   potassium chloride (Klor-Con M20) CR tablet 40 mEq (40 mEq Oral Given 2/4/25 1457)   albuterol inhalation solution 2.5 mg (has no administration in time range)   warfarin (COUMADIN) tablet 5 mg (has no administration in time range)   metoprolol tartrate (LOPRESSOR) tablet 25 mg (has no administration in time range)   verapamil (CALAN-SR) CR tablet 180 mg (180 mg Oral Given 2/4/25 1456)   levothyroxine tablet 137 mcg (has no administration in time range)   furosemide (LASIX) injection 20 mg (20 mg Intravenous Given  2/4/25 0800)   iohexol (OMNIPAQUE) 350 MG/ML injection (MULTI-DOSE) 100 mL (85 mL Intravenous Given 2/4/25 0754)   metoprolol tartrate (LOPRESSOR) tablet 50 mg (50 mg Oral Given 2/4/25 0476)       ED Risk Strat Scores   HEART Risk Score      Flowsheet Row Most Recent Value   Heart Score Risk Calculator    History 1 Filed at: 02/04/2025 2224   ECG 1 Filed at: 02/04/2025 2224   Age 2 Filed at: 02/04/2025 2224   Risk Factors 1 Filed at: 02/04/2025 2224   Troponin 0 Filed at: 02/04/2025 2224   HEART Score 5 Filed at: 02/04/2025 2224          HEART Risk Score      Flowsheet Row Most Recent Value   Heart Score Risk Calculator    History 1 Filed at: 02/04/2025 2224   ECG 1 Filed at: 02/04/2025 2224   Age 2 Filed at: 02/04/2025 2224   Risk Factors 1 Filed at: 02/04/2025 2224   Troponin 0 Filed at: 02/04/2025 2224   HEART Score 5 Filed at: 02/04/2025 2224                                                History of Present Illness       Chief Complaint   Patient presents with    Shortness of Breath     Pt states going to the doctor yesterday morning to follow up on chest xray, given a water pill took one dose. Now feeling sob and now using 2 L o2.        Past Medical History:   Diagnosis Date    Breast cancer (HCC)     Disease of thyroid gland     hypothyroidism    GERD (gastroesophageal reflux disease)     Hypertension     Rapid heart rate       Past Surgical History:   Procedure Laterality Date    BREAST SURGERY Left     lumpectomy     CHOLECYSTECTOMY      ORIF HIP FRACTURE Left 3/1/2021    Procedure: OPEN REDUCTION W/ INTERNAL FIXATION (ORIF) HIP WITH CANNUALATED SCREWS;  Surgeon: Richie Brennan MD;  Location: [facility];  Service: Orthopedics    ND COLONOSCOPY FLX DX W/COLLJ SPEC WHEN PFRMD N/A 6/26/2017    Procedure: COLONOSCOPY;  Surgeon: Hugo Hunter III, MD;  Location: [facility];  Service: Gastroenterology      Family History   Problem Relation Age of Onset    Prostate cancer Father     Breast cancer Sister     Sleep  apnea Son       Social History     Tobacco Use    Smoking status: Never    Smokeless tobacco: Never   Vaping Use    Vaping status: Never Used   Substance Use Topics    Alcohol use: Never    Drug use: Never      E-Cigarette/Vaping    E-Cigarette Use Never User       E-Cigarette/Vaping Substances      I have reviewed and agree with the history as documented.     The patient is a 84 y.o. female with a history of cancer, thyroid disease, GERD, hypertension, rapid heart rate who presents to [facility] with a chief complaint of shortness of breath. Symptoms began yesterday and have been constant since onset. Her pain is currently rated as a 5/10 in severity and described as sharp mid sternal pain without radiation. Associated symptoms include chest pressure. Symptoms are aggravated with exertion and alleviating factors include none noted. The patient denies fever, chills, night sweats, cough, sputum, hemoptysis, hematemesis, nausea, vomiting, diarrhea, falls, trauma, blurred vision, double vision, numbness, tingling, weakness, syncope. No other reported symptoms at this time.  Patient denies allergies to anything  Patient reports having a CXR recently done showing pulmonary edema. She reports taking a water pill yesterday prescribed by her doctor but becoming more short of breath          History provided by:  Patient   used: No    Shortness of Breath  Associated symptoms: chest pain    Associated symptoms: no abdominal pain, no cough, no ear pain, no fever, no headaches, no rash, no sore throat and no vomiting        Review of Systems   Constitutional:  Negative for chills and fever.   HENT:  Negative for ear pain and sore throat.    Eyes:  Negative for pain and visual disturbance.   Respiratory:  Positive for shortness of breath. Negative for cough.    Cardiovascular:  Positive for chest pain. Negative for palpitations.   Gastrointestinal:  Negative for abdominal pain and vomiting.    Genitourinary:  Negative for dysuria and hematuria.   Musculoskeletal:  Negative for arthralgias and back pain.   Skin:  Negative for color change and rash.   Neurological:  Negative for dizziness, seizures, syncope, facial asymmetry, light-headedness and headaches.   All other systems reviewed and are negative.          Objective       ED Triage Vitals [02/04/25 0517]   Temperature Pulse Blood Pressure Respirations SpO2 Patient Position - Orthostatic VS   (!) 96.8 °F (36 °C) (!) 123 117/90 (!) 24 90 % Sitting      Temp Source Heart Rate Source BP Location FiO2 (%) Pain Score    Temporal Monitor Left arm -- No Pain      Vitals      Date and Time Temp Pulse SpO2 Resp BP Pain Score FACES Pain Rating User   02/04/25 2000 -- 81 92 % 38 -- No Pain -- AMB   02/04/25 1900 98 °F (36.7 °C) -- -- -- 102/60 -- -- MM   02/04/25 1900 -- 67 91 % 27 -- No Pain -- AMB   02/04/25 1845 -- 71 93 % 33 94/53 -- -- LD   02/04/25 1700 -- 73 91 % 35 107/54 -- -- LD   02/04/25 1600 97.6 °F (36.4 °C) 125 93 % 37 96/62 No Pain -- LD   02/04/25 1528 -- -- 93 % -- -- -- -- AL   02/04/25 1500 -- 154 93 % 20 104/79 -- -- SG   02/04/25 1400 -- 152 93 % 20 118/83 -- -- SG   02/04/25 1315 -- 136 90 % 36 -- -- -- SG   02/04/25 1300 -- 128 95 % 26 123/76 -- -- SG   02/04/25 1200 -- 133 95 % 25 145/115 -- -- SG   02/04/25 1100 -- 121 95 % 20 136/74 -- -- SG   02/04/25 1000 -- 121 Simultaneous filing. User may not have seen previous data. 96 % Simultaneous filing. User may not have seen previous data. 25 Simultaneous filing. User may not have seen previous data. 123/90 Simultaneous filing. User may not have seen previous data. -- -- SG   02/04/25 0900 -- 123 93 % 22 132/101 -- --    02/04/25 0838 -- 135 92 % 18 121/74 -- --    02/04/25 0803 -- -- 95 % -- -- -- --    02/04/25 0730 -- 120 95 % 20 115/78 -- --    02/04/25 0700 -- 132 ED physician aware of HR 94 % 20 117/64 -- --    02/04/25 0637 -- -- 93 % 18 120/82 -- --    02/04/25 0517  96.8 °F (36 °C) 123 90 % 24 117/90 No Pain -- AYESHA            Physical Exam  Vitals reviewed.   HENT:      Head: Normocephalic.      Mouth/Throat:      Mouth: Mucous membranes are moist.   Eyes:      Pupils: Pupils are equal, round, and reactive to light.   Cardiovascular:      Rate and Rhythm: Normal rate.   Pulmonary:      Effort: Tachypnea and respiratory distress present.      Breath sounds: Rhonchi present. No decreased breath sounds or wheezing.   Abdominal:      General: Bowel sounds are normal.      Palpations: Abdomen is soft.      Tenderness: There is no abdominal tenderness.   Musculoskeletal:      Cervical back: Normal range of motion.      Right lower leg: No tenderness. No edema.      Left lower leg: No tenderness. No edema.   Skin:     General: Skin is warm and dry.      Capillary Refill: Capillary refill takes less than 2 seconds.      Coloration: Skin is not cyanotic.      Findings: No ecchymosis or rash.   Neurological:      Mental Status: She is alert and oriented to person, place, and time.         Results Reviewed       Procedure Component Value Units Date/Time    Procalcitonin [[id number]]  (Normal) Collected: 02/04/25 1009    Lab Status: Final result Specimen: Blood from Arm, Right Updated: 02/04/25 1911     Procalcitonin <0.05 ng/ml     HS Troponin I 4hr [[id number]]  (Normal) Collected: 02/04/25 1009    Lab Status: Final result Specimen: Blood from Arm, Right Updated: 02/04/25 1055     hs TnI 4hr 19 ng/L      Delta 4hr hsTnI 8 ng/L     HS Troponin I 2hr [[id number]]  (Normal) Collected: 02/04/25 0804    Lab Status: Final result Specimen: Blood from Arm, Right Updated: 02/04/25 0830     hs TnI 2hr 13 ng/L      Delta 2hr hsTnI 2 ng/L     B-Type Natriuretic Peptide(BNP) [[id number]]  (Abnormal) Collected: 02/04/25 0555    Lab Status: Final result Specimen: Blood from Arm, Right Updated: 02/04/25 0728     BNP 1,665 pg/mL     D-Dimer [[id number]]  (Abnormal) Collected: 02/04/25 0606    Lab Status: Final  result Specimen: Blood from Arm, Right Updated: 02/04/25 0650     D-Dimer, Quant 0.88 ug/ml FEU     Narrative:      In the evaluation for possible pulmonary embolism, in the appropriate (Well's Score of 4 or less) patient, the age adjusted d-dimer cutoff for this patient can be calculated as:    Age x 0.01 (in ug/mL) for Age-adjusted D-dimer exclusion threshold for a patient over 50 years.    Protime-INR [[id number]]  (Abnormal) Collected: 02/04/25 0606    Lab Status: Final result Specimen: Blood from Arm, Right Updated: 02/04/25 0634     Protime 20.4 seconds      INR 1.67    Narrative:      INR Therapeutic Range    Indication                                             INR Range      Atrial Fibrillation                                               2.0-3.0  Hypercoagulable State                                    2.0.2.3  Left Ventricular Asist Device                            2.0-3.0  Mechanical Heart Valve                                  -    Aortic(with afib, MI, embolism, HF, LA enlargement,    and/or coagulopathy)                                     2.0-3.0 (2.5-3.5)     Mitral                                                             2.5-3.5  Prosthetic/Bioprosthetic Heart Valve               2.0-3.0  Venous thromboembolism (VTE: VT, PE        2.0-3.0    APTT [[id number]]  (Normal) Collected: 02/04/25 0606    Lab Status: Final result Specimen: Blood from Arm, Right Updated: 02/04/25 0634     PTT 29 seconds     Comprehensive metabolic panel [[id number]]  (Abnormal) Collected: 02/04/25 0555    Lab Status: Final result Specimen: Blood from Arm, Right Updated: 02/04/25 0633     Sodium 136 mmol/L      Potassium 4.5 mmol/L      Chloride 105 mmol/L      CO2 18 mmol/L      ANION GAP 13 mmol/L      BUN 23 mg/dL      Creatinine 1.06 mg/dL      Glucose 172 mg/dL      Calcium 9.2 mg/dL      AST 22 U/L      ALT 17 U/L      Alkaline Phosphatase 86 U/L      Total Protein 7.7 g/dL      Albumin 4.2 g/dL      Total Bilirubin  0.93 mg/dL      eGFR 49 ml/min/1.73sq m     Narrative:      National Kid[employer] Disease Foundation guidelines for Chronic Kidney Disease (CKD):     Stage 1 with normal or high GFR (GFR > 90 mL/min/1.73 square meters)    Stage 2 Mild CKD (GFR = 60-89 mL/min/1.73 square meters)    Stage 3A Moderate CKD (GFR = 45-59 mL/min/1.73 square meters)    Stage 3B Moderate CKD (GFR = 30-44 mL/min/1.73 square meters)    Stage 4 Severe CKD (GFR = 15-29 mL/min/1.73 square meters)    Stage 5 End Stage CKD (GFR <15 mL/min/1.73 square meters)  Note: GFR calculation is accurate only with a steady state creatinine    Lipase [[id number]]  (Normal) Collected: 02/04/25 0555    Lab Status: Final result Specimen: Blood from Arm, Right Updated: 02/04/25 0633     Lipase 34 u/L     HS Troponin 0hr (reflex protocol) [[id number]]  (Normal) Collected: 02/04/25 0555    Lab Status: Final result Specimen: Blood from Arm, Right Updated: 02/04/25 0632     hs TnI 0hr 11 ng/L     FLU/COVID Rapid Antigen (30 min. TAT) - Preferred screening test in ED [[id number]]  (Normal) Collected: 02/04/25 0555    Lab Status: Final result Specimen: Nares from Nose Updated: 02/04/25 0618     SARS COV Rapid Antigen Negative     Influenza A Rapid Antigen Negative     Influenza B Rapid Antigen Negative    Narrative:      This test has been performed using the Quidel Roxi 2 FLU+SARS Antigen test under the Emergency Use Authorization (EUA). This test has been validated by the  and verified by the performing laboratory. The Roxi uses lateral flow immunofluorescent sandwich assay to detect SARS-COV, Influenza A and Influenza B Antigen.     The Quidel Roxi 2 SARS Antigen test does not differentiate between SARS-CoV and SARS-CoV-2.     Negative results are presumptive and may be confirmed with a molecular assay, if necessary, for patient management. Negative results do not rule out SARS-CoV-2 or influenza infection and should not be used as the sole basis for treatment  or patient management decisions. A negative test result may occur if the level of antigen in a sample is below the limit of detection of this test.     Positive results are indicative of the presence of viral antigens, but do not rule out bacterial infection or co-infection with other viruses.     All test results should be used as an adjunct to clinical observations and other information available to the provider.    FOR PEDIATRIC PATIENTS - copy/paste COVID Guidelines URL to browser: [email or web address]    CBC and differential [[id number]]  (Abnormal) Collected: 02/04/25 0555    Lab Status: Final result Specimen: Blood from Arm, Right Updated: 02/04/25 0602     WBC 16.25 Thousand/uL      RBC 4.83 Million/uL      Hemoglobin 13.3 g/dL      Hematocrit 44.3 %      MCV 92 fL      MCH 27.5 pg      MCHC 30.0 g/dL      RDW 16.0 %      MPV 11.3 fL      Platelets 266 Thousands/uL      nRBC 0 /100 WBCs      Segmented % 76 %      Immature Grans % 1 %      Lymphocytes % 16 %      Monocytes % 6 %      Eosinophils Relative 1 %      Basophils Relative 0 %      Absolute Neutrophils 12.44 Thousands/µL      Absolute Immature Grans 0.08 Thousand/uL      Absolute Lymphocytes 2.66 Thousands/µL      Absolute Monocytes 0.92 Thousand/µL      Eosinophils Absolute 0.12 Thousand/µL      Basophils Absolute 0.03 Thousands/µL             CTA chest pe study   Final Interpretation by Fuad Gentile MD (02/04 0813)   1.  No pulmonary embolism.   2.  Pulmonary edema with moderate-sized bilateral pleural effusions.   3.  Possible left upper lobe pneumonia.                     Workstation performed: [id number]         XR chest 1 view portable   Final Interpretation by Jeanne Chowdhury MD (02/04 0825)   Hazy density in the both lungs with increased lung markings with left effusion and small right effusion, suggest severe pulmonary edema/congestion      No acute airspace consolidation. Correlate  clinically if concern for pneumonia            Workstation performed: [id number]             Procedures    ED Medication and Procedure Management   Prior to Admission Medications   Prescriptions Last Dose Informant Patient Reported? Taking?   Calcium Carbonate-Vitamin D (CALTRATE 600+D PO) 2/3/2025 Self Yes Yes   Sig: Take 1 tablet by mouth daily   Red Yeast Rice Extract (RED YEAST RICE PO) 2/3/2025  Yes Yes   Sig: Take 1 tablet by mouth daily   anastrozole (ARIMIDEX) 1 mg tablet  Self Yes No   Sig: Take 1 mg by mouth daily   aspirin (ECOTRIN LOW STRENGTH) 81 mg EC tablet   No No   Sig: Take 1 tablet (81 mg total) by mouth daily   digoxin (LANOXIN) 0.125 mg tablet   No No   Sig: Take 1 tablet (125 mcg total) by mouth daily   docusate sodium (COLACE) 100 mg capsule   No No   Sig: Take 1 capsule (100 mg total) by mouth 2 (two) times a day   furosemide (LASIX) 40 mg tablet 2/4/2025  Yes Yes   Sig: Take 40 mg by mouth 2 (two) times a day   glipiZIDE (GLUCOTROL) 5 mg tablet 2/3/2025  Yes Yes   Sig: Take 5 mg by mouth 2 (two) times a day before meals   levothyroxine 112 mcg tablet 2/4/2025  No Yes   Sig: Take 1 tablet (112 mcg total) by mouth daily in the early morning   levothyroxine 25 mcg tablet 2/4/2025  No Yes   Sig: Take 1 tablet (25 mcg total) by mouth daily in the early morning   metoprolol succinate (TOPROL-XL) 100 mg 24 hr tablet   No No   Sig: Take 1 tablet (100 mg total) by mouth daily   metoprolol tartrate (LOPRESSOR) 50 mg tablet 2/3/2025  Yes Yes   Sig: Take 50 mg by mouth every 12 (twelve) hours   omeprazole (PriLOSEC) 40 MG capsule 2/3/2025 Self Yes Yes   prednisoLONE acetate (PRED FORTE) 1 % ophthalmic suspension 2/3/2025  Yes Yes   Sig: Administer 1 drop to the right eye 4 (four) times a day   senna (SENOKOT) 8.6 mg   No No   Sig: Take 2 tablets (17.2 mg total) by mouth daily at bedtime   verapamil (CALAN-SR) 180 mg CR tablet 2/3/2025 Self Yes Yes   Sig: Take 180 mg by mouth daily    warfarin  (COUMADIN) 5 mg tablet   No No   Sig: Take 0.5 tablets (2.5 mg total) by mouth daily   warfarin (COUMADIN) 5 mg tablet   Yes Yes   Sig: Take 5 mg by mouth daily Taken at bedtime      Facility-Administered Medications: None     Current Discharge Medication List        CONTINUE these medications which have NOT CHANGED    Details   Calcium Carbonate-Vitamin D (CALTRATE 600+D PO) Take 1 tablet by mouth daily      furosemide (LASIX) 40 mg tablet Take 40 mg by mouth 2 (two) times a day      glipiZIDE (GLUCOTROL) 5 mg tablet Take 5 mg by mouth 2 (two) times a day before meals      !! levothyroxine 112 mcg tablet Take 1 tablet (112 mcg total) by mouth daily in the early morning  Refills: 0    Associated Diagnoses: Hypothyroidism in adult      !! levothyroxine 25 mcg tablet Take 1 tablet (25 mcg total) by mouth daily in the early morning  Qty:  , Refills: 0    Associated Diagnoses: Hypothyroidism in adult      metoprolol tartrate (LOPRESSOR) 50 mg tablet Take 50 mg by mouth every 12 (twelve) hours      omeprazole (PriLOSEC) 40 MG capsule       prednisoLONE acetate (PRED FORTE) 1 % ophthalmic suspension Administer 1 drop to the right eye 4 (four) times a day      Red Yeast Rice Extract (RED YEAST RICE PO) Take 1 tablet by mouth daily      verapamil (CALAN-SR) 180 mg CR tablet Take 180 mg by mouth daily       warfarin (COUMADIN) 5 mg tablet Take 5 mg by mouth daily Taken at bedtime      anastrozole (ARIMIDEX) 1 mg tablet Take 1 mg by mouth daily      aspirin (ECOTRIN LOW STRENGTH) 81 mg EC tablet Take 1 tablet (81 mg total) by mouth daily  Refills: 0    Associated Diagnoses: Rapid heart beat      digoxin (LANOXIN) 0.125 mg tablet Take 1 tablet (125 mcg total) by mouth daily  Qty: 30 tablet, Refills: 0    Associated Diagnoses: New onset a-fib (HCC)      docusate sodium (COLACE) 100 mg capsule Take 1 capsule (100 mg total) by mouth 2 (two) times a day  Qty: 10 capsule, Refills: 0    Associated Diagnoses: Closed fracture of neck  of left femur, initial encounter ([facility])      metoprolol succinate (TOPROL-XL) 100 mg 24 hr tablet Take 1 tablet (100 mg total) by mouth daily  Qty: 30 tablet, Refills: 0    Associated Diagnoses: Atrial fibrillation with RVR ([facility])      senna (SENOKOT) 8.6 mg Take 2 tablets (17.2 mg total) by mouth daily at bedtime  Refills: 0    Associated Diagnoses: Closed fracture of neck of left femur, initial encounter ([facility])       !! - Potential duplicate medications found. Please discuss with provider.        No discharge procedures on file.  ED SEPSIS DOCUMENTATION   Time reflects when diagnosis was documented in both MDM as applicable and the Disposition within this note       Time User Action Codes Description Comment    2/4/2025  8:54 AM Cynthia Ortiz Add [I50.9] CHF (congestive heart failure) ([facility])     2/4/2025  8:54 AM Cynthia Ortiz Add [J90] Pleural effusion     2/4/2025  8:54 AM Cynthia Ortiz Remove [J90] Pleural effusion     2/4/2025  8:54 AM Cynthia Ortiz Add [J90] Pleural effusion, bilateral     2/4/2025  8:55 AM Cynthia Ortiz Add [I48.91] Atrial fibrillation with RVR ([facility])                  Gomez Chan PA-C  02/04/25 9910

## 2025-02-04 NOTE — ASSESSMENT & PLAN NOTE
Wt Readings from Last 3 Encounters:   02/04/25 79.4 kg (175 lb 0.7 oz)   08/05/22 93.2 kg (205 lb 7.5 oz)   04/26/22 97.5 kg (215 lb)     Patient placed on CHF pathway.  Given that she is taking 40 mg of Lasix at home, patient's Lasix has been ordered at 50 mg IV every 12 hours  Monitor input output closely, daily weight, cardiology consult  Patient hospitalized under stepdown level 2

## 2025-02-04 NOTE — ED CARE HANDOFF
Emergency Department Sign Out Note        Sign out and transfer of care from Hitesh Chan PA-C . See Separate Emergency Department note.     The patient, Fide Mccormack, was evaluated by the previous provider for shortness of breath.    Workup Completed:  History and physical, labs with analysis, imaging, medical interventions.    ED Course / Workup Pending (followup):  Patient will require admission pending CT results.                                     Procedures  Medical Decision Making  Amount and/or Complexity of Data Reviewed  Labs: ordered.  Radiology: ordered.    Risk  Prescription drug management.            Disposition  Final diagnoses:   None     ED Disposition       None          Follow-up Information    None       Patient's Medications   Discharge Prescriptions    No medications on file     No discharge procedures on file.       ED Provider  Electronically Signed by     Cynthia Ortiz PA-C  02/04/25 0024

## 2025-02-04 NOTE — ASSESSMENT & PLAN NOTE
Patient hospitalized with acute hypoxic respiratory failure and needed BiPAP on admission.  This is secondary to acute systolic congestive heart failure  Subsequently the patient is transition to 4 L oxygen nasal cannula and her pulse ox is 90%  See details of plan under acute systolic CHF

## 2025-02-04 NOTE — ASSESSMENT & PLAN NOTE
CT scan shows questionable pneumonia in the left upper lobe of the lung.  No evidence of sepsis on admission.    Patient did have a white count of 16 on admission  Patient's heart rate and respiratory rate were high secondary to her acute CHF and A-fib with rapid ventricular rate and not secondary to infection.  Will give intravenous ceftriaxone and oral azithromycin for now.  Placed on respiratory pathway  If procalcitonin is negative then consider discontinuing antibiotics during the hospitalization course

## 2025-02-04 NOTE — RESPIRATORY THERAPY NOTE
02/04/25 0553   Respiratory Assessment   Assessment Type Assess only   General Appearance Awake;Alert   Respiratory Pattern Labored;Tachypneic   Chest Assessment Chest expansion symmetrical   Bilateral Breath Sounds Crackles   Resp Comments patient placed on BiPAP for incresed WOB   Non-Invasive Information   O2 Interface Device Face mask   Non-Invasive Ventilation Mode BiPAP   $ Continous NIV Initial   $ Pulse Oximetry Spot Check Charge Completed   Non-Invasive Settings   IPAP (cm) 12 cm   EPAP (cm) 6 cm   Rate (Set) 8   FiO2 (%) 40   Pressure Support (cm H2O) 6   Rise Time 2   Non-Invasive Readings   Total Rate 34   MV (Mech) 14.9   Peak Pressure (Obs) 15   Spontaneous Vt (mL) 445   Leak (lpm) 6   Skin Intervention Skin intact   Non-Invasive Alarms   Insp Pressure High (cm H20) 30   Insp Pressure Low (cm H20) 5   Low Insp Pressure Time (sec) 20 sec   MV Low (L/min) 2   Vt High (mL) 1200   Vt Low (mL) 200   High Resp Rate (BPM) 40 BPM   Low Resp Rate (BPM) 6 BPM

## 2025-02-04 NOTE — RESPIRATORY THERAPY NOTE
02/04/25 0803   Respiratory Assessment   Assessment Type Assess only   General Appearance Awake;Alert   Respiratory Pattern Dyspnea at rest   Chest Assessment Chest expansion symmetrical   Bilateral Breath Sounds Diminished;Crackles   Resp Comments pt remains on bipap for increased wob, bbs crackles/diminished   O2 Device v60   Non-Invasive Information   O2 Interface Device Face mask   Non-Invasive Ventilation Mode BiPAP   SpO2 95 %   $ Pulse Oximetry Spot Check Charge Completed   Non-Invasive Settings   IPAP (cm) 12 cm   EPAP (cm) 6 cm   Rate (Set) 8   FiO2 (%) 40   Pressure Support (cm H2O) 6   Rise Time 2   Non-Invasive Readings   Total Rate 39   MV (Mech) 21.6   Peak Pressure (Obs) 14   Spontaneous Vt (mL) 546   Leak (lpm) 13   Skin Intervention Skin intact   Non-Invasive Alarms   Insp Pressure High (cm H20) 30   Insp Pressure Low (cm H20) 5   Low Insp Pressure Time (sec) 20 sec   MV Low (L/min) 2   Vt High (mL) 1200   Vt Low (mL) 200   High Resp Rate (BPM) 40 BPM   Low Resp Rate (BPM) 6 BPM

## 2025-02-05 ENCOUNTER — APPOINTMENT (INPATIENT)
Dept: NON INVASIVE DIAGNOSTICS | Facility: HOSPITAL | Age: 85
DRG: 291 | End: 2025-02-05
Payer: MEDICARE

## 2025-02-05 LAB
ALBUMIN SERPL BCG-MCNC: 3.5 G/DL (ref 3.5–5)
ALP SERPL-CCNC: 70 U/L (ref 34–104)
ALT SERPL W P-5'-P-CCNC: 13 U/L (ref 7–52)
ANION GAP SERPL CALCULATED.3IONS-SCNC: 7 MMOL/L (ref 4–13)
AST SERPL W P-5'-P-CCNC: 16 U/L (ref 13–39)
BILIRUB SERPL-MCNC: 0.82 MG/DL (ref 0.2–1)
BUN SERPL-MCNC: 22 MG/DL (ref 5–25)
CALCIUM SERPL-MCNC: 8.7 MG/DL (ref 8.4–10.2)
CHLORIDE SERPL-SCNC: 103 MMOL/L (ref 96–108)
CO2 SERPL-SCNC: 27 MMOL/L (ref 21–32)
CREAT SERPL-MCNC: 0.95 MG/DL (ref 0.6–1.3)
ERYTHROCYTE [DISTWIDTH] IN BLOOD BY AUTOMATED COUNT: 16.1 % (ref 11.6–15.1)
GFR SERPL CREATININE-BSD FRML MDRD: 55 ML/MIN/1.73SQ M
GLUCOSE SERPL-MCNC: 88 MG/DL (ref 65–140)
HCT VFR BLD AUTO: 35.2 % (ref 34.8–46.1)
HGB BLD-MCNC: 10.8 G/DL (ref 11.5–15.4)
INR PPP: 1.78 (ref 0.85–1.19)
MAGNESIUM SERPL-MCNC: 2 MG/DL (ref 1.9–2.7)
MCH RBC QN AUTO: 27.4 PG (ref 26.8–34.3)
MCHC RBC AUTO-ENTMCNC: 30.7 G/DL (ref 31.4–37.4)
MCV RBC AUTO: 89 FL (ref 82–98)
PLATELET # BLD AUTO: 206 THOUSANDS/UL (ref 149–390)
PMV BLD AUTO: 11.2 FL (ref 8.9–12.7)
POTASSIUM SERPL-SCNC: 4 MMOL/L (ref 3.5–5.3)
PROCALCITONIN SERPL-MCNC: <0.05 NG/ML
PROT SERPL-MCNC: 6.4 G/DL (ref 6.4–8.4)
PROTHROMBIN TIME: 21.4 SECONDS (ref 12.3–15)
RBC # BLD AUTO: 3.94 MILLION/UL (ref 3.81–5.12)
SODIUM SERPL-SCNC: 137 MMOL/L (ref 135–147)
WBC # BLD AUTO: 8.46 THOUSAND/UL (ref 4.31–10.16)

## 2025-02-05 PROCEDURE — 97167 OT EVAL HIGH COMPLEX 60 MIN: CPT

## 2025-02-05 PROCEDURE — 94760 N-INVAS EAR/PLS OXIMETRY 1: CPT

## 2025-02-05 PROCEDURE — 99232 SBSQ HOSP IP/OBS MODERATE 35: CPT | Performed by: STUDENT IN AN ORGANIZED HEALTH CARE EDUCATION/TRAINING PROGRAM

## 2025-02-05 PROCEDURE — 85027 COMPLETE CBC AUTOMATED: CPT | Performed by: INTERNAL MEDICINE

## 2025-02-05 PROCEDURE — 80053 COMPREHEN METABOLIC PANEL: CPT | Performed by: INTERNAL MEDICINE

## 2025-02-05 PROCEDURE — 94660 CPAP INITIATION&MGMT: CPT

## 2025-02-05 PROCEDURE — 84145 PROCALCITONIN (PCT): CPT | Performed by: INTERNAL MEDICINE

## 2025-02-05 PROCEDURE — 83735 ASSAY OF MAGNESIUM: CPT | Performed by: INTERNAL MEDICINE

## 2025-02-05 PROCEDURE — 94664 DEMO&/EVAL PT USE INHALER: CPT

## 2025-02-05 PROCEDURE — 97162 PT EVAL MOD COMPLEX 30 MIN: CPT

## 2025-02-05 PROCEDURE — 85610 PROTHROMBIN TIME: CPT | Performed by: INTERNAL MEDICINE

## 2025-02-05 RX ORDER — PREDNISOLONE ACETATE 10 MG/ML
1 SUSPENSION/ DROPS OPHTHALMIC 4 TIMES DAILY
Status: DISCONTINUED | OUTPATIENT
Start: 2025-02-05 | End: 2025-02-07 | Stop reason: HOSPADM

## 2025-02-05 RX ADMIN — AZITHROMYCIN 500 MG: 250 TABLET, FILM COATED ORAL at 17:59

## 2025-02-05 RX ADMIN — WARFARIN SODIUM 5 MG: 5 TABLET ORAL at 17:01

## 2025-02-05 RX ADMIN — VERAPAMIL HYDROCHLORIDE 180 MG: 180 TABLET ORAL at 10:43

## 2025-02-05 RX ADMIN — FUROSEMIDE 50 MG: 10 INJECTION, SOLUTION INTRAMUSCULAR; INTRAVENOUS at 10:43

## 2025-02-05 RX ADMIN — PREDNISOLONE ACETATE 1 DROP: 10 SUSPENSION/ DROPS OPHTHALMIC at 17:01

## 2025-02-05 RX ADMIN — METOPROLOL TARTRATE 25 MG: 25 TABLET, FILM COATED ORAL at 05:44

## 2025-02-05 RX ADMIN — PREDNISOLONE ACETATE 1 DROP: 10 SUSPENSION/ DROPS OPHTHALMIC at 21:36

## 2025-02-05 RX ADMIN — BENZONATATE 100 MG: 100 CAPSULE ORAL at 17:01

## 2025-02-05 RX ADMIN — PANTOPRAZOLE SODIUM 40 MG: 40 TABLET, DELAYED RELEASE ORAL at 05:44

## 2025-02-05 RX ADMIN — LEVOTHYROXINE SODIUM 137 MCG: 25 TABLET ORAL at 05:44

## 2025-02-05 RX ADMIN — BENZONATATE 100 MG: 100 CAPSULE ORAL at 21:37

## 2025-02-05 RX ADMIN — DIGOXIN 125 MCG: 125 TABLET ORAL at 10:43

## 2025-02-05 RX ADMIN — FUROSEMIDE 50 MG: 10 INJECTION, SOLUTION INTRAMUSCULAR; INTRAVENOUS at 17:01

## 2025-02-05 RX ADMIN — BENZONATATE 100 MG: 100 CAPSULE ORAL at 10:45

## 2025-02-05 RX ADMIN — CEFTRIAXONE SODIUM 1000 MG: 10 INJECTION, POWDER, FOR SOLUTION INTRAVENOUS at 17:59

## 2025-02-05 RX ADMIN — POTASSIUM CHLORIDE 40 MEQ: 1500 TABLET, EXTENDED RELEASE ORAL at 10:44

## 2025-02-05 NOTE — OCCUPATIONAL THERAPY NOTE
Occupational Therapy Evaluation     Patient Name: Fide Mccormack  Today's Date: 2/5/2025  Problem List  Principal Problem:    Acute hypoxic respiratory failure (HCC)  Active Problems:    Pneumonia of upper lobe of lung    Essential hypertension    GERD (gastroesophageal reflux disease)    Hypothyroidism    SAKSHI (obstructive sleep apnea)    Atrial fibrillation with RVR (HCC)    Acute systolic congestive heart failure (HCC)    Past Medical History  Past Medical History:   Diagnosis Date    Breast cancer (HCC)     Disease of thyroid gland     hypothyroidism    GERD (gastroesophageal reflux disease)     Hypertension     Rapid heart rate      Past Surgical History  Past Surgical History:   Procedure Laterality Date    BREAST SURGERY Left     lumpectomy     CHOLECYSTECTOMY      ORIF HIP FRACTURE Left 3/1/2021    Procedure: OPEN REDUCTION W/ INTERNAL FIXATION (ORIF) HIP WITH CANNUALATED SCREWS;  Surgeon: Richie Brennan MD;  [facility];  Service: Orthopedics    ID COLONOSCOPY FLX DX W/COLLJ SPEC WHEN PFRMD N/A 6/26/2017    Procedure: COLONOSCOPY;  Surgeon: Hugo Hunter III, MD;  Location: [facility];  Service: Gastroenterology         02/05/25 1059   OT Last Visit   OT Visit Date 02/05/25   Note Type   Note type Evaluation   Pain Assessment   Pain Assessment Tool 0-10   Pain Score No Pain   Restrictions/Precautions   Weight Bearing Precautions Per Order No   Other Precautions Chair Alarm;Bed Alarm;Multiple lines;Telemetry;O2;Fall Risk;Limb alert  (3L O2 NC)   Home Living   Type of Home Mobile home   Home Layout One level;Performs ADLs on one level;Able to live on main level with bedroom/bathroom  (2-3 ROMERO with rail)   Bathroom Shower/Tub Tub/shower unit   Bathroom Toilet Standard   Bathroom Equipment   (none)   Bathroom Accessibility Accessible   Home Equipment Walker;Cane   Prior Function   Level of [place] Independent with ADLs;Independent with functional mobility;Independent with IADLS  (pt typically  ambulates without AD, uses a SPC for community distances at times)   Lives With Spouse;Family  (granddaughter; spouse currently at STR)   Receives Help From Family   IADLs Independent with driving;Independent with meal prep;Independent with medication management  (granddaughter has recently been doing most of the driving)   Falls in the last 6 months 0   Vocational Retired   Subjective   Subjective Pt agreeable to therapy evaluation   ADL   Where Assessed Other (Comment)  (Assist levels for some self care tasks are based on functional assessment of performance skills and deficits observed during session.)   Eating Assistance 6  Modified independent   Eating Deficit Setup   Grooming Assistance 5  Supervision/Setup   Grooming Deficit Setup;Supervision/safety  (seated)   UB Dressing Assistance 5  Supervision/Setup   UB Dressing Deficit Setup   LB Dressing Assistance 4  Minimal Assistance   LB Dressing Deficit Setup;Supervision/safety;Increased time to complete   Toileting Assistance    (CGA)   Toileting Deficit Steadying;Increased time to complete   Bed Mobility   Supine to Sit 5  Supervision   Additional items Assist x 1;HOB elevated;Bedrails;Increased time required   Transfers   Sit to Stand 5  Supervision   Additional items Assist x 1;Verbal cues   Stand to Sit 5  Supervision   Additional items Assist x 1;Armrests;Verbal cues   Stand pivot 5  Supervision   Additional items Assist x 1;Increased time required   Additional Comments with RW   Activity Tolerance   Activity Tolerance Patient tolerated treatment well  (mild DILLON with ambulation. HR to 130s from 110s with activity. SpO2 stable low 90s on 3L O2 NC. RN present and aware.)   Medical Staff Made Aware Molly PT  (Pt seen for co-evaluation with Physical Therapist due to pt's medical complexity, functional limitations and limited activity tolerance.)   Nurse Made Aware Zechariah MARIE   RUE Assessment   RUE Assessment   (mild generalized deconditioning, grossly WFL)    LUE Assessment   LUE Assessment   (mild generalized deconditioning, grossly WFL)   Hand Function   Gross Motor Coordination Functional   Fine Motor Coordination Functional   Psychosocial   Psychosocial (WDL) WDL   Cognition   Overall Cognitive Status WFL   Arousal/Participation Alert;Cooperative   Attention Within functional limits   Orientation Level Oriented X4   Memory Within functional limits   Following Commands Follows all commands and directions without difficulty   Assessment   Limitation Decreased high-level ADLs;Decreased endurance;Decreased ADL status   Assessment Pt is a 84 y.o. female seen for OT evaluation s/p admit to [facility] on 2/4/2025 w/ Acute hypoxic respiratory failure (HCC).  Comorbidities affecting pt's functional performance at time of assessment include: HTN, obesity, previous surgery, COPD, CHF, and generalized weakness . Personal factors affecting pt at time of IE include:steps to enter environment, difficulty performing ADLS, difficulty performing IADLS , and health management . Prior to admission, pt was independent with ADLs and functional mobility using SPC for community distances. Upon evaluation: Pt requires supervision with RW for mobility, and supervision to CGA during some ADLs 2* the following deficits impacting occupational performance: decreased strength, decreased balance, decreased tolerance, and increased O2 needs . Pt to benefit from continued skilled OT tx while in the hospital to address deficits as defined above and maximize level of functional independence w ADL's and functional mobility. Occupational Performance areas to address include: grooming, bathing/shower, toilet hygiene, dressing, and functional mobility. From OT standpoint, recommendation at time of d/c would be Level 3 Minimal Resource Intensity.   Goals   Patient Goals to go home   Plan   Treatment Interventions ADL retraining;Functional transfer training;Endurance training;Patient/family training;Equipment  evaluation/education   Goal Expiration Date 02/19/25   OT Treatment Day 0   OT Frequency 2-3x/wk   Discharge Recommendation   Rehab Resource Intensity Level, OT III (Minimum Resource Intensity)   AM-PAC Daily Activity Inpatient   Lower Body Dressing 3   Bathing 3   Toileting 3   Upper Body Dressing 4   Grooming 4   Eating 4   Daily Activity Raw Score 21   Daily Activity Standardized Score (Calc for Raw Score >=11) 44.27   AM-PAC Applied Cognition Inpatient   Following a Speech/Presentation 4   Understanding Ordinary Conversation 4   Taking Medications 4   Remembering Where Things Are Placed or Put Away 4   Remembering List of 4-5 Errands 4   Taking Care of Complicated Tasks 4   Applied Cognition Raw Score 24   Applied Cognition Standardized Score 62.21       Goals: to be met by 02/19/25    Patient will perform functional bed mobility with independence, with HOB flat, no rails  Patient will perform functional transfers with modified independence using LRAD in preparation for ADL tasks, with good safety awareness  Patient will perform UB dressing task with independence while seated, with set up  Patient will perform LB dressing task with modified independence  Patient will perform toilet transfer with independence  Patient will perform toileting with independence, including hygiene and clothing management   Patient will perform BUE HEP with independence in preparation to increase ability to participate in ADL tasks  Patient will improve activity tolerance by participating in 25 minutes of session at a time in preparation for participation in ADL tasks  Patient will identify 3 potential fall hazards and identify compensatory techniques to decrease fall risk in the home environment         SAILAJA Ellison/TRAM

## 2025-02-05 NOTE — PHYSICAL THERAPY NOTE
Physical Therapy Evaluation     Patient's Name: Fide Mccormack    Admitting Diagnosis  CHF (congestive heart failure) (HCC) [I50.9]  SOB (shortness of breath) [R06.02]  Pleural effusion, bilateral [J90]  Atrial fibrillation with RVR (HCC) [I48.91]    Problem List  Patient Active Problem List   Diagnosis    Pneumonia of upper lobe of lung    Essential hypertension    GERD (gastroesophageal reflux disease)    Hypothyroidism    Humeral fracture    Hyponatremia    Anemia    SAKSHI (obstructive sleep apnea)    Other insomnia    Left femoral neck fracture    History of breast cancer    Left knee pain    Rapid heart beat    Class 2 obesity in adult    Atrial fibrillation with RVR (HCC)    Chest pain    Acute systolic congestive heart failure (HCC)    Elevated d-dimer    Generalized weakness    Acute hypoxic respiratory failure (HCC)     Past Medical History  Past Medical History:   Diagnosis Date    Breast cancer (HCC)     Disease of thyroid gland     hypothyroidism    GERD (gastroesophageal reflux disease)     Hypertension     Rapid heart rate      Past Surgical History  Past Surgical History:   Procedure Laterality Date    BREAST SURGERY Left     lumpectomy     CHOLECYSTECTOMY      ORIF HIP FRACTURE Left 3/1/2021    Procedure: OPEN REDUCTION W/ INTERNAL FIXATION (ORIF) HIP WITH CANNUALATED SCREWS;  Surgeon: Richie Brennan MD;  [facility];  Service: Orthopedics    CT COLONOSCOPY FLX DX W/COLLJ SPEC WHEN PFRMD N/A 6/26/2017    Procedure: COLONOSCOPY;  Surgeon: Hugo Hunter III, MD;  [facility];  Service: Gastroenterology      02/05/25 1043   PT Last Visit   PT Visit Date 02/05/25   Note Type   Note type Evaluation   Pain Assessment   Pain Assessment Tool 0-10   Pain Score No Pain   Restrictions/Precautions   Weight Bearing Precautions Per Order No   Braces or Orthoses Other (Comment)  (none per patient)   Other Precautions Chair Alarm;Bed Alarm;Multiple lines;Telemetry;O2;Fall Risk;Limb alert  (+3L  "O2 via NC)   Home Living   Type of Home Mobile home   Home Layout One level;Able to live on main level with bedroom/bathroom;Performs ADLs on one level;Stairs to enter with rails  (2-3 ROMERO)   Bathroom Shower/Tub Tub/shower unit   Bathroom Toilet Standard   Bathroom Equipment Other (Comment)  (none per patient)   Bathroom Accessibility Accessible   Home Equipment Walker;Cane   Additional Comments Pt ambulates without an AD for household distances and uses a cane for community distances.   Prior Function   Level of [place] Independent with functional mobility;Independent with ADLs;Independent with IADLS   Lives With Spouse;Family  (granddaughter; spouse currently at STR)   Receives Help From Family   IADLs Independent with driving;Independent with meal prep;Independent with medication management  (granddaughter has recently been doing most of the driving)   Falls in the last 6 months 0   Vocational Retired   General   Family/Caregiver Present No   Cognition   Overall Cognitive Status WFL   Arousal/Participation Alert   Orientation Level Oriented X4   Memory Within functional limits   Following Commands Follows all commands and directions without difficulty   Comments Pt agreeable to PT.   Subjective   Subjective \"I'm ready to go.\"   RLE Assessment   RLE Assessment X   Strength RLE   RLE Overall Strength 4/5   LLE Assessment   LLE Assessment X   Strength LLE   LLE Overall Strength 4/5   Light Touch   RLE Light Touch Grossly intact   LLE Light Touch Grossly intact   Bed Mobility   Supine to Sit 5  Supervision   Additional items Assist x 1;HOB elevated;Bedrails;Verbal cues   Transfers   Sit to Stand 5  Supervision   Additional items Assist x 1;Verbal cues   Stand to Sit 5  Supervision   Additional items Assist x 1;Armrests;Verbal cues   Ambulation/Elevation   Gait pattern Short stride;Shuffling   Gait Assistance   (CG assist)   Additional items Assist x 1;Verbal cues   Assistive Device Rolling walker   Distance 50 " feet   Balance   Static Sitting Good   Dynamic Sitting Fair +   Static Standing Fair   Dynamic Standing Fair -   Ambulatory Fair -   Endurance Deficit   Endurance Deficit Yes   Endurance Deficit Description decreased activity tolerance; pt requiring supplemental oxygen; pt was received on 3L O2 via NC; SpO2 post ambulation 92%; dyspnea on exertion noted   Activity Tolerance   Activity Tolerance Patient tolerated treatment well;Patient limited by fatigue   Medical Staff Made Aware OT Cody  (Co-evaluation performed with OT secondary to complex medical condition of patient and regression of functional status from baseline. PT/OT goals were addressed separately.)   Nurse Made Aware RN Zechariah   Assessment   Prognosis Good   Problem List Decreased strength;Decreased endurance;Impaired balance;Decreased mobility   Assessment Pt is 84 year old female seen for PT evaluation s/p admit to [facility] on 2/4/2025 with Acute hypoxic respiratory failure (HCC). PT consulted to assess pt's functional mobility and discharge needs. Order placed for PT evaluation and treatment. Comorbidities affecting pt's physical performance at time of assessment include pneumonia of upper lobe of lung, essential hypertension, GERD, hypothyroidism, obstructive sleep apnea, atrial fibrillation with RVR, and acute systolic CHF. Prior to hospitalization, pt was independent with all functional mobility without an AD for household distances and with a cane for community distances. Pt ambulates unrestricted distances on all terrain and elevations. Pt resides with her family, in a one level house with 2-3 steps to enter. Personal factors affecting pt at time of initial evaluation include stairs to enter home, ambulating with an assistive device, difficulty ambulating household distances, difficulty ambulating community distances, difficulty navigating level surfaces without external assistance, difficulty performing dynamic tasks in the community,  and difficulty performing ADLs and IADLs. Please find objective findings from PT assessment regarding body systems outlined above with impairments and limitations including weakness, impaired balance, decreased endurance, gait deviations, decreased activity tolerance, decreased functional mobility tolerance, and fall risk. The following objective measures were performed on initial evaluation Barthel Index: 50/100, Modified Jo: 3 (moderate disability), and AM-PAC 6-Clicks: 18/24. Pt's clinical presentation is currently evolving seen in pt's presentation of need for ongoing medical management/monitoring, pt is a fall risk, pt is currently requiring supplemental oxygen, and pt requires cues and assist for safety with functional mobility. Pt to benefit from continued PT treatment to address deficits as defined above and maximize pt's level of function and independence with mobility. From a PT standpoint, recommendation at time of discharge would be level 3, minimal resource intensity in order to facilitate return to prior level of function.   Barriers to Discharge Inaccessible home environment   Goals   Patient Goals to go home   STG Expiration Date 02/15/25   Short Term Goal #1 In 10 days: Increase bilateral LE strength 1/2 grade to facilitate independent mobility, Perform all bed mobility tasks modified independent to decrease caregiver burden, Perform all transfers modified independent to improve independence, Ambulate > 250 ft. with least restrictive assistive device modified independent w/o LOB and w/ normalized gait pattern 100% of the time, Navigate 3 stair(s) modified independent with unilateral handrail to facilitate return to previous living environment, and Increase all balance 1/2 grade to decrease risk for falls   Plan   Treatment/Interventions Functional transfer training;LE strengthening/ROM;Elevations;Therapeutic exercise;Endurance training;Patient/family training;Bed mobility;Gait training;Spoke to  nursing;OT   PT Frequency 2-3x/wk   Discharge Recommendation   Rehab Resource Intensity Level, PT III (Minimum Resource Intensity)   AM-PAC Basic Mobility Inpatient   Turning in Flat Bed Without Bedrails 3   Lying on Back to Sitting on Edge of Flat Bed Without Bedrails 3   Moving Bed to Chair 3   Standing Up From Chair Using Arms 3   Walk in Room 3   Climb 3-5 Stairs With Railing 3   Basic Mobility Inpatient Raw Score 18   Basic Mobility Standardized Score 41.05   [facility] Level Of Mobility   -HL Goal 6: Walk 10 steps or more   -HLM Achieved 7: Walk 25 feet or more   Modified [place] Scale   Modified [place] Scale 3   Barthel Index   Feeding 10   Bathing 0   Grooming Score 0   Dressing Score 5   Bladder Score 10   Bowels Score 10   Toilet Use Score 5   Transfers (Bed/Chair) Score 10   Mobility (Level Surface) Score 0   Stairs Score 0   Barthel Index Score 50     PT Evaluation Time: [id number]  Molly Blackwell, PT, DPT

## 2025-02-05 NOTE — CONSULTS
[facility]  [address] [place] [address]  [phone]     Fax: [phone]     Consultation Report     Name:Fide Mccormack  Room: ICU 01/ICU 01  Attending Provider:Hesham Hurd MD  Admission Date: 2/4/2025  5:22 AM  DOS: 2/5/2025     IMPRESSION:  Acute CHF  Afib with RVR    PLAN:  Diuretic therapy  Echo      Subjective:      Fide Mccormack is a 84 y.o. female with a PMH of Acute systolic CHF, hypertension, atrial fibrillation, GERD who presents with acute onset of shortness of breath.  Patient did not have any fever.  Shortness of breath was profound and associated with cough.  Patient could not breathe and when she came to the ER patient was significantly hypoxic and needed BiPAP.  Patient subsequently recovered from the same.  No nausea or vomiting at this point of time.  Patient does have generalized weakness.  Patient also has known history of A-fib and her heart rate was very high on admission.      Review of Systems  Constitutional: Negative for fever, chills, diaphoresis, appetite change, fatigue and unexpected weight change.  HENT: Negative for postnasal drip and trouble swallowing.  Eyes: Negative for visual disturbance.  Respiratory: Negative for shortness of breath. Negative for cough.  Cardiovascular: Negative for chest pain, palpitations and leg swelling.  Gastrointestinal: Negative for abdominal pain, blood in stool and abdominal distention.  Endocrine: Negative for cold intolerance, heat intolerance and polyuria.  Genitourinary: Negative for decreased urine volume and difficulty urinating.  Musculoskeletal: Negative for myalgias and arthralgias. Negative for back pain.  Skin: Negative for rash.  Neurological: Negative for dizziness, syncope, light-headedness and headaches.  Hematological: Does not bruise/bleed easily.  Psychiatric/Behavioral: Negative for sleep disturbance. Negative for anxiety.     Patient Active Problem List    Diagnosis Date Noted     Acute hypoxic respiratory failure (HCC) 02/04/2025    Generalized weakness 08/04/2022    Atrial fibrillation with RVR (HCC) 07/30/2022    Chest pain 07/30/2022    Acute systolic congestive heart failure (HCC) 07/30/2022    Elevated d-dimer 07/30/2022    Class 2 obesity in adult 03/01/2021    Left femoral neck fracture 02/28/2021    History of breast cancer 02/28/2021    Left knee pain 02/28/2021    Rapid heart beat 02/28/2021    Other insomnia     SAKSHI (obstructive sleep apnea)     Anemia 01/14/2020    Pneumonia of upper lobe of lung 01/13/2020    Essential hypertension 01/13/2020    GERD (gastroesophageal reflux disease) 01/13/2020    Hypothyroidism 01/13/2020    Humeral fracture 01/13/2020    Hyponatremia 01/13/2020        Past Medical History:   Diagnosis Date    Breast cancer (HCC)     Disease of thyroid gland     hypothyroidism    GERD (gastroesophageal reflux disease)     Hypertension     Rapid heart rate         Past Surgical History:   Procedure Laterality Date    BREAST SURGERY Left     lumpectomy     CHOLECYSTECTOMY      ORIF HIP FRACTURE Left 3/1/2021    Procedure: OPEN REDUCTION W/ INTERNAL FIXATION (ORIF) HIP WITH CANNUALATED SCREWS;  Surgeon: Richie Brennan MD;  Location: [facility];  Service: Orthopedics    NH COLONOSCOPY FLX DX W/COLLJ SPEC WHEN PFRMD N/A 6/26/2017    Procedure: COLONOSCOPY;  Surgeon: Hugo Hunter III, MD;  Location: [facility];  Service: Gastroenterology        Social History     Socioeconomic History    Marital status: /Civil Union     Spouse name: None    Number of children: None    Years of education: None    Highest education level: None   Occupational History    None   Tobacco Use    Smoking status: Never    Smokeless tobacco: Never   Vaping Use    Vaping status: Never Used   Substance and Sexual Activity    Alcohol use: Never    Drug use: Never    Sexual activity: Never   Other Topics Concern    None   Social History Narrative    None     Social Drivers of Health      Financial Resource Strain: Not on file   Food Insecurity: No Food Insecurity (2025)    Nursing - Inadequate Food Risk Classification     Worried About Running Out of Food in the Last Year: Not on file     Ran Out of Food in the Last Year: Not on file     Ran Out of Food in the Last Year: Never true   Transportation Needs: No Transportation Needs (2025)    Nursing - Transportation Risk Classification     Lack of Transportation: Not on file     Lack of Transportation: No   Physical Activity: Not on file   Stress: Not on file   Social Connections: Unknown (2024)    Received from [facility]     How often do you feel lonely or isolated from those around you? (Adult - for ages 18 years and over): Not on file   Intimate Partner Violence: Unknown (2025)    Nursing IPS     Feels Physically and Emotionally Safe: Not on file     Physically Hurt by Someone: Not on file     Humiliated or Emotionally Abused by Someone: Not on file     Physically Hurt by Someone: No     Hurt or Threatened by Someone: No   Housing Stability: Unknown (2025)    Nursing: Inadequate Housing Risk Classification     Has Housing: Not on file     Worried About Losing Housing: Not on file     Unable to Get Utilities: Not on file     Unable to Pay for Housing in the Last Year: No     Has Housin        Family History   Problem Relation Age of Onset    Prostate cancer Father     Breast cancer Sister     Sleep apnea Son      Family history reviewed and noncontributory     No Known Allergies     Scheduled Medicines  Current Facility-Administered Medications   Medication Dose Route Frequency Provider Last Rate    acetaminophen  650 mg Oral Q4H PRN Steven Estrada MD      albuterol  2.5 mg Nebulization Q4H PRN Steven Estrada MD      aluminum-magnesium hydroxide-simethicone  30 mL Oral Q6H PRN Steven Estrada MD      azithromycin  500 mg Oral Q24H Steven Estrada MD      benzonatate  100 mg  "Oral TID Steven Estrada MD      cefTRIAXone  1,000 mg Intravenous Q24H Steven Esrtada MD 1,000 mg (02/04/25 1935)    digoxin  125 mcg Oral Daily Steven Estrada MD      furosemide  50 mg Intravenous BID Steven Estrada MD      levothyroxine  137 mcg Oral Early Morning Steven Estrada MD      magnesium hydroxide  15 mL Oral Daily PRN Steven Estrada MD      metoprolol tartrate  25 mg Oral Q8H CHRIS Steven Estrada MD      ondansetron  4 mg Intravenous Q6H PRN Steven Estrada MD      pantoprazole  40 mg Oral Early Morning Steven Estrada MD      potassium chloride  40 mEq Oral Daily Steven Estrada MD      verapamil  180 mg Oral Daily Steven Estrada MD      warfarin  2.5 mg Oral Once per day on Sunday Monday Tuesday Thursday Friday Steven Estrada MD      warfarin  5 mg Oral Once per day on Wednesday Saturday Steven Estrada MD          Infusions        PRN Medicines    acetaminophen    albuterol    aluminum-magnesium hydroxide-simethicone    magnesium hydroxide    ondansetron     Diet Cardiovascular; Cardiac; Fluid Restriction 1800 ML        The following portions of the patient's history were reviewed and updated as appropriate: current medications, past family history, past medical history, past social history, past surgical history, allergies and problem list.              Objective:      /97   Pulse (!) 112   Temp (!) 97.1 °F (36.2 °C)   Resp (!) 27   Ht 5' 6\" (1.676 m)   Wt 79.4 kg (175 lb 0.7 oz)   SpO2 92%   BMI 28.25 kg/m²      BP Readings from Last 3 Encounters:   02/05/25 130/97   08/05/22 135/79   04/26/22 160/72          Intake/Output Summary (Last 24 hours) at 2/5/2025 1022  Last data filed at 2/5/2025 0454  Gross per 24 hour   Intake 730 ml   Output 1300 ml   Net -570 ml        Physical Exam:  General Appearance: Alert, cooperative, in no acute distress   Eyes: Sclerae anicteric.   HEET: Normocephalic, " "atraumatic.   Neck: Supple, no JVD   Cardiovascular: Normal rate, regular rhythm. No murmurs   No edema, normal pulses   Respiratory: Breathing unlabored. Lungs clear to auscultation   Gastrointestinal: Normoactive bowel sounds. Abdomen soft, nontender to palpation.   Musculoskeletal: No back or joint deformity.   Dermatologic: Skin is warm and dry.   Neurologic: Alert and oriented and neurologic exam is grossly normal.   Psychiatric: Normal affect and mood.            Lab Results:  Lab Results   Component Value Date    WBC 8.46 02/05/2025    HGB 10.8 (L) 02/05/2025    HCT 35.2 02/05/2025     02/05/2025     Lab Results   Component Value Date    TRIG 115 07/26/2022    HDL 45 (L) 07/26/2022     Lab Results   Component Value Date    K 4.0 02/05/2025    K 4.2 06/09/2023     02/05/2025    CL 99 (L) 06/09/2023    CO2 27 02/05/2025    CO2 26 06/09/2023    BUN 22 02/05/2025    BUN 16 06/09/2023    CREATININE 0.95 02/05/2025    CREATININE 1 06/09/2023    ALKPHOS 70 02/05/2025    ALKPHOS 69 06/09/2023    ALT 13 02/05/2025    ALT 32 06/21/2023    AST 16 02/05/2025    AST 43 (H) 06/21/2023    BILIDIR 0.16 01/13/2020     Lab Results   Component Value Date    INR 1.78 (H) 02/05/2025    TSH 3.47 12/11/2024     No results for input(s): \"TROPONIN\" in the last 72 hours.     Diagnostic Results:  ECG :     ECHO:  Chest Xray:     Assessment & Plan:      @[facility]@        Cristian Gomez MD  "

## 2025-02-05 NOTE — CASE MANAGEMENT
Case Management Assessment & Discharge Planning Note    Patient name Fide Mccormack  Location ICU ICU  MRN [id number]  : 1940 Date 2025       Current Admission Date: 2025  Current Admission Diagnosis:Acute hypoxic respiratory failure (HCC)   Patient Active Problem List    Diagnosis Date Noted Date Diagnosed    Acute hypoxic respiratory failure (HCC) 2025     Generalized weakness 2022     Atrial fibrillation with RVR (HCC) 2022     Chest pain 2022     Acute systolic congestive heart failure (HCC) 2022     Elevated d-dimer 2022     Class 2 obesity in adult 2021     Left femoral neck fracture 2021     History of breast cancer 2021     Left knee pain 2021     Rapid heart beat 2021     Other insomnia      SAKSHI (obstructive sleep apnea)      Anemia 2020     Pneumonia of upper lobe of lung 2020     Essential hypertension 2020     GERD (gastroesophageal reflux disease) 2020     Hypothyroidism 2020     Humeral fracture 2020     Hyponatremia 2020       LOS (days): 1  Geometric Mean LOS (GMLOS) (days): 3.9  Days to GMLOS:2.5     OBJECTIVE:    Risk of Unplanned Readmission Score: 16.21         Current admission status: Inpatient       Preferred Pharmacy:   [id number] [place] - [address] [address]  Phone: [phone] Fax: [phone]    [facility] [place] [place] - [address] [facility]  [address] [facility] [address]  Phone: [phone] Fax: [phone]    [facility] - [place] - [address] [facility]  [address] [facility]  [place] [address]  Phone: [phone] Fax: [phone]    Primary Care Provider: Toi Bose MD    Primary Insurance: MEDICARE  Secondary Insurance: AETNA    ASSESSMENT:  Active Health Care Proxies    There are no active Health Care Proxies on file.        Advance Directives  Does patient have Advance Directives?: Yes  Primary Contact: Isabela Mccormack (Grandchild)  [phone] (Mobile)         Readmission Root Cause  30 Day Readmission: No    Patient Information  Admitted from:: Home  Mental Status: Alert  During Assessment patient was accompanied by: Not accompanied during assessment  Assessment information provided by:: Patient  Primary Caregiver: Self  Support Systems: Spouse/significant other, Family members  County of Residence: [place]  What city do you live in[place]  Home entry access options. Select all that apply.: Stairs  Number of steps to enter home.: 3  Do the steps have railings?: Yes  Type of Current Residence: Trailer Home  Is patient a ?: No    Activities of Daily Living Prior to Admission  Functional Status: Independent  Completes ADLs independently?: Yes  Ambulates independently?: Yes  Does patient use assisted devices?: Yes  Assisted Devices (DME) used: Straight Cane, Other (Comment) (adj bed)  Does patient currently own DME?: Yes  What DME does the patient currently own?: Walker, Straight Cane, Other (Comment) (adj bed)  Does patient have a history of Outpatient Therapy (PT/OT)?: Yes  Does the patient have a history of Short-Term Rehab?: No  Does patient have a history of HHC?: Yes  Does patient currently have HHC?: No    Current Home Health Care  Home Health Agency Name::  (TBD;  has used [facility] in the past)    Patient Information Continued  Income Source: SSI/SSD  Does patient have prescription coverage?: Yes  Does patient receive dialysis treatments?: No  Does patient have a history of substance abuse?: No  Does patient have a history of Mental Health Diagnosis?: No         Means of Transportation  Means of Transport to Appts:: Other (Comment) (she or granddaughter drive)      Social Determinants of Health (SDOH)      Flowsheet Row Most Recent Value   Housing Stability    In the last 12 months, was there a time  when you were not able to pay the mortgage or rent on time? N   In the past 12 months, how many times have you moved where you were living? 0   At any time in the past 12 months, were you homeless or living in a shelter (including now)? N   Transportation Needs    In the past 12 months, has lack of transportation kept you from medical appointments or from getting medications? no   In the past 12 months, has lack of transportation kept you from meetings, work, or from getting things needed for daily living? No   Food Insecurity    Within the past 12 months, you worried that your food would run out before you got the money to buy more. Never true   Within the past 12 months, the food you bought just didn't last and you didn't have money to get more. Never true   Utilities    In the past 12 months has the electric, gas, oil, or water company threatened to shut off services in your home? No            DISCHARGE DETAILS:    Discharge planning discussed with:: pt  Freedom of Choice: Yes  Comments - Freedom of Choice: Met w pt at bedside; introduced self and explained role of CM, including arranging DME, STR, home health, out pt tx. Advised pt that CM will make appropriate referrals based on treatment team recommendations and MD orders, and she can consider recommended plan of care and choose from available vendors.  CM contacted family/caregiver?: No- see comments (pt alert and oriented adult)  Were Treatment Team discharge recommendations reviewed with patient/caregiver?: Yes  Did patient/caregiver verbalize understanding of patient care needs?: Yes  Were patient/caregiver advised of the risks associated with not following Treatment Team discharge recommendations?: Yes    Contacts  Patient Contacts: Isabela Mccormack (Grandchild)  [phone] (Mobile)  Relationship to Patient:: Family    Requested Home Health Care         Is the patient interested in HHC at discharge?: Yes  Home Health Discipline requested:: Physical  Therapy, Occupational Therapy, Nursing  Home Health Agency Name::  (TBD;  has used [facility] in the past)  Home Health Follow-Up Provider:: PCP  Home Health Services Needed:: Evaluate Functional Status and Safety, Gait/ADL Training, Strengthening/Theraputic Exercises to Improve Function, Heart Failure Management  Homebound Criteria Met:: Uses an Assist Device (i.e. cane, walker, etc)  Supporting Clincal Findings:: Dyspnea with Exertion, Fatigues Easliy in Short Distances, Limited Endurance    DME Referral Provided  Referral made for DME?: No    Other Referral/Resources/Interventions Provided:  Referral Comments: Pt resides w 23 yo granddaughter who is presently not employed.  Pt's  is presently at [facility] for STR.  Discussed PT/OT assignment of Level III;  pt prefers home health to outpt therapy.  Referrals made in [facility];  awaiting responses.         Treatment Team Recommendation: Home with Home Health Care, Outpatient Rehab  Discharge Destination Plan:: Home with Home Health Care  Transport at Discharge : Family

## 2025-02-05 NOTE — PLAN OF CARE
Problem: INFECTION - ADULT  Goal: Absence or prevention of progression during hospitalization  Description: INTERVENTIONS:  - Assess and monitor for signs and symptoms of infection  - Monitor lab/diagnostic results  - Monitor all insertion sites, i.e. indwelling lines, tubes, and drains  - Monitor endotracheal if appropriate and nasal secretions for changes in amount and color  - [place] appropriate cooling/warming therapies per order  - Administer medications as ordered  - Instruct and encourage patient and family to use good hand hygiene technique  - Identify and instruct in appropriate isolation precautions for identified infection/condition  Outcome: Progressing  Goal: Absence of fever/infection during neutropenic period  Description: INTERVENTIONS:  - Monitor WBC    Outcome: Progressing

## 2025-02-05 NOTE — RESPIRATORY THERAPY NOTE
RT Protocol Note  Fide Mccormack 84 y.o. female MRN: [id number]  Unit/Bed#: ICU 01 Encounter: [id number]    Assessment    Principal Problem:    Acute hypoxic respiratory failure (HCC)  Active Problems:    Pneumonia of upper lobe of lung    Essential hypertension    GERD (gastroesophageal reflux disease)    Hypothyroidism    SAKSHI (obstructive sleep apnea)    Atrial fibrillation with RVR (HCC)    Acute systolic congestive heart failure (HCC)      Home Pulmonary Medications:       Past Medical History:   Diagnosis Date    Breast cancer (HCC)     Disease of thyroid gland     hypothyroidism    GERD (gastroesophageal reflux disease)     Hypertension     Rapid heart rate      Social History     Socioeconomic History    Marital status: /Civil Union     Spouse name: None    Number of children: None    Years of education: None    Highest education level: None   Occupational History    None   Tobacco Use    Smoking status: Never    Smokeless tobacco: Never   Vaping Use    Vaping status: Never Used   Substance and Sexual Activity    Alcohol use: Never    Drug use: Never    Sexual activity: Never   Other Topics Concern    None   Social History Narrative    None     Social Drivers of Health     Financial Resource Strain: Not on file   Food Insecurity: No Food Insecurity (2/4/2025)    Nursing - Inadequate Food Risk Classification     Worried About Running Out of Food in the Last Year: Not on file     Ran Out of Food in the Last Year: Not on file     Ran Out of Food in the Last Year: Never true   Transportation Needs: No Transportation Needs (2/4/2025)    Nursing - Transportation Risk Classification     Lack of Transportation: Not on file     Lack of Transportation: No   Physical Activity: Not on file   Stress: Not on file   Social Connections: Unknown (6/18/2024)    Received from [employer]    Social Connections     How often do you feel lonely or isolated from those around you? (Adult - for ages 18 years and over): Not on  "file   Intimate Partner Violence: Unknown (2025)    Nursing IPS     Feels Physically and Emotionally Safe: Not on file     Physically Hurt by Someone: Not on file     Humiliated or Emotionally Abused by Someone: Not on file     Physically Hurt by Someone: No     Hurt or Threatened by Someone: No   Housing Stability: Unknown (2025)    Nursing: Inadequate Housing Risk Classification     Has Housing: Not on file     Worried About Losing Housing: Not on file     Unable to Get Utilities: Not on file     Unable to Pay for Housing in the Last Year: No     Has Housin       Subjective         Objective    Physical Exam:   Assessment Type: Assess only  General Appearance: Awake, Alert  Respiratory Pattern: Dyspnea with exertion, Dyspnea at rest  Chest Assessment: Chest expansion symmetrical  Bilateral Breath Sounds: Clear, Diminished    Vitals:  Blood pressure 119/79, pulse (!) 117, temperature (!) 97.1 °F (36.2 °C), temperature source Oral, resp. rate (!) 39, height 5' 6\" (1.676 m), weight 79.4 kg (175 lb 0.7 oz), SpO2 94%.          Imaging and other studies: Results Review Statement: No pertinent imaging studies reviewed.    O2 Device: v60     Plan    Respiratory Plan: Mild Distress pathway        Resp Comments: pt with hx of chf and dx of pna, will continue with albuterol prn   "

## 2025-02-05 NOTE — PROGRESS NOTES
Pastoral Care Progress Note             02/05/25 1300   Clinical Encounter Type   Visited With Patient   Routine Visit Introduction   Referral From Nurse      navigated family dynamics with pt and cultivated a relationship of care and support. Pt verbally processed emotions.  engaged in theological discussion and scripture review with pt. Pt expressed gratitude for the visit.  will follow up as needed or requested.

## 2025-02-05 NOTE — PLAN OF CARE
Problem: PHYSICAL THERAPY ADULT  Goal: Performs mobility at highest level of function for planned discharge setting.  See evaluation for individualized goals.  Description: Treatment/Interventions: Functional transfer training, LE strengthening/ROM, Elevations, Therapeutic exercise, Endurance training, Patient/family training, Bed mobility, Gait training, Spoke to nursing, OT          See flowsheet documentation for full assessment, interventions and recommendations.  2/5/2025 1224 by Molly Blackwell, PT  Note: Prognosis: Good  Problem List: Decreased strength, Decreased endurance, Impaired balance, Decreased mobility  Assessment: Pt is 84 year old female seen for PT evaluation s/p admit to [facility] on 2/4/2025 with Acute hypoxic respiratory failure (HCC). PT consulted to assess pt's functional mobility and discharge needs. Order placed for PT evaluation and treatment. Comorbidities affecting pt's physical performance at time of assessment include pneumonia of upper lobe of lung, essential hypertension, GERD, hypothyroidism, obstructive sleep apnea, atrial fibrillation with RVR, and acute systolic CHF. Prior to hospitalization, pt was independent with all functional mobility without an AD for household distances and with a cane for community distances. Pt ambulates unrestricted distances on all terrain and elevations. Pt resides with her family, in a one level house with 2-3 steps to enter. Personal factors affecting pt at time of initial evaluation include stairs to enter home, ambulating with an assistive device, difficulty ambulating household distances, difficulty ambulating community distances, difficulty navigating level surfaces without external assistance, difficulty performing dynamic tasks in the community, and difficulty performing ADLs and IADLs. Please find objective findings from PT assessment regarding body systems outlined above with impairments and limitations including weakness, impaired  balance, decreased endurance, gait deviations, decreased activity tolerance, decreased functional mobility tolerance, and fall risk. The following objective measures were performed on initial evaluation Barthel Index: 50/100, Modified [place]: 3 (moderate disability), and AM-PAC 6-Clicks: 18/24. Pt's clinical presentation is currently evolving seen in pt's presentation of need for ongoing medical management/monitoring, pt is a fall risk, pt is currently requiring supplemental oxygen, and pt requires cues and assist for safety with functional mobility. Pt to benefit from continued PT treatment to address deficits as defined above and maximize pt's level of function and independence with mobility. From a PT standpoint, recommendation at time of discharge would be level 3, minimal resource intensity in order to facilitate return to prior level of function.    Barriers to Discharge: Inaccessible home environment     Rehab Resource Intensity Level, PT: III (Minimum Resource Intensity)    See flowsheet documentation for full assessment.

## 2025-02-05 NOTE — PLAN OF CARE
Problem: OCCUPATIONAL THERAPY ADULT  Goal: Performs self-care activities at highest level of function for planned discharge setting.  See evaluation for individualized goals.  Description: Treatment Interventions: ADL retraining, Functional transfer training, Endurance training, Patient/family training, Equipment evaluation/education          See flowsheet documentation for full assessment, interventions and recommendations.   Outcome: Progressing  Note: Limitation: Decreased high-level ADLs, Decreased endurance, Decreased ADL status     Assessment: Pt is a 84 y.o. female seen for OT evaluation s/p admit to [facility] on 2/4/2025 w/ Acute hypoxic respiratory failure (HCC).  Comorbidities affecting pt's functional performance at time of assessment include: HTN, obesity, previous surgery, COPD, CHF, and generalized weakness . Personal factors affecting pt at time of IE include:steps to enter environment, difficulty performing ADLS, difficulty performing IADLS , and health management . Prior to admission, pt was independent with ADLs and functional mobility using SPC for community distances. Upon evaluation: Pt requires supervision with RW for mobility, and supervision to CGA during some ADLs 2* the following deficits impacting occupational performance: decreased strength, decreased balance, decreased tolerance, and increased O2 needs . Pt to benefit from continued skilled OT tx while in the hospital to address deficits as defined above and maximize level of functional independence w ADL's and functional mobility. Occupational Performance areas to address include: grooming, bathing/shower, toilet hygiene, dressing, and functional mobility. From OT standpoint, recommendation at time of d/c would be Level 3 Minimal Resource Intensity.     Rehab Resource Intensity Level, OT: III (Minimum Resource Intensity)        SAILAJA Ellison/L

## 2025-02-06 ENCOUNTER — APPOINTMENT (INPATIENT)
Dept: NON INVASIVE DIAGNOSTICS | Facility: HOSPITAL | Age: 85
DRG: 291 | End: 2025-02-06
Payer: MEDICARE

## 2025-02-06 LAB
ANION GAP SERPL CALCULATED.3IONS-SCNC: 7 MMOL/L (ref 4–13)
AORTIC ROOT: 3 CM
AV REGURGITATION PRESSURE HALF TIME: 911 MS
BASOPHILS # BLD AUTO: 0.04 THOUSANDS/ΜL (ref 0–0.1)
BASOPHILS NFR BLD AUTO: 1 % (ref 0–1)
BSA FOR ECHO PROCEDURE: 1.89 M2
BUN SERPL-MCNC: 24 MG/DL (ref 5–25)
CALCIUM SERPL-MCNC: 9.2 MG/DL (ref 8.4–10.2)
CHLORIDE SERPL-SCNC: 101 MMOL/L (ref 96–108)
CO2 SERPL-SCNC: 29 MMOL/L (ref 21–32)
CREAT SERPL-MCNC: 1.07 MG/DL (ref 0.6–1.3)
EOSINOPHIL # BLD AUTO: 0.16 THOUSAND/ΜL (ref 0–0.61)
EOSINOPHIL NFR BLD AUTO: 2 % (ref 0–6)
ERYTHROCYTE [DISTWIDTH] IN BLOOD BY AUTOMATED COUNT: 16.1 % (ref 11.6–15.1)
FRACTIONAL SHORTENING: 18 (ref 28–44)
GFR SERPL CREATININE-BSD FRML MDRD: 48 ML/MIN/1.73SQ M
GLUCOSE SERPL-MCNC: 99 MG/DL (ref 65–140)
HCT VFR BLD AUTO: 37.2 % (ref 34.8–46.1)
HGB BLD-MCNC: 11.6 G/DL (ref 11.5–15.4)
IMM GRANULOCYTES # BLD AUTO: 0.03 THOUSAND/UL (ref 0–0.2)
IMM GRANULOCYTES NFR BLD AUTO: 0 % (ref 0–2)
INR PPP: 1.84 (ref 0.85–1.19)
INTERVENTRICULAR SEPTUM IN DIASTOLE (PARASTERNAL SHORT AXIS VIEW): 1 CM
INTERVENTRICULAR SEPTUM: 1 CM (ref 0.6–1.1)
LAAS-AP2: 21.6 CM2
LAAS-AP4: 17.5 CM2
LEFT ATRIUM AREA SYSTOLE SINGLE PLANE A4C: 17 CM2
LEFT ATRIUM SIZE: 4.4 CM
LEFT ATRIUM VOLUME (MOD BIPLANE): 61 ML
LEFT ATRIUM VOLUME INDEX (MOD BIPLANE): 32.3 ML/M2
LEFT INTERNAL DIMENSION IN SYSTOLE: 4.5 CM (ref 2.1–4)
LEFT VENTRICULAR INTERNAL DIMENSION IN DIASTOLE: 5.5 CM (ref 3.5–6)
LEFT VENTRICULAR POSTERIOR WALL IN END DIASTOLE: 0.8 CM
LEFT VENTRICULAR STROKE VOLUME: 57 ML
LV EF US.2D.A4C+ESTIMATED: 34 %
LVSV (TEICH): 57 ML
LYMPHOCYTES # BLD AUTO: 1.67 THOUSANDS/ΜL (ref 0.6–4.47)
LYMPHOCYTES NFR BLD AUTO: 21 % (ref 14–44)
MCH RBC QN AUTO: 27.1 PG (ref 26.8–34.3)
MCHC RBC AUTO-ENTMCNC: 31.2 G/DL (ref 31.4–37.4)
MCV RBC AUTO: 87 FL (ref 82–98)
MITRAL REGURGITATION PEAK VELOCITY: 4.87 M/S
MITRAL VALVE MEAN INFLOW VELOCITY: 3.58 M/S
MITRAL VALVE REGURGITANT PEAK GRADIENT: 95 MMHG
MONOCYTES # BLD AUTO: 0.8 THOUSAND/ΜL (ref 0.17–1.22)
MONOCYTES NFR BLD AUTO: 10 % (ref 4–12)
NEUTROPHILS # BLD AUTO: 5.3 THOUSANDS/ΜL (ref 1.85–7.62)
NEUTS SEG NFR BLD AUTO: 66 % (ref 43–75)
NRBC BLD AUTO-RTO: 0 /100 WBCS
PISA MRMAX VEL: 0.3 M/S
PISA RADIUS: 0.3 CM
PLATELET # BLD AUTO: 214 THOUSANDS/UL (ref 149–390)
PMV BLD AUTO: 10.8 FL (ref 8.9–12.7)
POTASSIUM SERPL-SCNC: 4 MMOL/L (ref 3.5–5.3)
PROTHROMBIN TIME: 22 SECONDS (ref 12.3–15)
RBC # BLD AUTO: 4.28 MILLION/UL (ref 3.81–5.12)
RIGHT ATRIUM AREA SYSTOLE A4C: 15.5 CM2
RIGHT VENTRICLE ID DIMENSION: 3.3 CM
SL CV AV DECELERATION TIME RETROGRADE: 3140 MS
SL CV AV PEAK GRADIENT RETROGRADE: 45 MMHG
SL CV DOP CALC MV VTI RETROGRADE: 156.6 CM
SL CV LEFT ATRIUM LENGTH A2C: 5.8 CM
SL CV LV EF: 20
SL CV MV MEAN GRADIENT RETROGRADE: 60 MMHG
SL CV PED ECHO LEFT VENTRICLE DIASTOLIC VOLUME (MOD BIPLANE) 2D: 149 ML
SL CV PED ECHO LEFT VENTRICLE SYSTOLIC VOLUME (MOD BIPLANE) 2D: 93 ML
SODIUM SERPL-SCNC: 137 MMOL/L (ref 135–147)
TR MAX PG: 35 MMHG
TR PEAK VELOCITY: 2.9 M/S
TRICUSPID ANNULAR PLANE SYSTOLIC EXCURSION: 1.6 CM
TRICUSPID VALVE PEAK REGURGITATION VELOCITY: 2.94 M/S
WBC # BLD AUTO: 8 THOUSAND/UL (ref 4.31–10.16)

## 2025-02-06 PROCEDURE — 85610 PROTHROMBIN TIME: CPT | Performed by: INTERNAL MEDICINE

## 2025-02-06 PROCEDURE — 94760 N-INVAS EAR/PLS OXIMETRY 1: CPT

## 2025-02-06 PROCEDURE — 85025 COMPLETE CBC W/AUTO DIFF WBC: CPT | Performed by: STUDENT IN AN ORGANIZED HEALTH CARE EDUCATION/TRAINING PROGRAM

## 2025-02-06 PROCEDURE — 94660 CPAP INITIATION&MGMT: CPT

## 2025-02-06 PROCEDURE — 80048 BASIC METABOLIC PNL TOTAL CA: CPT | Performed by: STUDENT IN AN ORGANIZED HEALTH CARE EDUCATION/TRAINING PROGRAM

## 2025-02-06 PROCEDURE — 93306 TTE W/DOPPLER COMPLETE: CPT

## 2025-02-06 PROCEDURE — 99232 SBSQ HOSP IP/OBS MODERATE 35: CPT | Performed by: STUDENT IN AN ORGANIZED HEALTH CARE EDUCATION/TRAINING PROGRAM

## 2025-02-06 RX ADMIN — DIGOXIN 125 MCG: 125 TABLET ORAL at 08:52

## 2025-02-06 RX ADMIN — PREDNISOLONE ACETATE 1 DROP: 10 SUSPENSION/ DROPS OPHTHALMIC at 17:13

## 2025-02-06 RX ADMIN — WARFARIN SODIUM 2.5 MG: 2.5 TABLET ORAL at 17:12

## 2025-02-06 RX ADMIN — BENZONATATE 100 MG: 100 CAPSULE ORAL at 17:12

## 2025-02-06 RX ADMIN — BENZONATATE 100 MG: 100 CAPSULE ORAL at 22:02

## 2025-02-06 RX ADMIN — POTASSIUM CHLORIDE 40 MEQ: 1500 TABLET, EXTENDED RELEASE ORAL at 08:52

## 2025-02-06 RX ADMIN — METOPROLOL TARTRATE 25 MG: 25 TABLET, FILM COATED ORAL at 14:49

## 2025-02-06 RX ADMIN — AZITHROMYCIN 500 MG: 250 TABLET, FILM COATED ORAL at 18:00

## 2025-02-06 RX ADMIN — LEVOTHYROXINE SODIUM 137 MCG: 25 TABLET ORAL at 06:09

## 2025-02-06 RX ADMIN — PREDNISOLONE ACETATE 1 DROP: 10 SUSPENSION/ DROPS OPHTHALMIC at 12:42

## 2025-02-06 RX ADMIN — PREDNISOLONE ACETATE 1 DROP: 10 SUSPENSION/ DROPS OPHTHALMIC at 22:02

## 2025-02-06 RX ADMIN — BENZONATATE 100 MG: 100 CAPSULE ORAL at 08:52

## 2025-02-06 RX ADMIN — CEFTRIAXONE SODIUM 1000 MG: 10 INJECTION, POWDER, FOR SOLUTION INTRAVENOUS at 18:01

## 2025-02-06 RX ADMIN — FUROSEMIDE 50 MG: 10 INJECTION, SOLUTION INTRAMUSCULAR; INTRAVENOUS at 08:51

## 2025-02-06 RX ADMIN — VERAPAMIL HYDROCHLORIDE 180 MG: 180 TABLET ORAL at 08:52

## 2025-02-06 RX ADMIN — PANTOPRAZOLE SODIUM 40 MG: 40 TABLET, DELAYED RELEASE ORAL at 06:09

## 2025-02-06 RX ADMIN — METOPROLOL TARTRATE 25 MG: 25 TABLET, FILM COATED ORAL at 06:09

## 2025-02-06 RX ADMIN — PREDNISOLONE ACETATE 1 DROP: 10 SUSPENSION/ DROPS OPHTHALMIC at 08:52

## 2025-02-06 RX ADMIN — FUROSEMIDE 50 MG: 10 INJECTION, SOLUTION INTRAMUSCULAR; INTRAVENOUS at 17:12

## 2025-02-06 RX ADMIN — METOPROLOL TARTRATE 25 MG: 25 TABLET, FILM COATED ORAL at 22:07

## 2025-02-06 NOTE — ASSESSMENT & PLAN NOTE
Wt Readings from Last 3 Encounters:   02/04/25 79.4 kg (175 lb 0.7 oz)   08/05/22 93.2 kg (205 lb 7.5 oz)   04/26/22 97.5 kg (215 lb)     Echo is pending, symptoms improved  2L output total yesterday, daily weight  Lasix 50BID continued, cardio consult appreciated  Downgrade to [facility]

## 2025-02-06 NOTE — RESPIRATORY THERAPY NOTE
02/06/25 0428   Respiratory Assessment   General Appearance Awake   Respiratory Pattern Assisted;Spontaneous   Chest Assessment Chest expansion symmetrical   Bilateral Breath Sounds Clear   Cough None   Resp Comments pt awake but remains on bipap   O2 Device v60   Non-Invasive Information   O2 Interface Device Face mask   Non-Invasive Ventilation Mode BiPAP   SpO2 97 %   $ Pulse Oximetry Spot Check Charge Completed   Non-Invasive Settings   IPAP (cm) 12 cm   EPAP (cm) 6 cm   Rate (Set) 8   FiO2 (%) 40   Rise Time 2   Inspiratory Time (Set) 1   Humidification   (n/a)   Non-Invasive Readings   Total Rate 11   Vt (mL) (Mech) 616   MV (Mech) 6.7   Peak Pressure (Obs) 13   Leak (lpm) 0   Skin Intervention Skin intact;Mask rotated   Non-Invasive Alarms   Insp Pressure High (cm H20) 25   Insp Pressure Low (cm H20) 5   Low Insp Pressure Time (sec) 20 sec   MV Low (L/min) 2   Vt High (mL) 1200   Vt Low (mL) 200   High Resp Rate (BPM) 40 BPM   Low Resp Rate (BPM) 6 BPM

## 2025-02-06 NOTE — PLAN OF CARE
Problem: PAIN - ADULT  Goal: Verbalizes/displays adequate comfort level or baseline comfort level  Description: Interventions:  - Encourage patient to monitor pain and request assistance  - Assess pain using appropriate pain scale  - Administer analgesics based on type and severity of pain and evaluate response  - Implement non-pharmacological measures as appropriate and evaluate response  - Consider cultural and social influences on pain and pain management  - Notify physician/advanced practitioner if interventions unsuccessful or patient reports new pain  Outcome: Progressing     Problem: INFECTION - ADULT  Goal: Absence or prevention of progression during hospitalization  Description: INTERVENTIONS:  - Assess and monitor for signs and symptoms of infection  - Monitor lab/diagnostic results  - Monitor all insertion sites, i.e. indwelling lines, tubes, and drains  - Monitor endotracheal if appropriate and nasal secretions for changes in amount and color  - [place] appropriate cooling/warming therapies per order  - Administer medications as ordered  - Instruct and encourage patient and family to use good hand hygiene technique  - Identify and instruct in appropriate isolation precautions for identified infection/condition  Outcome: Progressing  Goal: Absence of fever/infection during neutropenic period  Description: INTERVENTIONS:  - Monitor WBC    Outcome: Progressing     Problem: SAFETY ADULT  Goal: Patient will remain free of falls  Description: INTERVENTIONS:  - Educate patient/family on patient safety including physical limitations  - Instruct patient to call for assistance with activity   - Consult OT/PT to assist with strengthening/mobility   - Keep Call bell within reach  - Keep bed low and locked with side rails adjusted as appropriate  - Keep care items and personal belongings within reach  - Initiate and maintain comfort rounds  - Make Fall Risk Sign visible to staff  - Offer Toileting every 2 Hours,  in advance of need  - Initiate/Maintain bed alarm  - Obtain necessary fall risk management equipment:   - Apply yellow socks and bracelet for high fall risk patients  - Consider moving patient to room near nurses station  Outcome: Progressing  Goal: Maintain or return to baseline ADL function  Description: INTERVENTIONS:  -  Assess patient's ability to carry out ADLs; assess patient's baseline for ADL function and identify physical deficits which impact ability to perform ADLs (bathing, care of mouth/teeth, toileting, grooming, dressing, etc.)  - Assess/evaluate cause of self-care deficits   - Assess range of motion  - Assess patient's mobility; develop plan if impaired  - Assess patient's need for assistive devices and provide as appropriate  - Encourage maximum independence but intervene and supervise when necessary  - Involve family in performance of ADLs  - Assess for home care needs following discharge   - Consider OT consult to assist with ADL evaluation and planning for discharge  - Provide patient education as appropriate  Outcome: Progressing  Goal: Maintains/Returns to pre admission functional level  Description: INTERVENTIONS:  - Perform AM-PAC 6 Click Basic Mobility/ Daily Activity assessment daily.  - Set and communicate daily mobility goal to care team and patient/family/caregiver.   - Collaborate with rehabilitation services on mobility goals if consulted  - Perform Range of Motion 3 times a day.  - Reposition patient every 2 hours.  - Dangle patient 3 times a day  - Stand patient 3 times a day  - Ambulate patient 3 times a day  - Out of bed to chair 3 times a day   - Out of bed for meals 3 times a day  - Out of bed for toileting  - Record patient progress and toleration of activity level   Outcome: Progressing     Problem: DISCHARGE PLANNING  Goal: Discharge to home or other facility with appropriate resources  Description: INTERVENTIONS:  - Identify barriers to discharge w/patient and caregiver  -  Arrange for needed discharge resources and transportation as appropriate  - Identify discharge learning needs (meds, wound care, etc.)  - Arrange for interpretive services to assist at discharge as needed  - Refer to Case Management Department for coordinating discharge planning if the patient needs post-hospital services based on physician/advanced practitioner order or complex needs related to functional status, cognitive ability, or social support system  Outcome: Progressing     Problem: Knowledge Deficit  Goal: Patient/family/caregiver demonstrates understanding of disease process, treatment plan, medications, and discharge instructions  Description: Complete learning assessment and assess knowledge base.  Interventions:  - Provide teaching at level of understanding  - Provide teaching via preferred learning methods  Outcome: Progressing     Problem: CARDIOVASCULAR - ADULT  Goal: Maintains optimal cardiac output and hemodynamic stability  Description: INTERVENTIONS:  - Monitor I/O, vital signs and rhythm  - Monitor for S/S and trends of decreased cardiac output  - Administer and titrate ordered vasoactive medications to optimize hemodynamic stability  - Assess quality of pulses, skin color and temperature  - Assess for signs of decreased coronary artery perfusion  - Instruct patient to report change in severity of symptoms  Outcome: Progressing  Goal: Absence of cardiac dysrhythmias or at baseline rhythm  Description: INTERVENTIONS:  - Continuous cardiac monitoring, vital signs, obtain 12 lead EKG if ordered  - Administer antiarrhythmic and heart rate control medications as ordered  - Monitor electrolytes and administer replacement therapy as ordered  Outcome: Progressing     Problem: RESPIRATORY - ADULT  Goal: Achieves optimal ventilation and oxygenation  Description: INTERVENTIONS:  - Assess for changes in respiratory status  - Assess for changes in mentation and behavior  - Position to facilitate oxygenation  and minimize respiratory effort  - Oxygen administered by appropriate delivery if ordered  - Initiate smoking cessation education as indicated  - Encourage broncho-pulmonary hygiene including cough, deep breathe, Incentive Spirometry  - Assess the need for suctioning and aspirate as needed  - Assess and instruct to report SOB or any respiratory difficulty  - Respiratory Therapy support as indicated  Outcome: Progressing     Problem: METABOLIC, FLUID AND ELECTROLYTES - ADULT  Goal: Electrolytes maintained within normal limits  Description: INTERVENTIONS:  - Monitor labs and assess patient for signs and symptoms of electrolyte imbalances  - Administer electrolyte replacement as ordered  - Monitor response to electrolyte replacements, including repeat lab results as appropriate  - Instruct patient on fluid and nutrition as appropriate  Outcome: Progressing  Goal: Fluid balance maintained  Description: INTERVENTIONS:  - Monitor labs   - Monitor I/O and WT  - Instruct patient on fluid and nutrition as appropriate  - Assess for signs & symptoms of volume excess or deficit  Outcome: Progressing     Problem: Nutrition/Hydration-ADULT  Goal: Nutrient/Hydration intake appropriate for improving, restoring or maintaining nutritional needs  Description: Monitor and assess patient's nutrition/hydration status for malnutrition. Collaborate with interdisciplinary team and initiate plan and interventions as ordered.  Monitor patient's weight and dietary intake as ordered or per policy. Utilize nutrition screening tool and intervene as necessary. Determine patient's food preferences and provide high-protein, high-caloric foods as appropriate.     INTERVENTIONS:  - Monitor oral intake, urinary output, labs, and treatment plans  - Assess nutrition and hydration status and recommend course of action  - Evaluate amount of meals eaten  - Assist patient with eating if necessary   - Allow adequate time for meals  - Recommend/ encourage  appropriate diets, oral nutritional supplements, and vitamin/mineral supplements  - Order, calculate, and assess calorie counts as needed  - Recommend, monitor, and adjust tube feedings and TPN/PPN based on assessed needs  - Assess need for intravenous fluids  - Provide specific nutrition/hydration education as appropriate  - Include patient/family/caregiver in decisions related to nutrition  Outcome: Progressing

## 2025-02-06 NOTE — ASSESSMENT & PLAN NOTE
CT scan shows questionable pneumonia in the left upper lobe of the lung.  No evidence of sepsis on admission.    Patient did have a white count of 16 on admission  Was started on abx on admission, will dc today as less likley pna

## 2025-02-06 NOTE — PROGRESS NOTES
[facility]  [address] [place] [address]  [phone]   Fax: [phone]    Progress Note     Name:Fide Mccormack   Room: ICU 01/ICU 01     IMPRESSION:  Acute CHF  Afib with RVR.  Rate controlled now    PLAN:  Continue diuretic therapy  SUBJECTIVE:  She feeling better.  She denies problems of chest pain, shortness of breath, orthopnea, PND, palpitations, syncope, or bleeding problems.   ?  OBJECTIVE:  NAD  Scheduled Medicines  Current Facility-Administered Medications   Medication Dose Route Frequency Provider Last Rate    acetaminophen  650 mg Oral Q4H PRN Steven Estrada MD      albuterol  2.5 mg Nebulization Q4H PRN Steven Estrada MD      aluminum-magnesium hydroxide-simethicone  30 mL Oral Q6H PRN Steven Estrada MD      azithromycin  500 mg Oral Q24H Hesham Hurd MD      benzonatate  100 mg Oral TID Steven Estrada MD      cefTRIAXone  1,000 mg Intravenous Q24H Hesham Hurd MD 1,000 mg (02/05/25 6409)    digoxin  125 mcg Oral Daily Steven Estrada MD      furosemide  50 mg Intravenous BID Steven Estrada MD      levothyroxine  137 mcg Oral Early Morning Steven Estrada MD      magnesium hydroxide  15 mL Oral Daily PRN Steven Estrada MD      metoprolol tartrate  25 mg Oral Q8H CHRIS Steven Estrada MD      ondansetron  4 mg Intravenous Q6H PRN Steven Estrada MD      pantoprazole  40 mg Oral Early Morning Steven Estrada MD      potassium chloride  40 mEq Oral Daily Steven Estrada MD      prednisoLONE acetate  1 drop Right Eye 4x Daily Hesham Hurd MD      verapamil  180 mg Oral Daily Steven Estrada MD      warfarin  2.5 mg Oral Once per day on Sunday Monday Tuesday Thursday Friday Steven Estrada MD      warfarin  5 mg Oral Once per day on Wednesday Saturday Steven Estrada MD        Infusions      PRN Medicines    acetaminophen    albuterol    aluminum-magnesium  "hydroxide-simethicone    magnesium hydroxide    ondansetron   No Known Allergies   Diet Cardiovascular; Cardiac; Fluid Restriction 1800 ML    Intake/Output Summary (Last 24 hours) at 2/6/2025 1542  Last data filed at 2/6/2025 1242  Gross per 24 hour   Intake 960 ml   Output 2625 ml   Net -1665 ml      /56 (BP Location: Right arm)   Pulse 73   Temp 97.7 °F (36.5 °C) (Oral)   Resp (!) 27   Ht 5' 6\" (1.676 m)   Wt 79.4 kg (175 lb 0.7 oz)   SpO2 94%   BMI 28.25 kg/m²    BP Readings from Last 3 Encounters:   02/06/25 116/56   08/05/22 135/79   04/26/22 160/72     ?  Physical Exam:  General Appearance:  Alert, cooperative, in no acute distress   Eyes:  Sclerae anicteric.    HEET:  Normocephalic, atraumatic.    Neck: Supple, no JVD   Cardiovascular:  Normal rate, regular rhythm. No murmurs   No edema, normal pulses   Respiratory:  Breathing unlabored. Lungs clear to auscultation   Gastrointestinal:  Normoactive bowel sounds. Abdomen soft, nontender to palpation.   Musculoskeletal: No back or joint deformity.   Dermatologic:  Skin is warm and dry.   Neurologic: Alert and oriented and neurologic exam is grossly normal.    Psychiatric: Normal affect and mood.    LABS:  Lab Results   Component Value Date    BUN 24 02/06/2025    CREATININE 1.07 02/06/2025    CALCIUM 9.2 02/06/2025    K 4.0 02/06/2025    CO2 29 02/06/2025     02/06/2025    ALKPHOS 70 02/05/2025    AST 16 02/05/2025    ALT 13 02/05/2025     Lab Results   Component Value Date    WBC 8.00 02/06/2025    HGB 11.6 02/06/2025    HCT 37.2 02/06/2025    MCV 87 02/06/2025     02/06/2025     Lab Results   Component Value Date    HDL 45 (L) 07/26/2022    LDLCALC 103 (H) 07/26/2022    TRIG 115 07/26/2022     ?    ?  ?  ?  Cristian Gomez MD  "

## 2025-02-06 NOTE — ASSESSMENT & PLAN NOTE
CT scan shows questionable pneumonia in the left upper lobe of the lung.  No evidence of sepsis on admission.    Patient did have a white count of 16 on admission  Was started on abx on admission, if continues to improve tomm will consider dcing as white count has resolved and procal is negative

## 2025-02-06 NOTE — ASSESSMENT & PLAN NOTE
Continue metoprolol and verapamil with holding parameters, dig added by cardiology yesterday  Currently rate controlled  On Coumadin for anticoagulation with a target INR of between 2 and 3- INR is 1. 8 4 today, repeat kenia- is currently continued on her home regimen

## 2025-02-06 NOTE — PROGRESS NOTES
Progress Note - Hospitalist   Name: Fide Mccormack 84 y.o. female I MRN: [id number]  Unit/Bed#: ICU 01 I Date of Admission: 2/4/2025   Date of Service: 2/5/2025 I Hospital Day: 1    Assessment & Plan  Acute hypoxic respiratory failure (HCC)  In the setting of CHF and pna  On bipap in the ED, today has been maintained on 3L, wean as tolerated  Acute systolic congestive heart failure (HCC)  Wt Readings from Last 3 Encounters:   02/04/25 79.4 kg (175 lb 0.7 oz)   08/05/22 93.2 kg (205 lb 7.5 oz)   04/26/22 97.5 kg (215 lb)     Echo is pending, symptoms improved  2L output total yesterday, daily weight  Lasix 50BID continued, cardio consult appreciated  Downgrade to medsurg  Atrial fibrillation with RVR (HCC)  Continue metoprolol and verapamil with holding parameters, dig  Currently rate controlled  On Coumadin for anticoagulation with a target INR of between 2 and 3- INR is 1.78 today, repeat tomm- is currently continued on her home regimen   Essential hypertension  Continue metoprolol and verapamil with holding parameters.  Continue intravenous Lasix for her acute systolic CHF  GERD (gastroesophageal reflux disease)  Continue Protonix daily.  Maalox as needed  Hypothyroidism  Continue levothyroxine 137 mcg daily  SAKSHI (obstructive sleep apnea)  Was on BiPAP, now improved.  BiPAP as needed and oxygen as needed during this hospitalization  Pneumonia of upper lobe of lung  CT scan shows questionable pneumonia in the left upper lobe of the lung.  No evidence of sepsis on admission.    Patient did have a white count of 16 on admission  Was started on abx on admission, if continues to improve tomm will consider dcing as white count has resolved and procal is negative     VTE Pharmacologic Prophylaxis: VTE Score: 3 Moderate Risk (Score 3-4) - Pharmacological DVT Prophylaxis Ordered: warfarin (Coumadin).    Mobility:   Basic Mobility Inpatient Raw Score: 18  JH-HLM Goal: 6: Walk 10 steps or more  JH-HLM Achieved: 7: Walk 25  feet or more  JH-HLM Goal achieved. Continue to encourage appropriate mobility.    Patient Centered Rounds: I performed bedside rounds with nursing staff today.   Discussions with Specialists or Other Care Team Provider: HAI      Current Length of Stay: 1 day(s)  Current Patient Status: Inpatient   Certification Statement: The patient will continue to require additional inpatient hospital stay due to iv lasix, echo pending  Discharge Plan: Anticipate discharge in 24-48 hrs to home.    Code Status: Level 1 - Full Code    Subjective   Pt states she feels much better than when she came in, breathing is much improved. Has not been back on the breathing mask since being in the ED. No other complaints, did bring in her eye drop that she is using post cataract surgery. Sitting in chair, did not get too SOB while moving.     Objective :  Temp:  [97.1 °F (36.2 °C)-98.6 °F (37 °C)] 97.9 °F (36.6 °C)  HR:  [] 100  BP: (101-130)/(54-97) 108/63  Resp:  [20-42] 21  SpO2:  [91 %-95 %] 91 %  O2 Device: None (Room air)  Nasal Cannula O2 Flow Rate (L/min):  [1 L/min-4 L/min] 1 L/min    Body mass index is 28.25 kg/m².     Input and Output Summary (last 24 hours):     Intake/Output Summary (Last 24 hours) at 2/5/2025 1924  Last data filed at 2/5/2025 1811  Gross per 24 hour   Intake 1450 ml   Output 1050 ml   Net 400 ml       Physical Exam  Constitutional:       General: She is not in acute distress.     Appearance: She is not diaphoretic.   Cardiovascular:      Rate and Rhythm: Normal rate. Rhythm irregular.   Pulmonary:      Effort: Pulmonary effort is normal.      Breath sounds: Normal breath sounds.   Musculoskeletal:         General: No swelling.   Neurological:      Mental Status: She is alert.         Telemetry:  Telemetry Orders (From admission, onward)               24 Hour Telemetry Monitoring  Continuous x 24 Hours (Telem)        Expiring   Question:  Reason for 24 Hour Telemetry  Answer:  Decompensated CHF- and any one  of the following: continuous diuretic infusion or total diuretic dose >200 mg daily, associated electrolyte derangement (I.e. K < 3.0), inotropic drip (continuous infusion), hx of ventricular arrhythmia, or new EF < 35%                                Lab Results: I have reviewed the following results:   Results from last 7 days   Lab Units 02/05/25  0543 02/04/25  0555   WBC Thousand/uL 8.46 16.25*   HEMOGLOBIN g/dL 10.8* 13.3   HEMATOCRIT % 35.2 44.3   PLATELETS Thousands/uL 206 266   SEGS PCT %  --  76*   LYMPHO PCT %  --  16   MONO PCT %  --  6   EOS PCT %  --  1     Results from last 7 days   Lab Units 02/05/25  0543   SODIUM mmol/L 137   POTASSIUM mmol/L 4.0   CHLORIDE mmol/L 103   CO2 mmol/L 27   BUN mg/dL 22   CREATININE mg/dL 0.95   ANION GAP mmol/L 7   CALCIUM mg/dL 8.7   ALBUMIN g/dL 3.5   TOTAL BILIRUBIN mg/dL 0.82   ALK PHOS U/L 70   ALT U/L 13   AST U/L 16   GLUCOSE RANDOM mg/dL 88     Results from last 7 days   Lab Units 02/05/25  0543   INR  1.78*             Results from last 7 days   Lab Units 02/05/25  0543 02/04/25  1009   PROCALCITONIN ng/ml <0.05 <0.05       Recent Cultures (last 7 days):         Imaging Results Review: I reviewed radiology reports from this admission including: chest xray.  Other Study Results Review: EKG was reviewed. Afib rvr rate 121    Last 24 Hours Medication List:     Current Facility-Administered Medications:     acetaminophen (TYLENOL) tablet 650 mg, Q4H PRN    albuterol inhalation solution 2.5 mg, Q4H PRN    aluminum-magnesium hydroxide-simethicone (MAALOX) oral suspension 30 mL, Q6H PRN    azithromycin (ZITHROMAX) tablet 500 mg, Q24H    benzonatate (TESSALON PERLES) capsule 100 mg, TID    cefTRIAXone (ROCEPHIN) 1,000 mg in dextrose 5 % 50 mL IVPB, Q24H, Last Rate: 1,000 mg (02/05/25 4249)    digoxin (LANOXIN) tablet 125 mcg, Daily    furosemide (LASIX) injection 50 mg, BID    levothyroxine tablet 137 mcg, Early Morning    magnesium hydroxide (MILK OF MAGNESIA) oral  suspension 15 mL, Daily PRN    metoprolol tartrate (LOPRESSOR) tablet 25 mg, Q8H CHRIS    ondansetron (ZOFRAN) injection 4 mg, Q6H PRN    pantoprazole (PROTONIX) EC tablet 40 mg, Early Morning    potassium chloride (Klor-Con M20) CR tablet 40 mEq, Daily    prednisoLONE acetate (PRED FORTE) 1 % ophthalmic suspension 1 drop, 4x Daily    verapamil (CALAN-SR) CR tablet 180 mg, Daily    warfarin (COUMADIN) tablet 2.5 mg, Once per day on Sunday Monday Tuesday Thursday Friday    warfarin (COUMADIN) tablet 5 mg, Once per day on Wednesday Saturday    Administrative Statements   Today, Patient Was Seen By: Hesham Hurd MD      **Please Note: This note may have been constructed using a voice recognition system.**

## 2025-02-06 NOTE — ASSESSMENT & PLAN NOTE
Continue metoprolol and verapamil with holding parameters, dig  Currently rate controlled  On Coumadin for anticoagulation with a target INR of between 2 and 3- INR is 1.78 today, repeat blanquitam- is currently continued on her home regimen

## 2025-02-06 NOTE — CASE MANAGEMENT
Case Management Discharge Planning Note    Patient name Fide Mccormack  Location ICU ICU  MRN [id number]  : 1940 Date 2025       Current Admission Date: 2025  Current Admission Diagnosis:Acute hypoxic respiratory failure (HCC)   Patient Active Problem List    Diagnosis Date Noted Date Diagnosed    Acute hypoxic respiratory failure (HCC) 2025     Generalized weakness 2022     Atrial fibrillation with RVR (HCC) 2022     Chest pain 2022     Acute systolic congestive heart failure (HCC) 2022     Elevated d-dimer 2022     Class 2 obesity in adult 2021     Left femoral neck fracture 2021     History of breast cancer 2021     Left knee pain 2021     Rapid heart beat 2021     Other insomnia      SAKSHI (obstructive sleep apnea)      Anemia 2020     Pneumonia of upper lobe of lung 2020     Essential hypertension 2020     GERD (gastroesophageal reflux disease) 2020     Hypothyroidism 2020     Humeral fracture 2020     Hyponatremia 2020       LOS (days): 2  Geometric Mean LOS (GMLOS) (days): 3.9  Days to GMLOS:1.7     OBJECTIVE:  Risk of Unplanned Readmission Score: 14.81         Current admission status: Inpatient   Preferred Pharmacy:   [id number] [place] - [address] [address]  Phone: [phone] Fax: [phone]    [facility] [place] [place] - [address] [facility]  [address] [facility] [address]  Phone: [phone] Fax: [phone]    [facility] - [place] - [address] [facility]  [address] [facility]  [place] [address]  Phone: [phone] Fax: [phone]    Primary Care Provider: Toi Bose MD    Primary Insurance: MEDICARE  Secondary Insurance: AETNA    DISCHARGE DETAILS:                                          Other Referral/Resources/Interventions  Provided:  Referral Comments: Met w pt and granddaughter at bedside to discuss d/c.  Pt given IMM and Provider Choice List printed from [facility] for home health.  Pt feels she does not need home health; advised her if she changes her mind, she should call her PCP and make request.  Pt no longer using O2;  reports her PCP is looking into SAKSHI testing as an outpt for her.  Pending echo done this AM;  Dr. Hurd reports pt is still on IV lasix and is not for d/c today.  Pt updated by nursing re: anticipated d/c for tomorrow.         Treatment Team Recommendation: Home with Home Health Care, Outpatient Rehab  Discharge Destination Plan:: Home, Other (pt declining services)  Transport at Discharge : Family                             IMM Given (Date):: 02/06/25  IMM Given to:: Patient  Family notified:: mason at bedside

## 2025-02-06 NOTE — RESPIRATORY THERAPY NOTE
02/05/25 2126   Respiratory Assessment   Resp Comments placed pt on bipap for HS use   O2 Device v60 [facility]   Non-Invasive Information   O2 Interface Device Face mask   Non-Invasive Ventilation Mode BiPAP   $ Intermittent NIV Yes   SpO2 96 %   $ Pulse Oximetry Spot Check Charge Completed   Non-Invasive Settings   IPAP (cm) 12 cm   EPAP (cm) 6 cm   Rate (Set) 8   FiO2 (%) 40   Rise Time 2  (15 min ramp applied)   Inspiratory Time (Set) 1   Non-Invasive Readings   Total Rate 14   Vt (mL) (Mech) 597   MV (Mech) 8.1   Peak Pressure (Obs) 9   Leak (lpm) 0   Skin Intervention Skin intact   Non-Invasive Alarms   Insp Pressure High (cm H20) 25   Insp Pressure Low (cm H20) 5   Low Insp Pressure Time (sec) 20 sec   MV Low (L/min) 2   Vt High (mL) 1200   Vt Low (mL) 200   High Resp Rate (BPM) 40 BPM   Low Resp Rate (BPM) 6 BPM

## 2025-02-06 NOTE — ASSESSMENT & PLAN NOTE
Wt Readings from Last 3 Encounters:   02/06/25 79.4 kg (175 lb 0.7 oz)   08/05/22 93.2 kg (205 lb 7.5 oz)   04/26/22 97.5 kg (215 lb)     Echo is pending, symptoms improved  She is net negative total of -2.75 L today  Lasix 50BID continued, cardio consult appreciated

## 2025-02-06 NOTE — PLAN OF CARE
Problem: SAFETY ADULT  Goal: Patient will remain free of falls  Description: INTERVENTIONS:  - Educate patient/family on patient safety including physical limitations  - Instruct patient to call for assistance with activity   - Consult OT/PT to assist with strengthening/mobility   - Keep Call bell within reach  - Keep bed low and locked with side rails adjusted as appropriate  - Keep care items and personal belongings within reach  - Initiate and maintain comfort rounds  - Make Fall Risk Sign visible to staff  - Offer Toileting every 2 Hours, in advance of need  - Initiate/Maintain bed/chair alarm  - Obtain necessary fall risk management equipment  - Apply yellow socks and bracelet for high fall risk patients  - Consider moving patient to room near nurses station  Outcome: Progressing  Goal: Maintain or return to baseline ADL function  Description: INTERVENTIONS:  -  Assess patient's ability to carry out ADLs; assess patient's baseline for ADL function and identify physical deficits which impact ability to perform ADLs (bathing, care of mouth/teeth, toileting, grooming, dressing, etc.)  - Assess/evaluate cause of self-care deficits   - Assess range of motion  - Assess patient's mobility; develop plan if impaired  - Assess patient's need for assistive devices and provide as appropriate  - Encourage maximum independence but intervene and supervise when necessary  - Involve family in performance of ADLs  - Assess for home care needs following discharge   - Consider OT consult to assist with ADL evaluation and planning for discharge  - Provide patient education as appropriate  Outcome: Progressing  Goal: Maintains/Returns to pre admission functional level  Description: INTERVENTIONS:  - Perform AM-PAC 6 Click Basic Mobility/ Daily Activity assessment daily.  - Set and communicate daily mobility goal to care team and patient/family/caregiver.   - Collaborate with rehabilitation services on mobility goals if  consulted  - Perform Range of Motion 3 times a day.  - Reposition patient every 2 hours.  - Ambulate patient 3 times a day  - Out of bed to chair 3 times a day   - Out of bed for meals 3 times a day  - Out of bed for toileting  - Record patient progress and toleration of activity level   Outcome: Progressing

## 2025-02-06 NOTE — PROGRESS NOTES
Progress Note - Hospitalist   Name: Fide Mccormack 84 y.o. female I MRN: [id number]  Unit/Bed#: ICU 01 I Date of Admission: 2/4/2025   Date of Service: 2/6/2025 I Hospital Day: 2    Assessment & Plan  Acute hypoxic respiratory failure (HCC)  In the setting of CHF and pna  Has now been weaned to room air  Acute systolic congestive heart failure (HCC)  Wt Readings from Last 3 Encounters:   02/06/25 79.4 kg (175 lb 0.7 oz)   08/05/22 93.2 kg (205 lb 7.5 oz)   04/26/22 97.5 kg (215 lb)     Echo is pending, symptoms improved  She is net negative total of -2.75 L today  Lasix 50BID continued, cardio consult appreciated  Atrial fibrillation with RVR (HCC)  Continue metoprolol and verapamil with holding parameters, dig added by cardiology yesterday  Currently rate controlled  On Coumadin for anticoagulation with a target INR of between 2 and 3- INR is 1. 8 4 today, repeat tomm- is currently continued on her home regimen   Essential hypertension  Continue metoprolol and verapamil with holding parameters  GERD (gastroesophageal reflux disease)  Continue Protonix daily.  Maalox as needed  Hypothyroidism  Continue levothyroxine 137 mcg daily  SAKSHI (obstructive sleep apnea)  Was on BiPAP, now improved.  BiPAP as needed and oxygen as needed during this hospitalization  Pneumonia of upper lobe of lung  CT scan shows questionable pneumonia in the left upper lobe of the lung.  No evidence of sepsis on admission.    Patient did have a white count of 16 on admission  Was started on abx on admission, will dc today as less likley pna    VTE Pharmacologic Prophylaxis: VTE Score: 3 Moderate Risk (Score 3-4) - Pharmacological DVT Prophylaxis Ordered: warfarin (Coumadin).    Mobility:   Basic Mobility Inpatient Raw Score: 22  JH-HLM Goal: 7: Walk 25 feet or more  JH-HLM Achieved: 7: Walk 25 feet or more  JH-HLM Goal achieved. Continue to encourage appropriate mobility.    Patient Centered Rounds: I performed bedside rounds with nursing  staff today.   Discussions with Specialists or Other Care Team Provider: CM    Education and Discussions with Family / Patient: Updated  ([facility]) via phone.    Current Length of Stay: 2 day(s)  Current Patient Status: Inpatient   Certification Statement: The patient will continue to require additional inpatient hospital stay due to echo results, cardiology eval  Discharge Plan: Anticipate discharge in 24-48 hrs to home with home services.    Code Status: Level 1 - Full Code    Subjective   Sitting in the chair in positive spirits, is off of oxygen now.  Awaiting echocardiogram to be done today.  Otherwise feeling much better.    Objective :  Temp:  [97.4 °F (36.3 °C)-97.9 °F (36.6 °C)] 97.7 °F (36.5 °C)  HR:  [] 73  BP: ()/(54-83) 116/56  Resp:  [16-48] 27  SpO2:  [87 %-98 %] 94 %  O2 Device: None (Room air)  Nasal Cannula O2 Flow Rate (L/min):  [1 L/min] 1 L/min    Body mass index is 28.25 kg/m².     Input and Output Summary (last 24 hours):     Intake/Output Summary (Last 24 hours) at 2/6/2025 1506  Last data filed at 2/6/2025 1242  Gross per 24 hour   Intake 960 ml   Output 2625 ml   Net -1665 ml       Physical Exam  Constitutional:       Appearance: Normal appearance.   Cardiovascular:      Rate and Rhythm: Normal rate and regular rhythm.   Pulmonary:      Effort: Pulmonary effort is normal. No respiratory distress.      Breath sounds: Normal breath sounds.   Musculoskeletal:      Comments: Minimal edema in bilateral feet   Skin:     General: Skin is warm and dry.   Neurological:      Mental Status: She is alert.             Lab Results: I have reviewed the following results:   Results from last 7 days   Lab Units 02/06/25  0430   WBC Thousand/uL 8.00   HEMOGLOBIN g/dL 11.6   HEMATOCRIT % 37.2   PLATELETS Thousands/uL 214   SEGS PCT % 66   LYMPHO PCT % 21   MONO PCT % 10   EOS PCT % 2     Results from last 7 days   Lab Units 02/06/25  0430 02/05/25  0543   SODIUM mmol/L 137 137    POTASSIUM mmol/L 4.0 4.0   CHLORIDE mmol/L 101 103   CO2 mmol/L 29 27   BUN mg/dL 24 22   CREATININE mg/dL 1.07 0.95   ANION GAP mmol/L 7 7   CALCIUM mg/dL 9.2 8.7   ALBUMIN g/dL  --  3.5   TOTAL BILIRUBIN mg/dL  --  0.82   ALK PHOS U/L  --  70   ALT U/L  --  13   AST U/L  --  16   GLUCOSE RANDOM mg/dL 99 88     Results from last 7 days   Lab Units 02/06/25  0430   INR  1.84*             Results from last 7 days   Lab Units 02/05/25  0543 02/04/25  1009   PROCALCITONIN ng/ml <0.05 <0.05       Recent Cultures (last 7 days):               Last 24 Hours Medication List:     Current Facility-Administered Medications:     acetaminophen (TYLENOL) tablet 650 mg, Q4H PRN    albuterol inhalation solution 2.5 mg, Q4H PRN    aluminum-magnesium hydroxide-simethicone (MAALOX) oral suspension 30 mL, Q6H PRN    azithromycin (ZITHROMAX) tablet 500 mg, Q24H    benzonatate (TESSALON PERLES) capsule 100 mg, TID    cefTRIAXone (ROCEPHIN) 1,000 mg in dextrose 5 % 50 mL IVPB, Q24H, Last Rate: 1,000 mg (02/05/25 1759)    digoxin (LANOXIN) tablet 125 mcg, Daily    furosemide (LASIX) injection 50 mg, BID    levothyroxine tablet 137 mcg, Early Morning    magnesium hydroxide (MILK OF MAGNESIA) oral suspension 15 mL, Daily PRN    metoprolol tartrate (LOPRESSOR) tablet 25 mg, Q8H CHRIS    ondansetron (ZOFRAN) injection 4 mg, Q6H PRN    pantoprazole (PROTONIX) EC tablet 40 mg, Early Morning    potassium chloride (Klor-Con M20) CR tablet 40 mEq, Daily    prednisoLONE acetate (PRED FORTE) 1 % ophthalmic suspension 1 drop, 4x Daily    verapamil (CALAN-SR) CR tablet 180 mg, Daily    warfarin (COUMADIN) tablet 2.5 mg, Once per day on Sunday Monday Tuesday Thursday Friday    warfarin (COUMADIN) tablet 5 mg, Once per day on Wednesday Saturday    Administrative Statements   Today, Patient Was Seen By: Hesham Hurd MD      **Please Note: This note may have been constructed using a voice recognition system.**

## 2025-02-06 NOTE — ASSESSMENT & PLAN NOTE
In the setting of CHF and pna  On bipap in the ED, today has been maintained on 3L, wean as tolerated

## 2025-02-07 VITALS
DIASTOLIC BLOOD PRESSURE: 59 MMHG | HEART RATE: 119 BPM | SYSTOLIC BLOOD PRESSURE: 121 MMHG | BODY MASS INDEX: 28.13 KG/M2 | RESPIRATION RATE: 33 BRPM | TEMPERATURE: 97.7 F | HEIGHT: 66 IN | OXYGEN SATURATION: 91 % | WEIGHT: 175.04 LBS

## 2025-02-07 LAB
ANION GAP SERPL CALCULATED.3IONS-SCNC: 9 MMOL/L (ref 4–13)
BASOPHILS # BLD AUTO: 0.03 THOUSANDS/ΜL (ref 0–0.1)
BASOPHILS NFR BLD AUTO: 0 % (ref 0–1)
BUN SERPL-MCNC: 26 MG/DL (ref 5–25)
CALCIUM SERPL-MCNC: 9.1 MG/DL (ref 8.4–10.2)
CHLORIDE SERPL-SCNC: 99 MMOL/L (ref 96–108)
CO2 SERPL-SCNC: 28 MMOL/L (ref 21–32)
CREAT SERPL-MCNC: 1.11 MG/DL (ref 0.6–1.3)
EOSINOPHIL # BLD AUTO: 0.11 THOUSAND/ΜL (ref 0–0.61)
EOSINOPHIL NFR BLD AUTO: 1 % (ref 0–6)
ERYTHROCYTE [DISTWIDTH] IN BLOOD BY AUTOMATED COUNT: 16 % (ref 11.6–15.1)
GFR SERPL CREATININE-BSD FRML MDRD: 46 ML/MIN/1.73SQ M
GLUCOSE SERPL-MCNC: 96 MG/DL (ref 65–140)
HCT VFR BLD AUTO: 36.4 % (ref 34.8–46.1)
HGB BLD-MCNC: 11.5 G/DL (ref 11.5–15.4)
IMM GRANULOCYTES # BLD AUTO: 0.03 THOUSAND/UL (ref 0–0.2)
IMM GRANULOCYTES NFR BLD AUTO: 0 % (ref 0–2)
INR PPP: 1.87 (ref 0.85–1.19)
LYMPHOCYTES # BLD AUTO: 1.4 THOUSANDS/ΜL (ref 0.6–4.47)
LYMPHOCYTES NFR BLD AUTO: 17 % (ref 14–44)
MCH RBC QN AUTO: 27.3 PG (ref 26.8–34.3)
MCHC RBC AUTO-ENTMCNC: 31.6 G/DL (ref 31.4–37.4)
MCV RBC AUTO: 86 FL (ref 82–98)
MONOCYTES # BLD AUTO: 0.76 THOUSAND/ΜL (ref 0.17–1.22)
MONOCYTES NFR BLD AUTO: 9 % (ref 4–12)
NEUTROPHILS # BLD AUTO: 5.73 THOUSANDS/ΜL (ref 1.85–7.62)
NEUTS SEG NFR BLD AUTO: 73 % (ref 43–75)
NRBC BLD AUTO-RTO: 0 /100 WBCS
PLATELET # BLD AUTO: 209 THOUSANDS/UL (ref 149–390)
PMV BLD AUTO: 11 FL (ref 8.9–12.7)
POTASSIUM SERPL-SCNC: 4 MMOL/L (ref 3.5–5.3)
PROTHROMBIN TIME: 22.3 SECONDS (ref 12.3–15)
RBC # BLD AUTO: 4.22 MILLION/UL (ref 3.81–5.12)
SODIUM SERPL-SCNC: 136 MMOL/L (ref 135–147)
WBC # BLD AUTO: 8.06 THOUSAND/UL (ref 4.31–10.16)

## 2025-02-07 PROCEDURE — 85025 COMPLETE CBC W/AUTO DIFF WBC: CPT | Performed by: STUDENT IN AN ORGANIZED HEALTH CARE EDUCATION/TRAINING PROGRAM

## 2025-02-07 PROCEDURE — 94760 N-INVAS EAR/PLS OXIMETRY 1: CPT

## 2025-02-07 PROCEDURE — 99239 HOSP IP/OBS DSCHRG MGMT >30: CPT | Performed by: STUDENT IN AN ORGANIZED HEALTH CARE EDUCATION/TRAINING PROGRAM

## 2025-02-07 PROCEDURE — 80048 BASIC METABOLIC PNL TOTAL CA: CPT | Performed by: STUDENT IN AN ORGANIZED HEALTH CARE EDUCATION/TRAINING PROGRAM

## 2025-02-07 PROCEDURE — 85610 PROTHROMBIN TIME: CPT | Performed by: INTERNAL MEDICINE

## 2025-02-07 RX ORDER — WARFARIN SODIUM 5 MG/1
5 TABLET ORAL
Status: COMPLETED | OUTPATIENT
Start: 2025-02-07 | End: 2025-02-07

## 2025-02-07 RX ORDER — WARFARIN SODIUM 5 MG/1
5 TABLET ORAL
Status: DISCONTINUED | OUTPATIENT
Start: 2025-02-07 | End: 2025-02-07

## 2025-02-07 RX ADMIN — FUROSEMIDE 50 MG: 10 INJECTION, SOLUTION INTRAMUSCULAR; INTRAVENOUS at 09:04

## 2025-02-07 RX ADMIN — WARFARIN SODIUM 5 MG: 5 TABLET ORAL at 12:40

## 2025-02-07 RX ADMIN — PREDNISOLONE ACETATE 1 DROP: 10 SUSPENSION/ DROPS OPHTHALMIC at 12:40

## 2025-02-07 RX ADMIN — LEVOTHYROXINE SODIUM 137 MCG: 25 TABLET ORAL at 05:27

## 2025-02-07 RX ADMIN — BENZONATATE 100 MG: 100 CAPSULE ORAL at 09:04

## 2025-02-07 RX ADMIN — PREDNISOLONE ACETATE 1 DROP: 10 SUSPENSION/ DROPS OPHTHALMIC at 09:05

## 2025-02-07 RX ADMIN — VERAPAMIL HYDROCHLORIDE 180 MG: 180 TABLET ORAL at 09:04

## 2025-02-07 RX ADMIN — METOPROLOL TARTRATE 25 MG: 25 TABLET, FILM COATED ORAL at 13:18

## 2025-02-07 RX ADMIN — PANTOPRAZOLE SODIUM 40 MG: 40 TABLET, DELAYED RELEASE ORAL at 05:26

## 2025-02-07 RX ADMIN — POTASSIUM CHLORIDE 40 MEQ: 1500 TABLET, EXTENDED RELEASE ORAL at 09:04

## 2025-02-07 RX ADMIN — DIGOXIN 125 MCG: 125 TABLET ORAL at 09:04

## 2025-02-07 NOTE — ASSESSMENT & PLAN NOTE
CT scan shows questionable pneumonia in the left upper lobe of the lung.   Was started on abx on admission, antibiotics DC'd after total of 3 days

## 2025-02-07 NOTE — RESPIRATORY THERAPY NOTE
02/07/25 0403   Respiratory Assessment   Assessment Type Assess only   General Appearance Drowsy   Respiratory Pattern Normal   Chest Assessment Chest expansion symmetrical   Bilateral Breath Sounds Clear   Cough None   Resp Comments Pt remains on bipap   O2 Device V60   Non-Invasive Information   O2 Interface Device Face mask   Non-Invasive Ventilation Mode BiPAP   SpO2 99 %   $ Pulse Oximetry Spot Check Charge Completed   Non-Invasive Settings   IPAP (cm) 12 cm   EPAP (cm) 6 cm   Rate (Set) 8   FiO2 (%) 40   Pressure Support (cm H2O) 6   Rise Time 2   Non-Invasive Readings   Total Rate 17   Vt (mL) (Mech) 508   MV (Mech) 9   Peak Pressure (Obs) 13   Spontaneous Vt (mL) 441   Leak (lpm) 5   Skin Intervention Skin intact   Non-Invasive Alarms   Insp Pressure High (cm H20) 25   Insp Pressure Low (cm H20) 5   Low Insp Pressure Time (sec) 20 sec   MV Low (L/min) 2   Vt High (mL) 1200   Vt Low (mL) 200   High Resp Rate (BPM) 40 BPM   Low Resp Rate (BPM) 8 BPM     RT Ventilator Management Note  Fide Mccormack 84 y.o. female MRN: [id number]  Unit/Bed#: ICU 01 Encounter: [id number]      Daily Screen    No data found in the last 10 encounters.           Physical Exam:   Assessment Type: Assess only  General Appearance: Drowsy  Respiratory Pattern: Normal  Chest Assessment: Chest expansion symmetrical  Bilateral Breath Sounds: Clear  Cough: None  O2 Device: V60      Resp Comments: Pt remains on bipap

## 2025-02-07 NOTE — ASSESSMENT & PLAN NOTE
Wt Readings from Last 3 Encounters:   02/06/25 79.4 kg (175 lb 0.7 oz)   08/05/22 93.2 kg (205 lb 7.5 oz)   04/26/22 97.5 kg (215 lb)     Echo with systolic function severely reduced at 20 to 25%, discussed with cardiology, harper for DC home today with follow-up with them on continued diuretics and digoxin, net -3.3L

## 2025-02-07 NOTE — PLAN OF CARE
Problem: PAIN - ADULT  Goal: Verbalizes/displays adequate comfort level or baseline comfort level  Description: Interventions:  - Encourage patient to monitor pain and request assistance  - Assess pain using appropriate pain scale  - Administer analgesics based on type and severity of pain and evaluate response  - Implement non-pharmacological measures as appropriate and evaluate response  - Consider cultural and social influences on pain and pain management  - Notify physician/advanced practitioner if interventions unsuccessful or patient reports new pain  Outcome: Progressing     Problem: INFECTION - ADULT  Goal: Absence or prevention of progression during hospitalization  Description: INTERVENTIONS:  - Assess and monitor for signs and symptoms of infection  - Monitor lab/diagnostic results  - Monitor all insertion sites, i.e. indwelling lines, tubes, and drains  - Monitor endotracheal if appropriate and nasal secretions for changes in amount and color  - [place] appropriate cooling/warming therapies per order  - Administer medications as ordered  - Instruct and encourage patient and family to use good hand hygiene technique  - Identify and instruct in appropriate isolation precautions for identified infection/condition  Outcome: Progressing  Goal: Absence of fever/infection during neutropenic period  Description: INTERVENTIONS:  - Monitor WBC    Outcome: Progressing     Problem: SAFETY ADULT  Goal: Patient will remain free of falls  Description: INTERVENTIONS:  - Educate patient/family on patient safety including physical limitations  - Instruct patient to call for assistance with activity   - Consult OT/PT to assist with strengthening/mobility   - Keep Call bell within reach  - Keep bed low and locked with side rails adjusted as appropriate  - Keep care items and personal belongings within reach  - Initiate and maintain comfort rounds  - Make Fall Risk Sign visible to staff  - Offer Toileting every  Hours,  in advance of need  - Initiate/Maintain alarm  - Obtain necessary fall risk management equipment:   - Apply yellow socks and bracelet for high fall risk patients  - Consider moving patient to room near nurses station  Outcome: Progressing  Goal: Maintain or return to baseline ADL function  Description: INTERVENTIONS:  -  Assess patient's ability to carry out ADLs; assess patient's baseline for ADL function and identify physical deficits which impact ability to perform ADLs (bathing, care of mouth/teeth, toileting, grooming, dressing, etc.)  - Assess/evaluate cause of self-care deficits   - Assess range of motion  - Assess patient's mobility; develop plan if impaired  - Assess patient's need for assistive devices and provide as appropriate  - Encourage maximum independence but intervene and supervise when necessary  - Involve family in performance of ADLs  - Assess for home care needs following discharge   - Consider OT consult to assist with ADL evaluation and planning for discharge  - Provide patient education as appropriate  Outcome: Progressing  Goal: Maintains/Returns to pre admission functional level  Description: INTERVENTIONS:  - Perform AM-PAC 6 Click Basic Mobility/ Daily Activity assessment daily.  - Set and communicate daily mobility goal to care team and patient/family/caregiver.   - Collaborate with rehabilitation services on mobility goals if consulted  - Perform Range of Motion  times a day.  - Reposition patient every  hours.  - Dangle patient  times a day  - Stand patient  times a day  - Ambulate patient  times a day  - Out of bed to chair  times a day   - Out of bed for meals times a day  - Out of bed for toileting  - Record patient progress and toleration of activity level   Outcome: Progressing     Problem: DISCHARGE PLANNING  Goal: Discharge to home or other facility with appropriate resources  Description: INTERVENTIONS:  - Identify barriers to discharge w/patient and caregiver  - Arrange for  needed discharge resources and transportation as appropriate  - Identify discharge learning needs (meds, wound care, etc.)  - Arrange for interpretive services to assist at discharge as needed  - Refer to Case Management Department for coordinating discharge planning if the patient needs post-hospital services based on physician/advanced practitioner order or complex needs related to functional status, cognitive ability, or social support system  Outcome: Progressing     Problem: Knowledge Deficit  Goal: Patient/family/caregiver demonstrates understanding of disease process, treatment plan, medications, and discharge instructions  Description: Complete learning assessment and assess knowledge base.  Interventions:  - Provide teaching at level of understanding  - Provide teaching via preferred learning methods  Outcome: Progressing     Problem: CARDIOVASCULAR - ADULT  Goal: Maintains optimal cardiac output and hemodynamic stability  Description: INTERVENTIONS:  - Monitor I/O, vital signs and rhythm  - Monitor for S/S and trends of decreased cardiac output  - Administer and titrate ordered vasoactive medications to optimize hemodynamic stability  - Assess quality of pulses, skin color and temperature  - Assess for signs of decreased coronary artery perfusion  - Instruct patient to report change in severity of symptoms  Outcome: Progressing  Goal: Absence of cardiac dysrhythmias or at baseline rhythm  Description: INTERVENTIONS:  - Continuous cardiac monitoring, vital signs, obtain 12 lead EKG if ordered  - Administer antiarrhythmic and heart rate control medications as ordered  - Monitor electrolytes and administer replacement therapy as ordered  Outcome: Progressing     Problem: RESPIRATORY - ADULT  Goal: Achieves optimal ventilation and oxygenation  Description: INTERVENTIONS:  - Assess for changes in respiratory status  - Assess for changes in mentation and behavior  - Position to facilitate oxygenation and  minimize respiratory effort  - Oxygen administered by appropriate delivery if ordered  - Initiate smoking cessation education as indicated  - Encourage broncho-pulmonary hygiene including cough, deep breathe, Incentive Spirometry  - Assess the need for suctioning and aspirate as needed  - Assess and instruct to report SOB or any respiratory difficulty  - Respiratory Therapy support as indicated  Outcome: Progressing     Problem: METABOLIC, FLUID AND ELECTROLYTES - ADULT  Goal: Electrolytes maintained within normal limits  Description: INTERVENTIONS:  - Monitor labs and assess patient for signs and symptoms of electrolyte imbalances  - Administer electrolyte replacement as ordered  - Monitor response to electrolyte replacements, including repeat lab results as appropriate  - Instruct patient on fluid and nutrition as appropriate  Outcome: Progressing  Goal: Fluid balance maintained  Description: INTERVENTIONS:  - Monitor labs   - Monitor I/O and WT  - Instruct patient on fluid and nutrition as appropriate  - Assess for signs & symptoms of volume excess or deficit  Outcome: Progressing     Problem: Nutrition/Hydration-ADULT  Goal: Nutrient/Hydration intake appropriate for improving, restoring or maintaining nutritional needs  Description: Monitor and assess patient's nutrition/hydration status for malnutrition. Collaborate with interdisciplinary team and initiate plan and interventions as ordered.  Monitor patient's weight and dietary intake as ordered or per policy. Utilize nutrition screening tool and intervene as necessary. Determine patient's food preferences and provide high-protein, high-caloric foods as appropriate.     INTERVENTIONS:  - Monitor oral intake, urinary output, labs, and treatment plans  - Assess nutrition and hydration status and recommend course of action  - Evaluate amount of meals eaten  - Assist patient with eating if necessary   - Allow adequate time for meals  - Recommend/ encourage  appropriate diets, oral nutritional supplements, and vitamin/mineral supplements  - Order, calculate, and assess calorie counts as needed  - Recommend, monitor, and adjust tube feedings and TPN/PPN based on assessed needs  - Assess need for intravenous fluids  - Provide specific nutrition/hydration education as appropriate  - Include patient/family/caregiver in decisions related to nutrition  Outcome: Progressing

## 2025-02-07 NOTE — DISCHARGE SUMMARY
Discharge Summary - Hospitalist   Name: Fide Mccormack 84 y.o. female I MRN: [id number]  Unit/Bed#: ICU 01 I Date of Admission: 2/4/2025   Date of Service: 2/7/2025 I Hospital Day: 3     Assessment & Plan  Acute hypoxic respiratory failure (HCC)  In the setting of CHF and pna  Resolved, has now been weaned to room air  Acute systolic congestive heart failure (HCC)  Wt Readings from Last 3 Encounters:   02/06/25 79.4 kg (175 lb 0.7 oz)   08/05/22 93.2 kg (205 lb 7.5 oz)   04/26/22 97.5 kg (215 lb)     Echo with systolic function severely reduced at 20 to 25%, discussed with cardiology, okay for DC home today with follow-up with them on continued diuretics and digoxin, net -3.3L    Atrial fibrillation with RVR (HCC)  Continue metoprolol and verapamil with holding parameters, dig added by cardiology yesterday  Currently rate controlled  On Coumadin for anticoagulation with a target INR of between 2 and 3- INR is 1.87 again today, patient states that she just got cataract surgery and was taken off it for a while and wants to go home continue following INR from home-her PCP doses her warfarin for  Instructed to continue taking 5 mg daily  Essential hypertension  Continue metoprolol and verapamil with holding parameters  GERD (gastroesophageal reflux disease)  Continue Protonix daily.  Maalox as needed  Hypothyroidism  Continue levothyroxine 137 mcg daily  SAKSHI (obstructive sleep apnea)  Was on BiPAP, now improved.  BiPAP as needed and oxygen as needed during this hospitalization  Pneumonia of upper lobe of lung  CT scan shows questionable pneumonia in the left upper lobe of the lung.   Was started on abx on admission, antibiotics DC'd after total of 3 days     Medical Problems       Resolved Problems  Date Reviewed: 2/4/2025   None       Discharging Physician / Practitioner: Hesham Hurd MD  PCP: Toi Bose MD  Admission Date:   Admission Orders (From admission, onward)       Ordered        02/04/25 0854  INPATIENT  ADMISSION  Once                          Discharge Date: 02/07/25    Consultations During Hospital Stay:  cardiology    Procedures Performed:   none    Significant Findings / Test Results:   Echo- ef 20-25%    Incidental Findings:    none    Test Results Pending at Discharge (will require follow up):   none     Outpatient Tests Requested:  none    Complications:  none    Reason for Admission: SOB    Hospital Course:   Fide Mccormack is a 84 y.o. female patient who originally presented to the hospital on 2/4/2025 due to sudden onset shortness of breath.  She was found to have pulmonary edema as well as A-fib with RVR.  She was treated with IV diuretics, as well as beta-blockers and digoxin.  Cardiology was consulted who is also her outpatient cardiologist.  She put out almost 6 L of urine during her hospital stay.  Her echocardiogram showed further decrease in systolic function to 20 to 25%.  Discussed with patient and patient's cardiologist.  The patient was very adamant that she wanted to go home as she felt much better, her oxygen was weaned to room air.  Noted that her Coumadin was recently held for cataract surgery and was being managed with her primary care at home to slowly bring it back to goal.  She is okay with going home and continuing checking INR daily with PCP.  She is discharged home in stable condition, counseled on strict outpatient follow-up and medication adherence.    Please see above list of diagnoses and related plan for additional information.     Condition at Discharge: stable    Discharge Day Visit / Exam:   Subjective: Patient is elated with the possibility of going home.  Heart rate is noted to go up into the 130s, states she is very excited seen smiling.  Discussed that her EF is very low and she needs to look out for things like increased lower extremity swelling or abdominal swelling as well as shortness of breath or weight gain.  Patient states she already has an appointment to see her  "cardiologist early next week.  Vitals: Blood Pressure: 121/59 (02/07/25 0700)  Pulse: (!) 119 (02/07/25 0904)  Temperature: 97.7 °F (36.5 °C) (02/07/25 0700)  Temp Source: Oral (02/07/25 0700)  Respirations: (!) 33 (02/07/25 0700)  Height: 5' 6\" (167.6 cm) (02/06/25 1018)  Weight - Scale: 79.4 kg (175 lb 0.7 oz) (02/06/25 1018)  SpO2: 91 % (02/07/25 0700)  Physical Exam  Constitutional:       General: She is not in acute distress.     Appearance: Normal appearance.   Cardiovascular:      Rate and Rhythm: Tachycardia present. Rhythm irregular.   Pulmonary:      Effort: Pulmonary effort is normal.      Breath sounds: Normal breath sounds.   Musculoskeletal:         General: No swelling.   Skin:     General: Skin is warm and dry.   Neurological:      General: No focal deficit present.      Mental Status: She is alert.          Discussion with Family: Patient declined call to .  Pt expressed understanding of plan of care and states she would communicate to her granddaughter resolved.    Discharge instructions/Information to patient and family:   See after visit summary for information provided to patient and family.      Provisions for Follow-Up Care:  See after visit summary for information related to follow-up care and any pertinent home health orders.      Mobility at time of Discharge:   Basic Mobility Inpatient Raw Score: 22  JH-HLM Goal: 7: Walk 25 feet or more  JH-HLM Achieved: 7: Walk 25 feet or more       Disposition:   Home    Planned Readmission: no    Discharge Medications:  See after visit summary for reconciled discharge medications provided to patient and/or family.      Administrative Statements   Discharge Statement:  I have spent a total time of 40 minutes in caring for this patient on the day of the visit/encounter. >30 minutes of time was spent on: Diagnostic results, Prognosis, Risks and benefits of tx options, Instructions for management, Patient and family education, Importance of tx " compliance, Counseling / Coordination of care, Documenting in the medical record, Reviewing / ordering tests, medicine, procedures  , and Communicating with other healthcare professionals .    **Please Note: This note may have been constructed using a voice recognition system**

## 2025-02-07 NOTE — RESPIRATORY THERAPY NOTE
02/06/25 2215   Respiratory Assessment   Assessment Type Assess only   General Appearance Awake;Alert   Respiratory Pattern Normal   Chest Assessment Chest expansion symmetrical   Bilateral Breath Sounds Clear   Cough None   Resp Comments Pt placed on bipap at this time   O2 Device V60   Non-Invasive Information   O2 Interface Device Face mask   Non-Invasive Ventilation Mode BiPAP   $ Intermittent NIV Yes   SpO2 98 %   $ Pulse Oximetry Spot Check Charge Completed   Non-Invasive Settings   IPAP (cm) 12 cm   EPAP (cm) 6 cm   Rate (Set) 8   FiO2 (%) 40   Pressure Support (cm H2O) 6   Rise Time 2   Non-Invasive Readings   Total Rate 26   Vt (mL) (Mech) 672   MV (Mech) 17   Peak Pressure (Obs) 13   Spontaneous Vt (mL) 786   Leak (lpm) 11   Skin Intervention Skin intact   Non-Invasive Alarms   Insp Pressure High (cm H20) 25   Insp Pressure Low (cm H20) 5   Low Insp Pressure Time (sec) 20 sec   Vt High (mL) 1200   Vt Low (mL) 200   High Resp Rate (BPM) 40 BPM   Low Resp Rate (BPM) 8 BPM     RT Ventilator Management Note  Fide Mccormack 84 y.o. female MRN: [id number]  Unit/Bed#: ICU 01 Encounter: [id number]      Daily Screen    No data found in the last 10 encounters.           Physical Exam:   Assessment Type: Assess only  General Appearance: Awake, Alert  Respiratory Pattern: Normal  Chest Assessment: Chest expansion symmetrical  Bilateral Breath Sounds: Clear  Cough: None  O2 Device: V60      Resp Comments: Pt placed on bipap at this time

## 2025-02-07 NOTE — ASSESSMENT & PLAN NOTE
Continue metoprolol and verapamil with holding parameters, dig added by cardiology yesterday  Currently rate controlled  On Coumadin for anticoagulation with a target INR of between 2 and 3- INR is 1.87 again today, patient states that she just got cataract surgery and was taken off it for a while and wants to go home continue following INR from home-her PCP doses her warfarin for  Instructed to continue taking 5 mg daily

## 2025-03-06 PROBLEM — J18.9 PNEUMONIA OF UPPER LOBE OF LUNG: Status: RESOLVED | Noted: 2020-01-13 | Resolved: 2025-03-06

## 2025-08-05 RX ORDER — LEVOTHYROXINE SODIUM 137 UG/1
TABLET ORAL
COMMUNITY
Start: 2021-01-01

## 2025-08-07 ENCOUNTER — OFFICE VISIT (OUTPATIENT)
Dept: GASTROENTEROLOGY | Facility: CLINIC | Age: 85
End: 2025-08-07
Payer: MEDICARE

## 2025-08-07 VITALS
HEART RATE: 70 BPM | WEIGHT: 170 LBS | HEIGHT: 65 IN | OXYGEN SATURATION: 98 % | DIASTOLIC BLOOD PRESSURE: 72 MMHG | SYSTOLIC BLOOD PRESSURE: 130 MMHG | BODY MASS INDEX: 28.32 KG/M2 | TEMPERATURE: 97.5 F

## 2025-08-07 DIAGNOSIS — R19.5 POSITIVE COLORECTAL CANCER SCREENING USING COLOGUARD TEST: Primary | ICD-10-CM

## 2025-08-07 PROCEDURE — 99213 OFFICE O/P EST LOW 20 MIN: CPT | Performed by: PHYSICIAN ASSISTANT

## 2025-08-07 RX ORDER — LANCETS 33 GAUGE
EACH MISCELLANEOUS
COMMUNITY
Start: 2025-06-23

## 2025-08-07 RX ORDER — LOSARTAN POTASSIUM 25 MG/1
25 TABLET ORAL DAILY
COMMUNITY
Start: 2025-03-04

## 2025-08-07 RX ORDER — ALBUTEROL SULFATE 90 UG/1
2 INHALANT RESPIRATORY (INHALATION) EVERY 4 HOURS PRN
COMMUNITY
Start: 2025-03-19

## 2025-08-07 RX ORDER — DEXTROMETHORPHAN HYDROBROMIDE AND PROMETHAZINE HYDROCHLORIDE 15; 6.25 MG/5ML; MG/5ML
5 SYRUP ORAL 4 TIMES DAILY PRN
COMMUNITY
Start: 2025-03-19

## 2025-08-15 ENCOUNTER — TELEPHONE (OUTPATIENT)
Age: 85
End: 2025-08-15

## (undated) DEVICE — LIGHT HANDLE COVER SLEEVE DISP BLUE STELLAR

## (undated) DEVICE — INTENDED FOR TISSUE SEPARATION, AND OTHER PROCEDURES THAT REQUIRE A SHARP SURGICAL BLADE TO PUNCTURE OR CUT.: Brand: BARD-PARKER SAFETY BLADES SIZE 15, STERILE

## (undated) DEVICE — CHLORAPREP HI-LITE 26ML ORANGE

## (undated) DEVICE — GLOVE SRG BIOGEL 8.5

## (undated) DEVICE — 2.8MM THREADED GUIDE WIRE- TROCAR POINT 300MM

## (undated) DEVICE — ACE WRAP 6 IN UNSTERILE

## (undated) DEVICE — 5.0MM CANNULATED DRILL BIT LARGE QC/300MM

## (undated) DEVICE — ADHESIVE SKIN HIGH VISCOSITY EXOFIN 1ML

## (undated) DEVICE — SUT MONOCRYL 3-0 PS-2 18 IN Y497G

## (undated) DEVICE — DRESSING MEPILEX AG BORDER 4 X 4 IN

## (undated) DEVICE — DRESSING MEPILEX AG BORDER 4 X 8 IN

## (undated) DEVICE — GLOVE INDICATOR PI UNDERGLOVE SZ 8.5 BLUE

## (undated) DEVICE — STERILE BETHLEHEM ORIF HIP PK: Brand: CARDINAL HEALTH

## (undated) DEVICE — 6617 IOBAN II PATIENT ISOLATION DRAPE 5/BX,4BX/CS: Brand: STERI-DRAPE™ IOBAN™ 2